# Patient Record
Sex: FEMALE | Race: WHITE | Employment: UNEMPLOYED | URBAN - METROPOLITAN AREA
[De-identification: names, ages, dates, MRNs, and addresses within clinical notes are randomized per-mention and may not be internally consistent; named-entity substitution may affect disease eponyms.]

---

## 2017-01-03 ENCOUNTER — OFFICE VISIT (OUTPATIENT)
Dept: OBGYN CLINIC | Age: 62
End: 2017-01-03

## 2017-01-03 ENCOUNTER — HOSPITAL ENCOUNTER (OUTPATIENT)
Dept: LAB | Age: 62
Discharge: HOME | End: 2017-01-03
Payer: MEDICARE

## 2017-01-03 VITALS
SYSTOLIC BLOOD PRESSURE: 133 MMHG | WEIGHT: 205 LBS | BODY MASS INDEX: 35 KG/M2 | HEIGHT: 64 IN | HEART RATE: 84 BPM | DIASTOLIC BLOOD PRESSURE: 74 MMHG | RESPIRATION RATE: 18 BRPM

## 2017-01-03 DIAGNOSIS — Z01.419 ENCOUNTER FOR GYNECOLOGICAL EXAMINATION WITHOUT ABNORMAL FINDING: Primary | ICD-10-CM

## 2017-01-03 DIAGNOSIS — Z87.448 HISTORY OF HEMATURIA: ICD-10-CM

## 2017-01-03 LAB
BILIRUB UR QL STRIP: NEGATIVE
GLUCOSE UR-MCNC: NEGATIVE MG/DL
KETONES P FAST UR STRIP-MCNC: NEGATIVE MG/DL
PH UR STRIP: 6 [PH] (ref 4.6–8)
PROT UR QL STRIP: NEGATIVE MG/DL
SP GR UR STRIP: 1.02 (ref 1–1.03)
UA UROBILINOGEN AMB POC: NORMAL (ref 0.2–1)
URINALYSIS CLARITY POC: CLEAR
URINALYSIS COLOR POC: YELLOW
URINE BLOOD POC: NORMAL
URINE LEUKOCYTES POC: NEGATIVE
URINE NITRITES POC: NEGATIVE

## 2017-01-03 PROCEDURE — 88175 CYTOPATH C/V AUTO FLUID REDO: CPT | Performed by: OBSTETRICS & GYNECOLOGY

## 2017-01-03 PROCEDURE — 87624 HPV HI-RISK TYP POOLED RSLT: CPT | Performed by: OBSTETRICS & GYNECOLOGY

## 2017-01-03 NOTE — PROGRESS NOTES
Subjective:   62 y.o. female for Well Woman Check. No LMP recorded. Patient is postmenopausal.    Social History: not sexually active. Pertinent past medical hstory: liver disease, former smoker, no history of HTN, DVT, CAD, DM, or migraines. Current Outpatient Prescriptions   Medication Sig Dispense Refill    omeprazole (PRILOSEC) 20 mg capsule Take 1 Cap by mouth daily. 30 Cap 2    levothyroxine (SYNTHROID) 125 mcg tablet Take 1 Tab by mouth Daily (before breakfast). 90 Tab 1    tiZANidine (ZANAFLEX) 4 mg tablet Take 1 Tab by mouth every six (6) hours. 120 Tab 0    meloxicam (MOBIC) 7.5 mg tablet Take 1 Tab by mouth daily. 30 Tab 2    traMADol (ULTRAM) 50 mg tablet Take 1 Tab by mouth three (3) times daily as needed for Pain. Max Daily Amount: 150 mg. Indications: PAIN 90 Tab 0    zolpidem (AMBIEN) 10 mg tablet   0    benztropine (COGENTIN) 1 mg tablet Take 1 mg by mouth three (3) times daily.  ARIPiprazole (ABILIFY) 5 mg tablet Take 5 mg by mouth daily.        Allergies   Allergen Reactions    Adhesive Tape-Silicones Rash     Past Medical History   Diagnosis Date    Abnormal Papanicolaou smear of cervix      1988 HPV    Asthma     Bipolar 1 disorder (HCC)     Bipolar 1 disorder, depressed (HCC)     Bursitis     Carpal tunnel syndrome     Cirrhosis, hepatitis C     Connective tissue disease ([address]     DDD (degenerative disc disease), lumbosacral     Gastric ulcer     Hashimoto's disease     Hashimoto's disease     Hepatitis C     Herniated intervertebral disc of lumbar spine     Hypermobility syndrome     Liver disease     Osteoarthritis     Plantar fasciitis, bilateral     RA (rheumatoid arthritis) ([address]      Past Surgical History   Procedure Laterality Date    Hx appendectomy      Hx heent      Hx gyn       BTL    Hx dilation and curettage  1988     cryso     Family History   Problem Relation Age of Onset    No Known Problems Mother     No Known Problems Father Social History   Substance Use Topics    Smoking status: Former Smoker     Quit date: 7/1/2005    Smokeless tobacco: Never Used    Alcohol use No        ROS:  Feeling well. No dyspnea or chest pain on exertion. No abdominal pain, change in bowel habits, black or bloody stools. No urinary tract symptoms. GYN ROS: no breast pain or new or enlarging lumps on self exam, no vaginal bleeding, no discharge or pelvic pain, no hot flashes. She complains of a history of hematuria which has so far not been worked up. No neurological complaints. Objective:     Visit Vitals    /74    Pulse 84    Resp 18    Ht 5' 4\" (1.626 m)    Wt 205 lb (93 kg)    BMI 35.19 kg/m2     The patient appears well, alert, oriented x 3, in no distress. ENT normal.  Neck supple. No adenopathy or thyromegaly. BUBBA. Lungs are clear, good air entry, no wheezes, rhonchi or rales. S1 and S2 normal, no murmurs, regular rate and rhythm. Abdomen soft without tenderness, guarding, mass or organomegaly. Extremities show no edema, normal peripheral pulses. Neurological is normal, no focal findings. BREAST EXAM: breasts appear normal, no suspicious masses, no skin or nipple changes or axillary nodes    PELVIC EXAM: normal external genitalia, vulva, vagina, cervix, uterus and adnexa, PAP: Pap smear done today, HPV test    UA: SG 1.025, 2+ blood    Assessment/Plan:   well woman  Hematuria, will contact the patient's PCP for follow up.   Mammogram done  pap smear  return annually or prn

## 2017-01-05 ENCOUNTER — HOSPITAL ENCOUNTER (OUTPATIENT)
Dept: PHYSICAL THERAPY | Age: 62
Discharge: HOME | End: 2017-01-05
Payer: MEDICARE

## 2017-01-05 PROCEDURE — 97530 THERAPEUTIC ACTIVITIES: CPT

## 2017-01-05 PROCEDURE — 97110 THERAPEUTIC EXERCISES: CPT

## 2017-01-05 PROCEDURE — 97112 NEUROMUSCULAR REEDUCATION: CPT

## 2017-01-05 NOTE — PROGRESS NOTES
PT DAILY TREATMENT NOTE - [facility]     Patient Name: Brant De La Garza  Date:2017  : 1955  [x]  Patient  Verified  [place]or: [place] MED[place] / Plan: [place] MEDICARE PART A & B / Product Type: Medicare /    In time: 10:27 am  Out time:11:26 am  Total Treatment Time (min): 59  Total Timed Codes (min): 49  1:1 Treatment Time ( [address] only): 49   Visit #: 14 of 24    Treatment Area: Myalgia [M79.1]    SUBJECTIVE  Pain Level (0-10 scale): 0  Any medication changes, allergies to medications, adverse drug reactions, diagnosis change, or new procedure performed?: [x] No    [] Yes (see summary sheet for update)  Subjective functional status/changes:   [] No changes reported  \"I feel great. My energy level is good and my shoulder is doing great. \"    OBJECTIVE    Modality rationale: decrease pain to improve the patients ability to tolerate daily activities    Min Type Additional Details    [] Estim:  []Unatt       []IFC  []Premod                        []Other:  []w/ice   []w/heat  Position:  Location:    [] Estim: []Att    []TENS instruct  []NMES                    []Other:  []w/US   []w/ice   []w/heat  Position:  Location:    []  Traction: [] Cervical       []Lumbar                       [] Prone          []Supine                       []Intermittent   []Continuous Lbs:  [] before manual  [] after manual    []  Ultrasound: []Continuous   [] Pulsed                           []1MHz   []3MHz W/cm2:  Location:    []  Iontophoresis with dexamethasone         Location: [] Take home patch   [] In clinic   10 [x]  Ice     []  heat  []  Ice massage  []  Laser   []  Anodyne Position: sitting   Location: right shoulder    []  Laser with stim  []  Other:  Position:  Location:    []  Vasopneumatic Device Pressure:       [] lo [] med [] hi   Temperature: [] lo [] med [] hi   [x] Skin assessment post-treatment:  [x]intact []redness- no adverse reaction    []redness - adverse reaction:     29 min Therapeutic Exercise: [x] See flow sheet : Rationale: increase ROM, increase strength, improve coordination and increase proprioception to improve the patients ability to perform ADLs and ambulation with decreased pain     10 min Therapeutic Activity: [x] See flow sheet :   Rationale: increase ROM, increase strength, improve coordination and increase proprioception to improve the patients ability to perform ADLs and ambulation with decreased pain      10 min Neuromuscular Re-education: [x] See flow sheet :dry needling procedure note    Rationale: increase ROM, increase strength, improve coordination, increase proprioception and  perform ADLs and ambulation with decrease pain to improve the patients ability to perform ADLs and ambulation with decreased pain    Dry Needling Procedure Note    Procedure: An intramuscular manual therapy (dry needling) and a neuro-muscular re-education treatment was done to deactivate myofascial trigger points with a 30 gauge filament needle under aseptic technique. Indications:  [x] Myofascial pain and dysfunction [] Muscled spasms  [] Myalgia/myositis   [] Muscle cramps  [] Muscle imbalances  [] Temporomandibular Dysfunction  [] Other:    Chart reviewed for the following:  Nicol LING PT, DPT, have reviewed the medical history, summary list and precautions/contraindications for Pete Day.   TIME OUT performed immediately prior to start of procedure:  Nicol LING PT, SHALINIT, have performed the following reviews on Pete Day prior to the start of the session:      [x] Verified patient identification by name and date of birth    [x] Agreement on all muscles being treated was verified   [x] Purpose of dry needling, side effects, possible complications, risks and benefits were explained to the patient   [x] Procedure site(s) verified  [x] Patient was positioned for comfort and draped for privacy  [x] Informed Consent was signed (initial visit) and verified verbally (subsequent visits)  [x] Patient was instructed on the need to report the use of blood thinners and/or immunosuppressant medications  [x] How to respond to possible adverse effects of treatment  [x] Self treatment of post needling soreness: ice, heat (moist heat, heat wraps) and stretching  [x] Opportunity was given to ask any questions, all questions were answered            Time: 10:54 am  Date of procedure: 1/5/2017  Treatment: The following muscles were treated today with intramuscular dry needling  [] Left [] Right Masster  [] Left [] Right Temporalis  [] Left [] Right Zygomaticus Major / Minor  [] Left [] Right Lateral Pterygoid  [] Left [] Right Medial Pterygoid  [] Left [] Right Digastric Post / Anterior Belly  [] Left [] Right Sternocleidomastoid  [] Left [] Right Scalene Anterior / Medial / Posterior  [] Left [] Right Extra Laryngeal Muscles  [] Left [x] Right Upper Trapezius  [] Left [x] Right Middle Trapezius  [] Left [] Right Lower Trapezius  [] Left [] Right Oblique Capitis Inferior  [] Left [] Right Splenius Capitis / Cervicis  [] Left [] Right Semispinalis: Capitis / Cervicis  [] Left [] Right Multifidi / Rotatores Cervicis / Thoracic  [] Left [] Right Longissimus Thoracis / Illiocostalis  [] Left [] Right Levator Scapulae  [] Left [x] Right Supraspinatus / Infraspinatus  [] Left [] Right Teres Major / Minor  [] Left [] Right Rhomboids / Serratus posterior superior  [] Left [] Right Pectoralis Major / Minor  [] Left [] Right Serratus Anterior  [] Left [] Right Latissimus Dorsi  [] Left [] Right Subscapularis  [] Left [] Right Coracobrachialis  [] Left [] Right Biceps Brachii  [] Left [] Right Deltoid: Anterior / Medial / Posterior  [] Left [] Right Brachialis  [] Left [] Right Triceps  [] Left [] Right Brachioradialis  [] Left [] Right Extensor Carpi Radialis Brevis / Extensor Carpi Radialis Longus    [] Left [] Right  Extensor digitorum  [] Left [] Right Supinator / Pronator Teres  [] Left [] Right Flexor Carpi Radialis/ Flexor Carpi Ulnaris   [] Left [] Right  Flexor Digitorum Superficialis/ Flexor Digitorum Profundus  [] Left [] Right Flexor Pollicis Longus / Flexor Pollicis Brevis / Palmaris Longus  [] Left [] Right Abductor Pollicis Longus / Abductor Pollicis Brevis  [] Left [] Right Opponens Pollicis / Adductor Pollicis  [] Left [] Right Dorsal / Palmar Interossei / Lumbricalis  [] Left [] Right Abductor Digiti Minimi / Opponens Digiti Minimi    Patient's response to today's treatment:  [x] Latent Twitch Response     [] Muscle relaxation [] Pain Relief  [x] Post needling soreness    [x] without complications  [] Increased Range of Motion   [] Decreased headaches    [] Decreased Tinnitus  [] Other:     Performed by: Cristy Pena, PT, DPT              With   [] TE   [x] TA   [] neuro   [] other: Patient Education: [x] Review HEP    [] Progressed/Changed HEP based on:   [] positioning   [] body mechanics   [] transfers   [] heat/ice application    [] other:      Other Objective/Functional Measures:   FOTO 71     Pain Level (0-10 scale) post treatment: 0    ASSESSMENT/Changes in Function:   Pt reports minimal to no pain with right shoulder or L-spine. Has not lifted anything heavier than 5 pounds. Has started back to raking yard in short increments. Discussed leaving chart open until after follow-up with  and if no return of sx, D/C at that time. Patient will continue to benefit from skilled PT services to modify and progress therapeutic interventions, address functional mobility deficits, address ROM deficits, address strength deficits, analyze and address soft tissue restrictions, analyze and cue movement patterns, analyze and modify body mechanics/ergonomics, assess and modify postural abnormalities and instruct in home and community integration to attain remaining goals. []  See Plan of Care  []  See progress note/recertification  []  See Discharge Summary         Progress towards goals / Updated goals:  Long Term Goals:  To be accomplished in 6 weeks:  1) increase right shoulder strength to 4/5 MMT in all directions to allow toreach overhead in kitchen cabinets.    Status at evaluation:  Status at Last Progress Note/Recert: MMT Right Shoulder:  Flex: 4/5  Abd:4-/5  Scaption: 4-/, p! IR: 4.5  Er 4/5        Current Status: PROGRESSING  Strength Right Left   Shoulder Flex p! Strength 4   Scaption  4- 4   Abd p! 4   IR 4+ 4   ER p! 4       2) increase trunk rotation to 16 in or less to indicate mobility needed for gait.    Status at evaluation: Right: 17 in Left: 18 in  Status at Last Progress Note/Recert: NA  Current Status: Trunk Rot: 14 in bilaterally MET      3) pt will be able to get in/out of car without back pain.    Status at evaluation: pain with getting in and out of the car  Status at Last Progress Note/Recert: continued intermittent pain    Current Status:Progressing: pt reports decreased level of pain and improved ability to transfer out of car but intermittent LBP       4) improve FOTO by >5 points to indicate increase in function.   Status at evaluation: 60  Status at Last Progress Note/Recert: 60  Current status: MET 71    5) Pt will be able to reach overhead with Right UE without pain to increase tolerance to daily activities  Status last PN/Recert: pain  Current Status: No pain at this time - MET      PLAN  [x]  Upgrade activities as tolerated     []  Continue plan of care  []  Update interventions per flow sheet       []  Discharge due to:_  []  Other:_      Greg Clarke, PT, DPT 1/5/2017  10:34 AM    Future Appointments  Date Time Provider Aniya Brady   1/9/2017 11:00 AM Dontrell López NP [facility] SCHED   1/12/2017 11:45 AM Aliza Hernandez MD [facility] SCHED   1/26/2017 10:30 AM Dutch Alarcon  [address]

## 2017-01-12 ENCOUNTER — OFFICE VISIT (OUTPATIENT)
Dept: FAMILY MEDICINE CLINIC | Age: 62
End: 2017-01-12

## 2017-01-12 ENCOUNTER — HOSPITAL ENCOUNTER (OUTPATIENT)
Dept: LAB | Age: 62
Discharge: HOME | End: 2017-01-12
Payer: MEDICARE

## 2017-01-12 VITALS
OXYGEN SATURATION: 98 % | TEMPERATURE: 98.8 F | HEART RATE: 62 BPM | BODY MASS INDEX: 34.49 KG/M2 | RESPIRATION RATE: 18 BRPM | DIASTOLIC BLOOD PRESSURE: 100 MMHG | HEIGHT: 64 IN | SYSTOLIC BLOOD PRESSURE: 130 MMHG | WEIGHT: 202 LBS

## 2017-01-12 DIAGNOSIS — Z23 ENCOUNTER FOR IMMUNIZATION: ICD-10-CM

## 2017-01-12 DIAGNOSIS — E03.9 ACQUIRED HYPOTHYROIDISM: ICD-10-CM

## 2017-01-12 DIAGNOSIS — Z86.39 HISTORY OF HYPOKALEMIA: ICD-10-CM

## 2017-01-12 DIAGNOSIS — R31.9 HEMATURIA: Primary | ICD-10-CM

## 2017-01-12 DIAGNOSIS — R31.9 HEMATURIA: ICD-10-CM

## 2017-01-12 DIAGNOSIS — K21.9 GASTROESOPHAGEAL REFLUX DISEASE WITHOUT ESOPHAGITIS: ICD-10-CM

## 2017-01-12 LAB
ALBUMIN SERPL BCP-MCNC: 4.5 G/DL (ref 3.4–5)
ALBUMIN/GLOB SERPL: 1.3 {RATIO} (ref 0.8–1.7)
ALP SERPL-CCNC: 82 U/L (ref 45–117)
ALT SERPL-CCNC: 33 U/L (ref 13–56)
ANION GAP BLD CALC-SCNC: 13 MMOL/L (ref 3–18)
AST SERPL W P-5'-P-CCNC: 26 U/L (ref 15–37)
BILIRUB SERPL-MCNC: 1.1 MG/DL (ref 0.2–1)
BILIRUB UR QL STRIP: NORMAL
BUN SERPL-MCNC: 12 MG/DL (ref 7–18)
BUN/CREAT SERPL: 14 (ref 12–20)
CALCIUM SERPL-MCNC: 9.7 MG/DL (ref 8.5–10.1)
CHLORIDE SERPL-SCNC: 103 MMOL/L (ref 100–108)
CO2 SERPL-SCNC: 24 MMOL/L (ref 21–32)
CREAT SERPL-MCNC: 0.86 MG/DL (ref 0.6–1.3)
GLOBULIN SER CALC-MCNC: 3.4 G/DL (ref 2–4)
GLUCOSE SERPL-MCNC: 108 MG/DL (ref 74–99)
GLUCOSE UR-MCNC: NEGATIVE MG/DL
KETONES P FAST UR STRIP-MCNC: NEGATIVE MG/DL
PH UR STRIP: 6 [PH] (ref 4.6–8)
POTASSIUM SERPL-SCNC: 4.4 MMOL/L (ref 3.5–5.5)
PROT SERPL-MCNC: 7.9 G/DL (ref 6.4–8.2)
PROT UR QL STRIP: NEGATIVE MG/DL
SODIUM SERPL-SCNC: 140 MMOL/L (ref 136–145)
SP GR UR STRIP: 1.01 (ref 1–1.03)
T4 FREE SERPL-MCNC: 1.5 NG/DL (ref 0.7–1.5)
TSH SERPL DL<=0.05 MIU/L-ACNC: 6.43 UIU/ML (ref 0.36–3.74)
UA UROBILINOGEN AMB POC: NORMAL (ref 0.2–1)
URINALYSIS CLARITY POC: CLEAR
URINALYSIS COLOR POC: YELLOW
URINE BLOOD POC: NORMAL
URINE LEUKOCYTES POC: NORMAL
URINE NITRITES POC: NEGATIVE

## 2017-01-12 PROCEDURE — 84443 ASSAY THYROID STIM HORMONE: CPT | Performed by: FAMILY MEDICINE

## 2017-01-12 PROCEDURE — 80053 COMPREHEN METABOLIC PANEL: CPT | Performed by: FAMILY MEDICINE

## 2017-01-12 PROCEDURE — 84439 ASSAY OF FREE THYROXINE: CPT | Performed by: FAMILY MEDICINE

## 2017-01-12 RX ORDER — POTASSIUM CHLORIDE 20 MEQ/1
TABLET, EXTENDED RELEASE ORAL 2 TIMES DAILY
COMMUNITY
End: 2017-03-10

## 2017-01-12 RX ORDER — POTASSIUM CHLORIDE 20 MEQ/1
20 TABLET, EXTENDED RELEASE ORAL DAILY
Status: CANCELLED | OUTPATIENT
Start: 2017-01-12

## 2017-01-12 RX ORDER — OMEPRAZOLE 20 MG/1
20 CAPSULE, DELAYED RELEASE ORAL DAILY
Qty: 30 CAP | Refills: 2 | Status: SHIPPED | OUTPATIENT
Start: 2017-01-12 | End: 2017-02-24 | Stop reason: SDUPTHER

## 2017-01-12 NOTE — PROGRESS NOTES
HISTORY OF PRESENT ILLNESS  Juno Flowers is a 62 y.o. female. HPI Comments: Ms. Rubens Avila is here for her scheduled 3 month follow up. She is doing well. Since her last visit, she has caught up most of her health maintenance. She's had her mammogram and pap (normal), she got her shingles vaccine and was supposed to have the colonoscopy but it got delayed and is coming up soon. She has seen rheumatology, has gone through PT, including dry needling, and her various pains are much better now. In fact, she wants to start walking again to lose the 40 lbs that she's put on. She had hematuria at Dr. Alexa Trejo' office and upon further questioning, she told Dr. Alexa Trejo that she had been told that in the past. It has never been evaluated and needs to be. Her BP is up today (she says she's been stressed lately) but looking back most of her BPs are fine. She will monitor this. She had an old pill bottle of potassium tablets that she was told to take for muscle cramps. She asked for a refill, but there is a HIGH warning against using potassium tablets with Prilosec (it can cause gastric erosions and ulcers, and she actually has a history of a perforated gastric ulcer) She doesn't recall if she was on this combo back then or not. She injured herself 4 weeks ago and ended up with a mallet finger, currently splinted. Her liver doctor called her and said she was cured of Hep C, and her cirrhosis is gone based of the testing. Thyroid Problem   Pertinent negatives include no chest pain, no abdominal pain, no headaches and no shortness of breath. Arthritis   Pertinent negatives include no chest pain, no abdominal pain, no headaches and no shortness of breath. Review of Systems   Constitutional: Negative for chills and fever. HENT: Negative for congestion, ear pain and sore throat. Eyes: Negative for discharge and redness. Respiratory: Negative for cough and shortness of breath.     Cardiovascular: Negative for chest pain, palpitations and orthopnea. Gastrointestinal: Negative for abdominal pain, nausea and vomiting. Genitourinary: Positive for hematuria. Negative for frequency and urgency. Musculoskeletal: Positive for joint pain and myalgias. Skin: Negative for rash. Neurological: Negative for weakness and headaches. Endo/Heme/Allergies: Does not bruise/bleed easily. Physical Exam   Constitutional: Vital signs are normal. She appears well-developed and well-nourished. HENT:   Right Ear: Tympanic membrane and ear canal normal.   Left Ear: Tympanic membrane and ear canal normal.   Nose: Nose normal.   Mouth/Throat: Uvula is midline, oropharynx is clear and moist and mucous membranes are normal.   Eyes: Pupils are equal, round, and reactive to light. Neck: No thyromegaly present. Cardiovascular: Normal rate, regular rhythm and normal heart sounds. Pulmonary/Chest: Effort normal and breath sounds normal. No respiratory distress. She has no wheezes. She has no rales. Abdominal: She exhibits no distension. Lymphadenopathy:     She has no cervical adenopathy. Skin: No rash noted. Psychiatric: She has a normal mood and affect. Nursing note and vitals reviewed. Results for orders placed or performed in visit on 01/12/17   AMB POC URINALYSIS DIP STICK AUTO W/O MICRO   Result Value Ref Range    Color (UA POC) Yellow     Clarity (UA POC) Clear     Glucose (UA POC) Negative Negative    Bilirubin (UA POC) 1+ Negative    Ketones (UA POC) Negative Negative    Specific gravity (UA POC) 1.015 1.001 - 1.035    Blood (UA POC) 2+ Negative    pH (UA POC) 6.0 4.6 - 8.0    Protein (UA POC) Negative Negative mg/dL    Urobilinogen (UA POC) 0.2 mg/dL 0.2 - 1    Nitrites (UA POC) Negative Negative    Leukocyte esterase (UA POC) Trace Negative       ASSESSMENT and PLAN    ICD-10-CM ICD-9-CM    1.  Hematuria R31.9 599.70 AMB POC URINALYSIS DIP STICK AUTO W/O MICRO      REFERRAL TO UROLOGY METABOLIC PANEL, COMPREHENSIVE   2. Gastroesophageal reflux disease without esophagitis K21.9 530.81 omeprazole (PRILOSEC) 20 mg capsule      METABOLIC PANEL, COMPREHENSIVE   3. Elevated BP T63 107.5 METABOLIC PANEL, COMPREHENSIVE   4. History of hypokalemia B29.41 W27.14 METABOLIC PANEL, COMPREHENSIVE   5. Acquired hypothyroidism E03.9 244.9 T4, FREE      TSH 3RD GENERATION   6. Encounter for immunization Z23 V03.89 PNEUMOCOCCAL CONJ VACCINE 13 VALENT IM      ADMIN PNEUMOCOCCAL VACCINE       Monitor BP.   Refer urology  prevnar today

## 2017-01-12 NOTE — PATIENT INSTRUCTIONS
I will send you an e-mail with any recommendations about the lab results. Do not take the potassium pills while you take Prilosec. Recheck in 3 months.     Someone will contact you about the urology referral.

## 2017-01-12 NOTE — MR AVS SNAPSHOT
Visit Information Date & Time Provider Department Dept. Phone Encounter #  
 1/12/2017 11:45 AM Kellen Contreras  [facility] [phone] [id number] Follow-up Instructions Return in about 3 months (around 4/12/2017). Your Appointments 1/16/2017 11:00 AM  
New Patient with Jairo Rocha NP [address] ([facility]) Appt Note: Colon Cancer Screening- Referred by Dr. Kassy Pabon; RE: R/S appt. ..JDG; $0 cp/ pwk/ photo ID/ ins card/ MSPQ. ....Manjeet Karri; R/S appointment from [address]  
  
    
 1/26/2017 10:30 AM  
Follow Up with Melissa Grubbs MD  
[facility], [address] [facility]) Appt Note: 3 MONTH FU/NO COPAY; PT R/S FROM 1/9/17  
 [address] [facility] [address]  
[phone]  
  
   
 [address] [facility] [address] Upcoming Health Maintenance Date Due COLONOSCOPY 10/21/1974 Pneumococcal 19-64 Medium Risk (1 of 1 - PPSV23) 10/21/1975 BREAST CANCER SCRN MAMMOGRAM 7/13/2018 PAP AKA CERVICAL CYTOLOGY 1/3/2022 DTaP/Tdap/Td series (2 - Td) 10/8/2024 Allergies as of 1/12/2017  Review Complete On: 1/12/2017 By: Kellen Contreras MD  
  
 Severity Noted Reaction Type Reactions Adhesive Tape-silicones  47/15/6810   Not Verified Rash Current Immunizations  Never Reviewed Name Date Hep A Vaccine 9/12/2012, 4/10/2012, 3/5/2012 Hep B Vaccine 9/12/2012, 4/10/2012, 3/5/2012 Influenza Vaccine 10/8/2014, 11/1/2013 Influenza Vaccine (Quad) PF 10/12/2016 Pneumococcal Conjugate (PCV-13)  Incomplete Tdap 10/8/2014 Not reviewed this visit You Were Diagnosed With   
  
 Codes Comments Hematuria    -  Primary ICD-10-CM: R31.9 ICD-9-CM: 599.70   
 Gastroesophageal reflux disease without esophagitis     ICD-10-CM: K21.9 ICD-9-CM: 530.81 Elevated BP     ICD-10-CM: I10 
ICD-9-CM: 401.9 History of hypokalemia     ICD-10-CM: Z86.39 
ICD-9-CM: V12.29 Acquired hypothyroidism     ICD-10-CM: E03.9 ICD-9-CM: 244.9 Encounter for immunization     ICD-10-CM: Q23 ICD-9-CM: V03.89 Vitals BP Pulse Temp Resp Height(growth percentile) Weight(growth percentile) (!) 130/100 (BP 1 Location: Left arm, BP Patient Position: Sitting) 62 98.8 °F (37.1 °C) (Oral) 18 5' 4\" (1.626 m) 202 lb (91.6 kg) SpO2 BMI OB Status Smoking Status 98% 34.67 kg/m2 Postmenopausal Former Smoker Vitals History BMI and BSA Data Body Mass Index Body Surface Area  
 34.67 kg/m 2 2.03 m 2 Preferred Pharmacy [facility] Phone [id number] [place] [address] [facility] [phone] Your Updated Medication List  
  
   
This list is accurate as of: 1/12/17 11:59 AM.  Always use your most recent med list.  
  
  
  
  
 ABILIFY 5 mg tablet Generic drug:  ARIPiprazole Take 5 mg by mouth daily. benztropine 1 mg tablet Commonly known as:  COGENTIN Take 1 mg by mouth three (3) times daily. levothyroxine 125 mcg tablet Commonly known as:  SYNTHROID Take 1 Tab by mouth Daily (before breakfast). meloxicam 7.5 mg tablet Commonly known as:  MOBIC Take 1 Tab by mouth daily. omeprazole 20 mg capsule Commonly known as:  PriLOSEC Take 1 Cap by mouth daily. potassium chloride 20 mEq tablet Commonly known as:  K-DUR, KLOR-CON Take  by mouth two (2) times a day. tiZANidine 4 mg tablet Commonly known as:  Selinda Lovings Take 1 Tab by mouth every six (6) hours. zolpidem 10 mg tablet Commonly known as:  AMBIEN Prescriptions Sent to Pharmacy Refills  
 omeprazole (PRILOSEC) 20 mg capsule 2 Sig: Take 1 Cap by mouth daily. Class: Normal  
 Pharmacy: [address] [place] [address] [address] [facility] #: [phone] Route: Oral  
  
We Performed the Following ADMIN PNEUMOCOCCAL VACCINE [ HCP] AMB POC URINALYSIS DIP STICK AUTO W/O MICRO [46935 CPT(R)] PNEUMOCOCCAL CONJ VACCINE 13 VALENT IM [id number] CPT(R)] REFERRAL TO UROLOGY [[id number] Custom] Comments:  
 Please evaluate patient for persistent hematuria Brigid Frederick Follow-up Instructions Return in about 3 months (around 4/12/2017). To-Do List   
 01/12/2017 Lab:  METABOLIC PANEL, COMPREHENSIVE   
  
 01/12/2017 Lab:  T4, FREE   
  
 01/12/2017 Lab:  TSH 3RD GENERATION Referral Information Referral ID Referred By Referred To  
  
 [id number] Von Valles MD   
   [address] [place] [address], [address] [place] [address] Phone: [phone] Fax: [phone] Visits Status Start Date End Date 1 New Request 1/12/17 1/12/18 If your referral has a status of pending review or denied, additional information will be sent to support the outcome of this decision. Patient Instructions I will send you an e-mail with any recommendations about the lab results. Do not take the potassium pills while you take Prilosec. Recheck in 3 months. Someone will contact you about the urology referral. 
 
  
Introducing [facility]! Dear Caroline Ferrari: Thank you for requesting a Greenbox account. Our records indicate that you already have an active Greenbox account. You can access your account anytime at [email or web address] Did you know that you can access your hospital and ER discharge instructions at any time in Greenbox? You can also review all of your test results from your hospital stay or ER visit. Additional Information If you have questions, please visit the Frequently Asked Questions section of the Trac[employer] website at [email or web address] Remember, Tracab is NOT to be used for urgent needs. For medical emergencies, dial 911. Now available from your iPhone and Android! Please provide this summary of care documentation to your next provider. Your primary care clinician is listed as Albaro Anedrs. If you have any questions after today's visit, please call [phone].

## 2017-01-12 NOTE — PROGRESS NOTES
Pt received PCV-13 vaccine 0.5ml in left deltoid. Tolerated well. No signs or symptoms of distress noted. Most current VIS given and consent signed.

## 2017-01-12 NOTE — PROGRESS NOTES
Rm 2  Pt presents to the clinic for a follow-up regarding her back pain and thyroid issues. Pt also requested med refills. Requested Prescriptions     Pending Prescriptions Disp Refills    omeprazole (PRILOSEC) 20 mg capsule 30 Cap 2     Sig: Take 1 Cap by mouth daily.  potassium chloride (K-DUR, KLOR-CON) 20 mEq tablet       Sig: Take 1 Tab by mouth daily. Flu shot requested: no    Depression Screening Completed: yes    Learning Assessment Completed: yes    Abuse Screening Completed: yes    Health Maintenance reviewed and discussed per provider: yes    Advance Directive:    1. Do you have an advance directive in place? Patient Reply: no    2. If not, would you like material regarding how to put one in place? Patient Reply: no     Coordination of Care:    1. Have you been to the ER, urgent care clinic since your last visit? Hospitalized since your last visit? no    2. Have you seen or consulted any other health care providers outside of the [address] [place] [address] since your last visit? Include any pap smears or colon screening.  no

## 2017-01-16 ENCOUNTER — OFFICE VISIT (OUTPATIENT)
Dept: SURGERY | Age: 62
End: 2017-01-16

## 2017-01-16 VITALS
BODY MASS INDEX: 34.49 KG/M2 | HEIGHT: 64 IN | TEMPERATURE: 97.9 F | RESPIRATION RATE: 20 BRPM | DIASTOLIC BLOOD PRESSURE: 88 MMHG | HEART RATE: 88 BPM | SYSTOLIC BLOOD PRESSURE: 134 MMHG | WEIGHT: 202 LBS

## 2017-01-16 DIAGNOSIS — R15.9 URINARY AND FECAL INCONTINENCE: ICD-10-CM

## 2017-01-16 DIAGNOSIS — Z80.0 FAMILY HISTORY OF COLON CANCER REQUIRING SCREENING COLONOSCOPY: Primary | ICD-10-CM

## 2017-01-16 DIAGNOSIS — R32 URINARY AND FECAL INCONTINENCE: ICD-10-CM

## 2017-01-16 RX ORDER — POLYETHYLENE GLYCOL 3350, SODIUM CHLORIDE, POTASSIUM CHLORIDE, SODIUM BICARBONATE, AND SODIUM SULFATE 240; 5.84; 2.98; 6.72; 22.72 G/4L; G/4L; G/4L; G/4L; G/4L
4 POWDER, FOR SOLUTION ORAL
Qty: 4 L | Refills: 0 | Status: SHIPPED | OUTPATIENT
Start: 2017-01-16 | End: 2017-01-16

## 2017-01-16 NOTE — PROGRESS NOTES
Jose Miguel Rgoers is a 62 y.o. female who presents today with   Chief Complaint   Patient presents with    Colon Cancer Screening     Consult       1. Have you been to the ER, urgent care clinic since your last visit? Hospitalized since your last visit? No    2. Have you seen or consulted any other health care providers outside of the [address] [place] [address] since your last visit? Include any pap smears or colon screening.  No

## 2017-01-16 NOTE — PROGRESS NOTES
[address]  [address] [address]  [facility]  [phone]    Colonoscopy History and Physical    Patient: Mariama Goldstein MRN: [id number]  SSN: [id number]    YOB: 1955  Age: 62 y.o. Sex: female      Subjective:      Mariama Goldstein is a 62 y.o. female who was referred by Dr. Juan R Schuster for colonoscopy for   Family hx of anal cancer. .  Her last colon screening was over 4 years ago and was negative per the patient. Her sister who is 77 was diagnosed with anal cancer last year. The patient denies any rectal bleeding, change in bowel habits, weight changes, nor any abdominal pain. She denies constipation, vomiting, diarrhea, bloody stools, mucousy stools, difficulty swallowing, loss of appetite, reflux and nausea. She does c/o fecal leakage which has been going on for several years. She states she has 2 BM/day and has leakage throughout the day. I did make an appointment for her to see Devan Mendez in Feb for urinary and anal leakage to see is she is a candidate for Interstem.         Past Medical History   Diagnosis Date    Abnormal Papanicolaou smear of cervix      1988 HPV    Asthma     Bipolar 1 disorder (HCC)     Bipolar 1 disorder, depressed (HCC)     Bursitis     Carpal tunnel syndrome     Cirrhosis, hepatitis C     Connective tissue disease ([address]     DDD (degenerative disc disease), lumbosacral     Gastric ulcer     Hashimoto's disease     Hashimoto's disease     Hepatitis C     Herniated intervertebral disc of lumbar spine     Hypermobility syndrome     Liver disease     Osteoarthritis     Plantar fasciitis, bilateral     RA [facility]rheumatoid arthritis) [facility] [address]      Past Surgical History   Procedure Laterality Date    Hx appendectomy      Hx heent      Hx gyn       BTL    Hx dilation and curettage  1988     cry      Family History   Problem Relation Age of Onset    No Known Problems Mother     No Known Problems Father Social History   Substance Use Topics    Smoking status: Former Smoker     Quit date: 7/1/2005    Smokeless tobacco: Never Used    Alcohol use No      Prior to Admission medications    Medication Sig Start Date End Date Taking? Authorizing Provider   potassium chloride (K-DUR, KLOR-CON) 20 mEq tablet Take  by mouth two (2) times a day. Yes Historical Provider   omeprazole (PRILOSEC) 20 mg capsule Take 1 Cap by mouth daily. 1/12/17  Yes Cecilia Morales MD   levothyroxine (SYNTHROID) 125 mcg tablet Take 1 Tab by mouth Daily (before breakfast). 11/28/16  Yes Cecilia Morales MD   tiZANidine (ZANAFLEX) 4 mg tablet Take 1 Tab by mouth every six (6) hours. 11/28/16  Yes Cecilia Morales MD   meloxicam (MOBIC) 7.5 mg tablet Take 1 Tab by mouth daily. 11/28/16  Yes Cecilia Morales MD   zolpidem (AMBIEN) 10 mg tablet  7/1/16  Yes Historical Provider   benztropine (COGENTIN) 1 mg tablet Take 1 mg by mouth three (3) times daily. Yes Historical Provider   ARIPiprazole (ABILIFY) 5 mg tablet Take 5 mg by mouth daily. Yes Historical Provider        Allergies   Allergen Reactions    Adhesive Tape-Silicones Rash       Review of Systems:  Review of systems performed with findings as noted. Objective:     Vitals:    01/16/17 1057   BP: 134/88   Pulse: 88   Resp: 20   Temp: 97.9 °F (36.6 °C)   TempSrc: Oral   Weight: 91.6 kg (202 lb)   Height: 5' 4\" (1.626 m)        Physical Exam:  GENERAL: alert, cooperative, no distress, appears stated age  LUNG: clear to auscultation bilaterally  HEART: regular rate and rhythm  ABDOMEN: soft, non-tender. Bowel sounds normal. No masses,  no organomegaly  NEUROLOGIC: negative  PSYCHIATRIC: non focal    Assessment:   Axel Dejesus is a 62 y.o. female who presents for colonoscopy for   Family hx of anal cancer. Plan:   1. I recommend proceeding with colonoscopy. The patient was in full agreement and was eager to proceed.     2. I discussed the details of the colonoscopy procedure. The risks of colonoscopy were discussed including colon injury/perforation, anesthesia issues, bleeding, and the possibility of incomplete examination. The patient was willing to accept these risks and proceed with the examination. All questions were answered to the patient's satisfaction. 3. The patient was provided with the instructions in preparation for the colonoscopy procedure including the bowel prep recommendations.                Signed By: Cadence Gruber NP     January 16, 2017

## 2017-01-18 ENCOUNTER — TELEPHONE (OUTPATIENT)
Dept: SURGERY | Age: 62
End: 2017-01-18

## 2017-01-18 ENCOUNTER — TELEPHONE (OUTPATIENT)
Dept: FAMILY MEDICINE CLINIC | Age: 62
End: 2017-01-18

## 2017-01-18 RX ORDER — LEVOTHYROXINE SODIUM 137 UG/1
137 TABLET ORAL
Qty: 90 TAB | Refills: 1 | Status: SHIPPED | OUTPATIENT
Start: 2017-01-18 | End: 2017-06-12 | Stop reason: SDUPTHER

## 2017-01-18 NOTE — TELEPHONE ENCOUNTER
CALLING PT BACK REGARDING SCHEDULING HER COLONOSCOPY. PT STATED HER DAUGHTER IN LAW MIGHT BE ABLE TO TAKE HER. SCHEDULED PT FOR 2/9/17 AT 1PM (ARRIVAL TIME 12PM.) PT STATES SHE DOES HAVE HER PREP INSTRUCTION SHEET.

## 2017-01-24 RX ORDER — EPINEPHRINE 0.1 MG/ML
1 INJECTION INTRACARDIAC; INTRAVENOUS
Status: CANCELLED | OUTPATIENT
Start: 2017-01-24 | End: 2017-01-24

## 2017-01-24 RX ORDER — FLUMAZENIL 0.1 MG/ML
0.2 INJECTION INTRAVENOUS
Status: CANCELLED | OUTPATIENT
Start: 2017-01-24 | End: 2017-01-24

## 2017-01-24 RX ORDER — ATROPINE SULFATE 0.1 MG/ML
0.5 INJECTION INTRAVENOUS
Status: CANCELLED | OUTPATIENT
Start: 2017-01-24 | End: 2017-01-24

## 2017-01-24 RX ORDER — DEXTROMETHORPHAN/PSEUDOEPHED 2.5-7.5/.8
1.2 DROPS ORAL
Status: CANCELLED | OUTPATIENT
Start: 2017-01-24

## 2017-01-24 RX ORDER — SODIUM CHLORIDE 0.9 % (FLUSH) 0.9 %
5-10 SYRINGE (ML) INJECTION AS NEEDED
Status: CANCELLED | OUTPATIENT
Start: 2017-01-24 | End: 2017-01-24

## 2017-01-24 RX ORDER — SODIUM CHLORIDE 9 MG/ML
25 INJECTION, SOLUTION INTRAVENOUS CONTINUOUS
Status: CANCELLED | OUTPATIENT
Start: 2017-01-24 | End: 2017-01-24

## 2017-01-24 RX ORDER — SODIUM CHLORIDE 0.9 % (FLUSH) 0.9 %
5-10 SYRINGE (ML) INJECTION EVERY 8 HOURS
Status: CANCELLED | OUTPATIENT
Start: 2017-01-24 | End: 2017-01-24

## 2017-01-24 RX ORDER — NALOXONE HYDROCHLORIDE 0.4 MG/ML
0.4 INJECTION, SOLUTION INTRAMUSCULAR; INTRAVENOUS; SUBCUTANEOUS
Status: CANCELLED | OUTPATIENT
Start: 2017-01-24 | End: 2017-01-24

## 2017-01-24 RX ORDER — MIDAZOLAM HYDROCHLORIDE 1 MG/ML
.25-5 INJECTION, SOLUTION INTRAMUSCULAR; INTRAVENOUS
Status: CANCELLED | OUTPATIENT
Start: 2017-01-24 | End: 2017-01-24

## 2017-01-26 ENCOUNTER — OFFICE VISIT (OUTPATIENT)
Dept: ORTHOPEDIC SURGERY | Age: 62
End: 2017-01-26

## 2017-01-26 VITALS
HEART RATE: 86 BPM | WEIGHT: 204 LBS | DIASTOLIC BLOOD PRESSURE: 67 MMHG | BODY MASS INDEX: 34.83 KG/M2 | HEIGHT: 64 IN | SYSTOLIC BLOOD PRESSURE: 133 MMHG

## 2017-01-26 DIAGNOSIS — M79.18 MYOFASCIAL PAIN: Primary | ICD-10-CM

## 2017-01-26 RX ORDER — DICLOFENAC SODIUM 10 MG/G
GEL TOPICAL
Refills: 0 | COMMUNITY
Start: 2016-12-16

## 2017-01-26 RX ORDER — ALBUTEROL SULFATE 90 UG/1
AEROSOL, METERED RESPIRATORY (INHALATION)
Refills: 0 | COMMUNITY
Start: 2016-10-21 | End: 2017-12-01 | Stop reason: SDUPTHER

## 2017-01-26 NOTE — MR AVS SNAPSHOT
Visit Information Date & Time Provider Department Dept. Phone Encounter #  
 1/26/2017 10:30 AM Faustino Alicia MD [facility] [phone] [id number] Follow-up Instructions Return if symptoms worsen or fail to improve. Your Appointments 4/13/2017 11:00 AM  
Office Visit with Meet Porter MD  
[employer] [facility]) Appt Note: 3 month follow up Tramaine Aponte [address] [place] [address]  
[phone]  
  
   
 [address] [id number] Upcoming Health Maintenance Date Due Pneumococcal 19-64 Medium Risk (1 of 1 - PPSV23) 10/21/1975 BREAST CANCER SCRN MAMMOGRAM 7/13/2018 PAP AKA CERVICAL CYTOLOGY 1/3/2022 DTaP/Tdap/Td series (2 - Td) 10/8/2024 COLONOSCOPY 1/18/2027 Allergies as of 1/26/2017  Review Complete On: 1/26/2017 By: Debbie Martines Severity Noted Reaction Type Reactions Adhesive Tape-silicones  94/32/2120   Not Verified Rash Current Immunizations  Never Reviewed Name Date Hep A Vaccine 9/12/2012, 4/10/2012, 3/5/2012 Hep B Vaccine 9/12/2012, 4/10/2012, 3/5/2012 Influenza Vaccine 10/8/2014, 11/1/2013 Influenza Vaccine (Quad) PF 10/12/2016 Pneumococcal Conjugate (PCV-13) 1/12/2017 Tdap 10/8/2014 Not reviewed this visit You Were Diagnosed With   
  
 Codes Comments Myofascial pain    -  Primary ICD-10-CM: M79.1 ICD-9-CM: 729.1 Vitals BP Pulse Height(growth percentile) Weight(growth percentile) BMI OB Status 133/67 86 5' 4\" (1.626 m) 204 lb (92.5 kg) 35.02 kg/m2 Postmenopausal  
 Smoking Status Former Smoker Vitals History BMI and BSA Data Body Mass Index Body Surface Area 35.02 kg/m 2 2.04 m 2 Preferred Pharmacy Pharmacy Name Phone [id number] [address] [place], [place] - [address] [facility] [phone] Your Updated Medication List  
  
 This list is accurate as of: 1/26/17 11:19 AM.  Always use your most recent med list.  
  
  
  
  
 ABILIFY 5 mg tablet Generic drug:  ARIPiprazole Take 5 mg by mouth daily. benztropine 1 mg tablet Commonly known as:  COGENTIN Take 1 mg by mouth three (3) times daily. diclofenac 1 % Gel Commonly known as:  VOLTAREN  
apply 2 grams to affected area four times a day  
  
 levothyroxine 137 mcg tablet Commonly known as:  SYNTHROID Take 137 mcg by mouth Daily (before breakfast). meloxicam 7.5 mg tablet Commonly known as:  MOBIC Take 1 Tab by mouth daily. omeprazole 20 mg capsule Commonly known as:  PriLOSEC Take 1 Cap by mouth daily. potassium chloride 20 mEq tablet Commonly known as:  K-DUR, KLOR-CON Take  by mouth two (2) times a day. PROAIR HFA 90 mcg/actuation inhaler Generic drug:  albuterol  
  
 tiZANidine 4 mg tablet Commonly known as:  Clearance Cornell Take 1 Tab by mouth every six (6) hours. zolpidem 10 mg tablet Commonly known as:  AMBIEN Follow-up Instructions Return if symptoms worsen or fail to improve. Patient Instructions What Is Myofascial Pain Syndrome? Myofascial pain syndrome is a chronic pain disorder that affects fascia (connective tissue that covers the muscles) and is characterized by muscle pain, tenderness, and spasm. The syndrome can involve a single muscle or a muscle group. Myofascial pain syndrome typically affects muscles in asymmetric areas of the body. The precise cause of the syndrome is unknown. As with most chronic pain conditions, associated symptoms may include poor sleep, fatigue, depression, and behavioral disturbances. In myofascial pain syndrome, there are focal tender points in muscles called trigger points. A trigger point is usually within a taut band of muscle that can be felt by the examiner.  They can ultimately be identified by the examiner applying pressure with one to three fingers and the thumb. Patients usually exhibit a jump sign when a trigger point is pressed. The patient with a positive jump sign may wince, cry out, and withdraw from the pressure being applied by an examiner. The pain may be referred, or felt at a distance away from the area being compressed. Trigger points can occur in muscles, ligaments, fascia, and periosteum (the membrane surrounding a bone). Pain in trigger points can be made worse with activity or stress. Currently, four types of trigger points can be distinguished: 
 
-An active trigger point is an area of extreme tenderness usually within a taut muscle or muscle group 
-A latent trigger point is an inactive area with the potential to act like a trigger point 
-A secondary trigger point is a highly irritable area in a muscle or muscle group that can be activated due to a trigger point or overload in another muscle or muscle group 
-A satellite trigger point is a highly irritable spot in a muscle or muscle group that becomes active because the muscle is in the region of another trigger point Causes Of Myofascial Pain Syndrome No one single factor can be identified as causing myofascial pain syndrome. The syndrome may develop from a muscle injury or excessive strain on a certain muscle or muscle group, ligament, or tendon. In addition, the following factors may be contributors: 
 
Trauma to the discs between the backbones, or vertebrae Inflammatory conditions Lack of blood flow to heart muscle Deconditioning from lack of exercise General fatigue Nutritional deficiencies Hormonal changes Obesity Intense cooling of parts of the body Use of tobacco 
Repetitive motions Overuse of a muscle Alcohol or drug abuse Long-term emotional stress Treatments For Myofascial Pain Syndrome Myofascial pain syndrome is best treated with a team of various medical professionals and various forms of therapies. The treatment team may consist of a primary care physician, a specialist in [address] [facility], nurses, physical and occupational therapists, massage therapists, and psychologists. The therapies may be carried out through drug and non-drug means. Current practice is combining non-drug therapies with short-term drug therapies for longer lasting and maximal benefits. Various drugs can be used in the treatment of myofascial pain syndrome. Non-steroidal anti-inflammatory drugs (NSAIDs) such as aspirin (Cierra), ibuprofen (Advil), and naproxen sodium (Aleve) can be used short term to reduce acute pain and inflammation. Acetaminophen (Tylenol) and oral narcotics such as oxycodone and hydrocodone may also be used in the short term for pain relief. Tricyclic antidepressants such as trazodone (Desyrel) and amitriptyline (Elavil) can be used at bedtime to improve sleep as well as relieve pain. Muscle relaxants such as cyclobenzaprine (Flexeril) and carisoprodol (Soma) can be used to relax muscle spasm and improve sleep, as they can have a sedating effect. Antidepressants such as fluoxetine (Prozac), sertraline (Zoloft), and duloxetine (Cymbalta) can be helpful with the chronic pain of myofascial pain syndrome. Anti-seizure medications such as gabapentin (Neurontin) and pregabalin (Lyrica) can also be helpful with the chronic pain of the syndrome. Capsaicin cream, a topical pain reliever derived from chili peppers, may also be helpful with the chronic pain of myofascial pain syndrome. Injections can also be utilized in the treatment of myofascial pain syndrome. Trigger point injections and botulinum toxin (Botox) injections are two areas of recent interest. Trigger point injections involve direct injection of a local anesthetic into the trigger point. This method is very effective when there are only a few precisely located trigger points. Botox can also be directly injected into trigger points. This method has produced inconsistent results. Various non-drug therapies can be utilized in the treatment of myofascial pain syndrome. Physical therapy is one of the best treatments for the syndrome. The stretch and spray method has also been used with some success for treatment of the syndrome. It involves spraying the muscle or muscle group containing the trigger point with a coolant, such as flourimethane, and then slowly stretching the muscle. Massage therapy is another non-drug treatment used in the treatment of the syndrome. Massage therapists exert ischemic compression, which is the application of progressively stronger pressure on a trigger point for the purpose of eliminating its tenderness. Conclusion Myofascial pain syndrome is a chronic pain disorder that affects muscle and the fascia covering the muscle. Key to the diagnosis and treatment of the syndrome is the identification of trigger points, which are areas of extreme tenderness in bands of taut muscle. No one knows the exact cause of the disorder. The treatment of myofascial pain syndrome is best done by a multidisciplinary team utilizing various drug and non-drug therapies. The combination of physical therapy, trigger point injections, and massage are routinely used with some success in the treatment of myofascial pain syndrome. [email or web address]  
 
  
Introducing [facility]! Dear Terry Best: Thank you for requesting a [employer] account. Our records indicate that you already have an active Vdancer account. You can access your account anytime at [email or web address] Did you know that you can access your hospital and ER discharge instructions at any time in Vdancer? You can also review all of your test results from your hospital stay or ER visit. Additional Information If you have questions, please visit the Frequently Asked Questions section of the [email or web address] website at [email or web address] Remember, [employer] is NOT to be used for urgent needs. For medical emergencies, dial 911. Now available from your iPhone and Android! Please provide this summary of care documentation to your next provider. Your primary care clinician is listed as Albaro Anders. If you have any questions after today's visit, please call [phone].

## 2017-01-26 NOTE — PROGRESS NOTES
[address]  [place] [address]  Phone: [phone]  Fax: [phone]        Asya Tompkins  : 1955  PCP: Phoebe Murray MD  2017    PROGRESS NOTE      ASSESSMENT AND PLAN    Leonid Maynard comes in to the office today for a cervical and lumbar PT continuation f/u. She has found significant relief with her visits and is experiencing little to no pain today. She only has complaints of an sporadic, low grade pain in her lumbar region. Pt will continue using her HEP to mitigate her remaining pain. Pt mentions she recently had a mechanical fall which injured her right shoulder. She was treated with a cortisone injection and PT which has provided relief. She was given counseling to avoid future falls. Pt will f/u prn. Jacinta Aschoff was seen today for follow-up. Diagnoses and all orders for this visit:    Myofascial pain         Follow-up Disposition:  Return if symptoms worsen or fail to improve. HISTORY OF PRESENT ILLNESS  Leonid Maynard is a 62 y.o. female. A&P / HPI from 2016:  Sukh Hartmann comes in to the office today c/o cervical and lumbar pain. She has brought her MRI with her today which does not reveal any obvious causes for her pain. She has a diagnosis of myofascial pain.     She was referred to PT as it has previously provided significant relief for her pain. Pt was advised to continue taking Mobic prn.     Pt will f/u in 6 weeks or prn. Updates from 17:  Pt presents for a cervical and lumbar PT continuation f/u. She has found significant relief with her visits and is experiencing little to no pain today. She only has complaints of an sporadic, low grade pain in her lumbar region. Pt mentions she recently had a mechanical fall which injured her right shoulder. She was treated with a cortisone injection and PT which has provided relief.       PAST MEDICAL HISTORY   Past Medical History Diagnosis Date    Abnormal Papanicolaou smear of cervix      1988 HPV    Asthma     Bipolar 1 disorder (HCC)     Bipolar 1 disorder, depressed (HCC)     Bursitis     Carpal tunnel syndrome     Cirrhosis, hepatitis C     Connective tissue disease ([address]     DDD (degenerative disc disease), lumbosacral     Gastric ulcer     Hashimoto's disease     Hashimoto's disease     Hepatitis C     Herniated intervertebral disc of lumbar spine     Hypermobility syndrome     Liver disease     Osteoarthritis     Plantar fasciitis, bilateral     RA (rheumatoid arthritis) ([address]        Past Surgical History   Procedure Laterality Date    Hx appendectomy      Hx heent      Hx gyn       BTL    Hx dilation and curettage  1988     cryso   . MEDICATIONS      Current Outpatient Prescriptions   Medication Sig Dispense Refill    diclofenac (VOLTAREN) 1 % gel apply 2 grams to affected area four times a day  0    PROAIR HFA 90 mcg/actuation inhaler   0    levothyroxine (SYNTHROID) 137 mcg tablet Take 137 mcg by mouth Daily (before breakfast). (Patient taking differently: Take 150 mcg by mouth Daily (before breakfast). ) 90 Tab 1    omeprazole (PRILOSEC) 20 mg capsule Take 1 Cap by mouth daily. 30 Cap 2    tiZANidine (ZANAFLEX) 4 mg tablet Take 1 Tab by mouth every six (6) hours. 120 Tab 0    zolpidem (AMBIEN) 10 mg tablet   0    benztropine (COGENTIN) 1 mg tablet Take 1 mg by mouth three (3) times daily.  ARIPiprazole (ABILIFY) 5 mg tablet Take 5 mg by mouth daily.  potassium chloride (K-DUR, KLOR-CON) 20 mEq tablet Take  by mouth two (2) times a day.  meloxicam (MOBIC) 7.5 mg tablet Take 1 Tab by mouth daily.  30 Tab 2        ALLERGIES    Allergies   Allergen Reactions    Adhesive Tape-Silicones Rash          SOCIAL HISTORY    Social History     Social History    Marital status: SINGLE     Spouse name: N/A    Number of children: N/A    Years of education: N/A     Social History Main Topics  Smoking status: Former Smoker     Quit date: 7/1/2005    Smokeless tobacco: Never Used    Alcohol use No    Drug use: Yes     Special: Marijuana    Sexual activity: No     Other Topics Concern    None     Social History Narrative     Social History Narrative      Problem Relation Age of Onset    No Known Problems Mother     No Known Problems Father          REVIEW OF SYSTEMS  Review of Systems   Constitutional: Negative for chills, diaphoresis, fever, malaise/fatigue and weight loss. Respiratory: Negative for shortness of breath. Cardiovascular: Negative for chest pain and leg swelling. Gastrointestinal: Negative for constipation, nausea and vomiting. Neurological: Negative for dizziness, tingling, seizures, loss of consciousness and headaches. Psychiatric/Behavioral: The patient does not have insomnia. PHYSICAL EXAMINATION  Visit Vitals    /67    Pulse 86    Ht 5' 4\" (1.626 m)    Wt 204 lb (92.5 kg)    BMI 35.02 kg/m2       Pain Assessment  1/26/2017   Location of Pain Back;Neck   Location Modifiers -   Severity of Pain 0   Quality of Pain Aching   Duration of Pain -   Frequency of Pain Intermittent   Aggravating Factors -   Aggravating Factors Comment -   Limiting Behavior Some   Relieving Factors Rest   Result of Injury No           Constitutional:  Well developed, well nourished, in no acute distress. Psychiatric: Affect and mood are appropriate. Integumentary: No rashes or abrasions noted on exposed areas. SPINE/MUSCULOSKELETAL EXAM    Cervical spine:  Neck is midline. Normal muscle tone. No focal atrophy is noted. ROM pain free. Shoulder ROM intact. Mild tenderness to palpation, R>L. Negative Spurling's sign. Negative Tinel's sign. Negative Dillon's sign.       Sensation in the bilateral arms grossly intact to light touch.      Lumbar spine:  No rash, ecchymosis, or gross obliquity. No fasciculations. No focal atrophy is noted.    No pain with hip ROM. Full range of motion. Diffuse tenderness to palpation, L>R. No tenderness to palpation at the sciatic notch. SI joints non-tender. Trochanters non tender.      Sensation in the bilateral legs grossly intact to light touch. MOTOR:      Biceps  Triceps Deltoids Wrist Ext Wrist Flex Hand Intrin   Right 5/5 5/5 5/5 5/5 5/5 5/5   Left 5/5 5/5 5/5 5/5 5/5 5/5             Hip Flex  Quads Hamstrings Ankle DF EHL Ankle PF   Right 5/5 5/5 5/5 5/5 5/5 5/5   Left 5/5 5/5 5/5 5/5 5/5 5/5     DTRs are 2+ biceps, triceps, brachioradialis, patella, and Achilles.     Negative Straight Leg raise. Squat not tested. No difficulty with tandem gait.      Ambulation without assistive device. FWB.       RADIOGRAPHS  Lumbar MRI images taken on June/July 2016 personally reviewed with patient:  1) No obvious stenosis      reviewed     Ms. Norberto Blanton has a reminder for a \"due or due soon\" health maintenance. I have asked that she contact her primary care provider for follow-up on this health maintenance.      This plan was reviewed with the patient and patient agrees. All questions were answered. More than half of this visit today was spent on counseling.     Written by Magdaleno Hays, as dictated by Dr. Grupo Sawyer, Dr. Charles Meléndez, confirm that all documentation is accurate.

## 2017-01-26 NOTE — PATIENT INSTRUCTIONS
What Is Myofascial Pain Syndrome? Myofascial pain syndrome is a chronic pain disorder that affects fascia (connective tissue that covers the muscles) and is characterized by muscle pain, tenderness, and spasm. The syndrome can involve a single muscle or a muscle group. Myofascial pain syndrome typically affects muscles in asymmetric areas of the body. The precise cause of the syndrome is unknown. As with most chronic pain conditions, associated symptoms may include poor sleep, fatigue, depression, and behavioral disturbances. In myofascial pain syndrome, there are focal tender points in muscles called trigger points. A trigger point is usually within a taut band of muscle that can be felt by the examiner. They can ultimately be identified by the examiner applying pressure with one to three fingers and the thumb. Patients usually exhibit a jump sign when a trigger point is pressed. The patient with a positive jump sign may wince, cry out, and withdraw from the pressure being applied by an examiner. The pain may be referred, or felt at a distance away from the area being compressed. Trigger points can occur in muscles, ligaments, fascia, and periosteum (the membrane surrounding a bone). Pain in trigger points can be made worse with activity or stress.  Currently, four types of trigger points can be distinguished:    -An active trigger point is an area of extreme tenderness usually within a taut muscle or muscle group  -A latent trigger point is an inactive area with the potential to act like a trigger point  -A secondary trigger point is a highly irritable area in a muscle or muscle group that can be activated due to a trigger point or overload in another muscle or muscle group  -A satellite trigger point is a highly irritable spot in a muscle or muscle group that becomes active because the muscle is in the region of another trigger point  Causes Of Myofascial Pain Syndrome    No one single factor can be identified as causing myofascial pain syndrome. The syndrome may develop from a muscle injury or excessive strain on a certain muscle or muscle group, ligament, or tendon. In addition, the following factors may be contributors:    Trauma to the discs between the backbones, or vertebrae  Inflammatory conditions  Lack of blood flow to heart muscle  Deconditioning from lack of exercise  General fatigue  Nutritional deficiencies  Hormonal changes  Obesity  Intense cooling of parts of the body  Use of tobacco  Repetitive motions  Overuse of a muscle  Alcohol or drug abuse  Long-term emotional stress  Treatments For Myofascial Pain Syndrome    Myofascial pain syndrome is best treated with a team of various medical professionals and various forms of therapies. The treatment team may consist of a primary care physician, a specialist in [address] [facility], nurses, physical and occupational therapists, massage therapists, and psychologists. The therapies may be carried out through drug and non-drug means. Current practice is combining non-drug therapies with short-term drug therapies for longer lasting and maximal benefits. Various drugs can be used in the treatment of myofascial pain syndrome. Non-steroidal anti-inflammatory drugs (NSAIDs) such as aspirin (Cierra), ibuprofen (Advil), and naproxen sodium (Aleve) can be used short term to reduce acute pain and inflammation. Acetaminophen (Tylenol) and oral narcotics such as oxycodone and hydrocodone may also be used in the short term for pain relief. Tricyclic antidepressants such as trazodone (Desyrel) and amitriptyline (Elavil) can be used at bedtime to improve sleep as well as relieve pain. Muscle relaxants such as cyclobenzaprine (Flexeril) and carisoprodol (Soma) can be used to relax muscle spasm and improve sleep, as they can have a sedating effect.     Antidepressants such as fluoxetine (Prozac), sertraline (Zoloft), and duloxetine (Cymbalta) can be helpful with the chronic pain of myofascial pain syndrome. Anti-seizure medications such as gabapentin (Neurontin) and pregabalin (Lyrica) can also be helpful with the chronic pain of the syndrome. Capsaicin cream, a topical pain reliever derived from chili peppers, may also be helpful with the chronic pain of myofascial pain syndrome. Injections can also be utilized in the treatment of myofascial pain syndrome. Trigger point injections and botulinum toxin (Botox) injections are two areas of recent interest. Trigger point injections involve direct injection of a local anesthetic into the trigger point. This method is very effective when there are only a few precisely located trigger points. Botox can also be directly injected into trigger points. This method has produced inconsistent results. Various non-drug therapies can be utilized in the treatment of myofascial pain syndrome. Physical therapy is one of the best treatments for the syndrome. The stretch and spray method has also been used with some success for treatment of the syndrome. It involves spraying the muscle or muscle group containing the trigger point with a coolant, such as flourimethane, and then slowly stretching the muscle. Massage therapy is another non-drug treatment used in the treatment of the syndrome. Massage therapists exert ischemic compression, which is the application of progressively stronger pressure on a trigger point for the purpose of eliminating its tenderness. Conclusion    Myofascial pain syndrome is a chronic pain disorder that affects muscle and the fascia covering the muscle. Key to the diagnosis and treatment of the syndrome is the identification of trigger points, which are areas of extreme tenderness in bands of taut muscle. No one knows the exact cause of the disorder. The treatment of myofascial pain syndrome is best done by a multidisciplinary team utilizing various drug and non-drug therapies.  The combination of physical therapy, trigger point injections, and massage are routinely used with some success in the treatment of myofascial pain syndrome.     [email or web address]

## 2017-02-09 ENCOUNTER — HOSPITAL ENCOUNTER (OUTPATIENT)
Age: 62
Setting detail: OUTPATIENT SURGERY
Discharge: HOME | End: 2017-02-09
Attending: COLON & RECTAL SURGERY | Admitting: COLON & RECTAL SURGERY
Payer: MEDICARE

## 2017-02-09 ENCOUNTER — SURGERY (OUTPATIENT)
Age: 62
End: 2017-02-09

## 2017-02-09 VITALS
OXYGEN SATURATION: 100 % | TEMPERATURE: 97.5 F | HEIGHT: 64 IN | WEIGHT: 205 LBS | SYSTOLIC BLOOD PRESSURE: 126 MMHG | DIASTOLIC BLOOD PRESSURE: 84 MMHG | RESPIRATION RATE: 20 BRPM | HEART RATE: 70 BPM | BODY MASS INDEX: 35 KG/M2

## 2017-02-09 PROCEDURE — 77030009426 HC FCPS BIOP ENDOSC BSC -B: Performed by: COLON & RECTAL SURGERY

## 2017-02-09 PROCEDURE — 77030031670 HC DEV INFL ENDOTEK BIG60 MRTM -B: Performed by: COLON & RECTAL SURGERY

## 2017-02-09 PROCEDURE — 88305 TISSUE EXAM BY PATHOLOGIST: CPT | Performed by: COLON & RECTAL SURGERY

## 2017-02-09 PROCEDURE — 74011250636 HC RX REV CODE- 250/636

## 2017-02-09 PROCEDURE — 76040000019: Performed by: COLON & RECTAL SURGERY

## 2017-02-09 PROCEDURE — 74011250636 HC RX REV CODE- 250/636: Performed by: COLON & RECTAL SURGERY

## 2017-02-09 RX ORDER — MIDAZOLAM HYDROCHLORIDE 5 MG/ML
INJECTION INTRAMUSCULAR; INTRAVENOUS AS NEEDED
Status: DISCONTINUED | OUTPATIENT
Start: 2017-02-09 | End: 2017-02-09 | Stop reason: HOSPADM

## 2017-02-09 RX ORDER — MIDAZOLAM HYDROCHLORIDE 1 MG/ML
INJECTION, SOLUTION INTRAMUSCULAR; INTRAVENOUS
Status: DISCONTINUED
Start: 2017-02-09 | End: 2017-02-09 | Stop reason: HOSPADM

## 2017-02-09 RX ADMIN — MEPERIDINE HYDROCHLORIDE 50 MG: 100 INJECTION, SOLUTION INTRAMUSCULAR; INTRAVENOUS; SUBCUTANEOUS at 14:27

## 2017-02-09 RX ADMIN — MEPERIDINE HYDROCHLORIDE 25 MG: 100 INJECTION, SOLUTION INTRAMUSCULAR; INTRAVENOUS; SUBCUTANEOUS at 14:32

## 2017-02-09 RX ADMIN — MIDAZOLAM HYDROCHLORIDE 1 MG: 5 INJECTION, SOLUTION INTRAMUSCULAR; INTRAVENOUS at 14:32

## 2017-02-09 RX ADMIN — MIDAZOLAM HYDROCHLORIDE 2 MG: 5 INJECTION, SOLUTION INTRAMUSCULAR; INTRAVENOUS at 14:27

## 2017-02-09 RX ADMIN — MIDAZOLAM HYDROCHLORIDE 1 MG: 5 INJECTION, SOLUTION INTRAMUSCULAR; INTRAVENOUS at 14:29

## 2017-02-09 NOTE — INTERVAL H&P NOTE
H&P Update:  Susie Pires was seen and examined. History and physical has been reviewed. The patient has been examined.  There have been no significant clinical changes since the completion of the originally dated History and Physical.    Signed By: Miladis Genao MD     February 9, 2017 12:03 PM

## 2017-02-09 NOTE — IP AVS SNAPSHOT
Codi Gray 
 
 
 [address] 
[phone] Patient: Sadia Walton MRN: [id number] :1955 You are allergic to the following Allergen Reactions Adhesive Tape-Silicones Rash Recent Documentation Height Weight Breastfeeding? BMI OB Status Smoking Status 1.626 m 93 kg No 35.19 kg/m2 Postmenopausal Former Smoker Emergency Contacts Name Discharge Info Relation Home Work Mobile Eloisa Ortiz (Dtr [employer]) DISCHARGE CAREGIVER [3] Other Relative [6] [phone] [facility], DISCHARGE CAREGIVER [3] Other Relative [6]   [phone] About your hospitalization You were admitted on:  2017 [facility] You [facility] on:  2017 Unit phone number:  [phone] Why you were hospitalized Your primary diagnosis was:  Not on File Providers Seen During Your Hospitalizations Provider Role Specialty Primary office phone Mary Bell MD Attending Provider Colon and Rectal Surgery [phone] Your Primary Care Physician (PCP) Primary Care Physician Office Phone Office Fax [facility] [phone] Follow-up Information Follow up With Details Comments Contact Info Estela Anderson MD   [place] [address] [address] [place] [address] 
[phone] MD Dr Cari Saab will contact you with results [id number] [address] [phone] Current Discharge Medication List  
  
CONTINUE these medications which have CHANGED Dose & Instructions Dispensing Information Comments Morning Noon Evening Bedtime  
 levothyroxine 137 mcg tablet Commonly known as:  SYNTHROID What changed:  how much to take Your next dose is: Today, Tomorrow Other:  _________ Dose:  137 mcg Take 137 mcg by mouth Daily (before breakfast). Quantity:  90 Tab Refills:  1 CONTINUE these medications which have NOT CHANGED Dose & Instructions Dispensing Information Comments Morning Noon Evening Bedtime ABILIFY 5 mg tablet Generic drug:  ARIPiprazole Your next dose is: Today, Tomorrow Other:  _________ Dose:  5 mg Take 5 mg by mouth daily. Refills:  0  
     
   
   
   
  
 benztropine 1 mg tablet Commonly known as:  COGENTIN Your next dose is: Today, Tomorrow Other:  _________ Dose:  1 mg Take 1 mg by mouth three (3) times daily. Refills:  0  
     
   
   
   
  
 diclofenac 1 % Gel Commonly known as:  VOLTAREN Your next dose is: Today, Tomorrow Other:  _________  
   
   
 apply 2 grams to affected area four times a day Refills:  0  
     
   
   
   
  
 meloxicam 7.5 mg tablet Commonly known as:  MOBIC Your next dose is: Today, Tomorrow Other:  _________ Dose:  7.5 mg Take 1 Tab by mouth daily. Quantity:  30 Tab Refills:  2  
     
   
   
   
  
 omeprazole 20 mg capsule Commonly known as:  PriLOSEC Your next dose is: Today, Tomorrow Other:  _________ Dose:  20 mg Take 1 Cap by mouth daily. Quantity:  30 Cap Refills:  2  
     
   
   
   
  
 potassium chloride 20 mEq tablet Commonly known as:  K-DUR, KLOR-CON Your next dose is: Today, Tomorrow Other:  _________ Take  by mouth two (2) times a day. Refills:  0 PROAIR HFA 90 mcg/actuation inhaler Generic drug:  albuterol Your next dose is: Today, Tomorrow Other:  _________ Refills:  0  
     
   
   
   
  
 tiZANidine 4 mg tablet Commonly known as:  Selinda Lovings Your next dose is: Today, Tomorrow Other:  _________ Dose:  4 mg Take 1 Tab by mouth every six (6) hours. Quantity:  120 Tab Refills:  0  
     
   
   
   
  
 zolpidem 10 mg tablet Commonly known as:  AMBIEN Your next dose is: Today, Tomorrow Other:  _________ Refills:  0 Discharge Instructions Colonoscopy Discharge Instructions Leighton Marmolejo [id number] 
1955 COLONOSCOPY FINDINGS: 
Your colonoscopy showed: 1. Three colon polyps which were completely removed. 2. Otherwise normal examination. FOLLOW UP RECOMMENDATIONS: 
 Dr. Víctor Kruse will contact you with your results. Dr. Víctor Kruse will recommend your next colonoscopy after the Pathology results are available. DISCOMFORT: 
If you have redness at your IV site- apply warm compress to area; if redness or soreness persist- contact your physician There may be a slight amount of blood passed from the rectum, more than a teaspoon of bright red blood is not expected - contact your physician Gaseous discomfort is common- walking, belching will help relieve any gas pains. If discomfort persist- contact your physician DIET: 
 Regular diet. ACTIVITY: 
You may resume your normal daily activities, however, it is recommended that you spend the remainder of the day resting - avoiding any strenuous activities. You may not operate a vehicle for 24 hours You may not engage in an occupation involving machinery or appliances for rest of today You may not drink alcoholic beverages for at least 24 hours Avoid making any critical decisions for at least 24 hour CALL M.D. ANY SIGN OF: Increasing pain, nausea, vomiting Abdominal distension (swelling) New increased bleeding Fever or chills Pain in chest area or shortness of breath Samira Townsend MD, FACS, FASCRS Colon and Rectal Surgery [facility] Office [phone] Fax     [phone] DISCHARGE SUMMARY from Nurse The following personal items are in your possession at time of discharge: 
 
Dental Appliances: None Visual Aid: Glasses PATIENT INSTRUCTIONS: 
 
 
F-face looks uneven A-arms unable to move or move unevenly S-speech slurred or non-existent T-time-call 911 as soon as signs and symptoms begin-DO NOT go Back to bed or wait to see if you get better-TIME IS BRAIN. Warning Signs of HEART ATTACK Call 911 if you have these symptoms: 
? Chest discomfort. Most heart attacks involve discomfort in the center of the chest that lasts more than a few minutes, or that goes away and comes back. It can feel like uncomfortable pressure, squeezing, fullness, or pain. ? Discomfort in other areas of the upper body. Symptoms can include pain or discomfort in one or both arms, the back, neck, jaw, or stomach. ? Shortness of breath with or without chest discomfort. ? Other signs may include breaking out in a cold sweat, nausea, or lightheadedness. Don't wait more than five minutes to call [address]! Fast action can save your life. Calling 911 is almost always the fastest way to get lifesaving treatment. Emergency Medical Services staff can begin treatment when they arrive  up to an hour sooner than if someone gets to the hospital by car. The discharge information has been reviewed with the patient and granddaughter. The patient and grandaughter verbalized understanding. Discharge medications reviewed with the patient and grandaughter and appropriate educational materials and side effects teaching were provided. Patient armband removed and given to patient to take home. Patient was informed of the privacy risks if armband lost or stolen Discharge Orders None Introducing [facility]! Dear Jacinta Aschoff: Thank you for requesting a [employer] account. Our records indicate that you already have an active [employer] account. You can access your account anytime at [email or web address] Did you know that you can access your hospital and ER discharge instructions at any time in [employer]? You can also review all of your test results from your hospital stay or ER visit. Additional Information If you have questions, please visit the Frequently Asked Questions section of the [employer] website at [email or web address] Remember, [employer] is NOT to be used for urgent needs. For medical emergencies, dial 911. Now available from your iPhone and Android! General Information Please provide this summary of care documentation to your next provider. Patient Signature:  ____________________________________________________________ Date:  ____________________________________________________________  
  
Lulu Crump Provider Signature:  ____________________________________________________________ Date:  ____________________________________________________________

## 2017-02-09 NOTE — PROCEDURES
Colonoscopy Procedure Note      Rocío Mccarty  1955  [id number]                Date of Procedure: 2/9/2017    Indications: fam hx of anal cancer    Preoperative diagnosis: fam hx of anal cancer      Postoperative diagnosis: descending colon polyps, rectal polyp    Title of Procedure:  Colonoscopy with polypectomies    :  Dionicio Dominguez MD    Assistant(s): Endoscopy Technician-1: Blanca Don  Endoscopy RN-1: Lord Yaz RN    Referring Source:  Abhinav Iqbal MD    Sedation:  Demerol 75 mg IV,  Versed 4 mg IV      ASA Class: ASA 3 - Severe systemic disease but compensated        Procedure Details:    Prior to the procedure, a history and physical were performed. The patients medications, allergies and sensitivities were reviewed and all questions were answered. After informed consent was obtained for the procedure, with all risks and benefits of procedure explained. The patient was taken to the endoscopy suite and placed in the left lateral decubitus position. Patient identification and proposed procedure were verified prior to the procedure by the nurse and I. Following the  satisfactory administration of sedation,  the anus was inspected and appeared within normal limits. Digital rectal examination revealed Normal sphincter tone and squeeze pressure. Palpation revealed No Masses. Next the Olympus video colonoscope was introduced through the anus and advanced to cecum, which was identified by the ileocecal valve and appendiceal orifice, terminal ileum. The quality of preparation was excellent. The terminal ileum was visualized. The colonoscope was then slowly withdrawn and the mucosa carefully examined for any abnormalities. Cecal withdrawl time was greater than six minutes. The patient tolerated the procedure well.       Findings:   Rectum: 1  Sessile polyp(s), the largest 3 mm in size;  Sigmoid: normal  Descending Colon: 2  Sessile polyp(s), the largest 3 mm in size;  Transverse Colon: normal  Ascending Colon: normal  Cecum: normal  Terminal Ileum: normal    Interventions:  3 complete polypectomy were performed using cold biopsy forceps and the polyps were  retrieved    Specimen Removed:   ID Type Source Tests Collected by Time Destination   1 : Polyp Bxs x2 (cold forcep) Preservative Colon, Descending  Viola Velasco MD 2/9/2017 1441 Pathology   2 : Polyp bx (cold forcep) Preservative Rectum  Viola Velasco MD 2/9/2017 1446 Pathology        Complications: None. EBL:  None. Impression:    descending colon polyps, rectal polyp     Recommendations: -Await pathology. Resume normal medication(s). Discharge Disposition:  Home in the company of a  when able to ambulate.         Viola Velasco MD, FACS, FASCRS  Colon and Rectal Surgery  [facility]  Office [phone]  Fax     [phone]  2/9/2017  2:51 PM       [facility]  [address] [place] [address]

## 2017-02-09 NOTE — H&P (VIEW-ONLY)
[address]  [facility] [address]  [address] [place] [address]  [phone]    Colonoscopy History and Physical    Patient: Jennifer Ramos MRN: [id number]  SSN: [id number]    YOB: 1955  Age: 62 y.o. Sex: female      Subjective:      Jennifer Ramos is a 62 y.o. female who was referred by Dr. Momo Green for colonoscopy for   Family hx of anal cancer. .  Her last colon screening was over 4 years ago and was negative per the patient. Her sister who is 77 was diagnosed with anal cancer last year. The patient denies any rectal bleeding, change in bowel habits, weight changes, nor any abdominal pain. She denies constipation, vomiting, diarrhea, bloody stools, mucousy stools, difficulty swallowing, loss of appetite, reflux and nausea. She does c/o fecal leakage which has been going on for several years. She states she has 2 BM/day and has leakage throughout the day. I did make an appointment for her to see Joshua Mackey in Feb for urinary and anal leakage to see is she is a candidate for Interstem.         Past Medical History   Diagnosis Date    Abnormal Papanicolaou smear of cervix      1988 HPV    Asthma     Bipolar 1 disorder (HCC)     Bipolar 1 disorder, depressed (HCC)     Bursitis     Carpal tunnel syndrome     Cirrhosis, hepatitis C     Connective tissue disease ([address]     DDD (degenerative disc disease), lumbosacral     Gastric ulcer     Hashimoto's disease     Hashimoto's disease     Hepatitis C     Herniated intervertebral disc of lumbar spine     Hypermobility syndrome     Liver disease     Osteoarthritis     Plantar fasciitis, bilateral     RA (rheumatoid arthritis) ([address]      Past Surgical History   Procedure Laterality Date    Hx appendectomy      Hx heent      Hx gyn       BTL    Hx dilation and curettage  1988     cryso      Family History   Problem Relation Age of Onset    No Known Problems Mother     No Known Problems Father Social History   Substance Use Topics    Smoking status: Former Smoker     Quit date: 7/1/2005    Smokeless tobacco: Never Used    Alcohol use No      Prior to Admission medications    Medication Sig Start Date End Date Taking? Authorizing Provider   potassium chloride (K-DUR, KLOR-CON) 20 mEq tablet Take  by mouth two (2) times a day. Yes Historical Provider   omeprazole (PRILOSEC) 20 mg capsule Take 1 Cap by mouth daily. 1/12/17  Yes Caryn Street MD   levothyroxine (SYNTHROID) 125 mcg tablet Take 1 Tab by mouth Daily (before breakfast). 11/28/16  Yes Caryn Street MD   tiZANidine (ZANAFLEX) 4 mg tablet Take 1 Tab by mouth every six (6) hours. 11/28/16  Yes Caryn Street MD   meloxicam (MOBIC) 7.5 mg tablet Take 1 Tab by mouth daily. 11/28/16  Yes Caryn Street MD   zolpidem (AMBIEN) 10 mg tablet  7/1/16  Yes Historical Provider   benztropine (COGENTIN) 1 mg tablet Take 1 mg by mouth three (3) times daily. Yes Historical Provider   ARIPiprazole (ABILIFY) 5 mg tablet Take 5 mg by mouth daily. Yes Historical Provider        Allergies   Allergen Reactions    Adhesive Tape-Silicones Rash       Review of Systems:  Review of systems performed with findings as noted. Objective:     Vitals:    01/16/17 1057   BP: 134/88   Pulse: 88   Resp: 20   Temp: 97.9 °F (36.6 °C)   TempSrc: Oral   Weight: 91.6 kg (202 lb)   Height: 5' 4\" (1.626 m)        Physical Exam:  GENERAL: alert, cooperative, no distress, appears stated age  LUNG: clear to auscultation bilaterally  HEART: regular rate and rhythm  ABDOMEN: soft, non-tender. Bowel sounds normal. No masses,  no organomegaly  NEUROLOGIC: negative  PSYCHIATRIC: non focal    Assessment:   Keya Jernigan is a 62 y.o. female who presents for colonoscopy for   Family hx of anal cancer. Plan:   1. I recommend proceeding with colonoscopy. The patient was in full agreement and was eager to proceed.     2. I discussed the details of the colonoscopy procedure. The risks of colonoscopy were discussed including colon injury/perforation, anesthesia issues, bleeding, and the possibility of incomplete examination. The patient was willing to accept these risks and proceed with the examination. All questions were answered to the patient's satisfaction. 3. The patient was provided with the instructions in preparation for the colonoscopy procedure including the bowel prep recommendations.                Signed By: Torres Peterson NP     January 16, 2017

## 2017-02-09 NOTE — DISCHARGE INSTRUCTIONS
Colonoscopy Discharge Instructions       Yvette Rios  [id number]  1955      COLONOSCOPY FINDINGS:  Your colonoscopy showed: 1. Three colon polyps which were completely removed. 2. Otherwise normal examination. FOLLOW UP RECOMMENDATIONS:   Dr. Beryle Levee will contact you with your results. Dr. Beryle Levee will recommend your next colonoscopy after the Pathology results are available. DISCOMFORT:  If you have redness at your IV site- apply warm compress to area; if redness or soreness persist- contact your physician  There may be a slight amount of blood passed from the rectum, more than a teaspoon of bright red blood is not expected - contact your physician  Gaseous discomfort is common- walking, belching will help relieve any gas pains. If discomfort persist- contact your physician    DIET:   Regular diet. ACTIVITY:  You may resume your normal daily activities, however, it is recommended that you spend the remainder of the day resting - avoiding any strenuous activities. You may not operate a vehicle for 24 hours  You may not engage in an occupation involving machinery or appliances for rest of today  You may not drink alcoholic beverages for at least 24 hours  Avoid making any critical decisions for at least 24 hour    CALL M.D.   ANY SIGN OF:   Increasing pain, nausea, vomiting  Abdominal distension (swelling)  New increased bleeding   Fever or chills  Pain in chest area or shortness of breath      Ade Anders MD, FACS, FASCRS  Colon and Rectal Surgery  [facility]  Office [phone]  Fax     [phone] SUMMARY from Nurse    The following personal items are in your possession at time of discharge:    Dental Appliances: None  Visual Aid: Glasses                            PATIENT INSTRUCTIONS:    After general anesthesia or intravenous sedation, for 24 hours or while taking prescription Narcotics:  · Limit your activities  · Do not drive and operate hazardous machinery  · Do not make important personal or business decisions  · Do  not drink alcoholic beverages  · If you have not urinated within 8 hours after discharge, please contact your surgeon on call. Report the following to your surgeon:  · Excessive pain, swelling, redness or odor of or around the surgical area  · Temperature over 100.5  · Nausea and vomiting lasting longer than 4 hours or if unable to take medications  · Any signs of decreased circulation or nerve impairment to extremity: change in color, persistent  numbness, tingling, coldness or increase pain  · Any questions        What to do at Home:  Recommended activity: Activity as tolerated and no driving for today. *  Please give a list of your current medications to your Primary Care Provider. *  Please update this list whenever your medications are discontinued, doses are      changed, or new medications (including over-the-counter products) are added. *  Please carry medication information at all times in case of emergency situations. These are general instructions for a healthy lifestyle:    No smoking/ No tobacco products/ Avoid exposure to second hand smoke    Surgeon General's Warning:  Quitting smoking now greatly reduces serious risk to your health. Obesity, smoking, and sedentary lifestyle greatly increases your risk for illness    A healthy diet, regular physical exercise & weight monitoring are important for maintaining a healthy lifestyle    You may be retaining fluid if you have a history of heart failure or if you experience any of the following symptoms:  Weight gain of 3 pounds or more overnight or 5 pounds in a week, increased swelling in our hands or feet or shortness of breath while lying flat in bed. Please call your doctor as soon as you notice any of these symptoms; do not wait until your next office visit.     Recognize signs and symptoms of STROKE:    F-face looks uneven    A-arms unable to move or move unevenly    S-speech slurred or non-existent    T-time-call 911 as soon as signs and symptoms begin-DO NOT go       Back to bed or wait to see if you get better-TIME IS BRAIN. Warning Signs of HEART ATTACK     Call 911 if you have these symptoms:   Chest discomfort. Most heart attacks involve discomfort in the center of the chest that lasts more than a few minutes, or that goes away and comes back. It can feel like uncomfortable pressure, squeezing, fullness, or pain.  Discomfort in other areas of the upper body. Symptoms can include pain or discomfort in one or both arms, the back, neck, jaw, or stomach.  Shortness of breath with or without chest discomfort.  Other signs may include breaking out in a cold sweat, nausea, or lightheadedness. Don't wait more than five minutes to call 911 - MINUTES MATTER! Fast action can save your life. Calling 911 is almost always the fastest way to get lifesaving treatment. Emergency Medical Services staff can begin treatment when they arrive -- up to an hour sooner than if someone gets to the hospital by car. The discharge information has been reviewed with the patient and granddaughter. The patient and grandaughter verbalized understanding. Discharge medications reviewed with the patient and grandaughter and appropriate educational materials and side effects teaching were provided. Patient armband removed and given to patient to take home.   Patient was informed of the privacy risks if armband lost or stolen

## 2017-02-24 DIAGNOSIS — K21.9 GASTROESOPHAGEAL REFLUX DISEASE WITHOUT ESOPHAGITIS: ICD-10-CM

## 2017-02-24 RX ORDER — OMEPRAZOLE 20 MG/1
20 CAPSULE, DELAYED RELEASE ORAL DAILY
Qty: 30 CAP | Refills: 2 | Status: CANCELLED | OUTPATIENT
Start: 2017-02-24

## 2017-02-24 RX ORDER — OMEPRAZOLE 20 MG/1
20 CAPSULE, DELAYED RELEASE ORAL DAILY
Qty: 30 CAP | Refills: 2 | Status: SHIPPED | OUTPATIENT
Start: 2017-02-24 | End: 2017-06-08 | Stop reason: SDUPTHER

## 2017-02-24 NOTE — TELEPHONE ENCOUNTER
From: Octavia Paul  To: Lorrie Santoyo MD  Sent: 2/24/2017 11:32 AM EST  Subject: Medication Renewal Request    Original authorizing provider: MD Octavia Bustillos would like a refill of the following medications:  omeprazole (PRILOSEC) 20 mg capsule Lorrie Santoyo MD]    Preferred pharmacy: [address] [place] [address] [address], [address]    Comment:

## 2017-03-10 ENCOUNTER — OFFICE VISIT (OUTPATIENT)
Dept: FAMILY MEDICINE CLINIC | Age: 62
End: 2017-03-10

## 2017-03-10 VITALS
BODY MASS INDEX: 34.83 KG/M2 | HEART RATE: 86 BPM | SYSTOLIC BLOOD PRESSURE: 120 MMHG | WEIGHT: 204 LBS | HEIGHT: 64 IN | RESPIRATION RATE: 16 BRPM | DIASTOLIC BLOOD PRESSURE: 91 MMHG | TEMPERATURE: 96.9 F | OXYGEN SATURATION: 96 %

## 2017-03-10 DIAGNOSIS — J04.0 LARYNGITIS: ICD-10-CM

## 2017-03-10 DIAGNOSIS — J06.9 UPPER RESPIRATORY TRACT INFECTION, UNSPECIFIED TYPE: Primary | ICD-10-CM

## 2017-03-10 DIAGNOSIS — R05.9 COUGH: ICD-10-CM

## 2017-03-10 RX ORDER — PREDNISONE 50 MG/1
50 TABLET ORAL
Qty: 7 TAB | Refills: 0 | Status: SHIPPED | OUTPATIENT
Start: 2017-03-10 | End: 2017-04-04

## 2017-03-10 RX ORDER — CHOLECALCIFEROL (VITAMIN D3) 125 MCG
CAPSULE ORAL
COMMUNITY
End: 2018-04-02

## 2017-03-10 RX ORDER — PREDNISONE 50 MG/1
50 TABLET ORAL
Qty: 7 TAB | Refills: 0 | Status: SHIPPED | OUTPATIENT
Start: 2017-03-10 | End: 2017-03-10 | Stop reason: SDUPTHER

## 2017-03-10 RX ORDER — AZITHROMYCIN 250 MG/1
TABLET, FILM COATED ORAL
Qty: 6 TAB | Refills: 0 | Status: SHIPPED | OUTPATIENT
Start: 2017-03-10 | End: 2017-03-10 | Stop reason: SDUPTHER

## 2017-03-10 RX ORDER — HYDROCODONE POLISTIREX AND CHLORPHENIRAMINE POLISTIREX 10; 8 MG/5ML; MG/5ML
1 SUSPENSION, EXTENDED RELEASE ORAL
Qty: 60 ML | Refills: 0 | Status: SHIPPED | OUTPATIENT
Start: 2017-03-10 | End: 2017-04-04

## 2017-03-10 RX ORDER — AZITHROMYCIN 250 MG/1
TABLET, FILM COATED ORAL
Qty: 6 TAB | Refills: 0 | Status: SHIPPED | OUTPATIENT
Start: 2017-03-10 | End: 2017-03-15

## 2017-03-10 NOTE — MR AVS SNAPSHOT
Visit Information Date & Time Provider Department Dept. Phone Encounter #  
 3/10/2017 11:00 AM Claudy Michael [facility] [phone] [id number] Follow-up Instructions Return if symptoms worsen or fail to improve. Your Appointments 3/10/2017 11:00 AM  
Office Visit with Baron Cristy MD  
[facility] Appt Note: cough/congestion/refill inhaler Tramaine Fadi [place] [facility] [address]  
[phone]  
  
   
 [address] [id number] 4/11/2017 11:30 AM  
Follow Up with Shannon Avila NP [facility] Appt Note: HCV/Cirrhosis; HCV/Cirrhosis, r/s  
 [address] [facility] [address] [facility] 4/13/2017 11:00 AM  
Office Visit with Baron Cristy MD  
[address] [facility] Appt Note: 3 month follow up Tramaine Flynns [phone]  
[phone] Upcoming Health Maintenance Date Due Pneumococcal 19-64 Medium Risk (1 of 1 - PPSV23) 10/21/1975 BREAST CANCER SCRN MAMMOGRAM 7/13/2018 PAP AKA CERVICAL CYTOLOGY 1/3/2022 COLONOSCOPY 2/9/2022 DTaP/Tdap/Td series (2 - Td) 10/8/2024 Allergies as of 3/10/2017  Review Complete On: 3/10/2017 By: Kiersten Mackenzie LPN Severity Noted Reaction Type Reactions Adhesive Tape-silicones  79/98/9404   Not Verified Rash Current Immunizations  Never Reviewed Name Date Hep A Vaccine 9/12/2012, 4/10/2012, 3/5/2012 Hep B Vaccine 9/12/2012, 4/10/2012, 3/5/2012 Influenza Vaccine 10/8/2014, 11/1/2013 Influenza Vaccine (Quad) PF 10/12/2016 Pneumococcal Conjugate (PCV-13) 1/12/2017 Tdap 10/8/2014 Not reviewed this visit You Were Diagnosed With   
  
 Codes Comments Upper respiratory tract infection, unspecified type    -  Primary ICD-10-CM: J06.9 ICD-9-CM: 465.9 Laryngitis     ICD-10-CM: J04.0 ICD-9-CM: 464.00 Cough     ICD-10-CM: R05 ICD-9-CM: 110. 2 Vitals BP Pulse Temp Resp Height(growth percentile) Weight(growth percentile) (!) 120/91 (BP 1 Location: Left arm, BP Patient Position: Sitting) 86 96.9 °F (36.1 °C) (Oral) 16 5' 4\" (1.626 m) 204 lb (92.5 kg) SpO2 BMI OB Status Smoking Status 96% 35.02 kg/m2 Postmenopausal Former Smoker Vitals History BMI and BSA Data Body Mass Index Body Surface Area 35.02 kg/m 2 2.04 m 2 Preferred Pharmacy Pharmacy Name Phone [id number] [address], [facility] [address] [facility] [phone] Your Updated Medication List  
  
   
This list is accurate as of: 3/10/17 10:54 AM.  Always use your most recent med list.  
  
  
  
  
 ABILIFY 5 mg tablet Generic drug:  ARIPiprazole Take 5 mg by mouth daily. azithromycin 250 mg tablet Commonly known as:  Iman Anisha Take 2 tablets today, then take 1 tablet daily  
  
 benztropine 1 mg tablet Commonly known as:  COGENTIN Take 1 mg by mouth three (3) times daily. chlorpheniramine-HYDROcodone 10-8 mg/5 mL suspension Commonly known as:  Leva Bigler Take 5 mL by mouth every twelve (12) hours as needed for Cough. Max Daily Amount: 10 mL. diclofenac 1 % Gel Commonly known as:  VOLTAREN  
apply 2 grams to affected area four times a day  
  
 levothyroxine 137 mcg tablet Commonly known as:  SYNTHROID Take 137 mcg by mouth Daily (before breakfast). omeprazole 20 mg capsule Commonly known as:  PriLOSEC Take 1 Cap by mouth daily. potassium chloride 20 mEq tablet Commonly known as:  K-DUR, KLOR-CON Take  by mouth two (2) times a day. predniSONE 50 mg tablet Commonly known as:  Jackie Garrido Take 1 Tab by mouth every morning. With food PROAIR HFA 90 mcg/actuation inhaler Generic drug:  albuterol  
  
 tiZANidine 4 mg tablet Commonly known as:  Verona Crape Take 1 Tab by mouth every six (6) hours. VITAMIN D3 2,000 unit Tab Generic drug:  cholecalciferol (vitamin D3) Take  by mouth.  
  
 zolpidem 10 mg tablet Commonly known as:  AMBIEN Prescriptions Printed Refills  
 chlorpheniramine-HYDROcodone (TUSSIONEX) 10-8 mg/5 mL suspension 0 Sig: Take 5 mL by mouth every twelve (12) hours as needed for Cough. Max Daily Amount: 10 mL. Class: Print Route: Oral  
 azithromycin (ZITHROMAX) 250 mg tablet 0 Sig: Take 2 tablets today, then take 1 tablet daily Class: Print  
 predniSONE (DELTASONE) 50 mg tablet 0 Sig: Take 1 Tab by mouth every morning. With food Class: Print Route: Oral  
  
Follow-up Instructions Return if symptoms worsen or fail to improve. Patient Instructions Take the antibiotic for 5 days, and the prednisone in the morning for 7 days. Take the cough medication at night, but if you are taking the cough medicine, don't take your dose of zanaflex at night Recheck as needed Introducing [facility] SERVICES! Dear Lauren Anderson: Thank you for requesting a [employer] account. Our records indicate that you already have an active PureHistory account. You can access your account anytime at [email or web address] Did you know that you can access your hospital and ER discharge instructions at any time in Pure[employer]? You can also review all of your test results from your hospital stay or ER visit. Additional Information If you have questions, please visit the Frequently Asked Questions section of the Pure[employer] website at [email or web address] Remember, PureHistory is NOT to be used for urgent needs. For medical emergencies, dial 911. Now available from your iPhone and Android! Please provide this summary of care documentation to your next provider. Your primary care clinician is listed as Albaro Anders. If you have any questions after today's visit, please call [phone].

## 2017-03-10 NOTE — PROGRESS NOTES
Rm 2  Pt presents to the clinic complaining of a productive cough with green mucous as well as congestion. Pt would like any prescriptions printed. Flu shot requested: no    Depression Screening Completed: yes    Learning Assessment Completed: yes    Abuse Screening Completed: n/a    Health Maintenance reviewed and discussed per provider: yes     Coordination of Care:    1. Have you been to the ER, urgent care clinic since your last visit? Hospitalized since your last visit? no    2. Have you seen or consulted any other health care providers outside of the [address] [place] [address] since your last visit? Include any pap smears or colon screening.  no

## 2017-03-10 NOTE — PROGRESS NOTES
HISTORY OF PRESENT ILLNESS  Alyssa Rinaldi is a 62 y.o. female. HPI Comments: Ms. Danny Franco has had chest congestion for a couple of months (I saw her in [address]) and now it is becoming more productive and she is losing her voice. She denies fever, chills, body aches. She doesn't smoke. Cough   Associated symptoms include headaches. Pertinent negatives include no chest pain. Nasal Congestion    Associated symptoms include congestion, sore throat, cough and headaches. Pertinent negatives include no chills, no ear pain and no chest pain. Review of Systems   Constitutional: Positive for malaise/fatigue. Negative for chills and fever. HENT: Positive for congestion and sore throat. Negative for ear pain, nosebleeds and tinnitus. Eyes: Negative for blurred vision and discharge. Respiratory: Positive for cough and sputum production. Negative for hemoptysis and wheezing. Cardiovascular: Negative for chest pain and palpitations. Gastrointestinal: Negative for heartburn. Genitourinary: Negative for dysuria and urgency. Neurological: Positive for headaches. Physical Exam   Constitutional: Vital signs are normal. She appears well-developed and well-nourished. HENT:   Right Ear: Tympanic membrane and ear canal normal.   Left Ear: Tympanic membrane and ear canal normal.   Nose: Nose normal.   Mouth/Throat: Uvula is midline, oropharynx is clear and moist and mucous membranes are normal.   Eyes: Pupils are equal, round, and reactive to light. Neck: No thyromegaly present. Cardiovascular: Normal rate, regular rhythm and normal heart sounds. Pulmonary/Chest: Effort normal and breath sounds normal. No respiratory distress. She has no wheezes. She has no rales. Abdominal: She exhibits no distension. There is no tenderness. There is no rebound. Lymphadenopathy:     She has no cervical adenopathy. Skin: No rash noted. Nursing note and vitals reviewed.       ASSESSMENT and PLAN ICD-10-CM ICD-9-CM    1. Upper respiratory tract infection, unspecified type J06.9 465.9 azithromycin (ZITHROMAX) 250 mg tablet      DISCONTINUED: azithromycin (ZITHROMAX) 250 mg tablet   2. Laryngitis J04.0 464.00 predniSONE (DELTASONE) 50 mg tablet      DISCONTINUED: predniSONE (DELTASONE) 50 mg tablet   3. Cough R05 786.2 chlorpheniramine-HYDROcodone (TUSSIONEX) 10-8 mg/5 mL suspension      predniSONE (DELTASONE) 50 mg tablet      DISCONTINUED: predniSONE (DELTASONE) 50 mg tablet       Will treat as secondary infection. I told her about zanaflex and Tussionex interacting, she will hold Zanaflex for now.     AVS instructions reviewed with patient, pt verbalized understanding

## 2017-03-10 NOTE — ACP (ADVANCE CARE PLANNING)
Do you have an [address] in place in the event that you have a healthcare crisis that could impact your decision making as it pertains to your health? no    Would you like information about Advance Care Planning? no    Information given.  no

## 2017-03-10 NOTE — PROGRESS NOTES
[facility] at the [address], [place] An[place]ms, [address] [facility]  Phone: [phone]      Fax:  [phone]    Discharge Summary    Patient name: Leighton Marmolejo     Start of Care: 16  Referral source: Delilah Reinoso MD    : 1955  Medical/Treatment Diagnosis: Myalgia [M79.1]  Onset Date:2016, 2016  Prior Hospitalization: see medical history   Provider#: [id number]  Comorbidities: Anxiety, Asthma, BMI >30,Depression  Prior Level of Function: shoulder: pain with reaching, pushing and pulling, lifting, household activities    PLOF LBP: increase in pain with bending, prolonged sitting (>45min), gardening, stooping, pain with getting in/out of car    decreased strength    Medications: Verified on Patient Summary List    Visits from Start of Care: 13    Missed Visits: 0  Reporting Period : 16 to 17      Long Term Goals: To be accomplished in 6 weeks:  1) increase right shoulder strength to 4/5 MMT in all directions to allow toreach overhead in kitchen cabinets.    Status at evaluation:  Status at Last Progress Note/Recert: MMT Right Shoulder:  Flex: 4/5  Abd:4-/5  Scaption: 4-/, p! IR: 4.5  Er 4/5        Current Status: PROGRESSING  Strength Right Left   Shoulder Flex p! Strength 4   Scaption  4- 4   Abd p! 4   IR 4+ 4   ER p! 4       2) increase trunk rotation to 16 in or less to indicate mobility needed for gait.    Status at evaluation: Right: 17 in Left: 18 in  Status at Last Progress Note/Recert: NA  Current Status: Trunk Rot: 14 in bilaterally MET      3) pt will be able to get in/out of car without back pain.    Status at evaluation: pain with getting in and out of the car  Status at Last Progress Note/Recert: continued intermittent pain    Current Status:Progressing: pt reports decreased level of pain and improved ability to transfer out of car but intermittent LBP       4) improve FOTO by >5 points to indicate increase in function. Status at evaluation: 60  Status at Last Progress Note/Recert: 60  Current status: MET 71     5) Pt will be able to reach overhead with Right UE without pain to increase tolerance to daily activities  Status last PN/Recert: pain  Current Status: No pain at this time - MET    G-Codes (GP)  Mobility    Goal  CJ= 20-39%  D/C  CK= 40-59%    The severity rating is based on clinical judgment and the FOTO score. Assessment/ Summary of Care:   Pt was out of town for 30 days over thanksgiving. Was on hold from PT for back and right shoulder until return from out of town. Upon return, pt had fall in front yard over tree root, fell onto face and right arm ended up behind back. Pt originally thought had fx right humerus or dislocated shoulder. Despite having fall, pt has demonstrated increase in function since starting PT.     RECOMMENDATIONS:  [x]Discontinue therapy: [x]Patient has reached or is progressing toward set goals      []Patient is non-compliant or has abdicated      []Due to lack of appreciable progress towards set goals    Cristy Pena, PT, DPT 3/10/2017 8:41 AM

## 2017-03-10 NOTE — PATIENT INSTRUCTIONS
Take the antibiotic for 5 days, and the prednisone in the morning for 7 days.     Take the cough medication at night, but if you are taking the cough medicine, don't take your dose of zanaflex at night    Recheck as needed

## 2017-03-26 PROBLEM — R31.29 MICROSCOPIC HEMATURIA: Status: ACTIVE | Noted: 2017-03-26

## 2017-04-04 PROBLEM — R39.15 URGENCY OF URINATION: Status: ACTIVE | Noted: 2017-04-04

## 2017-04-04 PROBLEM — N39.3 SUI (STRESS URINARY INCONTINENCE, FEMALE): Status: ACTIVE | Noted: 2017-04-04

## 2017-04-04 PROBLEM — R35.0 URINARY FREQUENCY: Status: ACTIVE | Noted: 2017-04-04

## 2017-04-04 PROBLEM — R35.1 NOCTURIA: Status: ACTIVE | Noted: 2017-04-04

## 2017-04-11 ENCOUNTER — HOSPITAL ENCOUNTER (OUTPATIENT)
Dept: LAB | Age: 62
Discharge: HOME | End: 2017-04-11
Payer: MEDICARE

## 2017-04-11 ENCOUNTER — OFFICE VISIT (OUTPATIENT)
Dept: HEMATOLOGY | Age: 62
End: 2017-04-11

## 2017-04-11 VITALS
SYSTOLIC BLOOD PRESSURE: 112 MMHG | HEIGHT: 64 IN | WEIGHT: 210.2 LBS | BODY MASS INDEX: 35.89 KG/M2 | OXYGEN SATURATION: 96 % | DIASTOLIC BLOOD PRESSURE: 75 MMHG | TEMPERATURE: 97.6 F | HEART RATE: 96 BPM | RESPIRATION RATE: 16 BRPM

## 2017-04-11 DIAGNOSIS — B18.2 CHRONIC HEPATITIS C WITHOUT HEPATIC COMA (HCC): ICD-10-CM

## 2017-04-11 DIAGNOSIS — B18.2 CHRONIC HEPATITIS C WITHOUT HEPATIC COMA (HCC): Primary | ICD-10-CM

## 2017-04-11 LAB
ALBUMIN SERPL BCP-MCNC: 4 G/DL (ref 3.4–5)
ALBUMIN/GLOB SERPL: 1.3 {RATIO} (ref 0.8–1.7)
ALP SERPL-CCNC: 72 U/L (ref 45–117)
ALT SERPL-CCNC: 32 U/L (ref 13–56)
ANION GAP BLD CALC-SCNC: 11 MMOL/L (ref 3–18)
AST SERPL W P-5'-P-CCNC: 22 U/L (ref 15–37)
BASOPHILS # BLD AUTO: 0 K/UL (ref 0–0.06)
BASOPHILS # BLD: 1 % (ref 0–2)
BILIRUB DIRECT SERPL-MCNC: 0.1 MG/DL (ref 0–0.2)
BILIRUB SERPL-MCNC: 0.6 MG/DL (ref 0.2–1)
BUN SERPL-MCNC: 11 MG/DL (ref 7–18)
BUN/CREAT SERPL: 14 (ref 12–20)
CALCIUM SERPL-MCNC: 9.2 MG/DL (ref 8.5–10.1)
CHLORIDE SERPL-SCNC: 104 MMOL/L (ref 100–108)
CO2 SERPL-SCNC: 27 MMOL/L (ref 21–32)
CREAT SERPL-MCNC: 0.76 MG/DL (ref 0.6–1.3)
DIFFERENTIAL METHOD BLD: ABNORMAL
EOSINOPHIL # BLD: 0.1 K/UL (ref 0–0.4)
EOSINOPHIL NFR BLD: 2 % (ref 0–5)
ERYTHROCYTE [DISTWIDTH] IN BLOOD BY AUTOMATED COUNT: 12.8 % (ref 11.6–14.5)
GLOBULIN SER CALC-MCNC: 3.2 G/DL (ref 2–4)
GLUCOSE SERPL-MCNC: 106 MG/DL (ref 74–99)
HCT VFR BLD AUTO: 42.2 % (ref 35–45)
HGB BLD-MCNC: 14.6 G/DL (ref 12–16)
LYMPHOCYTES # BLD AUTO: 25 % (ref 21–52)
LYMPHOCYTES # BLD: 1.3 K/UL (ref 0.9–3.6)
MCH RBC QN AUTO: 29.2 PG (ref 24–34)
MCHC RBC AUTO-ENTMCNC: 34.6 G/DL (ref 31–37)
MCV RBC AUTO: 84.4 FL (ref 74–97)
MONOCYTES # BLD: 0.5 K/UL (ref 0.05–1.2)
MONOCYTES NFR BLD AUTO: 9 % (ref 3–10)
NEUTS SEG # BLD: 3.3 K/UL (ref 1.8–8)
NEUTS SEG NFR BLD AUTO: 63 % (ref 40–73)
PLATELET # BLD AUTO: 111 K/UL (ref 135–420)
PMV BLD AUTO: 9.6 FL (ref 9.2–11.8)
POTASSIUM SERPL-SCNC: 3.7 MMOL/L (ref 3.5–5.5)
PROT SERPL-MCNC: 7.2 G/DL (ref 6.4–8.2)
RBC # BLD AUTO: 5 M/UL (ref 4.2–5.3)
SODIUM SERPL-SCNC: 142 MMOL/L (ref 136–145)
WBC # BLD AUTO: 5.2 K/UL (ref 4.6–13.2)

## 2017-04-11 PROCEDURE — 85025 COMPLETE CBC W/AUTO DIFF WBC: CPT | Performed by: NURSE PRACTITIONER

## 2017-04-11 PROCEDURE — 80048 BASIC METABOLIC PNL TOTAL CA: CPT | Performed by: NURSE PRACTITIONER

## 2017-04-11 PROCEDURE — 36415 COLL VENOUS BLD VENIPUNCTURE: CPT | Performed by: NURSE PRACTITIONER

## 2017-04-11 PROCEDURE — 80076 HEPATIC FUNCTION PANEL: CPT | Performed by: NURSE PRACTITIONER

## 2017-04-11 NOTE — MR AVS SNAPSHOT
Visit Information Date & Time Provider Department Dept. Phone Encounter #  
 4/11/2017 11:30 AM Ana Maria Beach NP [facility] [id number] Follow-up Instructions Return in about 6 months (around 10/11/2017). Your Appointments 4/13/2017 11:00 AM  
Office Visit with Peggy Jacobsen MD  
[address]) Appt Note: 3 month follow up Tramaine Aponte [address] [facility] [address]  
[phone]  
  
   
 [address] [place] [address]  
  
    
 5/3/2017  1:15 PM  
PROCEDURE with Mac Tyson MD  
[facility] ([address] [place] [address]) Appt Note: ua CYSTO microhematuria RAMONA check on estrace cream  
 [address] [facility] [address] [facility] [address] [place] [address]  
[phone]  
  
   
 Brandon Posrclas 15 Upcoming Health Maintenance Date Due Pneumococcal 19-64 Medium Risk (1 of 1 - PPSV23) 10/21/1975 BREAST CANCER SCRN MAMMOGRAM 7/13/2018 PAP AKA CERVICAL CYTOLOGY 1/3/2022 COLONOSCOPY 2/9/2022 DTaP/Tdap/Td series (2 - Td) 10/8/2024 Allergies as of 4/11/2017  Review Complete On: 4/11/2017 By: Ana Maria Beach NP Severity Noted Reaction Type Reactions Adhesive Tape-silicones  55/25/9844   Not Verified Rash Current Immunizations  Never Reviewed Name Date Hep A Vaccine 9/12/2012, 4/10/2012, 3/5/2012 Hep B Vaccine 9/12/2012, 4/10/2012, 3/5/2012 Influenza Vaccine 10/8/2014, 11/1/2013 Influenza Vaccine (Quad) PF 10/12/2016 Pneumococcal Conjugate (PCV-13) 1/12/2017 Tdap 10/8/2014 Not reviewed this visit You Were Diagnosed With   
  
 Codes Comments Chronic hepatitis C without hepatic coma (HCC)    -  Primary ICD-10-CM: B18.2 ICD-9-CM: 070.54 Vitals BP Pulse Temp Resp Height(growth percentile)  112/75 (BP 1 Location: Left arm, BP Patient Position: Sitting) 96 97.6 °F (36.4 °C) (Tympanic) 16 5' 4\" (1.626 m) Weight(growth percentile) SpO2 BMI OB Status Smoking Status 210 lb 3.2 oz (95.3 kg) 96% 36.08 kg/m2 Postmenopausal Former Smoker BMI and BSA Data Body Mass Index Body Surface Area 36.08 kg/m 2 2.07 m 2 Preferred Pharmacy Pharmacy Name Phone [id number] [address] [place], [place] [facility] [address] [facility] [phone] Your Updated Medication List  
  
   
This list is accurate as of: 4/11/17 11:48 AM.  Always use your most recent med list.  
  
  
  
  
 ABILIFY 5 mg tablet Generic drug:  ARIPiprazole Take 5 mg by mouth daily. benztropine 1 mg tablet Commonly known as:  COGENTIN Take 1 mg by mouth three (3) times daily. diclofenac 1 % Gel Commonly known as:  VOLTAREN  
apply 2 grams to affected area four times a day  
  
 estradiol 0.01 % (0.1 mg/gram) vaginal cream  
Commonly known as:  ESTRACE  
1/16\" of cream applied to periurethral area daily  
  
 levothyroxine 137 mcg tablet Commonly known as:  SYNTHROID Take 137 mcg by mouth Daily (before breakfast). omeprazole 20 mg capsule Commonly known as:  PriLOSEC Take 1 Cap by mouth daily. PROAIR HFA 90 mcg/actuation inhaler Generic drug:  albuterol  
  
 tiZANidine 4 mg tablet Commonly known as:  Sloane Satya Take 1 Tab by mouth every six (6) hours. VITAMIN D3 2,000 unit Tab Generic drug:  cholecalciferol (vitamin D3) Take  by mouth.  
  
 zolpidem 10 mg tablet Commonly known as:  AMBIEN Follow-up Instructions Return in about 6 months (around 10/11/2017). To-Do List   
 04/11/2017 Lab:  CBC WITH AUTOMATED DIFF   
  
 04/11/2017 Lab:  HEPATIC FUNCTION PANEL   
  
 04/11/2017 Lab:  METABOLIC PANEL, BASIC Introducing [facility] & HEALTH SERVICES! Dear Michaela Kearns: Thank you for requesting a [employer] account. Our records indicate that you already have an active [employer] account.   You can access your account anytime at [email or web address] Did you know that you can access your hospital and ER discharge instructions at any time in [employer]? You can also review all of your test results from your hospital stay or ER visit. Additional Information If you have questions, please visit the Frequently Asked Questions section of the [employer] website at [email or web address] Remember, [employer] is NOT to be used for urgent needs. For medical emergencies, dial 911. Now available from your iPhone and Android! Please provide this summary of care documentation to your next provider. Your primary care clinician is listed as Albaro Anders. If you have any questions after today's visit, please call [phone].

## 2017-04-11 NOTE — PROGRESS NOTES
2 Francisca Tomlinson MD, MELANIE Rand PA-C Phillips Saas, MD, FACP, MD Catie Matos NP Zoila Footman, NP        at [facility]     [address] [facility], [address], [address]     [phone]     FAX: [phone]    at [address], [address] [place] [address], [address]     [phone]     FAX: [phone]       Patient Care Team:  Loulou Ruiz MD as PCP - General (Family Practice)  Angelo Vasquez NP (Nurse Practitioner)      Problem List  Date Reviewed: 4/11/2017          Codes Class Noted    Urinary frequency ICD-10-CM: R35.0  ICD-9-CM: 788.41  4/4/2017        Urgency of urination ICD-10-CM: R39.15  ICD-9-CM: 998.96  4/4/2017        Nocturia ICD-10-CM: R35.1  ICD-9-CM: 788.43  4/4/2017        RAMONA (stress urinary incontinence, female) ICD-10-CM: N39.3  ICD-9-CM: 625.6  4/4/2017        Microscopic hematuria ICD-10-CM: R31.29  ICD-9-CM: 599.72  3/26/2017        Chronic back pain ICD-10-CM: M54.9, G89.29  ICD-9-CM: 724.5, 338.29  9/23/2015    Overview Signed 9/23/2015 12:00 PM by Carol Guerrero NP     Nerve ablation 09/16/2015. Hypothyroidism ICD-10-CM: E03.9  ICD-9-CM: 244.9  2/6/2013        Chronic hepatitis C ([facility] ICD-10-CM: B18.2  ICD-9-CM: 070.54  2/6/2013    Overview Addendum 3/19/2015 10:22 AM by Carol Guerrero NP     SVR achieved with SOF/SIM (2014). Standard interferon TIW x 6 months 1997. Non-response. Standard interferon/ribavirin x 6months 1999. Non-response             Rheumatoid arthritis ([facility] ICD-10-CM: M06.9  ICD-9-CM: 714.0  2/6/2013        Carpal tunnel syndrome ICD-10-CM: G56.00  ICD-9-CM: 354.0  2/6/2013              Darvin Chandler returns to the [facility] today for education and management of cirrhosis. She was treated for HCV and was SVR two years post treatment.   She began treatment on 05/15/2014 with dual therapy of SOF/SIM and completed her 12 weeks of therapy on 08/05/2014. The patient is a 62 y.o.  female who was noted to have abnormalities in liver chemistries and subsequently tested positive for chronic HCV in 1990s. Risk factors for acquiring HCV are IV drug use in 1980s. There was no history of acute incteric hepatitis at the time of these risk factors. Ultrasound of the liver was performed 06/2016. The liver again demonstrates a coarsened echotexture with lobulated contour compatible with the history of cirrhosis. No mention of suspicious masses in the report. Repeat ultrasound with shear wave elastography was performed in 12/2016. The ultrasound demonstrates a coarse heterogeneous echotexture. No focal mass. The shear wave elastography suggests that the cirrhosis is resolving. A liver biopsy has not been performed. The patient was treated with standard interferon in 1997 and with standard interferon and ribavirin in 1999. Both treatments lasted for 24 weeks. The patient tolerated treatment reasonably well. HCV RNA levels obtained during treatment are not available at this time. The patient is unaware of the virologic response pattern. The most recent laboratory studies indicate that the liver transaminases are normal, alkaline phosphatase is normal, tests of hepatic synthetic and metabolic function are normal, and the platelet count is depressed. The patient has no complaints which can be attributed to liver disease. The patient completes all daily activities without any functional limitations.  The patient has not experienced fatigue, fevers, chills, shortness of breath, chest pain, pain in the right side over the liver, diffuse abdominal pain, nausea, vomiting, constipation, diarrhrea, dry eyes, dry mouth, arthralgias, myalgias, yellowing of the eyes or skin, itching, dark urine, problems concentrating, swelling of the abdomen, swelling of the lower extremities, hematemesis, or hematochezia. ALLERGIES  Allergies   Allergen Reactions    Adhesive Tape-Silicones Rash       MEDICATIONS:  Current Outpatient Prescriptions   Medication Sig Dispense Refill    estradiol (ESTRACE) 0.01 % (0.1 mg/gram) vaginal cream 1/16\" of cream applied to periurethral area daily 42.5 g 5    cholecalciferol, vitamin D3, (VITAMIN D3) 2,000 unit tab Take  by mouth.  omeprazole (PRILOSEC) 20 mg capsule Take 1 Cap by mouth daily. 30 Cap 2    diclofenac (VOLTAREN) 1 % gel apply 2 grams to affected area four times a day  0    PROAIR HFA 90 mcg/actuation inhaler   0    levothyroxine (SYNTHROID) 137 mcg tablet Take 137 mcg by mouth Daily (before breakfast). (Patient taking differently: Take 150 mcg by mouth Daily (before breakfast). ) 90 Tab 1    tiZANidine (ZANAFLEX) 4 mg tablet Take 1 Tab by mouth every six (6) hours. 120 Tab 0    zolpidem (AMBIEN) 10 mg tablet   0    benztropine (COGENTIN) 1 mg tablet Take 1 mg by mouth three (3) times daily.  ARIPiprazole (ABILIFY) 5 mg tablet Take 5 mg by mouth daily. REVIEW OF SYSTEMS:  Systems related to all organ systems were reviewed. All were negative except as noted above. FAMILY/SOCIAL HISTORY:  The patient is . The patient has 2 children, and 4 grandchildren. The patient used to smoke a little but stopped in 2005. She used to consumed alcohol in excess. The patient has been abstinent from alcohol since 5/2012. 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 The patient is currently receiving disability. PHYSICAL EXAMINATION:  Visit Vitals    /75 (BP 1 Location: Left arm, BP Patient Position: Sitting)    Pulse 96    Temp 97.6 °F (36.4 °C) (Tympanic)    Resp 16    Ht 5' 4\" (1.626 m)    Wt 210 lb 3.2 oz (95.3 kg)    SpO2 96%    BMI 36.08 kg/m2     General: No acute distress. Eyes: Sclera anicteric. ENT: No oral lesions.   Thyroid normal.  Nodes: No adenopathy. Skin: No spider angiomata. No jaundice. No palmar erythema. Respiratory: Lungs clear to auscultation. Cardiovascular: Regular heart rate. No murmurs. No JVD. Abdomen: Soft non-tender. Liver size normal to percussion/palpation. Spleen not palpable. No obvious ascites. Extremities: No lower extremity edema. No muscle wasting. No gross arthritic changes. Neurologic: Alert and oriented. Cranial nerves grossly intact. No asterixsis. LABORATORY STUDIES:   Liver [place] [address] & Units 1/12/2017 10/3/2016 7/18/2016   WBC 4.6 - 13.2 K/uL  3.9 (L)    ANC 1.8 - 8.0 K/UL  2.4    HGB 12.0 - 16.0 g/dL  13.7     - 420 K/uL  81 (L)    INR 0.8 - 1.2    1.1    AST 15 - 37 U/L 26 26 24   ALT 13 - 56 U/L 33 34 31   Alk Phos 45 - 117 U/L 82 87 77   Bili, Total 0.2 - 1.0 MG/DL 1.1 (H) 0.4 0.8   Bili, Direct 0.0 - 0.2 MG/DL  0.1    Albumin 3.4 - 5.0 g/dL 4.5 3.8 4.1   BUN 7.0 - 18 MG/DL 12 16 9   Creat 0.6 - 1.3 MG/DL 0.86 0.81 0.78   Na 136 - 145 mmol/L 140 144 138   K 3.5 - 5.5 mmol/L 4.4 4.1 3.9   Cl 100 - 108 mmol/L 103 106 104   CO2 21 - 32 mmol/L 24 29 25   Glucose 74 - 99 mg/dL 108 (H) 97 119 (H)   Ammonia 11 - 32 UMOL/L        Cancer Screening Latest Ref Rng & Units 10/3/2016 5/10/2016   AFP, Serum 0.0 - 8.0 ng/mL 3.4 3.2   AFP-L3% 0.0 - 9.9 % Comment Comment     Cancer Screening Latest Ref Rng & Units 12/23/2015   AFP, Serum 0.0 - 8.0 ng/mL 4.9   AFP-L3% 0.0 - 9.9 % Comment     SEROLOGIES:  Serologies Latest Ref Rng 2/6/2013   Hep A Ab, Total Negative Positive (A)   Hep B Surface Ag Negative Negative   Hep B Core Ab, Total Negative Negative   Hep B Surface Ab 0.00 - 0.99 Index Value 0.52   Hep C Genotype See Note 1a   IL28B Genotype  CT genotype   Ferritin 13 - 150 ng/mL 464 (H)   Iron % Saturation 15 - 55 % 67 (H)     2/2013. HCV RNA Log 6.36 IU/ml. Eradicated. LIVER HISTOLOGY:  12/2016. Shear wave elastography.   E Median is 11.66, suggestive of F#, bridging fibrosis. Wide E Std of 4.41 is suggestive of fatty liver disease. ENDOSCOPIC PROCEDURES:  09/2014. EGD by Dr. Amy Alejandre. Normal esophagus. H.Pylori is negative. RADIOLOGY:  04/2014. Ultrasound of liver. Consistent with cirrhosis. No masses. 10/2014. Ultrasound of liver. Consistent with cirrhosis. No masses. 04/2015. Ultrasound of the liver. Consistent with cirrhosis. No mention of masses in the report. 10/2015. Ultrasound of the liver. Consistent with cirrhosis. No mention of masses in the report.  06/2016. Ultrasound of the liver. Heterogeneous appearing hepatic echotexture without mass. 12/2016. Ultrasound of the liver. Coarse heterogeneous echotexture. No focal mass. OTHER TESTING:  Not available or performed    ASSESSMENT AND PLAN:  Chronic HCV of unclear severity. The most recent laboratory studies indicate that the liver transaminases are normal, alkaline phosphatase is normal,  tests of hepatic synthetic and metabolic function are depressed, and the platelet count is depressed. Based upon laboratory studies the patient clearly has cirrhosis. Will perform laboratory testing to monitor liver function and degree of liver injury. This will include hepatic panel, a CBC w/ diff, a BMP, a PT/INR, a HCV RNA, and an AFP-L3%. Complications of cirrhosis were discussed in detail. We discussed thrombocytopenia, portal hypertension, varices, GI bleeding, peripheral edema, ascites, hepatic encephalopathy, and hepatocellular carcinoma. She is very stable and the cirrhosis may have even resolved. We will move her appointments to every 6 months. We discussed the need for every 6 month liver imaging studies. Hepatic encephalopathy has not developed. Edema. Resolved. Diuretics have been discontinued. [facility] screening. Ultrasound was recently performed and does not demonstrate any sign of developing HCC. Repeat imaging annually. HCV.  The patient was previously treated for HCV with standard interferon and ribavirin in the 1990s. There was a non-response that was not clearly defined. She was retreated with SOF/SIM last year and is SVR more than one year post treatment. The patient was directed to continue all current medications at the current dosages except for the NSAIDs, which she should stop. There are no contraindications for the patient to take any medications that are necessary for treatment of other medical issues. The patient was counseled regarding alcohol consumption. Vaccination for viral hepatitis A is not needed. The patient has serologic evidence of prior exposure or vaccination with immunity. [facility] in 6 months.      Jose L Naik NP   [facility] of [address] [place] [address], [address]   [phone]

## 2017-04-13 ENCOUNTER — OFFICE VISIT (OUTPATIENT)
Dept: FAMILY MEDICINE CLINIC | Age: 62
End: 2017-04-13

## 2017-04-13 ENCOUNTER — HOSPITAL ENCOUNTER (OUTPATIENT)
Dept: LAB | Age: 62
Discharge: HOME | End: 2017-04-13
Payer: MEDICARE

## 2017-04-13 VITALS
HEART RATE: 74 BPM | SYSTOLIC BLOOD PRESSURE: 126 MMHG | OXYGEN SATURATION: 98 % | WEIGHT: 212 LBS | DIASTOLIC BLOOD PRESSURE: 58 MMHG | RESPIRATION RATE: 16 BRPM | HEIGHT: 64 IN | BODY MASS INDEX: 36.19 KG/M2 | TEMPERATURE: 96.6 F

## 2017-04-13 DIAGNOSIS — E03.9 ACQUIRED HYPOTHYROIDISM: Primary | ICD-10-CM

## 2017-04-13 DIAGNOSIS — Z13.31 SCREENING FOR DEPRESSION: ICD-10-CM

## 2017-04-13 DIAGNOSIS — E03.9 ACQUIRED HYPOTHYROIDISM: ICD-10-CM

## 2017-04-13 DIAGNOSIS — R31.9 HEMATURIA: ICD-10-CM

## 2017-04-13 DIAGNOSIS — E66.9 OBESITY (BMI 35.0-39.9 WITHOUT COMORBIDITY): ICD-10-CM

## 2017-04-13 PROBLEM — R39.15 URGENCY OF URINATION: Status: RESOLVED | Noted: 2017-04-04 | Resolved: 2017-04-13

## 2017-04-13 PROBLEM — R35.0 URINARY FREQUENCY: Status: RESOLVED | Noted: 2017-04-04 | Resolved: 2017-04-13

## 2017-04-13 LAB
BILIRUB UR QL STRIP: NEGATIVE
GLUCOSE UR-MCNC: NEGATIVE MG/DL
KETONES P FAST UR STRIP-MCNC: NEGATIVE MG/DL
PH UR STRIP: 7 [PH] (ref 4.6–8)
PROT UR QL STRIP: NEGATIVE MG/DL
SP GR UR STRIP: 1.01 (ref 1–1.03)
T4 FREE SERPL-MCNC: 1.7 NG/DL (ref 0.7–1.5)
TSH SERPL DL<=0.05 MIU/L-ACNC: 0.46 UIU/ML (ref 0.36–3.74)
UA UROBILINOGEN AMB POC: NORMAL (ref 0.2–1)
URINALYSIS CLARITY POC: CLEAR
URINALYSIS COLOR POC: YELLOW
URINE BLOOD POC: NORMAL
URINE LEUKOCYTES POC: NEGATIVE
URINE NITRITES POC: NEGATIVE

## 2017-04-13 PROCEDURE — 84443 ASSAY THYROID STIM HORMONE: CPT | Performed by: FAMILY MEDICINE

## 2017-04-13 PROCEDURE — 84439 ASSAY OF FREE THYROXINE: CPT | Performed by: FAMILY MEDICINE

## 2017-04-13 NOTE — PROGRESS NOTES
HISTORY OF PRESENT ILLNESS  Jovan Price is a 62 y.o. female. HPI Comments: Ms. Elaine Hickey is here for her scheduled follow up. She is doing well, hasn't had any back pain since dry needling was done. She saw the hepatologist and was told that her cirrhosis does appear to be resolving, but she has fatty liver. Because of this she is really determined to lose weight. At one point in the past she lost 40 lbs, mostly by extensive walking. Three months ago her TSH was elevated and I increased her dose of Synthroid. She is due for a follow up lab test.   NO other complaints or issues. She is in the process of being worked up for hematuria and has a cystoscopy and CT scheduled. So far everything is benign. Hypertension    Pertinent negatives include no chest pain, no palpitations, no blurred vision, no headaches, no shortness of breath, no nausea and no vomiting. Thyroid Problem   Pertinent negatives include no chest pain, no abdominal pain, no headaches and no shortness of breath. Review of Systems   Constitutional: Negative for chills and fever. Eyes: Negative for blurred vision and double vision. Respiratory: Negative for cough and shortness of breath. Cardiovascular: Negative for chest pain and palpitations. Gastrointestinal: Negative for abdominal pain, heartburn, nausea and vomiting. Neurological: Negative for headaches. Physical Exam   Constitutional: Vital signs are normal. She appears well-developed and well-nourished. HENT:   Right Ear: Tympanic membrane and ear canal normal.   Left Ear: Tympanic membrane and ear canal normal.   Nose: Nose normal.   Mouth/Throat: Uvula is midline, oropharynx is clear and moist and mucous membranes are normal.   Eyes: Pupils are equal, round, and reactive to light. Neck: No thyromegaly present. Cardiovascular: Normal rate, regular rhythm and normal heart sounds.     Pulmonary/Chest: Effort normal and breath sounds normal. No respiratory distress. She has no wheezes. She has no rales. Abdominal: She exhibits no distension. Skin: No rash noted. Psychiatric: She has a normal mood and affect. Her behavior is normal.   Nursing note and vitals reviewed. ASSESSMENT and PLAN    ICD-10-CM ICD-9-CM    1. Acquired hypothyroidism E03.9 244.9 TSH 3RD GENERATION      T4, FREE   2. Hematuria R31.9 599.70 AMB POC URINALYSIS DIP STICK AUTO W/O MICRO   3. Obesity (BMI 35.0-39.9 without comorbidity) ([facility]) E66.9 278.00    4.  Screening for depression Z13.89 V79.0 BEHAV ASSMT W/SCORE & DOCD/STAND INSTRUMENT         AVS instructions reviewed with patient, pt verbalized understanding

## 2017-04-13 NOTE — MR AVS SNAPSHOT
Visit Information Date & Time Provider Department Dept. Phone Encounter #  
 4/13/2017 11:00 AM Duke Herrera, [address] [facility] [phone] [id number] Follow-up Instructions Return in about 6 months (around 10/13/2017), or if symptoms worsen or fail to improve. Your Appointments 5/3/2017  1:15 PM  
PROCEDURE with Cj Aguero MD  
[facility] ([address]) Appt Note: ua CYSTO microhematuria RAMONA check on estrace cream  
 [address] [address]  
[phone]  
  
   
 [address] [facility] [place]  
  
    
 10/11/2017 11:30 AM  
Follow Up with Nic Penny NP [facility] ([address]) Appt Note: HCV  
 [address] [facility] [address] [facility] [address] [place] [address] Upcoming Health Maintenance Date Due Pneumococcal 19-64 Medium Risk (1 of 1 - PPSV23) 10/21/1975 BREAST CANCER SCRN MAMMOGRAM 7/13/2018 PAP AKA CERVICAL CYTOLOGY 1/3/2022 COLONOSCOPY 2/9/2022 DTaP/Tdap/Td series (2 - Td) 10/8/2024 Allergies as of 4/13/2017  Review Complete On: 4/13/2017 By: Duke Herrera MD  
  
 Severity Noted Reaction Type Reactions Adhesive Tape-silicones  41/97/6707   Not Verified Rash Current Immunizations  Never Reviewed Name Date Hep A Vaccine 9/12/2012, 4/10/2012, 3/5/2012 Hep B Vaccine 9/12/2012, 4/10/2012, 3/5/2012 Influenza Vaccine 10/8/2014, 11/1/2013 Influenza Vaccine (Quad) PF 10/12/2016 Pneumococcal Conjugate (PCV-13) 1/12/2017 Tdap 10/8/2014 Not reviewed this visit You Were Diagnosed With   
  
 Codes Comments Acquired hypothyroidism    -  Primary ICD-10-CM: E03.9 ICD-9-CM: 244.9 Hematuria     ICD-10-CM: R31.9 ICD-9-CM: 599.70 Vitals BP Pulse Temp Resp Height(growth percentile) Weight(growth percentile) 126/58 (BP 1 Location: Left arm, BP Patient Position: Sitting) 74 96.6 °F (35.9 °C) (Oral) 16 5' 4\" (1.626 m) 212 lb (96.2 kg) SpO2 BMI OB Status Smoking Status 98% 36.39 kg/m2 Postmenopausal Former Smoker Vitals History BMI and BSA Data Body Mass Index Body Surface Area  
 36.39 kg/m 2 2.08 m 2 Preferred Pharmacy Pharmacy Name Phone [id number] [address], [facility] [address] [facility] [phone] Your Updated Medication List  
  
   
This list is accurate as of: 4/13/17 11:31 AM.  Always use your most recent med list.  
  
  
  
  
 ABILIFY 5 mg tablet Generic drug:  ARIPiprazole Take 5 mg by mouth daily. benztropine 1 mg tablet Commonly known as:  COGENTIN Take 1 mg by mouth three (3) times daily. diclofenac 1 % Gel Commonly known as:  VOLTAREN  
apply 2 grams to affected area four times a day  
  
 estradiol 0.01 % (0.1 mg/gram) vaginal cream  
Commonly known as:  ESTRACE  
1/16\" of cream applied to periurethral area daily  
  
 levothyroxine 137 mcg tablet Commonly known as:  SYNTHROID Take 137 mcg by mouth Daily (before breakfast). omeprazole 20 mg capsule Commonly known as:  PriLOSEC Take 1 Cap by mouth daily. PROAIR HFA 90 mcg/actuation inhaler Generic drug:  albuterol  
  
 tiZANidine 4 mg tablet Commonly known as:  Todd Heading Take 1 Tab by mouth every six (6) hours. VITAMIN D3 2,000 unit Tab Generic drug:  cholecalciferol (vitamin D3) Take  by mouth.  
  
 zolpidem 10 mg tablet Commonly known as:  AMBIEN We Performed the Following AMB POC URINALYSIS DIP STICK AUTO W/O MICRO [66243 CPT(R)] Follow-up Instructions Return in about 6 months (around 10/13/2017), or if symptoms worsen or fail to improve. To-Do List   
 04/13/2017 Lab:  T4, FREE   
  
 04/13/2017 Lab:  TSH 3RD GENERATION Patient Instructions I will send you an e-mail with any recommendations about the lab results. Continue everything the same for now. Recheck 6 months, goal should be 18 lbs less (3 lbs/mo) Introducing [facility]! Dear Toshia Beatty: Thank you for requesting a [employer] account. Our records indicate that you already have an active [employer] account. You can access your account anytime at [email or web address] Did you know that you can access your hospital and ER discharge instructions at any time in [employer]? You can also review all of your test results from your hospital stay or ER visit. Additional Information If you have questions, please visit the Frequently Asked Questions section of the [employer] website at [email or web address] Remember, [employer] is NOT to be used for urgent needs. For medical emergencies, dial 911. Now available from your iPhone and Android! Please provide this summary of care documentation to your next provider. Your primary care clinician is listed as Albaro Anders. If you have any questions after today's visit, please call [phone].

## 2017-04-13 NOTE — PATIENT INSTRUCTIONS
I will send you an e-mail with any recommendations about the lab results. Continue everything the same for now.     Recheck 6 months, goal should be 18 lbs less (3 lbs/mo)

## 2017-04-13 NOTE — PROGRESS NOTES
Rm 2  Pt presents to the clinic for a 3 month follow-up regarding her HTN and thyroid. Flu shot requested: no    Depression Screening Completed: yes    Learning Assessment Completed: yes    Abuse Screening Completed: n/a    Health Maintenance reviewed and discussed per provider: yes     Coordination of Care:    1. Have you been to the ER, urgent care clinic since your last visit? Hospitalized since your last visit? no    2. Have you seen or consulted any other health care providers outside of the [address] [place] [address] since your last visit? Include any pap smears or colon screening.  no

## 2017-05-03 PROBLEM — N36.42 INTRINSIC (URETHRAL) SPHINCTER DEFICIENCY (ISD): Status: ACTIVE | Noted: 2017-05-03

## 2017-05-10 ENCOUNTER — OFFICE VISIT (OUTPATIENT)
Dept: ORTHOPEDIC SURGERY | Age: 62
End: 2017-05-10

## 2017-05-10 VITALS
TEMPERATURE: 98 F | BODY MASS INDEX: 36.12 KG/M2 | OXYGEN SATURATION: 97 % | HEIGHT: 64 IN | DIASTOLIC BLOOD PRESSURE: 74 MMHG | HEART RATE: 85 BPM | RESPIRATION RATE: 20 BRPM | WEIGHT: 211.6 LBS | SYSTOLIC BLOOD PRESSURE: 140 MMHG

## 2017-05-10 DIAGNOSIS — M79.18 MYOFASCIAL PAIN: Primary | ICD-10-CM

## 2017-05-10 RX ORDER — DICLOFENAC SODIUM 75 MG/1
75 TABLET, DELAYED RELEASE ORAL
Qty: 60 TAB | Refills: 5 | Status: SHIPPED | OUTPATIENT
Start: 2017-05-10 | End: 2018-04-18 | Stop reason: SDUPTHER

## 2017-05-10 RX ORDER — LIDOCAINE HYDROCHLORIDE 20 MG/ML
4 INJECTION, SOLUTION EPIDURAL; INFILTRATION; INTRACAUDAL; PERINEURAL ONCE
Qty: 4 ML | Refills: 0
Start: 2017-05-10 | End: 2017-05-10

## 2017-05-10 RX ORDER — TIZANIDINE 2 MG/1
2 TABLET ORAL 3 TIMES DAILY
Qty: 90 TAB | Refills: 2 | Status: SHIPPED | OUTPATIENT
Start: 2017-05-10 | End: 2018-12-17 | Stop reason: SDUPTHER

## 2017-05-10 RX ORDER — BUPIVACAINE HYDROCHLORIDE 2.5 MG/ML
4 INJECTION, SOLUTION INFILTRATION; PERINEURAL ONCE
Qty: 4 ML | Refills: 0
Start: 2017-05-10 | End: 2017-05-10

## 2017-05-10 RX ORDER — BETAMETHASONE SODIUM PHOSPHATE AND BETAMETHASONE ACETATE 3; 3 MG/ML; MG/ML
12 INJECTION, SUSPENSION INTRA-ARTICULAR; INTRALESIONAL; INTRAMUSCULAR; SOFT TISSUE ONCE
Qty: 2 ML | Refills: 0
Start: 2017-05-10 | End: 2017-05-10

## 2017-05-10 NOTE — MR AVS SNAPSHOT
Visit Information Date & Time Provider Department Dept. Phone Encounter #  
 5/10/2017  8:15 AM Xavier Candelaria MD [address] [facility] [phone] Follow-up Instructions Return in about 6 weeks (around 6/21/2017), or if symptoms worsen or fail to improve. Your Appointments 10/5/2017 11:00 AM  
Office Visit with La Christian MD  
[address] [facility]: 6 month follow up Tramaine Aponte [address] [facility] [id number]  
[phone]  
  
   
 [address]  
  
    
 10/11/2017 11:30 AM  
Follow Up with Soledad Whyte NP [facility] ([facility]) Appt Note: HCV  
 [id number] [address] [employer] [address] [facility] [address] [facility] Upcoming Health Maintenance Date Due Pneumococcal 19-64 Medium Risk (1 of 1 - PPSV23) 10/21/1975 INFLUENZA AGE 9 TO ADULT 8/1/2017 BREAST CANCER SCRN MAMMOGRAM 7/13/2018 PAP AKA CERVICAL CYTOLOGY 1/3/2022 COLONOSCOPY 2/9/2022 DTaP/Tdap/Td series (2 - Td) 10/8/2024 Allergies as of 5/10/2017  Review Complete On: 5/10/2017 By: Savana Ornelas LPN Severity Noted Reaction Type Reactions Adhesive Tape-silicones  94/49/3240   Not Verified Rash Current Immunizations  Never Reviewed Name Date Hep A Vaccine 9/12/2012, 4/10/2012, 3/5/2012 Hep B Vaccine 9/12/2012, 4/10/2012, 3/5/2012 Influenza Vaccine 10/8/2014, 11/1/2013 Influenza Vaccine (Quad) PF 10/12/2016 Pneumococcal Conjugate (PCV-13) 1/12/2017 Tdap 10/8/2014 Not reviewed this visit You Were Diagnosed With   
  
 Codes Comments Myofascial pain    -  Primary ICD-10-CM: M79.1 ICD-9-CM: 729.1 Vitals BP Pulse Temp Resp Height(growth percentile) Weight(growth percentile) 140/74 85 98 °F (36.7 °C) (Oral) 20 5' 4\" (1.626 m) 211 lb 9.6 oz (96 kg) SpO2 BMI OB Status Smoking Status 97% 36.32 kg/m2 Postmenopausal Former Smoker BMI and BSA Data Body Mass Index Body Surface Area  
 36.32 kg/m 2 2.08 m 2 Preferred Pharmacy Pharmacy Name Phone [id number], [place] - [address] [facility] [phone] Your Updated Medication List  
  
   
This list is accurate as of: 5/10/17  9:31 AM.  Always use your most recent med list.  
  
  
  
  
 ABILIFY 5 mg tablet Generic drug:  ARIPiprazole Take 5 mg by mouth daily. benztropine 1 mg tablet Commonly known as:  COGENTIN Take 1 mg by mouth three (3) times daily. betamethasone 6 mg/mL injection Commonly known as:  CELESTONE SOLUSPAN  
2 mL by IntraMUSCular route once for 1 dose. bupivacaine 0.25 % (2.5 mg/mL) Soln injection Commonly known as:  MARCAINE  
4 mL by IntraMUSCular route once for 1 dose. conjugated estrogens 0.625 mg/gram vaginal cream  
Commonly known as:  PREMARIN  
1/2 gm twice weekly on sunday and wednesday * diclofenac 1 % Gel Commonly known as:  VOLTAREN  
apply 2 grams to affected area four times a day * diclofenac EC 75 mg EC tablet Commonly known as:  VOLTAREN Take 1 Tab by mouth two (2) times daily as needed. levothyroxine 137 mcg tablet Commonly known as:  SYNTHROID Take 137 mcg by mouth Daily (before breakfast). lidocaine (PF) 20 mg/mL (2 %) injection Commonly known as:  XYLOCAINE  
4 mL by Other route once for 1 dose. omeprazole 20 mg capsule Commonly known as:  PriLOSEC Take 1 Cap by mouth daily. PROAIR HFA 90 mcg/actuation inhaler Generic drug:  albuterol * tiZANidine 4 mg tablet Commonly known as:  Kamran Bliss Take 1 Tab by mouth every six (6) hours. * tiZANidine 2 mg tablet Commonly known as:  Kamran Bliss Take 1 Tab by mouth three (3) times daily. VITAMIN D3 2,000 unit Tab Generic drug:  cholecalciferol (vitamin D3) Take  by mouth.  
  
 zolpidem 10 mg tablet Commonly known as:  AMBIEN  
  
 * Notice: This list has 4 medication(s) that are the same as other medications prescribed for you. Read the directions carefully, and ask your doctor or other care provider to review them with you. Prescriptions Sent to Pharmacy Refills  
 diclofenac EC (VOLTAREN) 75 mg EC tablet 5 Sig: Take 1 Tab by mouth two (2) times daily as needed. Class: Normal  
 Pharmacy: [address] [place] [address] Ph #: [phone] Route: Oral  
 tiZANidine (ZANAFLEX) 2 mg tablet 2 Sig: Take 1 Tab by mouth three (3) times daily. Class: Normal  
 Pharmacy: [address] [place] [address] Ph #: [phone] Route: Oral  
  
We Performed the Following BETAMETHASONE ACETATE & SODIUM PHOSPHATE INJECTION 3 MG EA. [ [facility]] INJECTION, BUPIVICAINE HYDRO [ [facility]] LIDOCAINE INJECTION [ [facility]] GA INJECT TRIGGER POINTS, > 3 [id number] CPT(R)] REFERRAL TO PHYSICAL THERAPY [SBJ89 Custom] Comments:  
 eval and treat, dry needling if indicated, Pilates equipment based exercises; Patient has lumbar myofascial pain. Follow-up Instructions Return in about 6 weeks (around 6/21/2017), or if symptoms worsen or fail to improve. Referral Information Referral ID Referred By Referred To  
  
 [id number] NADEEM Rhodes Not Available Visits Status Start Date End Date 1 New Request 5/10/17 5/10/18 If your referral has a status of pending review or denied, additional information will be sent to support the outcome of this decision. Patient Instructions Herniated Disc: Exercises Your Care Instructions Here are some examples of typical rehabilitation exercises for your condition. Start each exercise slowly. Ease off the exercise if you start to have pain. Your doctor or physical therapist will tell you when you can start these exercises and which ones will work best for you. How to do the exercises Note: These exercises can help you move easier and feel better. But when you first start doing them, you may have more pain in your back. This is normal. But it is important to pay close attention to your pain during and after each exercise. · Keep doing these exercises if your pain stays the same or moves from your leg and buttock more toward the middle of your spine. Pain moving out of your leg and buttock is a good sign. · Stop doing these exercises if your pain gets worse in your leg and buttock. Stop if you start to have pain in your leg and buttock that you didn't have before. Be sure to do these exercises in the order they appear. Note how your pain changes before you move to the next one. If your pain is much worse right after exercise and stays worse the next day, do not do any of these exercises. 1. Rest on belly 1. Lie on your stomach, face down, with your head turned to the side. Place your arms beside your body. If this bothers your neck, place your hands, one on top of the other, underneath your forehead. This will help support your head and neck. 2. Try to relax your lower back muscles as much as you can. 3. Continue to lie on your stomach for 2 minutes. 4. If your pain spreads down your leg or increases down your leg, stop this exercise and do not do the next exercises. 2. Press-up 1. Lie on your stomach, face down. Keep your elbows tucked into your sides and under your shoulders. 2. Press your elbows down into the floor to raise your upper back. As you do this, relax your stomach muscles. Allow your back to arch without using your back muscles. Let your low back relax completely as you arch up. 3. Hold this position for 2 minutes. 4. Repeat 2 to 4 times. 5. If your pain spreads down your leg or increases down your leg, stop this exercise and do not do the next exercises. 3. Full press-up 1. Lie on your stomach, face down. Keep your elbows tucked into your sides and under your shoulders. 2. Straighten your elbows, and push your upper body up as far as you can. Allow your lower back to sag. Keep your hips, pelvis, and legs relaxed. 3. Hold this position for 5 seconds, and then relax. 4. Repeat 10 times. Each time, try to raise your upper body a little higher and hold your arms a bit straighter. 5. If your pain spreads down your leg or gets worse down your leg, stop this exercise and do not move to the next exercise. 6. If you can't do this exercise, you may instead try the backward bend exercise that follows. 4. Backward bend · Stand with your feet hip-width apart. Your toes should point forward. Do not lock your knees. · Place your hands in the small of your back. · Bend backward as far as you can, keeping your knees straight. Hold this position for 2 to 3 seconds. Then return to your starting position. · Repeat 2 to 4 times. Each time, try to bend backward a little farther, until you bend backward as far as you can. · If your pain spreads down your leg or increases down your leg, stop this exercise. Follow-up care is a key part of your treatment and safety. Be sure to make and go to all appointments, and call your doctor if you are having problems. It's also a good idea to know your test results and keep a list of the medicines you take. Where can you learn more? Go to http://dena-denton.info/. Enter 69275 [phone] in the search box to learn more about \"Herniated Disc: Exercises. \" Current as of: May 23, 2016 Content Version: 11.2 © 6745-0748 [employer].  Care instructions adapted under license by [employer] (which disclaims liability or warranty for this information). If you have questions about a medical condition or this instruction, always ask your healthcare professional. [address] any warranty or liability for your use of this information. Exercises discussed today: Sohail therapy Introducing [facility]! Dear Tanna Desir: Thank you for requesting a [employer] account. Our records indicate that you already have an active [employer] account. You can access your account anytime at [email or web address] Did you know that you can access your hospital and ER discharge instructions at any time in [employer]? You can also review all of your test results from your hospital stay or ER visit. Additional Information If you have questions, please visit the Frequently Asked Questions section of the [employer] website at [email or web address] Remember, [employer] is NOT to be used for urgent needs. For medical emergencies, dial 911. Now available from your iPhone and Android! Please provide this summary of care documentation to your next provider. Your primary care clinician is listed as Albaro Anders. If you have any questions after today's visit, please call [phone].

## 2017-05-10 NOTE — PATIENT INSTRUCTIONS
Herniated Disc: Exercises  Your Care Instructions  Here are some examples of typical rehabilitation exercises for your condition. Start each exercise slowly. Ease off the exercise if you start to have pain. Your doctor or physical therapist will tell you when you can start these exercises and which ones will work best for you. How to do the exercises  Note: These exercises can help you move easier and feel better. But when you first start doing them, you may have more pain in your back. This is normal. But it is important to pay close attention to your pain during and after each exercise. · Keep doing these exercises if your pain stays the same or moves from your leg and buttock more toward the middle of your spine. Pain moving out of your leg and buttock is a good sign. · Stop doing these exercises if your pain gets worse in your leg and buttock. Stop if you start to have pain in your leg and buttock that you didn't have before. Be sure to do these exercises in the order they appear. Note how your pain changes before you move to the next one. If your pain is much worse right after exercise and stays worse the next day, do not do any of these exercises. 1. Rest on belly    1. Lie on your stomach, face down, with your head turned to the side. Place your arms beside your body. If this bothers your neck, place your hands, one on top of the other, underneath your forehead. This will help support your head and neck. 2. Try to relax your lower back muscles as much as you can. 3. Continue to lie on your stomach for 2 minutes. 4. If your pain spreads down your leg or increases down your leg, stop this exercise and do not do the next exercises. 2. Press-up    1. Lie on your stomach, face down. Keep your elbows tucked into your sides and under your shoulders. 2. Press your elbows down into the floor to raise your upper back. As you do this, relax your stomach muscles.  Allow your back to arch without using your back muscles. Let your low back relax completely as you arch up. 3. Hold this position for 2 minutes. 4. Repeat 2 to 4 times. 5. If your pain spreads down your leg or increases down your leg, stop this exercise and do not do the next exercises. 3. Full press-up    1. Lie on your stomach, face down. Keep your elbows tucked into your sides and under your shoulders. 2. Straighten your elbows, and push your upper body up as far as you can. Allow your lower back to sag. Keep your hips, pelvis, and legs relaxed. 3. Hold this position for 5 seconds, and then relax. 4. Repeat 10 times. Each time, try to raise your upper body a little higher and hold your arms a bit straighter. 5. If your pain spreads down your leg or gets worse down your leg, stop this exercise and do not move to the next exercise. 6. If you can't do this exercise, you may instead try the backward bend exercise that follows. 4. Backward bend    · Stand with your feet hip-width apart. Your toes should point forward. Do not lock your knees. · Place your hands in the small of your back. · Bend backward as far as you can, keeping your knees straight. Hold this position for 2 to 3 seconds. Then return to your starting position. · Repeat 2 to 4 times. Each time, try to bend backward a little farther, until you bend backward as far as you can. · If your pain spreads down your leg or increases down your leg, stop this exercise. Follow-up care is a key part of your treatment and safety. Be sure to make and go to all appointments, and call your doctor if you are having problems. It's also a good idea to know your test results and keep a list of the medicines you take. Where can you learn more? Go to http://dena-denton.info/. Enter [id number] [phone] in the search box to learn more about \"Herniated Disc: Exercises. \"  Current as of: May 23, 2016  Content Version: 11.2  © 3171-1911 [employer].  Care instructions adapted under license by [address] (which disclaims liability or warranty for this information). If you have questions about a medical condition or this instruction, always ask your healthcare professional. [place] [address] any warranty or liability for your use of this information.           Exercises discussed today:  Sohail therapy

## 2017-05-10 NOTE — PROGRESS NOTES
[address]  [address], [address]  [place], [address] [place] [address] Street  Phone: [phone]  Fax: [phone]        Peter Orellana  : 1955  PCP: Duke Herrera MD  5/10/2017    PROGRESS NOTE      ASSESSMENT AND PLAN    Ivory Vega comes in to the office today for a prn f/u. Pt previously found relief with cervical and lumbar PT. She returns today after straining her lumbar region about 2 weeks ago while moving heavy tables. Her pain still appears to be myofascial in nature although there is potential for it to be due to sacroiliitis or a lumbar radiculopathy. She was given trigger point injections to her lumbar region which provided good, immediate relief for her pain free ROM. Pt was referred to PT. Pt was prescribed diclofenac 75mg BID prn. The risks, benefits, and potential side effects of this medication were discussed. She was prescribed Zanaflex 2 mg TID as she has found relief with it in the past.     Pt will f/u in 6 weeks or prn. Tanna Desir was seen today for back pain. Diagnoses and all orders for this visit:    Myofascial pain  -     bupivacaine (MARCAINE) 0.25 % (2.5 mg/mL) soln injection; 4 mL by IntraMUSCular route once for 1 dose. -     INJECTION, BUPIVICAINE HYDRO  -     LIDOCAINE INJECTION  -     lidocaine, PF, (XYLOCAINE) 20 mg/mL (2 %) injection; 4 mL by Other route once for 1 dose. -     betamethasone (CELESTONE SOLUSPAN) 6 mg/mL injection; 2 mL by IntraMUSCular route once for 1 dose.  -     BETAMETHASONE ACETATE & SODIUM PHOSPHATE INJECTION 6 MG   - INJECT TRIGGER POINTS, > 3       Follow-up Disposition: Not on File      HISTORY OF PRESENT ILLNESS  Ivory Vega is a 62 y.o. female. A&P / HPI from 2016:  Omari Thrasher comes in to the office today c/o cervical and lumbar pain. She has brought her MRI with her today which does not reveal any obvious causes for her pain.  She has a diagnosis of myofascial pain.      She was referred to PT as it has previously provided significant relief for her pain. Pt was advised to continue taking Mobic prn.      Pt will f/u in 6 weeks or prn.     Updates from 01/26/17:  Pt presents for a cervical and lumbar PT continuation f/u. She has found significant relief with her visits and is experiencing little to no pain today. She only has complaints of an sporadic, low grade pain in her lumbar region.     Pt mentions she recently had a mechanical fall which injured her right shoulder. She was treated with a cortisone injection and PT which has provided relief. Updates from 05/10/17:  Pt presents for a prn f/u. Pt previously found relief with cervical and lumbar PT. She returns today after straining her lumbar region about 2 weeks ago while moving heavy tables. Her pain still appears to be myofascial in nature although there is potential for it to be due to sacroiliitis or a lumbar radiculopathy. PAST MEDICAL HISTORY   Past Medical History:   Diagnosis Date    Abnormal Papanicolaou smear of cervix     1988 HPV    Asthma     Bipolar 1 disorder ([address]     Bipolar 1 disorder, depressed (HCC)     Bursitis     Carpal tunnel syndrome     Cirrhosis, hepatitis C     Connective tissue disease ([address]     DDD (degenerative disc disease), lumbosacral     Gastric ulcer     Hashimoto's disease     Hashimoto's disease     Hepatitis C     Herniated intervertebral disc of lumbar spine     Hypermobility syndrome     Liver disease     Osteoarthritis     Plantar fasciitis, bilateral     RA (rheumatoid arthritis) ([address]        Past Surgical History:   Procedure Laterality Date    COLONOSCOPY N/A 2/9/2017     Colonoscopy w/polyp bxs x3 [facility] Griffin [facility] ENDOSCOPY    HX APPENDECTOMY      HX DILATION AND CURETTAGE  1988    cryso    HX GYN      BTL    HX HEENT      malofacial surgery   .       MEDICATIONS      Current Outpatient Prescriptions   Medication Sig Dispense Refill    conjugated estrogens (PREMARIN) 0.625 mg/gram vaginal cream 1/2 gm twice weekly on sunday and wednesday 45 g 2    cholecalciferol, vitamin D3, (VITAMIN D3) 2,000 unit tab Take  by mouth.  omeprazole (PRILOSEC) 20 mg capsule Take 1 Cap by mouth daily. 30 Cap 2    diclofenac (VOLTAREN) 1 % gel apply 2 grams to affected area four times a day  0    PROAIR HFA 90 mcg/actuation inhaler   0    levothyroxine (SYNTHROID) 137 mcg tablet Take 137 mcg by mouth Daily (before breakfast). (Patient taking differently: Take 150 mcg by mouth Daily (before breakfast). ) 90 Tab 1    tiZANidine (ZANAFLEX) 4 mg tablet Take 1 Tab by mouth every six (6) hours. 120 Tab 0    zolpidem (AMBIEN) 10 mg tablet   0    benztropine (COGENTIN) 1 mg tablet Take 1 mg by mouth three (3) times daily.  ARIPiprazole (ABILIFY) 5 mg tablet Take 5 mg by mouth daily. ALLERGIES    Allergies   Allergen Reactions    Adhesive Tape-Silicones Rash          SOCIAL HISTORY    Social History     Social History    Marital status: SINGLE     Spouse name: N/A    Number of children: N/A    Years of education: N/A     Social History Main Topics    Smoking status: Former Smoker     Quit date: 7/1/2005    Smokeless tobacco: Never Used    Alcohol use No    Drug use: Yes     Special: Marijuana    Sexual activity: No     Other Topics Concern    Not on file     Social History Narrative       FAMILY HISTORY    Family History   Problem Relation Age of Onset    No Known Problems Mother     No Known Problems Father          REVIEW OF SYSTEMS  Review of Systems   Constitutional: Negative for chills, diaphoresis, fever, malaise/fatigue and weight loss. Respiratory: Negative for shortness of breath. Cardiovascular: Negative for chest pain and leg swelling. Gastrointestinal: Negative for constipation, nausea and vomiting. Neurological: Negative for dizziness, tingling, seizures, loss of consciousness and headaches. Psychiatric/Behavioral: The patient does not have insomnia. PHYSICAL EXAMINATION  Visit Vitals    /74    Pulse 85    Temp 98 °F (36.7 °C) (Oral)    Resp 20    Ht 5' 4\" (1.626 m)    Wt 211 lb 9.6 oz (96 kg)    SpO2 97%    BMI 36.32 kg/m2       Pain Assessment  5/10/2017   Location of Pain Back   Location Modifiers -   Severity of Pain 7   Quality of Pain Aching; Throbbing;Burning   Duration of Pain -   Frequency of Pain Constant   Aggravating Factors -   Aggravating Factors Comment -   Limiting Behavior -   Relieving Factors -   Result of Injury -           Constitutional:  Well developed, well nourished, in no acute distress. Psychiatric: Affect and mood are appropriate. Integumentary: No rashes or abrasions noted on exposed areas. SPINE/MUSCULOSKELETAL EXAM    Cervical spine:  Neck is midline. Normal muscle tone. No focal atrophy is noted. ROM pain free. Shoulder ROM intact. Mild tenderness to palpation, R>L. Negative Spurling's sign. Negative Tinel's sign. Negative Dillon's sign.       Sensation in the bilateral arms grossly intact to light touch.       Lumbar spine:  No rash, ecchymosis, or gross obliquity. No fasciculations. No focal atrophy is noted. No pain with hip ROM. Full range of motion. Diffuse tenderness to palpation, L>R. No tenderness to palpation at the sciatic notch. SI joints non-tender. Trochanters non tender.      Sensation in the bilateral legs grossly intact to light touch. Updates from 05/10/17:  Bilateral SI joints tender. Negative right PORTILLO test.  Negative right p4 test.  Negative bilateral straight leg raise. Tenderness to palpation in the lumbar region.     MOTOR:      Biceps  Triceps Deltoids Wrist Ext Wrist Flex Hand Intrin   Right 5/5 5/5 5/5 5/5 5/5 5/5   Left 5/5 5/5 5/5 5/5 5/5 5/5             Hip Flex  Quads Hamstrings Ankle DF EHL Ankle PF   Right 5/5 5/5 5/5 5/5 5/5 5/5   Left 5/5 5/5 5/5 5/5 5/5 5/5     DTRs are 2+ biceps, triceps, brachioradialis, patella, and Achilles.      Negative Straight Leg raise. Squat not tested. No difficulty with tandem gait.       Ambulation without assistive device. FWB.       RADIOGRAPHS  Lumbar MRI images taken on June/July 2016 personally reviewed with patient:  1) No obvious stenosis       reviewed      Ms. Yancy Najera has a reminder for a \"due or due soon\" health maintenance. I have asked that she contact her primary care provider for follow-up on this health maintenance.       This plan was reviewed with the patient and patient agrees. All questions were answered. More than half of this visit today was spent on counseling.      Written by Glenn Sousa, as dictated by Dr. Hilda Bobby, Dr. Lxex Victoria, confirm that all documentation is accurate.

## 2017-05-16 ENCOUNTER — HOSPITAL ENCOUNTER (OUTPATIENT)
Dept: PHYSICAL THERAPY | Age: 62
End: 2017-05-16
Payer: MEDICARE

## 2017-05-22 ENCOUNTER — HOSPITAL ENCOUNTER (OUTPATIENT)
Dept: PHYSICAL THERAPY | Age: 62
Discharge: HOME | End: 2017-05-22
Payer: MEDICARE

## 2017-05-22 PROCEDURE — 97530 THERAPEUTIC ACTIVITIES: CPT

## 2017-05-22 PROCEDURE — G8978 MOBILITY CURRENT STATUS: HCPCS

## 2017-05-22 PROCEDURE — 97162 PT EVAL MOD COMPLEX 30 MIN: CPT

## 2017-05-22 PROCEDURE — G8979 MOBILITY GOAL STATUS: HCPCS

## 2017-05-22 NOTE — PROGRESS NOTES
PT DAILY TREATMENT NOTE - [facility]     Patient Name: Damaris Oconnor  Date:2017  : 1955  [x]  Patient  Verified  Payor: Pia Whelan / Plan: [place] / Product Type: Medicare /    In time:10:25 am  Out time:11:00 am  Total Treatment Time (min): 35  Total Timed Codes (min): 10  1:1 Treatment Time ( [address] only): 35   Visit #: 1 of 12    Treatment Area: Myofascial pain [M79.1]    SUBJECTIVE  Pain Level (0-10 scale): 3  Any medication changes, allergies to medications, adverse drug reactions, diagnosis change, or new procedure performed?: [x] No    [] Yes (see summary sheet for update)  Subjective functional status/changes:   [] No changes reported    Hx Present Illness: \"I am back for a little tune up\"  Pt reports that \"I hurt the right side, pulling heavy tables. I was twisting my back. \"  ~3-4 weeks ago, preparing for a yard sale  Pt reports that at present the primary problem is on the right side, previously it was on the left side  Has also has some right shoulder and neck stiffness, end of last year injured right shoulder with a fall  Denies numbness and tingling at present  Prior to injection reports that had pain to proximal right hamstring and buttock  Increase in pain with standing, bending, stooping, getting down and getting back up, rolling in bed, going from sitting to standing    Pt has hx of myofascial pain    Pain:  _7-8__/10 max       __3_/10 min     _3___/10 now    Location: indicates right QL, right side of L-spine, right buttock, proximal hamstring, right UT and periscapular region       [] Sharp    [] Dull      [] Burning     []  Aching     [] Throbbing      [] Tingling     [] Other:       [x]  Constant                   [] Intermittent        Previous treatment:   PT with dry needling last year  Trigger point injections 2 weeks ago with positive results, reduction in pain      PMHX: Significant for: please see past medical hx form   Right shoulder injury in Fall/Winter 2016 - Right shoulder dislocation    Social/Recreation/Work: lives with granddaughter  Tali Velásquez, housework    Patient Goal(s): \"Pain relief so I can function. \"      OBJECTIVE    Modality rationale:    Min Type Additional Details    [] Estim:  []Unatt       []IFC  []Premod                        []Other:  []w/ice   []w/heat  Position:  Location:    [] Estim: []Att    []TENS instruct  []NMES                    []Other:  []w/US   []w/ice   []w/heat  Position:  Location:    []  Traction: [] Cervical       []Lumbar                       [] Prone          []Supine                       []Intermittent   []Continuous Lbs:  [] before manual  [] after manual    []  Ultrasound: []Continuous   [] Pulsed                           []1MHz   []3MHz W/cm2:  Location:    []  Iontophoresis with dexamethasone         Location: [] Take home patch   [] In clinic    []  Ice     []  heat  []  Ice massage  []  Laser   []  Anodyne Position:  Location:    []  Laser with stim  []  Other:  Position:  Location:    []  Vasopneumatic Device Pressure:       [] lo [] med [] hi   Temperature: [] lo [] med [] hi   [] Skin assessment post-treatment:  []intact []redness- no adverse reaction    []redness - adverse reaction:     25 min [x]Eval                  []Re-Eval             With   [] TE   [x] TA -10   [] neuro   [] other: Patient Education: [] Review HEP    [] Progressed/Changed HEP based on:   [] positioning   [] body mechanics   [] transfers   [] heat/ice application    [x] other: reviewed exam findings, anatomy involved and POC     Other Objective/Functional Measures: Movement/gait: decreased arm swing      Visual Inspection: Thoracic: flattened  Lumbar: appears decreased  Shoulder/Scapula: left shoulder lower, scap abducted and tilted at vertebral border    Palpation: increased tone and trigger points right UT and infraspinatus  TTP along bilateral gluts, posterior hip musculature, lumbar paraspinals. AROM/PROM Right Left   Standing Forward Flexion: 5 in from floor     Extension: dcr 25%     Trunk Rot 16 in 16.5 in                    Strength Right Left   Hip Flex 4+ 4+   Knee Ext 5 5   Hamstrings 4- 4-   Hip Ext 4 4   Hip Abd 4- 4-            Special Tests Right Left   Slump - -   SLR - -   Passive SLR >90 >90   PORTILLO - -   Melanie - -   Hip Add Drop  + +     Other Right Left                                       Other Comments: Standing Forward Flexion 5 in from floor  Gillet: hypomobility bilaterally, lateralization bilaterally   Scapular Rhythm: dyskinesia bilaterally, superior translation of humeral head with abduction     Pain Level (0-10 scale) post treatment: 3    ASSESSMENT/Changes in Function:   Pt is a 62 y.o., Right hand dominant female with C/C of right shoulder pain and LBP. Pt reports long standing hx of LBP with exacerbation while preparing for a yard sale 3-4 weeks ago. Denies radicular sx at this time and none provoked by exam or special test. Objective findings: decreased right shoulder and scapular strength, decreased scapular stabilization evidenced by dyskinesia, multiple active trigger points in UT, RC musculature and periscapular musculature. With Lumbar spine noted decreased ROM, decreased trunk rotation, glute and hamstring inhibition and decreased strength, decreased pelvic mobility, gait abnormality and multiple trigger points and increased turgor in lumbar paraspinals and posterior hip musculature. Pt could benefit from skilled PT to address ROM, mobility and strength deficits.     Patient will continue to benefit from skilled PT services to modify and progress therapeutic interventions, address functional mobility deficits, address ROM deficits, address strength deficits, analyze and address soft tissue restrictions, analyze and cue movement patterns, analyze and modify body mechanics/ergonomics, assess and modify postural abnormalities and instruct in home and community integration to attain remaining goals. [x]  See Plan of Care  []  See progress note/recertification  []  See Discharge Summary         Progress towards goals / Updated goals:  Short Term Goals: STG- To be accomplished in 3 week(s):  1. Pt will be independent with HEP to encourage prophylaxis. Eval: held due to time  Current: NA    Long Term Goals: LTG- To be accomplished in 6 week(s):  1. Pt will increase glut and hamstring strength to 4/5 MMT or greater to improve tolerance to daily activities. Eval:4-/5 MMT  Current: NA    2. Pt will improve trunk rotation to 15 in or less to indicate mobility needed for gait. .  Eval:Right 16 in Left 16.5 in  Current: NA    3. Pt will be be able to lift gallon of milk and move using Right UE without pain. Eval:unable  Current: NA    4. Pt FOTO score will increase by 10 points to show improvement in function.   Eval:54  Current: will address at visit 5      PLAN  [x]  Upgrade activities as tolerated     []  Continue plan of care  []  Update interventions per flow sheet       []  Discharge due to:_  []  Other:_      Lamine Hughes, PT, DPT 5/22/2017  10:28 AM    Future Appointments  Date Time Provider Aniya Caitlyn   6/21/2017 9:15 AM Jaret Zhong  [address]   10/5/2017 11:00 AM Raven Guillen MD [facility] SCHED   10/11/2017 11:30 AM Milady Wood NP [address] [place] [address]

## 2017-05-22 NOTE — PROGRESS NOTES
[facility] at the [address], [place] Aniya simms, [address] [facility]  Phone: [phone]      Fax:  [phone]    Plan of Care/ Statement of Necessity for Physical Therapy Services    Patient name: Leeroy Linder Start of Care: 2017   Referral source: Shay Lee MD : 1955    Medical Diagnosis: Myofascial pain [M79.1]   Onset Date: 2017    Treatment Diagnosis: Myofascial Pain    Prior Hospitalization: see medical history Provider#: [id number]   Medications: Verified on Patient summary List    Comorbidities:  BMI >30, Depression, Anxiety, Thyroid Dysfunction, Hx of back pain , Asthma    Prior Level of Function: Increase in pain with standing, bending, stooping, getting down and getting back up, rolling in bed, going from sitting to standing       The Plan of Care and following information is based on the information from the initial evaluation. Assessment/ key information:   Pt is a 62 y.o., Right hand dominant female with C/C of right shoulder pain and LBP. Pt reports long standing hx of LBP with exacerbation while preparing for a yard sale 3-4 weeks ago. Denies radicular sx at this time and none provoked by exam or special test. Objective findings: decreased right shoulder and scapular strength, decreased scapular stabilization evidenced by dyskinesia, multiple active trigger points in UT, RC musculature and periscapular musculature. With Lumbar spine noted decreased ROM, decreased trunk rotation, glute and hamstring inhibition and decreased strength, decreased pelvic mobility, gait abnormality and multiple trigger points and increased turgor in lumbar paraspinals and posterior hip musculature. Pt could benefit from skilled PT to address ROM, mobility and strength deficits.     Evaluation Complexity History HIGH Complexity :3+ comorbidities / personal factors will impact the outcome/ POC ; Examination HIGH Complexity : 4+ Standardized tests and measures addressing body structure, function, activity limitation and / or participation in recreation  ;Presentation MEDIUM Complexity : Evolving with changing characteristics  ; Clinical Decision Making MEDIUM Complexity : FOTO score of 26-74  Overall Complexity Rating: MEDIUM    Problem List: pain affecting function, decrease ROM, decrease strength, edema affecting function, impaired gait/ balance, decrease ADL/ functional abilitiies, decrease activity tolerance and decrease flexibility/ joint mobility   Treatment Plan may include any combination of the following: Therapeutic exercise, Therapeutic activities, Neuromuscular re-education, Physical agent/modality, Gait/balance training, Manual therapy and Patient education  Patient / Family readiness to learn indicated by: asking questions, trying to perform skills and interest  Persons(s) to be included in education: patient (P)  Barriers to Learning/Limitations: None  Patient Goal (s): Pain relief so I can function  Patient Self Reported Health Status: good  Rehabilitation Potential: good    Short Term Goals: STG- To be accomplished in 3 week(s):  1. Pt will be independent with HEP to encourage prophylaxis. Eval: held due to time  Current: NA     Long Term Goals: LTG- To be accomplished in 6 week(s):  1. Pt will increase glut and hamstring strength to 4/5 MMT or greater to improve tolerance to daily activities. Eval:4-/5 MMT  Current: NA     2. Pt will improve trunk rotation to 15 in or less to indicate mobility needed for gait. .  Eval:Right 16 in Left 16.5 in  Current: NA     3. Pt will be be able to lift gallon of milk and move using Right UE without pain. Eval:unable  Current: NA     4. Pt FOTO score will increase by 10 points to show improvement in function. Eval:54  Current: will address at visit 5    5.  Pt will be able to start a walking program for exercise to start healthy lifestyle changes  Eval: pain with walking  Current: NT    Frequency / Duration: Patient to be seen 2 times per week for 6 weeks. Patient/ Caregiver education and instruction: Diagnosis, prognosis, self care and activity modification   [x]  Plan of care has been reviewed with PTA    G-Codes (GP)  Mobility   Current  CK= 40-59%   Goal  CJ= 20-39%    The severity rating is based on clinical judgment and the FOTO score. Certification Period: 5/22/17 - 7/20/17   Fozia Yung PT, DPT 5/22/2017 3:43 PM  _____________________________________________________________________  I certify that the above Therapy Services are being furnished while the patient is under my care. I agree with the treatment plan and certify that this therapy is necessary.     Physician's Signature:____________________  Date:__________Time:______    Please sign and return to   [facility] at the [address], [address] [facility]  Phone: [phone]      Fax:  [phone]

## 2017-05-24 ENCOUNTER — HOSPITAL ENCOUNTER (OUTPATIENT)
Dept: PHYSICAL THERAPY | Age: 62
Discharge: HOME | End: 2017-05-24
Payer: MEDICARE

## 2017-05-24 PROCEDURE — 97112 NEUROMUSCULAR REEDUCATION: CPT

## 2017-05-24 PROCEDURE — 97140 MANUAL THERAPY 1/> REGIONS: CPT

## 2017-05-24 PROCEDURE — 97530 THERAPEUTIC ACTIVITIES: CPT

## 2017-05-24 NOTE — PROGRESS NOTES
PT DAILY TREATMENT NOTE - [facility]     Patient Name: Tiffany Schwab  Date:2017  : 1955  [x]  Patient  Verified  Payor: Tangela Laureano / Plan: [place] MEDICARE PART A & B / Product Type: Medicare /    In time:10:54  Out time:11:40  Total Treatment Time (min): 46  Total Timed Codes (min): 46  1:1 Treatment Time ( [address] only): 55   Visit #: 2 of 12    Treatment Area: Myofascial pain [M79.1]    SUBJECTIVE  Pain Level (0-10 scale): 310  Any medication changes, allergies to medications, adverse drug reactions, diagnosis change, or new procedure performed?: [x] No    [] Yes (see summary sheet for update)  Subjective functional status/changes:   [] No changes reported  \"My back already feels better. \"    OBJECTIVE    Modality rationale:    Min Type Additional Details    [] Estim:  []Unatt       []IFC  []Premod                        []Other:  []w/ice   []w/heat  Position:  Location:    [] Estim: []Att    []TENS instruct  []NMES                    []Other:  []w/US   []w/ice   []w/heat  Position:  Location:    []  Traction: [] Cervical       []Lumbar                       [] Prone          []Supine                       []Intermittent   []Continuous Lbs:  [] before manual  [] after manual    []  Ultrasound: []Continuous   [] Pulsed                           []1MHz   []3MHz W/cm2:  Location:    []  Iontophoresis with dexamethasone         Location: [] Take home patch   [] In clinic    []  Ice     []  heat  []  Ice massage  []  Laser   []  Anodyne Position:  Location:    []  Laser with stim  []  Other:  Position:  Location:    []  Vasopneumatic Device Pressure:       [] lo [] med [] hi   Temperature: [] lo [] med [] hi   [] Skin assessment post-treatment:  []intact []redness- no adverse reaction    []redness - adverse reaction:      min []Eval                  []Re-Eval        min Therapeutic Exercise:  [] See flow sheet :       20 min Therapeutic Activity:  []  See flow sheet :  Mechanical spine assessment   Rationale: decrease pain  to improve the patients ability to tolerate positions and ADLs. 16 min Neuromuscular Re-education:  []  See flow sheet :  Added glutes sets, prone heel presses, bridges with adduction, transverse abdominus (TA) bracing with Swissball #1, #2, #3, added posterior pelvic tilts (PPT), added REIL (to be performed every 2-3 hours while awake)   Rationale: increase strength, improve coordination and increase proprioception  to improve the patients ability to tolerate positions and ADLs. 10 min Manual Therapy:    METs to correct left innominate upslip   Rationale: decrease pain and correct joint alignment and joint mechanics to tolerate positions and ADLs. min Gait Training:  ___ feet with ___ device on level surfaces with ___ level of assist   Rationale: With   [] TE   [] TA   [] neuro   [] other: Patient Education: [x] Review HEP    [] Progressed/Changed HEP based on:   [] positioning   [] body mechanics   [] transfers   [] heat/ice application    [] other:      Other Objective/Functional Measures:   left LE shorter in supine and sitting- left innominate upslip  Sit: 2/10 right LBP  Standing: NE (2/10)  FRED x10 reps: increased midline LBP (3/10)  RFISitting x10 reps: increased midline LBP (3-4/10)  Prone x1 minutes: NE (3-4/10)  JAYDA x1 minute: NE (3-4/10)   REIL x10 reps: decreased LBP (2/10)     Pain Level (0-10 scale) post treatment: 2/10    ASSESSMENT/Changes in Function:    Pt displayed a left innominate upslip that was corrected today resulting in reduction of back pain. Mechanical spine assessement showed a tentative extension bias directional preference.     Patient will continue to benefit from skilled PT services to modify and progress therapeutic interventions, address functional mobility deficits, address ROM deficits, address strength deficits, analyze and address soft tissue restrictions, analyze and cue movement patterns, analyze and modify body mechanics/ergonomics, assess and modify postural abnormalities and instruct in home and community integration to attain remaining goals. []  See Plan of Care  []  See progress note/recertification  []  See Discharge Summary         Progress towards goals / Updated goals:  Short Term Goals: STG- To be accomplished in 3 week(s):  1. Pt will be independent with HEP to encourage prophylaxis. Eval: held due to time  Current: progressing (pt is over 25% independent with exercises) 5/24/17      Long Term Goals: LTG- To be accomplished in 6 week(s):  1. Pt will increase glut and hamstring strength to 4/5 MMT or greater to improve tolerance to daily activities. Eval:4-/5 MMT  Current: NA      2. Pt will improve trunk rotation to 15 in or less to indicate mobility needed for gait. .  Eval:Right 16 in Left 16.5 in  Current: NA      3. Pt will be be able to lift gallon of milk and move using Right UE without pain. Eval:unable  Current: NA      4. Pt FOTO score will increase by 10 points to show improvement in function. Eval:54  Current: will address at visit 5     5.  Pt will be able to start a walking program for exercise to start healthy lifestyle changes  Eval: pain with walking  Current: NT    PLAN  []  Upgrade activities as tolerated     [x]  Continue plan of care  []  Update interventions per flow sheet       []  Discharge due to:_  []  Other:_      [place] Thibodeaux PT 5/24/2017  10:57 AM    Future Appointments  Date Time Provider Aniya Brady   5/24/2017 11:00 AM DANIEL Crouch [facility]   5/31/2017 1:30 PM Georgia Marx PT, DPT MICARLOS [facility]   6/1/2017 11:00 AM Georgia Gurrolaughter, PT, DPT MIHPNIKKY [facility]   6/7/2017 11:00 AM Georgia Gurrolaughter, PT, DPT MIHPNIKOW [facility]   6/9/2017 11:00 AM Fili SANTIAGO [facility]   6/14/2017 11:00 AM Georgia Marx PT, DPT PAMELA [facility]   6/16/2017 11:00 AM [facility]   6/21/2017 9:15 AM Josh Bower  [address] 6/21/2017 1:00 PM Georgia Marx, PT, DPT PAMELA [facility]   6/23/2017 11:00 AM Mita Medrano [facility]   6/28/2017 11:00 AM DANIEL FungTBMAGGIE [facility]   6/30/2017 11:00 AM Sonya Camargo [facility]   10/5/2017 11:00 AM Ajit Cross MD [facility]   10/11/2017 11:30 AM Kamari Bhardwaj NP [address] [place] [address]

## 2017-05-31 ENCOUNTER — HOSPITAL ENCOUNTER (OUTPATIENT)
Dept: PHYSICAL THERAPY | Age: 62
Discharge: HOME | End: 2017-05-31
Payer: MEDICARE

## 2017-05-31 PROCEDURE — 97112 NEUROMUSCULAR REEDUCATION: CPT

## 2017-05-31 PROCEDURE — 97110 THERAPEUTIC EXERCISES: CPT

## 2017-05-31 NOTE — PROGRESS NOTES
PT DAILY TREATMENT NOTE 12-    Patient Name: Rosalba Marti  Date:2017  : 1955  [x]  Patient  Verified  Payor: Ashok  / Plan: [place] MEDICARE PART A & B / Product Type: Medicare /    In time: 1:21 pm  Out time:2:40 pm   Total Treatment Time (min): 71 (waiting on PT)  Total Timed Codes (min): 61  1:1 Treatment Time ( [address] only): 45   Visit #: 3 of 12    Treatment Area: Myofascial pain [M79.1]    SUBJECTIVE  Pain Level (0-10 scale): 2  Any medication changes, allergies to medications, adverse drug reactions, diagnosis change, or new procedure performed?: [x] No    [] Yes (see summary sheet for update)  Subjective functional status/changes:   [] No changes reported  \"Not too bad. I have walked 7 miles since last time I was here. I walked on the track and on the golf course. \"   Indicates pain at right side of SIJ.      OBJECTIVE    Modality rationale: decrease pain to improve the patients ability to tolerate daily activities    Min Type Additional Details    [] Estim:  []Unatt       []IFC  []Premod                        []Other:  []w/ice   []w/heat  Position:  Location:    [] Estim: []Att    []TENS instruct  []NMES                    []Other:  []w/US   []w/ice   []w/heat  Position:  Location:    []  Traction: [] Cervical       []Lumbar                       [] Prone          []Supine                       []Intermittent   []Continuous Lbs:  [] before manual  [] after manual    []  Ultrasound: []Continuous   [] Pulsed                           []1MHz   []3MHz W/cm2:  Location:    []  Iontophoresis with dexamethasone         Location: [] Take home patch   [] In clinic   10 [x]  Ice     []  heat  []  Ice massage  []  Laser   []  Anodyne Position:prone with pillow under abdomen  Location: right glut    []  Laser with stim  []  Other:  Position:  Location:    []  Vasopneumatic Device Pressure:       [] lo [] med [] hi   Temperature: [] lo [] med [] hi   [x] Skin assessment post-treatment:  [x]intact []redness- no adverse reaction    []redness - adverse reaction:     51 min Therapeutic Exercise:  [x] See flow sheet :   Rationale: increase ROM, increase strength, improve coordination and increase proprioception to improve the patients ability to perform daily activities with decreased pain and symptom levels     10 min Neuromuscular Re-education:  [x]  See flow sheet / see dry needling procedure note   Rationale: increase ROM, increase proprioception and decrease trigger points and pain   to improve the patients ability to perform daily activities with decreased pain and symptom levels    Dry Needling Procedure Note    Procedure: An intramuscular manual therapy (dry needling) and a neuro-muscular re-education treatment was done to deactivate myofascial trigger points with a 30 gauge filament needle under aseptic technique. Indications:  [x] Myofascial pain and dysfunction [] Muscled spasms  [x] Myalgia/myositis   [] Muscle cramps  [] Muscle imbalances  [] Other:    Chart reviewed for the following:  IShira, PT, DPT, have reviewed the medical history, summary list and precautions/contraindications for Damaris Oconnor.   TIME OUT performed immediately prior to start of procedure:  Shira LING, PT, DPT, have performed the following reviews on Damaris Oconnor prior to the start of the session:      [x] Verified patient identification by name and date of birth    [x] Agreement on all muscles being treated was verified   [x] Purpose of dry needling, side effects, possible complications, risks and benefits were explained to the patient   [x] Procedure site(s) verified  [x] Patient was positioned for comfort and draped for privacy  [x] Informed Consent was signed (initial visit) and verified verbally (subsequent visits)  [x] Patient was instructed on the need to report the use of blood thinners and/or immunosuppressant medications  [x] How to respond to possible adverse effects of treatment  [x] Self treatment of post needling soreness: ice, heat (moist heat, heat wraps) and stretching  [x] Opportunity was given to ask any questions, all questions were answered            Time: 2:28 pm  Date of procedure: 5/31/2017    Treatment: The following muscles were treated today with intramuscular dry needling  [] Left [] Right Abdominals: Ant Rectus Abdominis  [] Left [] Right External Oblique / Internal Oblique / Transverse Abdominis  [] Left [] Right Thoracic Multifidi / Freeda Alexandre  [] Left [] Right Iliocostalis Erick Sites / Julissa ontario  [] Left [] Right Longissimus Thoracis / Julissa ontario  [] Left [] Right Lumbar Multifidi  [] Left [] Right Serratus Posterior Inferior  [] Left [] Right Quadratus Lumborum  [] Left [] Right Psoas  [] Left [] Right Iliacus  [] Left [] Right Iliopsoas (inguinal)  [] Left [] Right Piriformis  [] Left [] Right Quadratus Femoris  [] Left [x] Right Manpreet Se / Romayne Plane / Frida Yaritza  [] Left [] Right Obturator Internus  [] Left [] Right Obturator Externus / Chantal Benedicto / Inferior Gemellus    [] Left [] Right Tensor Fasciae Camila  [] Left [] Right Iliotibial Band  [] Left [] Right Pectineus  [] Left [] Right Sartorius  [] Left [] Right Gracilis  [] Left [] Right Adductor Brevis / Adductor Longus / Adductor [employer] Garcia  [] Left [] Right Rectus Femoris / Vastus Lateralis / Vastus Intermedius / Vastus Medialis Obliquus  [] Left [] Right Tibialis Anterior / Posterior  [] Left [] Right Extensor Digitorum Longus / Brevis  [] Left [] Right Extensor Hallucis Longus / Brevis  [] Left [] Right Hamstrings: Biceps Femoris / [place] / Semimembranosis  [] Left [] Right Popliteus / Planteris  [] Left [] Right Gastrocnemius Medial / Lateral  [] Left [] Right Soleus  [] Left [] Right Peronei: Longus / Madalynn Umanzor / Tertius  [] Left [] Right Flexor Digitorum Longus / Brevis  [] Left [] Right Flexor Hallucis Longus / Brevis  [] Left [] Right Quadratus Plantae  [] Left [] Right Abductor Digiti Minimi  [] Left [] Right Foot Interossei  [] Left [] Right Other:    Patient's response to today's treatment:  [x] Latent Twitch Response  [] Muscle relaxation [] Pain Relief  [x] Post needling soreness [x] without complications  [] Increased Range of Motion  [] Other:     Performed by: Art Pérez, PT, DPT          With   [] TE   [] TA   [] neuro   [] other: Patient Education: [x] Review HEP    [] Progressed/Changed HEP based on:   [] positioning   [] body mechanics   [] transfers   [] heat/ice application    [] other:      Other Objective/Functional Measures:   Twitch response in right glut max and med     Pain Level (0-10 scale) post treatment: 3-4 \"sore\"    ASSESSMENT/Changes in Function:   Initiated trigger point dry needling this session with no adverse reactions. Noted bruises from trigger point injections on right glut. Pt fatigued with clamshells. Reported is trying to walk on regular basis. Patient will continue to benefit from skilled PT services to modify and progress therapeutic interventions, address functional mobility deficits, address ROM deficits, address strength deficits, analyze and address soft tissue restrictions, analyze and cue movement patterns, analyze and modify body mechanics/ergonomics, assess and modify postural abnormalities and instruct in home and community integration to attain remaining goals. []  See Plan of Care  []  See progress note/recertification  []  See Discharge Summary         Progress towards goals / Updated goals:  Short Term Goals: STG- To be accomplished in 3 week(s):  1. Pt will be independent with HEP to encourage prophylaxis. Eval: held due to time  Current: progressing reports compliance      Long Term Goals: LTG- To be accomplished in 6 week(s):  1. Pt will increase glut and hamstring strength to 4/5 MMT or greater to improve tolerance to daily activities. Eval:4-/5 MMT  Current: NA      2.  Pt will improve trunk rotation to 15 in or less to indicate mobility needed for gait..  Eval:Right 16 in Left 16.5 in  Current: NA      3. Pt will be be able to lift gallon of milk and move using Right UE without pain. Eval:unable  Current: NA      4. Pt FOTO score will increase by 10 points to show improvement in function. Eval:54  Current: will address at visit 5      5.  Pt will be able to start a walking program for exercise to start healthy lifestyle changes  Eval: pain with walking  Current: NT    PLAN  [x]  Upgrade activities as tolerated     []  Continue plan of care  []  Update interventions per flow sheet       []  Discharge due to:_  []  Other:_      Guzman Koo PT, DPT 5/31/2017  1:28 PM    Future Appointments  Date Time Provider Aniya Delgadoi   5/31/2017 1:30 PM Guzman Koo PT, DPT [facility]   6/1/2017 11:00 AM Guzman Koo PT, DPT [facility]   6/7/2017 11:00 AM Guzman Koo PT, DPT [facility]   6/9/2017 11:00 AM Rexine Critchley [facility]   6/14/2017 11:00 AM Guzman Koo PT, DPT [facility]   6/16/2017 11:00 AM Rexine Critchley [facility]   6/21/2017 9:15 AM [facility]izz[facility]avedra  [facility]   6/21/2017 1:00 PM Guzman Koo PT, DPT [facility]   6/23/2017 11:00 AM Rexine Critchley [facility]   6/28/2017 11:00 AM Santo Mina PT [facility]   6/30/2017 11:00 AM Rexine Critchley [facility]   10/5/2017 11:00 AM Duke Herrera MD [facility]   10/11/2017 11:30 AM Nic Penny NP [address] [place] [address]

## 2017-06-01 ENCOUNTER — HOSPITAL ENCOUNTER (OUTPATIENT)
Dept: PHYSICAL THERAPY | Age: 62
Discharge: HOME | End: 2017-06-01
Payer: MEDICARE

## 2017-06-01 PROCEDURE — 97112 NEUROMUSCULAR REEDUCATION: CPT

## 2017-06-01 PROCEDURE — 97110 THERAPEUTIC EXERCISES: CPT

## 2017-06-01 NOTE — PROGRESS NOTES
PT DAILY TREATMENT NOTE - [facility]     Patient Name: Tyrell Barajas  Date:2017  : 1955  [x]  Patient  Verified  Payor: Keith Pan / Plan: [place] MEDICARE PART A & B / Product Type: Medicare /    In time: 11:02 am  Out time:12:04 pm   Total Treatment Time (min): 62  Total Timed Codes (min): 62  1:1 Treatment Time ( [address] only): 40   Visit #: 4 of 12    Treatment Area: Myofascial pain [M79.1]    SUBJECTIVE  Pain Level (0-10 scale): 2  Any medication changes, allergies to medications, adverse drug reactions, diagnosis change, or new procedure performed?: [x] No    [] Yes (see summary sheet for update)  Subjective functional status/changes:   [] No changes reported  \"I was really sore. I decided not to walk yesterday. But I felt I could go farther with my stretches. I am good today. \"    OBJECTIVE    Modality rationale:    Min Type Additional Details    [] Estim:  []Unatt       []IFC  []Premod                        []Other:  []w/ice   []w/heat  Position:  Location:    [] Estim: []Att    []TENS instruct  []NMES                    []Other:  []w/US   []w/ice   []w/heat  Position:  Location:    []  Traction: [] Cervical       []Lumbar                       [] Prone          []Supine                       []Intermittent   []Continuous Lbs:  [] before manual  [] after manual    []  Ultrasound: []Continuous   [] Pulsed                           []1MHz   []3MHz W/cm2:  Location:    []  Iontophoresis with dexamethasone         Location: [] Take home patch   [] In clinic    []  Ice     []  heat  []  Ice massage  []  Laser   []  Anodyne Position:  Location:    []  Laser with stim  []  Other:  Position:  Location:    []  Vasopneumatic Device Pressure:       [] lo [] med [] hi   Temperature: [] lo [] med [] hi   [] Skin assessment post-treatment:  []intact []redness- no adverse reaction    []redness - adverse reaction:       50 min Therapeutic Exercise:  [x] See flow sheet :   Rationale: increase ROM, increase strength, improve coordination and increase proprioception to improve the patients ability to perform daily activities with decreased pain and symptom levels    12 min Neuromuscular Re-education:  [x]  See flow sheet : TA ball series, 90-90 hip lift with tactile cues   Rationale: increase ROM, increase strength, improve coordination and increase proprioception  to improve the patients ability to perform daily activities with decreased pain and symptom levels            With   [] TE   [] TA   [] neuro   [] other: Patient Education: [x] Review HEP    [] Progressed/Changed HEP based on:   [] positioning   [] body mechanics   [] transfers   [] heat/ice application    [] other:      Other Objective/Functional Measures:   No increase in pain with exercises     Pain Level (0-10 scale) post treatment: 2    ASSESSMENT/Changes in Function:   No adverse reactions to dry needling, pt reported increased soreness but increased mobility. Gluts fatigued with bridges and clamshells. Patient will continue to benefit from skilled PT services to modify and progress therapeutic interventions, address functional mobility deficits, address ROM deficits, address strength deficits, analyze and address soft tissue restrictions, analyze and cue movement patterns, analyze and modify body mechanics/ergonomics, assess and modify postural abnormalities and instruct in home and community integration to attain remaining goals. []  See Plan of Care  []  See progress note/recertification  []  See Discharge Summary         Progress towards goals / Updated goals:  Short Term Goals: STG- To be accomplished in 3 week(s):  1. Pt will be independent with HEP to encourage prophylaxis. Eval: held due to time  Current: progressing reports compliance      Long Term Goals: LTG- To be accomplished in 6 week(s):  1. Pt will increase glut and hamstring strength to 4/5 MMT or greater to improve tolerance to daily activities.   Eval:4-/5 MMT  Current: progressing - appropriately challenged with clamshells and bridges.       2. Pt will improve trunk rotation to 15 in or less to indicate mobility needed for gait. .  Eval:Right 16 in Left 16.5 in  Current: NA      3. Pt will be be able to lift gallon of milk and move using Right UE without pain. Eval:unable  Current: NA      4. Pt FOTO score will increase by 10 points to show improvement in function. Eval:54  Current: will address at visit 5      5.  Pt will be able to start a walking program for exercise to start healthy lifestyle changes  Eval: pain with walking  Current: NT       PLAN  [x]  Upgrade activities as tolerated     []  Continue plan of care  []  Update interventions per flow sheet       []  Discharge due to:_  []  Other:_      Cleo Tracy PT, DPT 6/1/2017  11:35 AM    Future Appointments  Date Time Provider Aniya Brady   6/7/2017 11:00 AM Cleo Tracy PT, DPT [facility]   6/9/2017 11:00 AM Janna Sierra [facility]   6/14/2017 11:00 AM Cleo Tracy PT, DPT [facility]   6/16/2017 11:00 AM Janna Sierra [facility]   6/21/2017 9:15 AM Alia Huston  [address]   6/21/2017 1:00 PM Cleo Tracy PT, DPT [facility]   6/23/2017 11:00 AM Janna YOUSSEFHPNIKOW THE [facility]   6/28/2017 11:00 AM Patrica Kong PT [facility]   6/30/2017 11:00 AM Janna NOVAKW THE [facility]   10/5/2017 11:00 AM Edward[facility] Joe [facility]   10/11/2017 11:30 AM Gabriela Leavitt NP [facility] [address]

## 2017-06-07 ENCOUNTER — HOSPITAL ENCOUNTER (OUTPATIENT)
Dept: PHYSICAL THERAPY | Age: 62
Discharge: HOME | End: 2017-06-07
Payer: MEDICARE

## 2017-06-07 PROCEDURE — 97140 MANUAL THERAPY 1/> REGIONS: CPT

## 2017-06-07 PROCEDURE — 97112 NEUROMUSCULAR REEDUCATION: CPT

## 2017-06-07 PROCEDURE — 97110 THERAPEUTIC EXERCISES: CPT

## 2017-06-07 NOTE — PROGRESS NOTES
PT DAILY TREATMENT NOTE - [facility]     Patient Name: Damaris Oconnor  Date:2017  : 1955  [x]  Patient  Verified  Payor: [place] MEDICARE / Plan: VA MEDICARE PART A & B / Product Type: Medicare /    In time:11:06  Out time:12:20  Total Treatment Time (min): 74  Total Timed Codes (min): 74  1:1 Treatment Time ( [address] only): 40   Visit #: 5 of 12    Treatment Area: Myofascial pain [M79.1]    SUBJECTIVE  Pain Level (0-10 scale): 210  Any medication changes, allergies to medications, adverse drug reactions, diagnosis change, or new procedure performed?: [x] No    [] Yes (see summary sheet for update)  Subjective functional status/changes:   [] No changes reported  \"I'm doing all right. \"    OBJECTIVE    Modality rationale:    Min Type Additional Details    [] Estim:  []Unatt       []IFC  []Premod                        []Other:  []w/ice   []w/heat  Position:  Location:    [] Estim: []Att    []TENS instruct  []NMES                    []Other:  []w/US   []w/ice   []w/heat  Position:  Location:    []  Traction: [] Cervical       []Lumbar                       [] Prone          []Supine                       []Intermittent   []Continuous Lbs:  [] before manual  [] after manual    []  Ultrasound: []Continuous   [] Pulsed                           []1MHz   []3MHz W/cm2:  Location:    []  Iontophoresis with dexamethasone         Location: [] Take home patch   [] In clinic    []  Ice     []  heat  []  Ice massage  []  Laser   []  Anodyne Position:  Location:    []  Laser with stim  []  Other:  Position:  Location:    []  Vasopneumatic Device Pressure:       [] lo [] med [] hi   Temperature: [] lo [] med [] hi   [] Skin assessment post-treatment:  []intact []redness- no adverse reaction    []redness - adverse reaction:      min []Eval                  []Re-Eval       33 total  15 1:1 min Therapeutic Exercise:  [x] See flow sheet :   Rationale: increase ROM, increase strength, improve coordination and increase proprioception to improve the patients ability to perform daily activities with decreased pain and symptom levels     min Therapeutic Activity:  []  See flow sheet :        31 total  15 1:1 min Neuromuscular Re-education:  [x]  See flow sheet :   Rationale: increase ROM, increase strength, improve coordination and increase proprioception to improve the patients ability to perform daily activities with decreased pain and symptom levels    10 1:1 min Manual Therapy:    METs to correct posteriorly rotated right innominate (successful)   Rationale: decrease pain and correct joint alignment and joint mechanics to tolerate positions and ADLs. min Gait Training:  ___ feet with ___ device on level surfaces with ___ level of assist   Rationale: With   [] TE   [] TA   [] neuro   [] other: Patient Education: [x] Review HEP    [] Progressed/Changed HEP based on:   [] positioning   [] body mechanics   [] transfers   [] heat/ice application    [] other:      Other Objective/Functional Measures:   left LE even in supine and shortens in sitting- posteriorly rotated right innominate  right standing flexion test    Pain Level (0-10 scale) post treatment: 3-4/10    ASSESSMENT/Changes in Function:    Pt displayed a posteriorly rotated right innominate that was successfully corrected today using METs. Correction did not change the pain the pt was experiencing after she performed sidelying clams.   Pt reported increased pain in the ipsilateral proximal lateral thighs when she was performing clams with lingering pain reported afterwards.         Patient will continue to benefit from skilled PT services to modify and progress therapeutic interventions, address functional mobility deficits, address ROM deficits, address strength deficits, analyze and address soft tissue restrictions, analyze and cue movement patterns, analyze and modify body mechanics/ergonomics, assess and modify postural abnormalities and instruct in home and community integration to attain remaining goals. []  See Plan of Care  []  See progress note/recertification  []  See Discharge Summary         Progress towards goals / Updated goals:  Short Term Goals: STG- To be accomplished in 3 week(s):  1. Pt will be independent with HEP to encourage prophylaxis. Eval: held due to time  Current: progressing reports compliance      Long Term Goals: LTG- To be accomplished in 6 week(s):  1. Pt will increase glut and hamstring strength to 4/5 MMT or greater to improve tolerance to daily activities. Eval:4-/5 MMT  Current: progressing - appropriately challenged with clamshells and bridges.       2. Pt will improve trunk rotation to 15 in or less to indicate mobility needed for gait. .  Eval:Right 16 in Left 16.5 in  Current: NA      3. Pt will be be able to lift gallon of milk and move using Right UE without pain. Eval:unable  Current: NA      4. Pt FOTO score will increase by 10 points to show improvement in function. Eval:54  Current: will address at visit 5      5.  Pt will be able to start a walking program for exercise to start healthy lifestyle changes  Eval: pain with walking  Current: NT    PLAN  []  Upgrade activities as tolerated     []  Continue plan of care  []  Update interventions per flow sheet       []  Discharge due to:_  []  Other:_      Hieu Casillas PT 6/7/2017  12:05 PM    Future Appointments  Date Time Provider Aniya Caitlyn   6/8/2017 10:00 AM [facility]   6/9/2017 11:00 AM Earline Sicard [facility]   6/14/2017 11:00 AM Shira Pat PT, DPT [facility]   6/16/2017 11:00 AM Earline Sicard [facility]   6/21/2017 9:15 AM Palomo Reno  [address]   6/21/2017 1:00 PM Shira Pat PT, DPT [facility]   6/23/2017 11:00 AM Earline Sicard [facility]   6/28/2017 11:00 AM DANIEL Quinteros THE [facility]   6/30/2017 11:00 AM Earline Sicard [facility]PT[facility]   10/5/2017 11:00 AM Qing Galvan [place] [id number] [facility] 10/11/2017 11:30 AM Nic Penny NP [address] [place] [address]

## 2017-06-08 ENCOUNTER — OFFICE VISIT (OUTPATIENT)
Dept: FAMILY MEDICINE CLINIC | Age: 62
End: 2017-06-08

## 2017-06-08 VITALS
OXYGEN SATURATION: 92 % | WEIGHT: 208 LBS | DIASTOLIC BLOOD PRESSURE: 98 MMHG | RESPIRATION RATE: 16 BRPM | SYSTOLIC BLOOD PRESSURE: 134 MMHG | TEMPERATURE: 97 F | HEIGHT: 64 IN | BODY MASS INDEX: 35.51 KG/M2 | HEART RATE: 59 BPM

## 2017-06-08 DIAGNOSIS — R05.9 COUGH: ICD-10-CM

## 2017-06-08 DIAGNOSIS — Z00.00 ROUTINE GENERAL MEDICAL EXAMINATION AT A HEALTH CARE FACILITY: ICD-10-CM

## 2017-06-08 DIAGNOSIS — K21.9 GASTROESOPHAGEAL REFLUX DISEASE WITHOUT ESOPHAGITIS: ICD-10-CM

## 2017-06-08 DIAGNOSIS — J01.00 ACUTE NON-RECURRENT MAXILLARY SINUSITIS: Primary | ICD-10-CM

## 2017-06-08 DIAGNOSIS — Z71.89 ADVANCED CARE PLANNING/COUNSELING DISCUSSION: ICD-10-CM

## 2017-06-08 RX ORDER — OMEPRAZOLE 20 MG/1
20 CAPSULE, DELAYED RELEASE ORAL DAILY
Qty: 30 CAP | Refills: 2 | Status: SHIPPED | OUTPATIENT
Start: 2017-06-08 | End: 2017-06-30 | Stop reason: SDUPTHER

## 2017-06-08 RX ORDER — AMOXICILLIN AND CLAVULANATE POTASSIUM 875; 125 MG/1; MG/1
1 TABLET, FILM COATED ORAL EVERY 12 HOURS
Qty: 20 TAB | Refills: 0 | Status: SHIPPED | OUTPATIENT
Start: 2017-06-08 | End: 2017-06-18

## 2017-06-08 NOTE — PATIENT INSTRUCTIONS
Take the antibiotic for the full 10 days, Robitussin DM for cough. Recheck next week if you're not feeling better. Read the info about Advanced Care planning and get one done and filed electronically.     Recheck as needed

## 2017-06-08 NOTE — MR AVS SNAPSHOT
Visit Information Date & Time Provider Department Dept. Phone Encounter #  
 6/8/2017 10:00 AM Mary Gallardo, [address] [phone] [id number] Your Appointments 6/21/2017  9:15 AM  
Follow Up with Nick Berman MD  
[address] [facility], [address] and [facility]) Appt Note: 6 wk f/u low back [address] [facility] [address]  
[phone]  
  
   
 [address] [facility],[address] [facility]  
  
    
 10/5/2017 11:00 AM  
Office Visit with Mary Gallardo MD  
[address] [facility] ([facility]) Appt Note: 6 month follow up Tramaine Aponte [address] [facility] [address]  
[phone]  
  
   
 [address]  
  
    
 10/11/2017 11:30 AM  
Follow Up with Bennett Mirza NP [facility] ([facility]) Appt Note: HCV  
 [address] [address] [facility] [address]  
  
   
 [address] [facility] Upcoming Health Maintenance Date Due Pneumococcal 19-64 Medium Risk (1 of 1 - PPSV23) 10/21/1975 INFLUENZA AGE 9 TO ADULT 8/1/2017 BREAST CANCER SCRN MAMMOGRAM 7/13/2018 PAP AKA CERVICAL CYTOLOGY 1/3/2022 COLONOSCOPY 2/9/2022 DTaP/Tdap/Td series (2 - Td) 10/8/2024 Allergies as of 6/8/2017  Review Complete On: 6/8/2017 By: Mary Gallardo MD  
  
 Severity Noted Reaction Type Reactions Adhesive Tape-silicones  63/54/9824   Not Verified Rash Current Immunizations  Never Reviewed Name Date Hep A Vaccine 9/12/2012, 4/10/2012, 3/5/2012 Hep B Vaccine 9/12/2012, 4/10/2012, 3/5/2012 Influenza Vaccine 10/8/2014, 11/1/2013 Influenza Vaccine (Quad) PF 10/12/2016 Pneumococcal Conjugate (PCV-13) 1/12/2017 Tdap 10/8/2014 Not reviewed this visit You Were Diagnosed With   
  
 Codes Comments Acute non-recurrent maxillary sinusitis    -  Primary ICD-10-CM: J01.00 ICD-9-CM: 461.0 Cough     ICD-10-CM: R05 ICD-9-CM: 786.2 Routine general medical examination at a health care facility     ICD-10-CM: Z00.00 ICD-9-CM: V70.0 Advanced care planning/counseling discussion     ICD-10-CM: Z71.89 ICD-9-CM: V65.49 Vitals BP Pulse Temp Resp Height(growth percentile) Weight(growth percentile) (!) 134/98 (BP 1 Location: Right arm, BP Patient Position: Sitting) (!) 59 97 °F (36.1 °C) (Oral) 16 5' 4\" (1.626 m) 208 lb (94.3 kg) SpO2 BMI OB Status Smoking Status 92% 35.7 kg/m2 Postmenopausal Former Smoker BMI and BSA Data Body Mass Index Body Surface Area 35.7 kg/m 2 2.06 m 2 Preferred Pharmacy Pharmacy Name Phone [facility] [address] [place], [place] - [address] [facility] [phone] Your Updated Medication List  
  
   
This list is accurate as of: 6/8/17 10:27 AM.  Always use your most recent med list.  
  
  
  
  
 ABILIFY 5 mg tablet Generic drug:  ARIPiprazole Take 5 mg by mouth daily. amoxicillin-clavulanate 875-125 mg per tablet Commonly known as:  AUGMENTIN Take 1 Tab by mouth every twelve (12) hours for 10 days. benztropine 1 mg tablet Commonly known as:  COGENTIN Take 1 mg by mouth three (3) times daily. conjugated estrogens 0.625 mg/gram vaginal cream  
Commonly known as:  PREMARIN  
1/2 gm twice weekly on sunday and wednesday * diclofenac 1 % Gel Commonly known as:  VOLTAREN  
apply 2 grams to affected area four times a day * diclofenac EC 75 mg EC tablet Commonly known as:  VOLTAREN Take 1 Tab by mouth two (2) times daily as needed. levothyroxine 137 mcg tablet Commonly known as:  SYNTHROID Take 137 mcg by mouth Daily (before breakfast). omeprazole 20 mg capsule Commonly known as:  PriLOSEC Take 1 Cap by mouth daily. PROAIR HFA 90 mcg/actuation inhaler Generic drug:  albuterol  
  
 tiZANidine 2 mg tablet Commonly known as:  Cirilo Prakash Take 1 Tab by mouth three (3) times daily. VITAMIN D3 2,000 unit Tab Generic drug:  cholecalciferol (vitamin D3) Take  by mouth.  
  
 zolpidem 10 mg tablet Commonly known as:  AMBIEN  
  
 * Notice: This list has 2 medication(s) that are the same as other medications prescribed for you. Read the directions carefully, and ask your doctor or other care provider to review them with you. Prescriptions Sent to Pharmacy Refills  
 amoxicillin-clavulanate (AUGMENTIN) 875-125 mg per tablet 0 Sig: Take 1 Tab by mouth every twelve (12) hours for 10 days. Class: Normal  
 Pharmacy: [address] [place] [address] #: [phone] Route: Oral  
  
To-Do List   
 06/09/2017 11:00 AM  
  Appointment with Vishal Bo at THE [address] ([phone])  
  
 06/14/2017 11:00 AM  
  Appointment with Vishal Bo at THE [address] ([phone])  
  
 06/16/2017 11:00 AM  
  Appointment with Vishal Bo at THE [address] ([phone])  
  
 06/21/2017 1:00 PM  
  Appointment with Lamine Hughes PT, DPT at THE [address] ([phone])  
  
 06/23/2017 11:00 AM  
  Appointment with Vishal Bo at THE [address] ([phone])  
  
 06/28/2017 11:00 AM  
  Appointment with Antonio Yang PT at THE [address] ([phone])  
  
 06/30/2017 11:00 AM  
  Appointment with iVshal Bo at THE [address] ([phone]) Patient Instructions Take the antibiotic for the full 10 days, Robitussin DM for cough. Recheck next week if you're not feeling better. Read the info about Advanced Care planning and get one done and filed electronically. Recheck as needed Introducing hospitals & HEALTH SERVICES! Dear Aisha Broderick: Thank you for requesting a [employer] account.   Our records indicate that you already have an active [employer] account. You can access your account anytime at [email or web address] Did you know that you can access your hospital and ER discharge instructions at any time in [employer]? You can also review all of your test results from your hospital stay or ER visit. Additional Information If you have questions, please visit the Frequently Asked Questions section of the [employer] website at [email or web address] Remember, [employer] is NOT to be used for urgent needs. For medical emergencies, dial 911. Now available from your iPhone and Android! Please provide this summary of care documentation to your next provider. Your primary care clinician is listed as Albaro Anders. If you have any questions after today's visit, please call [phone].

## 2017-06-08 NOTE — PROGRESS NOTES
HISTORY OF PRESENT ILLNESS  Savanah Turpin is a 62 y.o. female. HPI Comments: Ms. Jose Armando Soria is here because of a cough and congestion for a couple of weeks. No fever, chills, nausea, sore throat. Congestion is getting worse, also starting to lose voice. She asks about an incidental lung finding on CT urogram. It looks like it said atelectasis vs scar tissue, but no mass or such. She needs a refill of Prilosec. Review of Systems   Constitutional: Positive for malaise/fatigue. Negative for chills and fever. HENT: Positive for congestion. Negative for ear pain, nosebleeds, sore throat and tinnitus. Eyes: Negative for blurred vision and discharge. Respiratory: Positive for cough. Negative for hemoptysis, sputum production and wheezing. Cardiovascular: Negative for chest pain and palpitations. Gastrointestinal: Negative for heartburn. Genitourinary: Negative for dysuria and urgency. Neurological: Positive for headaches. Physical Exam   Neck: No thyromegaly present. Cardiovascular: Normal rate. Pulmonary/Chest: No respiratory distress. She has no wheezes. She has no rales. Abdominal: She exhibits no distension. Lymphadenopathy:     She has no cervical adenopathy. ASSESSMENT and PLAN    ICD-10-CM ICD-9-CM    1. Acute non-recurrent maxillary sinusitis J01.00 461.0 amoxicillin-clavulanate (AUGMENTIN) 875-125 mg per tablet   2. Cough R05 786.2    3. Gastroesophageal reflux disease without esophagitis K21.9 530.81 omeprazole (PRILOSEC) 20 mg capsule   4. Routine general medical examination at a health care facility Z00.00 V70.0    5. Advanced care planning/counseling discussion Z71.89 V65.49        Will treat sinusitis and follow closely.     AVS instructions reviewed with patient, pt verbalized understanding

## 2017-06-08 NOTE — PROGRESS NOTES
Laura Prado is a 62 y.o. female and presents for annual Medicare Wellness Visit. Problem List: Reviewed with patient and discussed risk factors. 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       Current medical providers:  Patient Care Team:  Leonardo Garcia MD as PCP - General (Family Practice)  Parisa Hanna NP (Nurse Practitioner)    PSH: Reviewed with patient  Past Surgical History:   Procedure Laterality Date    COLONOSCOPY N/A 2/9/2017     Colonoscopy w/polyp bxs x3 performed by Mary Ghotra [facility] at [facility] ENDOSCOPY    HX APPENDECTOMY      HX DILATION AND CURETTAGE  1988    cryso    HX GYN      BTL    HX HEENT      malofacial surgery        SH: Reviewed with patient  Social History   Substance Use Topics    Smoking status: Former Smoker     Quit date: 7/1/2005    Smokeless tobacco: Never Used    Alcohol use No       FH: Reviewed with patient  Family History   Problem Relation Age of Onset    No Known Problems Mother     No Known Problems Father        Medications/Allergies: Reviewed with patient  Current Outpatient Prescriptions on File Prior to Visit   Medication Sig Dispense Refill    diclofenac EC (VOLTAREN) 75 mg EC tablet Take 1 Tab by mouth two (2) times daily as needed. 60 Tab 5    tiZANidine (ZANAFLEX) 2 mg tablet Take 1 Tab by mouth three (3) times daily. 90 Tab 2    conjugated estrogens (PREMARIN) 0.625 mg/gram vaginal cream 1/2 gm twice weekly on sunday and wednesday 45 g 2    cholecalciferol, vitamin D3, (VITAMIN D3) 2,000 unit tab Take  by mouth.       omeprazole (PRILOSEC) 20 mg capsule Take 1 Cap by mouth daily. 30 Cap 2    diclofenac (VOLTAREN) 1 % gel apply 2 grams to affected area four times a day  0    PROAIR HFA 90 mcg/actuation inhaler   0    levothyroxine (SYNTHROID) 137 mcg tablet Take 137 mcg by mouth Daily (before breakfast). (Patient taking differently: Take 150 mcg by mouth Daily (before breakfast). ) 90 Tab 1    zolpidem (AMBIEN) 10 mg tablet   0    benztropine (COGENTIN) 1 mg tablet Take 1 mg by mouth three (3) times daily.  ARIPiprazole (ABILIFY) 5 mg tablet Take 5 mg by mouth daily.  tiZANidine (ZANAFLEX) 4 mg tablet Take 1 Tab by mouth every six (6) hours. 120 Tab 0     No current facility-administered medications on file prior to visit. Allergies   Allergen Reactions    Adhesive Tape-Silicones Rash       Objective:  Visit Vitals    BP (!) 134/98 (BP 1 Location: Right arm, BP Patient Position: Sitting)    Pulse (!) 59    Temp 97 °F (36.1 °C) (Oral)    Resp 16    Ht 5' 4\" (1.626 m)    Wt 208 lb (94.3 kg)    SpO2 92%    BMI 35.7 kg/m2    Body mass index is 35.7 kg/(m^2). Assessment of cognitive impairment: Alert and oriented x 3    Depression Screen:   PHQ over the last two weeks 6/8/2017   Little interest or pleasure in doing things Not at all   Feeling down, depressed or hopeless Not at all   Total Score PHQ 2 0       Fall Risk Assessment:  No flowsheet data found. Functional Ability:   Does the patient exhibit a steady gait? yes   How long did it take the patient to get up and walk from a sitting position? 3 secs   Is the patient self reliant?  (ie can do own laundry, meals, household chores)  yes     Does the patient handle his/her own medications? yes     Does the patient handle his/her own money? yes     Is the patients home safe (ie good lighting, handrails on stairs and bath, etc.)? yes     Did you notice or did patient express any hearing difficulties? no     Did you notice or did patient express any vision difficulties?    Yes (wears glasses) Were distance and reading eye charts used? no       Advance Care Planning:   Patient was offered the opportunity to discuss advance care planning:  yes     Does patient have an Advance Directive:  no   If no, did you provide information on Caring Connections? yes       Plan:      No orders of the defined types were placed in this encounter. Health Maintenance   Topic Date Due    Pneumococcal 19-64 Medium Risk (1 of 1 - PPSV23) 10/21/1975    INFLUENZA AGE 9 TO ADULT  08/01/2017    BREAST CANCER SCRN MAMMOGRAM  07/13/2018    PAP AKA CERVICAL CYTOLOGY  01/03/2022    COLONOSCOPY  02/09/2022    DTaP/Tdap/Td series (2 - Td) 10/08/2024    ZOSTER VACCINE AGE 60>  Completed       *Patient verbalized understanding and agreement with the plan. A copy of the After Visit Summary with personalized health plan was given to the patient today.         UTD on HM, only needs an AMD

## 2017-06-08 NOTE — PROGRESS NOTES
Patient presents to the clinic for cough that began a few months ago. Patient complains of chest congestion, productive cough, and shortness of breath.

## 2017-06-09 ENCOUNTER — HOSPITAL ENCOUNTER (OUTPATIENT)
Dept: PHYSICAL THERAPY | Age: 62
Discharge: HOME | End: 2017-06-09
Payer: MEDICARE

## 2017-06-09 PROCEDURE — 97110 THERAPEUTIC EXERCISES: CPT

## 2017-06-09 PROCEDURE — 97530 THERAPEUTIC ACTIVITIES: CPT

## 2017-06-09 PROCEDURE — 97112 NEUROMUSCULAR REEDUCATION: CPT

## 2017-06-09 NOTE — PROGRESS NOTES
PT DAILY TREATMENT NOTE - [facility] 3-16    Patient Name: Raul Carlton  Date:2017  : 1955  [x]  Patient  Verified  Payor: Adela Ryan / Plan: [place] MEDICARE PART A & B / Product Type: Medicare /    In time:11:00  Out time:12:10  Total Treatment Time (min): 70  Total Timed Codes (min): 70  1:1 Treatment Time (Sara Hamm only): 38  Visit #: 6 of 12    Treatment Area: Myofascial pain [M79.1]    SUBJECTIVE  Pain Level (0-10 scale): 2/10  Any medication changes, allergies to medications, adverse drug reactions, diagnosis change, or new procedure performed?: [x] No    [] Yes (see summary sheet for update)  Subjective functional status/changes:   [] No changes reported  \"I just have some pain today but nothing really severe. \"    OBJECTIVE  30 min Therapeutic Exercise:  [x] See flow sheet :   Rationale: increase ROM, increase strength and improve coordination to improve the patients ability to perform ADLs with less pain. 25 min Therapeutic Activity:  [x]  See flow sheet :   Rationale: increase ROM, increase strength and improve coordination  to improve the patients ability to perform ADLs with less pain. 15 min Neuromuscular Re-education:  [x]  See flow sheet :   Rationale: increase ROM, increase strength and improve coordination  to improve the patients ability to seasonal allergic rhinitis     With   [] TE   [] TA   [] neuro   [] other: Patient Education: [x] Review HEP    [] Progressed/Changed HEP based on:   [] positioning   [] body mechanics   [] transfers   [] heat/ice application    [] other:      Other Objective/Functional Measures: FOTO: 56     Pain Level (0-10 scale) post treatment: 2/10    ASSESSMENT/Changes in Function: Pt tolerated ex well. Pt required intial VC to properly facilitate hamstrings. Pt continues to be moderately challenged by ex. Pt denied modalities post tx.      Patient will continue to benefit from skilled PT services to modify and progress therapeutic interventions, address functional mobility deficits, address ROM deficits, address strength deficits, analyze and cue movement patterns and analyze and modify body mechanics/ergonomics to attain remaining goals. []  See Plan of Care  []  See progress note/recertification  []  See Discharge Summary         Progress towards goals / Updated goals:  Short Term Goals: STG- To be accomplished in 3 week(s):  1. Pt will be independent with HEP to encourage prophylaxis. Eval: held due to time  Current: progressing reports compliance      Long Term Goals: LTG- To be accomplished in 6 week(s):  1. Pt will increase glut and hamstring strength to 4/5 MMT or greater to improve tolerance to daily activities. Eval:4-/5 MMT  Current: progressing - appropriately challenged with clamshells and bridges.       2. Pt will improve trunk rotation to 15 in or less to indicate mobility needed for gait. .  Eval:Right 16 in Left 16.5 in  Current: NA      3. Pt will be be able to lift gallon of milk and move using Right UE without pain. Eval:unable  Current: able with 3/10      4. Pt FOTO score will increase by 10 points to show improvement in function. Eval:54  Current: will address at visit 5      5.  Pt will be able to start a walking program for exercise to start healthy lifestyle changes  Eval: pain with walking  Current: NT    PLAN  []  Upgrade activities as tolerated     [x]  Continue plan of care  []  Update interventions per flow sheet       []  Discharge due to:_  []  Other:_      Bebeto Kidney 6/9/2017  12:45 PM

## 2017-06-12 RX ORDER — LEVOTHYROXINE SODIUM 137 UG/1
137 TABLET ORAL
Qty: 90 TAB | Refills: 1 | Status: SHIPPED | OUTPATIENT
Start: 2017-06-12 | End: 2018-01-01 | Stop reason: SDUPTHER

## 2017-06-13 ENCOUNTER — TELEPHONE (OUTPATIENT)
Dept: FAMILY MEDICINE CLINIC | Age: 62
End: 2017-06-13

## 2017-06-14 ENCOUNTER — DOCUMENTATION ONLY (OUTPATIENT)
Dept: FAMILY MEDICINE CLINIC | Age: 62
End: 2017-06-14

## 2017-06-14 ENCOUNTER — HOSPITAL ENCOUNTER (OUTPATIENT)
Dept: PHYSICAL THERAPY | Age: 62
Discharge: HOME | End: 2017-06-14
Payer: MEDICARE

## 2017-06-14 PROCEDURE — 97110 THERAPEUTIC EXERCISES: CPT

## 2017-06-14 PROCEDURE — 97530 THERAPEUTIC ACTIVITIES: CPT

## 2017-06-14 NOTE — PROGRESS NOTES
PT DAILY TREATMENT NOTE - [facility] 3-16    Patient Name: Polly Olivares  Date:2017  : 1955  [x]  Patient  Verified  Payor: Cosme Wade / Plan: [place] MEDICARE PART A & B / Product Type: Medicare /    In time:11:30  Out time:12:30  Total Treatment Time (min): 60  Total Timed Codes (min): 60  1:1 Treatment Time ( [address] only): 35   Visit #: 24 of 28    Treatment Area: Myofascial pain [M79.1]    SUBJECTIVE  Pain Level (0-10 scale): 4/10  Any medication changes, allergies to medications, adverse drug reactions, diagnosis change, or new procedure performed?: [x] No    [] Yes (see summary sheet for update)  Subjective functional status/changes:   [] No changes reported  \"I have a good amount of pain today. \"    OBJECTIVE    30 min Therapeutic Exercise:  [x] See flow sheet :   Rationale: increase ROM, increase strength and improve coordination to improve the patients ability to perform ADLs with less pain. 30 min Therapeutic Activity:  [x]  See flow sheet :   Rationale: increase ROM, increase strength and improve coordination  to improve the patients ability to perform ADLs with less pain. With   [] TE   [] TA   [] neuro   [] other: Patient Education: [x] Review HEP    [] Progressed/Changed HEP based on:   [] positioning   [] body mechanics   [] transfers   [] heat/ice application    [] other:      Other Objective/Functional Measures:      Pain Level (0-10 scale) post treatment: 4/10    ASSESSMENT/Changes in Function: Pt continues to be moderately challenged by ex. Pt denied modalities post tx. Patient will continue to benefit from skilled PT services to modify and progress therapeutic interventions, address functional mobility deficits, address ROM deficits, address strength deficits, analyze and address soft tissue restrictions, analyze and cue movement patterns and assess and modify postural abnormalities to attain remaining goals.      []  See Plan of Care  []  See progress note/recertification  [] See Discharge Summary         Progress towards goals / Updated goals:  Short Term Goals: STG- To be accomplished in 3 week(s):  1. Pt will be independent with HEP to encourage prophylaxis. Eval: held due to time  Current: progressing reports compliance      Long Term Goals: LTG- To be accomplished in 6 week(s):  1. Pt will increase glut and hamstring strength to 4/5 MMT or greater to improve tolerance to daily activities. Eval:4-/5 MMT  Current: progressing - appropriately challenged with clamshells and bridges.       2. Pt will improve trunk rotation to 15 in or less to indicate mobility needed for gait. .  Eval:Right 16 in Left 16.5 in  Current: NA      3. Pt will be be able to lift gallon of milk and move using Right UE without pain. Eval:unable  Current: able with 3/10      4. Pt FOTO score will increase by 10 points to show improvement in function. Eval:54  Current: will address at visit 5      5.  Pt will be able to start a walking program for exercise to start healthy lifestyle changes  Eval: pain with walking  Current: NT    PLAN  []  Upgrade activities as tolerated     [x]  Continue plan of care  []  Update interventions per flow sheet       []  Discharge due to:_  []  Other:_      Sean Amaya 6/14/2017  2:30 PM

## 2017-06-15 ENCOUNTER — TELEPHONE (OUTPATIENT)
Dept: FAMILY MEDICINE CLINIC | Age: 62
End: 2017-06-15

## 2017-06-15 NOTE — TELEPHONE ENCOUNTER
Pt called and stated she is feeling better and would like to cancel her appt. She informed she will finish off the medication and call on Monday 6/17/17 if symptoms continue.

## 2017-06-16 ENCOUNTER — APPOINTMENT (OUTPATIENT)
Dept: PHYSICAL THERAPY | Age: 62
End: 2017-06-16
Payer: MEDICARE

## 2017-06-21 ENCOUNTER — HOSPITAL ENCOUNTER (OUTPATIENT)
Dept: PHYSICAL THERAPY | Age: 62
Discharge: HOME | End: 2017-06-21
Payer: MEDICARE

## 2017-06-21 PROCEDURE — 97110 THERAPEUTIC EXERCISES: CPT

## 2017-06-21 PROCEDURE — 97112 NEUROMUSCULAR REEDUCATION: CPT

## 2017-06-21 NOTE — PROGRESS NOTES
PT DAILY TREATMENT NOTE - [facility]     Patient Name: Latosha Perdomo  Date:2017  : 1955  [x]  Patient  Verified  Payor: Zainab Rodriguez / Plan: [place] MEDICARE PART A & B / Product Type: Medicare /    In time:12:35 pm  Out time:1:37 pm  Total Treatment Time (min): 62  Total Timed Codes (min): 52  1:1 Treatment Time ( [address] only): 46   Visit #: 8 of 12    Treatment Area: Myofascial pain [M79.1]    SUBJECTIVE  Pain Level (0-10 scale): 4-5  Any medication changes, allergies to medications, adverse drug reactions, diagnosis change, or new procedure performed?: [x] No    [] Yes (see summary sheet for update)  Subjective functional status/changes:   [] No changes reported  \"I have finally finished my antibiotics. I am having some spasms today.  Just a little achy    OBJECTIVE    Modality rationale: decrease pain to improve the patients ability to tolerate daily activities    Min Type Additional Details    [] Estim:  []Unatt       []IFC  []Premod                        []Other:  []w/ice   []w/heat  Position:  Location:    [] Estim: []Att    []TENS instruct  []NMES                    []Other:  []w/US   []w/ice   []w/heat  Position:  Location:    []  Traction: [] Cervical       []Lumbar                       [] Prone          []Supine                       []Intermittent   []Continuous Lbs:  [] before manual  [] after manual    []  Ultrasound: []Continuous   [] Pulsed                           []1MHz   []3MHz W/cm2:  Location:    []  Iontophoresis with dexamethasone         Location: [] Take home patch   [] In clinic   10 [x]  Ice     []  heat  []  Ice massage  []  Laser   []  Anodyne Position: prone wtn pillows under abdomen  Location: L-spine and gluts    []  Laser with stim  []  Other:  Position:  Location:    []  Vasopneumatic Device Pressure:       [] lo [] med [] hi   Temperature: [] lo [] med [] hi   [x] Skin assessment post-treatment:  [x]intact []redness- no adverse reaction    []redness  adverse reaction: 34 min Therapeutic Exercise:  [x] See flow sheet :   Rationale: increase ROM, increase strength, improve coordination and increase proprioception to improve the patients ability to perform daily activities with decreased pain and symptom levels     18 min Neuromuscular Re-education:  [x]  See flow sheet :   Rationale: increase ROM, improve coordination, increase proprioception and decrease pain and trigger points  to improve the patients ability to perform daily activities with decreased pain and symptom levels    Dry Needling Procedure Note    Procedure: An intramuscular manual therapy (dry needling) and a neuro-muscular re-education treatment was done to deactivate myofascial trigger points with a 30 gauge filament needle under aseptic technique. Indications:  [x] Myofascial pain and dysfunction [] Muscled spasms  [x] Myalgia/myositis   [] Muscle cramps  [] Muscle imbalances  [] Other:    Chart reviewed for the following:  William LING PT, DPT, have reviewed the medical history, summary list and precautions/contraindications for Rosita Ing.   TIME OUT performed immediately prior to start of procedure:  William LING PT, DPT, have performed the following reviews on Rosita Ing prior to the start of the session:      [x] Verified patient identification by name and date of birth    [x] Agreement on all muscles being treated was verified   [x] Purpose of dry needling, side effects, possible complications, risks and benefits were explained to the patient   [x] Procedure site(s) verified  [x] Patient was positioned for comfort and draped for privacy  [x] Informed Consent was signed (initial visit) and verified verbally (subsequent visits)  [x] Patient was instructed on the need to report the use of blood thinners and/or immunosuppressant medications  [x] How to respond to possible adverse effects of treatment  [x] Self treatment of post needling soreness: ice, heat (moist heat, heat wraps) and stretching  [x] Opportunity was given to ask any questions, all questions were answered            Time: 1:17 pm  Date of procedure: 6/21/2017    Treatment: The following muscles were treated today with intramuscular dry needling  [] Left [] Right Abdominals: Ant Rectus Abdominis  [] Left [] Right External Oblique / Internal Oblique / Transverse Abdominis  [] Left [] Right Thoracic Multifidi / Sudha Prerna  [] Left [x] Right Iliocostalis Thoracis / Flor Junior  [] Left [x] Right Longissimus Thoracis / Flor Junior  [] Left [] Right Lumbar Multifidi  [] Left [] Right Serratus Posterior Inferior  [] Left [] Right Quadratus Lumborum  [] Left [] Right Psoas  [] Left [] Right Iliacus  [] Left [] Right Iliopsoas (inguinal)  [] Left [x] Right Piriformis  [] Left [] Right Quadratus Femoris  [] Left [x] Right Sharlet Parisian / Eloisa Lovett / Bakari  [] Left [] Right Obturator Internus  [] Left [] Right Obturator Externus / [place] Amen / Inferior Gemellus    [] Left [] Right Tensor Fasciae Camila  [] Left [] Right Iliotibial Band  [] Left [] Right Pectineus  [] Left [] Right Sartorius  [] Left [] Right Gracilis  [] Left [] Right Adductor Brevis / Adductor Longus / Adductor Barber  [] Left [] Right Rectus Femoris / Vastus Lateralis / Vastus Intermedius / Vastus Medialis Obliquus  [] Left [] Right Tibialis Anterior / Posterior  [] Left [] Right Extensor Digitorum Longus / Brevis  [] Left [] Right Extensor Hallucis Longus / Brevis  [] Left [] Right Hamstrings: Biceps Femoris / Noretta Chapa / Semimembranosis  [] Left [] Right Popliteus / Planteris  [] Left [] Right Gastrocnemius Medial / Lateral  [] Left [] Right Soleus  [] Left [] Right Peronei: Longus / Meghana Bookman / Tertius  [] Left [] Right Flexor Digitorum Longus / Brevis  [] Left [] Right Flexor Hallucis Longus / Brevis  [] Left [] Right Quadratus Plantae  [] Left [] Right Abductor Digiti Minimi  [] Left [] Right Foot Interossei  [] Left [] Right Other:    Patient's response to today's treatment:  [x] Latent Twitch Response  [] Muscle relaxation [] Pain Relief  [x] Post needling soreness [] without complications  [] Increased Range of Motion  [] Other:     Performed by: Dipti Hui, PT, DPT            With   [] TE   [] TA   [] neuro   [] other: Patient Education: [x] Review HEP    [] Progressed/Changed HEP based on:   [] positioning   [] body mechanics   [] transfers   [] heat/ice application    [] other:      Other Objective/Functional Measures: ***     Pain Level (0-10 scale) post treatment: ***    ASSESSMENT/Changes in Function: ***    Patient will continue to benefit from skilled PT services to modify and progress therapeutic interventions, address functional mobility deficits, address ROM deficits, address strength deficits, analyze and address soft tissue restrictions, analyze and cue movement patterns, analyze and modify body mechanics/ergonomics, assess and modify postural abnormalities and instruct in home and community integration to attain remaining goals. []  See Plan of Care  []  See progress note/recertification  []  See Discharge Summary         Progress towards goals / Updated goals:  Short Term Goals: STG- To be accomplished in 3 week(s):  1. Pt will be independent with HEP to encourage prophylaxis. Eval: held due to time  Current: progressing reports compliance      Long Term Goals: LTG- To be accomplished in 6 week(s):  1. Pt will increase glut and hamstring strength to 4/5 MMT or greater to improve tolerance to daily activities. Eval:4-/5 MMT  Current: progressing - appropriately challenged with clamshells and bridges.       2. Pt will improve trunk rotation to 15 in or less to indicate mobility needed for gait. .  Eval:Right 16 in Left 16.5 in  Current: NA      3. Pt will be be able to lift gallon of milk and move using Right UE without pain. Eval:unable  Current: able with 3/10      4.  Pt FOTO score will increase by 10 points to show improvement in function. Eval:54  Current: will address at visit 5      5.  Pt will be able to start a walking program for exercise to start healthy lifestyle changes  Eval: pain with walking  Current: NT    PLAN  [x]  Upgrade activities as tolerated     []  Continue plan of care  []  Update interventions per flow sheet       []  Discharge due to:_  []  Other:_      Arianna Meyers PT, DPT 6/21/2017  12:46 PM    Future Appointments  Date Time Provider Aniya Brady   6/23/2017 11:00 AM Dougie SANTIAGO THE [facility]   6/28/2017 11:00 AM DANIEL Woods THE [facility]   6/29/2017 10:45 AM Xin Hardy  [facility]   6/30/2017 11:00 AM Dougie SANTIAGO THE [facility]   10/5/2017 11:00 AM Leonides Campa MD [facility]   10/11/2017 11:30 AM Crow More NP [address] [place] [address]

## 2017-06-23 ENCOUNTER — APPOINTMENT (OUTPATIENT)
Dept: PHYSICAL THERAPY | Age: 62
End: 2017-06-23
Payer: MEDICARE

## 2017-06-27 ENCOUNTER — HOSPITAL ENCOUNTER (OUTPATIENT)
Dept: PHYSICAL THERAPY | Age: 62
Discharge: HOME | End: 2017-06-27
Payer: MEDICARE

## 2017-06-27 PROCEDURE — 97110 THERAPEUTIC EXERCISES: CPT

## 2017-06-27 PROCEDURE — 97530 THERAPEUTIC ACTIVITIES: CPT

## 2017-06-27 NOTE — PROGRESS NOTES
PT DAILY TREATMENT NOTE - [facility]    Patient Name: Richa Greenberg  Date:2017  : 1955  [x]  Patient  Verified  Payor: [place] MEDICARE / Plan: VA MEDICARE PART A & B / Product Type: Medicare /    In time:9:55  Out time:10:56  Total Treatment Time (min): 61  Total Timed Codes (min): 61  1:1 Treatment Time ( [address] only): 34   Visit #: 9 of 12    Treatment Area: Myofascial pain [M79.1]    SUBJECTIVE  Pain Level (0-10 scale): 3/10  Any medication changes, allergies to medications, adverse drug reactions, diagnosis change, or new procedure performed?: [x] No    [] Yes (see summary sheet for update)  Subjective functional status/changes:   [] No changes reported  \"I was having some spasms yesterday or something. \"    OBJECTIVE    30 min Therapeutic Exercise:  [x] See flow sheet :   Rationale: increase ROM, increase strength and improve coordination to improve the patients ability to perform ADLs with less pain. 20 min Therapeutic Activity:  [x]  See flow sheet :   Rationale: increase ROM, increase strength and improve coordination  to improve the patients ability to perform ADLs with less pain. 11 min Neuromuscular Re-education:  [x]  See flow sheet :   Rationale: increase ROM, increase strength and improve coordination  to improve the patients ability to perform ADLs with less pain. With   [] TE   [] TA   [] neuro   [] other: Patient Education: [x] Review HEP    [] Progressed/Changed HEP based on:   [] positioning   [] body mechanics   [] transfers   [] heat/ice application    [] other:      Other Objective/Functional Measures:      Pain Level (0-10 scale) post treatment: 2/10    ASSESSMENT/Changes in Function: Pt continues to be challenged by ex and reports increased fatigue. Pt had mild decreased pain post tx.      Patient will continue to benefit from skilled PT services to modify and progress therapeutic interventions, address functional mobility deficits, address ROM deficits, address strength deficits, analyze and cue movement patterns, analyze and modify body mechanics/ergonomics and assess and modify postural abnormalities to attain remaining goals. []  See Plan of Care  []  See progress note/recertification  []  See Discharge Summary         Progress towards goals / Updated goals:  Short Term Goals: STG- To be accomplished in 3 week(s):  1. Pt will be independent with HEP to encourage prophylaxis. Eval: held due to time  Current: progressing reports compliance      Long Term Goals: LTG- To be accomplished in 6 week(s):  1. Pt will increase glut and hamstring strength to 4/5 MMT or greater to improve tolerance to daily activities. Eval:4-/5 MMT  Current: progressing - appropriately challenged with clamshells and bridges.       2. Pt will improve trunk rotation to 15 in or less to indicate mobility needed for gait. .  Eval:Right 16 in Left 16.5 in  Current: NA      3. Pt will be be able to lift gallon of milk and move using Right UE without pain. Eval:unable  Current: able with 3/10      4. Pt FOTO score will increase by 10 points to show improvement in function. Eval:54  Current: will address at visit 5      5.  Pt will be able to start a walking program for exercise to start healthy lifestyle changes  Eval: pain with walking  Current: NT    PLAN  []  Upgrade activities as tolerated     [x]  Continue plan of care  []  Update interventions per flow sheet       []  Discharge due to:_  []  Other:_      Ger Sousa 6/27/2017  10:08 AM

## 2017-06-28 ENCOUNTER — HOSPITAL ENCOUNTER (OUTPATIENT)
Dept: PHYSICAL THERAPY | Age: 62
Discharge: HOME | End: 2017-06-28
Payer: MEDICARE

## 2017-06-28 PROCEDURE — G8979 MOBILITY GOAL STATUS: HCPCS

## 2017-06-28 PROCEDURE — 97164 PT RE-EVAL EST PLAN CARE: CPT

## 2017-06-28 PROCEDURE — G8978 MOBILITY CURRENT STATUS: HCPCS

## 2017-06-28 PROCEDURE — 97110 THERAPEUTIC EXERCISES: CPT

## 2017-06-28 NOTE — PROGRESS NOTES
[facility] at the [address], [place], [address] [facility]  Phone: [phone]      Fax:  [phone]    Continued Plan of Care/ Re-certification for Physical Therapy Services    Patient name: Kennedy Moreno Start of Care: 17   Referral source: Arden Pina MD : 1955   Medical/Treatment Diagnosis: Myofascial pain [M79.1] Onset Date: 2017   Prior Hospitalization: see medical history Provider#: [id number]   Medications: Verified on Patient Summary List    Comorbidities:  BMI >30, Depression, Anxiety, Thyroid Dysfunction, Hx of back pain , Asthma    Prior Level of Function: Increase in pain with standing, bending, stooping, getting down and getting back up, rolling in bed, going from sitting to standing  Visits from Start of Care: 10    Missed Visits: 2    The Plan of Care and following information is based on the patient's current status:  Short Term Goals: STG- To be accomplished in 3 week(s):  1. Pt will be independent with HEP to encourage prophylaxis. Eval: held due to time  Current: progressing reports compliance      Long Term Goals: LTG- To be accomplished in 6 week(s):  1. Pt will increase glut and hamstring strength to 4/5 MMT or greater to improve tolerance to daily activities. Eval:4-/5 MMT  Current: met (strength glutes: 4+/5 right, 4/5 left, knee flexors: 5/5 bilaterally)      2. Pt will improve trunk rotation to 15 in or less to indicate mobility needed for gait. .  Eval:Right 16 in Left 16.5 in  Current: met (lower trunk rotation: 9 inches right, 10.5 inches left)      3. Pt will be be able to lift gallon of milk and move using Right UE without pain. Eval:unable   Current: met (pt lifted up to 9 lbs and walked through the clinic without increased pain)      4. Pt FOTO score will increase by 10 points to show improvement in function. Eval: FOTO: 54/100  Current: progressing (FOTO: 56/100)      5.  Pt will be able to start a walking program for exercise to start healthy lifestyle changes  Eval: pain with walking  Current: progressing (pt reports walking up to 3-4 miles per week depending on weather)      Key functional changes:    Pt has shown improvements in LEs' strength, ROM of trunk in rotation and flexion, and demonstrated ability to carry loads equivalent to bags of groceries without increased pain. Innominate alignment remaining symmetric for at least 3 weeks. Pt's LEs' strength still not WNL and pt would benefit from additional skilled PT to emphasize LE strengthening and core/back strengthening/stabilization, but pt is to leave the local area for a period of 7 weeks to care for a elderly friend starting on 7/05/17. Pt has 2 more PT treatments scheduled before her scheduled leave and pt would benefit to receive dry needling and continue with strengthening/stabilization. Pt would likely benefit from additional PT after she returns to the local area in August.      Problems/ barriers to goal attainment: none     Problem List: pain affecting function, decrease ROM, decrease strength, edema affecting function, impaired gait/ balance, decrease ADL/ functional abilitiies, decrease activity tolerance and decrease flexibility/ joint mobility                         Treatment Plan may include any combination of the following: Therapeutic exercise, Therapeutic activities, Neuromuscular re-education, Physical agent/modality, Gait/balance training, Manual therapy and Patient education    Patient Goal (s) has been updated and includes: Pain relief so I can function     Goals for this certification period to be accomplished in 2 weeks:   1) Continue with unmet goals above. Frequency / Duration: Patient to be seen for 2 more treatments:    Assessment / Recommendations:   Continue with 2 more visits then D/C pt due to pt leaving the area for 7 weeks.     G-Codes (GP)  Mobility  [id number] Current  CK= 40-59%  [id number] Goal  CJ= 20-39%    The severity rating is based on clinical judgment and the FOTO score. Certification Period: 5/22/17 to 7/20/17    Sandeep Perez, PT 6/28/2017 7:38 PM    ________________________________________________________________________  I certify that the above Therapy Services are being furnished while the patient is under my care. I agree with the treatment plan and certify that this therapy is necessary. [] I have read the above and request that my patient continue as recommended.   [] I have read the above report and request that my patient continue therapy with the following changes/special instructions: ______________________________________  [] I have read the above report and request that my patient be discharged from therapy    Physician's Signature:_______________________________Date:___________Time:__________    Please sign and return to [facility] at the [address], [place], [address] [facility]  Phone: [phone]      Fax:  [phone]

## 2017-06-28 NOTE — PROGRESS NOTES
PT DAILY TREATMENT NOTE - [facility]     Patient Name: Sarath Black  Date:2017  : 1955  [x]  Patient  Verified  Payor: Payam Cortez / Plan: VA MEDICARE PART A & B / Product Type: Medicare /    In time:11:00  Out time:11:45  Total Treatment Time (min): 45  Total Timed Codes (min): 25  1:1 Treatment Time ( [address] only): 45   Visit #: 10 of 12    Treatment Area: Myofascial pain [M79.1]    SUBJECTIVE  Pain Level (0-10 scale): 2/10  Any medication changes, allergies to medications, adverse drug reactions, diagnosis change, or new procedure performed?: [x] No    [] Yes (see summary sheet for update)  Subjective functional status/changes:   [] No changes reported  \"I'm doing good today. I have good days and bad days. I was sore after last dry needling treatment. \"    OBJECTIVE    Modality rationale:    Min Type Additional Details    [] Estim:  []Unatt       []IFC  []Premod                        []Other:  []w/ice   []w/heat  Position:  Location:    [] Estim: []Att    []TENS instruct  []NMES                    []Other:  []w/US   []w/ice   []w/heat  Position:  Location:    []  Traction: [] Cervical       []Lumbar                       [] Prone          []Supine                       []Intermittent   []Continuous Lbs:  [] before manual  [] after manual    []  Ultrasound: []Continuous   [] Pulsed                           []1MHz   []3MHz W/cm2:  Location:    []  Iontophoresis with dexamethasone         Location: [] Take home patch   [] In clinic    []  Ice     []  heat  []  Ice massage  []  Laser   []  Anodyne Position:  Location:    []  Laser with stim  []  Other:  Position:  Location:    []  Vasopneumatic Device Pressure:       [] lo [] med [] hi   Temperature: [] lo [] med [] hi   [] Skin assessment post-treatment:  []intact []redness- no adverse reaction    []redness - adverse reaction:     20 min []Eval                  [x]Re-Eval       25 min Therapeutic Exercise:  [x] See flow sheet :  Added Swissball squats, added bridges on Swissball   Rationale: increase ROM, increase strength and improve coordination to improve the patients ability to perform ADLs with less pain. min Therapeutic Activity:  []  See flow sheet :         min Neuromuscular Re-education:  []  See flow sheet :        min Manual Therapy:          min Gait Training:  ___ feet with ___ device on level surfaces with ___ level of assist   Rationale: With   [] TE   [] TA   [] neuro   [] other: Patient Education: [x] Review HEP    [] Progressed/Changed HEP based on:   [] positioning   [] body mechanics   [] transfers   [] heat/ice application    [] other:      Other Objective/Functional Measures:   strength glutes: 4+/5 right, 4/5 left, knee flexors: 5/5 bilaterally  Innominates aligned. Pain Level (0-10 scale) post treatment: 1/10    ASSESSMENT/Changes in Function:    Pt has shown improvements in LEs' strength, ROM of trunk in rotation and flexion, and demonstrated ability to carry loads equivalent to bags of groceries without increased pain. Innominate alignment remaining symmetric for at least 3 weeks. Pt's LEs' strength still not WNL and pt would benefit from additional skilled PT to emphasize LE strengthening and core/back strengthening/stabilization, but pt is to leave the local area for a period of 7 weeks to care for a elderly friend starting on 7/05/17. Pt has 2 more PT treatments scheduled before her scheduled leave and pt would benefit to receive dry needling and continue with strengthening/stabilization.   Pt would likely benefit from additional PT after she returns to the local area in August.    Patient will continue to benefit from skilled PT services to modify and progress therapeutic interventions, address functional mobility deficits, address ROM deficits, address strength deficits, analyze and cue movement patterns, analyze and modify body mechanics/ergonomics and assess and modify postural abnormalities to attain remaining goals. []  See Plan of Care  []  See progress note/recertification  []  See Discharge Summary         Progress towards goals / Updated goals:  Short Term Goals: STG- To be accomplished in 3 week(s):  1. Pt will be independent with HEP to encourage prophylaxis. Eval: held due to time  Current: progressing reports compliance      Long Term Goals: LTG- To be accomplished in 6 week(s):  1. Pt will increase glut and hamstring strength to 4/5 MMT or greater to improve tolerance to daily activities. Eval:4-/5 MMT  Current: met (strength glutes: 4+/5 right, 4/5 left, knee flexors: 5/5 bilaterally) 6/28/17      2. Pt will improve trunk rotation to 15 in or less to indicate mobility needed for gait. .  Eval:Right 16 in Left 16.5 in  Current: met (lower trunk rotation: 9 inches right, 10.5 inches left) 6/28/17       3. Pt will be be able to lift gallon of milk and move using Right UE without pain. Eval:unable   Current: met (pt lifted up to 9 lbs and walked through the clinic without increased pain) 6/28/17      4. Pt FOTO score will increase by 10 points to show improvement in function. Eval: FOTO: 54/100  Current: progressing (FOTO: 56/100) 6/09/17      5.  Pt will be able to start a walking program for exercise to start healthy lifestyle changes  Eval: pain with walking  Current: progressing (pt reports walking up to 3-4 miles per week depending on weather) 6/28/17     PLAN  []  Upgrade activities as tolerated     [x]  Continue plan of care  []  Update interventions per flow sheet       []  Discharge due to:_  []  Other:_      Devan Vogel, PT 6/28/2017  11:04 AM    Future Appointments  Date Time Provider Aniya Caitlyn   6/29/2017 10:45 AM Garo Linda  [address]   6/30/2017 11:00 AM Latonia YOUSSEFNIKKY THE [facility]   7/3/2017 7:30 AM Santos Lawrence, PT, DPT MINIKK[facility]   10/5/2017 11:00 AM Herb Ortega MD [facility] WISAM KAREN   10/11/2017 11:30 AM Ced Cameron NP [address] [place] [address]

## 2017-06-29 ENCOUNTER — OFFICE VISIT (OUTPATIENT)
Dept: ORTHOPEDIC SURGERY | Age: 62
End: 2017-06-29

## 2017-06-29 VITALS
WEIGHT: 208 LBS | HEIGHT: 64 IN | HEART RATE: 76 BPM | OXYGEN SATURATION: 93 % | DIASTOLIC BLOOD PRESSURE: 82 MMHG | RESPIRATION RATE: 16 BRPM | BODY MASS INDEX: 35.51 KG/M2 | SYSTOLIC BLOOD PRESSURE: 118 MMHG

## 2017-06-29 DIAGNOSIS — M79.18 MYOFASCIAL PAIN: Primary | ICD-10-CM

## 2017-06-29 RX ORDER — ACETAMINOPHEN 325 MG
TABLET ORAL
Refills: 0 | COMMUNITY
Start: 2017-06-06 | End: 2017-10-11

## 2017-06-29 RX ORDER — IBUPROFEN 600 MG/1
TABLET ORAL
Refills: 0 | COMMUNITY
Start: 2017-06-06 | End: 2018-07-03 | Stop reason: ALTCHOICE

## 2017-06-29 RX ORDER — OXYCODONE AND ACETAMINOPHEN 10; 325 MG/1; MG/1
TABLET ORAL
Refills: 0 | COMMUNITY
Start: 2017-06-06 | End: 2017-10-11

## 2017-06-29 NOTE — PROGRESS NOTES
[address]  [address]  [place]  Phone: [phone]  Fax: [phone]        Curtis Patel  : 1955  PCP: Gaurang Pavon MD  2017    PROGRESS NOTE      ASSESSMENT AND PLAN    Rin Akbar comes in to the office today for PT and trigger point injection f/u. She is doing better and hopefully she eventually has full resolution of her myofascial pain. She will be going on a trip to [facility] soon to help take care of a friend. Pt will f/u in 6 months or sooner as needed. Ralph Hinojosa was seen today for neck pain, back pain and follow-up. Diagnoses and all orders for this visit:    Myofascial pain       Follow-up Disposition:  Return in about 6 months (around 2017), or if symptoms worsen or fail to improve. HISTORY OF PRESENT ILLNESS  Rin Akbar is a 62 y.o. female. A&P / HPI from 2016:  Madi Delgadillo comes in to the office today c/o cervical and lumbar pain. She has brought her MRI with her today which does not reveal any obvious causes for her pain. She has a diagnosis of myofascial pain.      She was referred to PT as it has previously provided significant relief for her pain. Pt was advised to continue taking Mobic prn.      Pt will f/u in 6 weeks or prn.      Updates from 17:  Pt presents for a cervical and lumbar PT continuation f/u. She has found significant relief with her visits and is experiencing little to no pain today. She only has complaints of an sporadic, low grade pain in her lumbar region.      Pt mentions she recently had a mechanical fall which injured her right shoulder. She was treated with a cortisone injection and PT which has provided relief.     Updates from 05/10/17:  Pt presents for a prn f/u. Pt previously found relief with cervical and lumbar PT. She returns today after straining her lumbar region about 2 weeks ago while moving heavy tables.  Her pain still appears to be myofascial in nature although there is potential for it to be due to sacroiliitis or a lumbar radiculopathy.          Updates from 06/29/17:  Pt presents for pain in the lumbar region. At the last visit I referred her to get PT and a trigger point injection which provided her great relief. She reports to an increase in exercise which has also provided her relief. PAST MEDICAL HISTORY   Past Medical History:   Diagnosis Date    Abnormal Papanicolaou smear of cervix     1988 HPV    Asthma     [facility] [address]     Bipolar 1 disorder, depressed (HCC)     Bursitis     Carpal tunnel syndrome     Cirrhosis, hepatitis C     Connective tissue disease ([facility] [address] [facility]     DDD (degenerative disc disease), lumbosacral     Gastric ulcer     Hashimoto's disease     Hashimoto's disease     Hepatitis C     Herniated intervertebral disc of lumbar spine     Hypermobility syndrome     Liver disease     Osteoarthritis     Plantar fasciitis, bilateral     RA (rheumatoid arthritis) ([facility] [address]        Past Surgical History:   Procedure Laterality Date    COLONOSCOPY N/A 2/9/2017     Colonoscopy w/polyp bxs x3 performed by Nakita Ferrell MD at [facility] ENDOSCOPY    HX APPENDECTOMY      HX DILATION AND CURETTAGE  1988    cryso    HX GYN      BTL    HX HEENT      malofacial surgery   . MEDICATIONS      Current Outpatient Prescriptions   Medication Sig Dispense Refill    levothyroxine (SYNTHROID) 137 mcg tablet Take 137 mcg by mouth Daily (before breakfast). 90 Tab 1    omeprazole (PRILOSEC) 20 mg capsule Take 1 Cap by mouth daily. 30 Cap 2    diclofenac EC (VOLTAREN) 75 mg EC tablet Take 1 Tab by mouth two (2) times daily as needed. 60 Tab 5    tiZANidine (ZANAFLEX) 2 mg tablet Take 1 Tab by mouth three (3) times daily.  90 Tab 2    conjugated estrogens (PREMARIN) 0.625 mg/gram vaginal cream 1/2 gm twice weekly on sunday and wednesday 45 g 2    cholecalciferol, vitamin D3, (VITAMIN D3) 2,000 unit tab Take  by mouth.      diclofenac (VOLTAREN) 1 % gel apply 2 grams to affected area four times a day  0    PROAIR HFA 90 mcg/actuation inhaler   0    zolpidem (AMBIEN) 10 mg tablet   0    benztropine (COGENTIN) 1 mg tablet Take 1 mg by mouth three (3) times daily.  ARIPiprazole (ABILIFY) 5 mg tablet Take 5 mg by mouth daily.  oxyCODONE-acetaminophen (PERCOCET 10)  mg per tablet take 1 tablet by mouth every 4 to 6 hours if needed for pain  0    ibuprofen (MOTRIN) 600 mg tablet take 1 tablet by mouth every 6 hours if needed for pain  0    ATHENOL 325 mg tablet take 2 tablets by mouth every 6 hours if needed for pain  0        ALLERGIES    Allergies   Allergen Reactions    Adhesive Tape-Silicones Rash          SOCIAL HISTORY    Social History     Social History    Marital status: SINGLE     Spouse name: N/A    Number of children: N/A    Years of education: N/A     Social History Main Topics    Smoking status: Former Smoker     Quit date: 7/1/2005    Smokeless tobacco: Never Used    Alcohol use No    Drug use: Yes     Special: Marijuana    Sexual activity: No     Other Topics Concern    None     Social History Narrative       FAMILY HISTORY    Family History   Problem Relation Age of Onset    No Known Problems Mother     No Known Problems Father          REVIEW OF SYSTEMS  Review of Systems   Constitutional: Negative for chills, diaphoresis, fever, malaise/fatigue and weight loss. Respiratory: Negative for shortness of breath. Cardiovascular: Negative for chest pain and leg swelling. Gastrointestinal: Negative for constipation, nausea and vomiting. Neurological: Negative for dizziness, tingling, seizures, loss of consciousness, weakness and headaches. Psychiatric/Behavioral: The patient does not have insomnia.            PHYSICAL EXAMINATION  Visit Vitals    /82    Pulse 76    Resp 16    Ht 5' 4\" (1.626 m)    Wt 208 lb (94.3 kg)    SpO2 93%    BMI 35.7 kg/m2       Pain Assessment  6/29/2017   Location of Pain Neck;Back   Location Modifiers -   Severity of Pain 2   Quality of Pain Aching;Dull   Duration of Pain Persistent   Frequency of Pain Constant   Aggravating Factors -   Aggravating Factors Comment -   Limiting Behavior -   Relieving Factors -   Result of Injury No           Constitutional:  Well developed, well nourished, in no acute distress. Psychiatric: Affect and mood are appropriate. Integumentary: No rashes or abrasions noted on exposed areas. SPINE/MUSCULOSKELETAL EXAM    Cervical spine:  Neck is midline. Normal muscle tone. No focal atrophy is noted. ROM pain free. Shoulder ROM intact. Mild tenderness to palpation, R>L. Negative Spurling's sign. Negative Tinel's sign. Negative Dillon's sign.       Sensation in the bilateral arms grossly intact to light touch.       Lumbar spine:  No rash, ecchymosis, or gross obliquity. No fasciculations. No focal atrophy is noted. No pain with hip ROM. Full range of motion. Diffuse tenderness to palpation, L>R. No tenderness to palpation at the sciatic notch. SI joints non-tender. Trochanters non tender.      Sensation in the bilateral legs grossly intact to light touch.     Updates from 05/10/17:  Bilateral SI joints tender. Negative right PORTILLO test.  Negative right p4 test.  Negative bilateral straight leg raise. Tenderness to palpation in the lumbar region. MOTOR:      Biceps  Triceps Deltoids Wrist Ext Wrist Flex Hand Intrin   Right 5/5 5/5 5/5 5/5 5/5 5/5   Left 5/5 5/5 5/5 5/5 5/5 5/5             Hip Flex  Quads Hamstrings Ankle DF EHL Ankle PF   Right 5/5 5/5 5/5 5/5 5/5 5/5   Left 5/5 5/5 5/5 5/5 5/5 5/5     DTRs are 2+ biceps, triceps, brachioradialis, patella, and Achilles.      Negative Straight Leg raise. Squat not tested. No difficulty with tandem gait.       Ambulation without assistive device. FWB.         RADIOGRAPHS  Lumbar MRI images taken on June/July 2016 personally reviewed with patient:  1) No obvious stenosis       reviewed      Ms. Dat Soliz has a reminder for a \"due or due soon\" health maintenance. I have asked that she contact her primary care provider for follow-up on this health maintenance.       This plan was reviewed with the patient and patient agrees. All questions were answered. More than half of this visit today was spent on counseling.      Written by Marta Douglas, as dictated by Dr. Nallely Mireles. I, Dr. Nallely Mireles, confirm that all documentation is accurate.

## 2017-06-29 NOTE — MR AVS SNAPSHOT
Visit Information Date & Time Provider Department Dept. Phone Encounter #  
 6/29/2017 10:45 AM Lizzeth Saavedra MD [place] [facility] [phone] [id number] Follow-up Instructions Return in about 6 months (around 12/29/2017), or if symptoms worsen or fail to improve. Your Appointments 6/30/2017  1:30 PM  
Office Visit with Duke Herrera MD  
[facility]: cough/congestion [address] [facility] [address]  
[phone]  
  
   
 [address] [facility] [address]  
  
    
 10/5/2017 11:00 AM  
Office Visit with Duke Herrera MD  
[facility] Appt Note: 6 month follow up [address] [id number]  
[phone]  
  
    
 10/11/2017 11:30 AM  
Follow Up with Nic Penny NP [facility] [place] ([facility]) Appt Note: HCV  
 [address] [address] [facility] [address] [place] [address] Upcoming Health Maintenance Date Due Pneumococcal 19-64 Medium Risk (1 of 1 - PPSV23) 10/21/1975 INFLUENZA AGE 9 TO ADULT 8/1/2017 BREAST CANCER SCRN MAMMOGRAM 7/13/2018 PAP AKA CERVICAL CYTOLOGY 1/3/2022 COLONOSCOPY 2/9/2022 DTaP/Tdap/Td series (2 - Td) 10/8/2024 Allergies as of 6/29/2017  Review Complete On: 6/29/2017 By: Aleisha Cole LPN Severity Noted Reaction Type Reactions Adhesive Tape-silicones  87/73/0977   Not Verified Rash Current Immunizations  Never Reviewed Name Date Hep A Vaccine 9/12/2012, 4/10/2012, 3/5/2012 Hep B Vaccine 9/12/2012, 4/10/2012, 3/5/2012 Influenza Vaccine 10/8/2014, 11/1/2013 Influenza Vaccine (Quad) PF 10/12/2016 Pneumococcal Conjugate (PCV-13) 1/12/2017 Tdap 10/8/2014 Not reviewed this visit You Were Diagnosed With   
  
 Codes Comments Myofascial pain    -  Primary ICD-10-CM: M79.1 ICD-9-CM: 729.1 Vitals BP Pulse Resp Height(growth percentile) Weight(growth percentile) SpO2  
 118/82 76 16 5' 4\" (1.626 m) 208 lb (94.3 kg) 93% BMI OB Status Smoking Status 35.7 kg/m2 Postmenopausal Former Smoker BMI and BSA Data Body Mass Index Body Surface Area 35.7 kg/m 2 2.06 m 2 Preferred Pharmacy Pharmacy Name Phone [facility] [place], [place] - [address] [facility] [phone] Your Updated Medication List  
  
   
This list is accurate as of: 6/29/17 11:30 AM.  Always use your most recent med list.  
  
  
  
  
 ABILIFY 5 mg tablet Generic drug:  ARIPiprazole Take 5 mg by mouth daily. ATHENOL 325 mg tablet Generic drug:  acetaminophen  
take 2 tablets by mouth every 6 hours if needed for pain  
  
 benztropine 1 mg tablet Commonly known as:  COGENTIN Take 1 mg by mouth three (3) times daily. conjugated estrogens 0.625 mg/gram vaginal cream  
Commonly known as:  PREMARIN  
1/2 gm twice weekly on sunday and wednesday * diclofenac 1 % Gel Commonly known as:  VOLTAREN  
apply 2 grams to affected area four times a day * diclofenac EC 75 mg EC tablet Commonly known as:  VOLTAREN Take 1 Tab by mouth two (2) times daily as needed. ibuprofen 600 mg tablet Commonly known as:  MOTRIN  
take 1 tablet by mouth every 6 hours if needed for pain  
  
 levothyroxine 137 mcg tablet Commonly known as:  SYNTHROID Take 137 mcg by mouth Daily (before breakfast). omeprazole 20 mg capsule Commonly known as:  PriLOSEC Take 1 Cap by mouth daily. oxyCODONE-acetaminophen  mg per tablet Commonly known as:  PERCOCET 10  
take 1 tablet by mouth every 4 to 6 hours if needed for pain PROAIR HFA 90 mcg/actuation inhaler Generic drug:  albuterol  
  
 tiZANidine 2 mg tablet Commonly known as:  Taz Lynn Take 1 Tab by mouth three (3) times daily. VITAMIN D3 2,000 unit Tab Generic drug:  cholecalciferol (vitamin D3) Take  by mouth.  
  
 zolpidem 10 mg tablet Commonly known as:  AMBIEN  
  
 * Notice: This list has 2 medication(s) that are the same as other medications prescribed for you. Read the directions carefully, and ask your doctor or other care provider to review them with you. Follow-up Instructions Return in about 6 months (around 12/29/2017), or if symptoms worsen or fail to improve. To-Do List   
 06/30/2017 11:00 AM  
  Appointment with Bebeto Parsons at THE [facility] PT [address] [facility] ([phone])  
  
 07/03/2017 7:30 AM  
  Appointment with Nereida Aceves, PT, DPT at THE [facility] PT [facility] ([phone]) Introducing [facility] & HEALTH SERVICES! Dear Zakia Sullivan: Thank you for requesting a Chobani account. Our records indicate that you already have an active Chobani account. You can access your account anytime at [email or web address] Did you know that you can access your hospital and ER discharge instructions at any time in Chobani? You can also review all of your test results from your hospital stay or ER visit. Additional Information If you have questions, please visit the Frequently Asked Questions section of the Chobani website at [email or web address] Remember, Chobani is NOT to be used for urgent needs. For medical emergencies, dial 911. Now available from your iPhone and Android! Please provide this summary of care documentation to your next provider. Your primary care clinician is listed as Albaro Anders. If you have any questions after today's visit, please call [phone].

## 2017-06-30 ENCOUNTER — APPOINTMENT (OUTPATIENT)
Dept: PHYSICAL THERAPY | Age: 62
End: 2017-06-30
Payer: MEDICARE

## 2017-06-30 ENCOUNTER — OFFICE VISIT (OUTPATIENT)
Dept: FAMILY MEDICINE CLINIC | Age: 62
End: 2017-06-30

## 2017-06-30 VITALS
SYSTOLIC BLOOD PRESSURE: 141 MMHG | HEIGHT: 64 IN | DIASTOLIC BLOOD PRESSURE: 85 MMHG | OXYGEN SATURATION: 97 % | TEMPERATURE: 98.5 F | WEIGHT: 205 LBS | BODY MASS INDEX: 35 KG/M2 | HEART RATE: 77 BPM | RESPIRATION RATE: 16 BRPM

## 2017-06-30 DIAGNOSIS — K21.9 GASTROESOPHAGEAL REFLUX DISEASE WITHOUT ESOPHAGITIS: ICD-10-CM

## 2017-06-30 DIAGNOSIS — J06.9 UPPER RESPIRATORY TRACT INFECTION, UNSPECIFIED TYPE: Primary | ICD-10-CM

## 2017-06-30 DIAGNOSIS — R05.9 COUGH: ICD-10-CM

## 2017-06-30 RX ORDER — HYDROCODONE POLISTIREX AND CHLORPHENIRAMINE POLISTIREX 10; 8 MG/5ML; MG/5ML
1 SUSPENSION, EXTENDED RELEASE ORAL
Qty: 90 ML | Refills: 0 | Status: SHIPPED | OUTPATIENT
Start: 2017-06-30 | End: 2017-09-26

## 2017-06-30 RX ORDER — AZITHROMYCIN 250 MG/1
TABLET, FILM COATED ORAL
Qty: 6 TAB | Refills: 0 | Status: SHIPPED | OUTPATIENT
Start: 2017-06-30 | End: 2017-07-05

## 2017-06-30 RX ORDER — OMEPRAZOLE 20 MG/1
20 CAPSULE, DELAYED RELEASE ORAL DAILY
Qty: 30 CAP | Refills: 2 | OUTPATIENT
Start: 2017-06-30

## 2017-06-30 RX ORDER — OMEPRAZOLE 20 MG/1
20 CAPSULE, DELAYED RELEASE ORAL DAILY
Qty: 30 CAP | Refills: 2 | Status: SHIPPED | OUTPATIENT
Start: 2017-06-30 | End: 2018-02-12 | Stop reason: SDUPTHER

## 2017-06-30 RX ORDER — PREDNISONE 20 MG/1
40 TABLET ORAL DAILY
Qty: 14 TAB | Refills: 0 | Status: SHIPPED | OUTPATIENT
Start: 2017-06-30 | End: 2017-07-07

## 2017-06-30 RX ORDER — OMEPRAZOLE 20 MG/1
20 CAPSULE, DELAYED RELEASE ORAL DAILY
Qty: 30 CAP | Refills: 2 | Status: CANCELLED | OUTPATIENT
Start: 2017-06-30

## 2017-06-30 NOTE — PROGRESS NOTES
Patient presents to the clinic to follow up on a cough that has not subsided. Patient complains of a dry cough.

## 2017-06-30 NOTE — PATIENT INSTRUCTIONS
Take the prednisone for 7 days, and the cough medication only if needed.     If the cough becomes productive, sinuses act up again while in [facility], take the Zithromax for 5 days

## 2017-06-30 NOTE — MR AVS SNAPSHOT
Visit Information Date & Time Provider Department Dept. Phone Encounter #  
 6/30/2017  1:30 PM Angelia Diop, [employer] [phone] [id number] Your Appointments 10/5/2017 11:00 AM  
Office Visit with Angelia Diop MD  
[employer] [facility]) Appt Note: 6 month follow up [address] [facility] [address]  
[phone]  
  
   
 [address]  
  
    
 10/11/2017 11:30 AM  
Follow Up with Taniya Abrams NP [facility] [place] [facility] ([facility]) Appt Note: HCV  
 [address] [address] [facility] [address]  
[phone]  
  
   
 [address] [address] [facility]  
  
    
 12/29/2017 10:30 AM  
Follow Up with Efren Holm MD  
[facility]) Appt Note: 6 mo f/u low back and shoulder [address] [facility] [id number] [phone]  
  
   
 [address] [facility] [address] Upcoming Health Maintenance Date Due Pneumococcal 19-64 Medium Risk (1 of 1 - PPSV23) 10/21/1975 INFLUENZA AGE 9 TO ADULT 8/1/2017 BREAST CANCER SCRN MAMMOGRAM 7/13/2018 PAP AKA CERVICAL CYTOLOGY 1/3/2022 COLONOSCOPY 2/9/2022 DTaP/Tdap/Td series (2 - Td) 10/8/2024 Allergies as of 6/30/2017  Review Complete On: 6/30/2017 By: Richelle Gallardo LPN Severity Noted Reaction Type Reactions Adhesive Tape-silicones  78/38/0616   Not Verified Rash Current Immunizations  Never Reviewed Name Date Hep A Vaccine 9/12/2012, 4/10/2012, 3/5/2012 Hep B Vaccine 9/12/2012, 4/10/2012, 3/5/2012 Influenza Vaccine 10/8/2014, 11/1/2013 Influenza Vaccine (Quad) PF 10/12/2016 Pneumococcal Conjugate (PCV-13) 1/12/2017 Tdap 10/8/2014 Not reviewed this visit You Were Diagnosed With   
  
 Codes Comments Upper respiratory tract infection, unspecified type    -  Primary ICD-10-CM: J06.9 ICD-9-CM: 465.9 Cough     ICD-10-CM: R05 ICD-9-CM: 476. 2 Vitals BP Pulse Temp Resp Height(growth percentile) Weight(growth percentile) 141/85 (BP 1 Location: Right arm, BP Patient Position: Sitting) 77 98.5 °F (36.9 °C) (Oral) 16 5' 4\" (1.626 m) 205 lb (93 kg) SpO2 BMI OB Status Smoking Status 97% 35.19 kg/m2 Postmenopausal Former Smoker BMI and BSA Data Body Mass Index Body Surface Area  
 35.19 kg/m 2 2.05 m 2 Preferred Pharmacy Pharmacy Name Phone [facility] [address] [place], [place] - [address] [facility] [phone] Your Updated Medication List  
  
   
This list is accurate as of: 6/30/17  1:46 PM.  Always use your most recent med list.  
  
  
  
  
 ABILIFY 5 mg tablet Generic drug:  ARIPiprazole Take 5 mg by mouth daily. ATHENOL 325 mg tablet Generic drug:  acetaminophen  
take 2 tablets by mouth every 6 hours if needed for pain  
  
 azithromycin 250 mg tablet Commonly known as:  Alcus Aspen Take 2 tablets today, then take 1 tablet daily  
  
 benztropine 1 mg tablet Commonly known as:  COGENTIN Take 1 mg by mouth three (3) times daily. chlorpheniramine-HYDROcodone 10-8 mg/5 mL suspension Commonly known as:  Jem Maillard Take 5 mL by mouth every twelve (12) hours as needed for Cough. Max Daily Amount: 10 mL. conjugated estrogens 0.625 mg/gram vaginal cream  
Commonly known as:  PREMARIN  
1/2 gm twice weekly on sunday and wednesday * diclofenac 1 % Gel Commonly known as:  VOLTAREN  
apply 2 grams to affected area four times a day * diclofenac EC 75 mg EC tablet Commonly known as:  VOLTAREN Take 1 Tab by mouth two (2) times daily as needed. ibuprofen 600 mg tablet Commonly known as:  MOTRIN  
take 1 tablet by mouth every 6 hours if needed for pain  
  
 levothyroxine 137 mcg tablet Commonly known as:  SYNTHROID Take 137 mcg by mouth Daily (before breakfast). omeprazole 20 mg capsule Commonly known as:  PriLOSEC Take 1 Cap by mouth daily. oxyCODONE-acetaminophen  mg per tablet Commonly known as:  PERCOCET 10  
take 1 tablet by mouth every 4 to 6 hours if needed for pain  
  
 predniSONE 20 mg tablet Commonly known as:  Rayne Nora Take 2 Tabs by mouth daily for 7 days. PROAIR HFA 90 mcg/actuation inhaler Generic drug:  albuterol  
  
 tiZANidine 2 mg tablet Commonly known as:  Lenell Muss Take 1 Tab by mouth three (3) times daily. VITAMIN D3 2,000 unit Tab Generic drug:  cholecalciferol (vitamin D3) Take  by mouth.  
  
 zolpidem 10 mg tablet Commonly known as:  AMBIEN  
  
 * Notice: This list has 2 medication(s) that are the same as other medications prescribed for you. Read the directions carefully, and ask your doctor or other care provider to review them with you. Prescriptions Printed Refills  
 chlorpheniramine-HYDROcodone (TUSSIONEX) 10-8 mg/5 mL suspension 0 Sig: Take 5 mL by mouth every twelve (12) hours as needed for Cough. Max Daily Amount: 10 mL. Class: Print Route: Oral  
  
Prescriptions Sent to Pharmacy Refills  
 predniSONE (DELTASONE) 20 mg tablet 0 Sig: Take 2 Tabs by mouth daily for 7 days. Class: Normal  
 Pharmacy: [address] [place] [address] Ph #: [phone] Route: Oral  
 azithromycin (ZITHROMAX) 250 mg tablet 0 Sig: Take 2 tablets today, then take 1 tablet daily Class: Normal  
 Pharmacy: [address] [place] [address] Ph #: [phone] To-Do List   
 07/03/2017 7:30 AM  
  Appointment with Esme Noonan, PT, DPT at [address] [place] [address] ([phone]) Patient Instructions Take the prednisone for 7 days, and the cough medication only if needed. If the cough becomes productive, sinuses act up again while in [facility], take the Zithromax for 5 days Introducing [facility] & HEALTH SERVICES! Dear Gutierrez Benson: Thank you for requesting a [employer] account. Our records indicate that you already have an active New Vision account. You can access your account anytime at [email or web address] Did you know that you can access your hospital and ER discharge instructions at any time in New Vision? You can also review all of your test results from your hospital stay or ER visit. Additional Information If you have questions, please visit the Frequently Asked Questions section of the New Vision website at [email or web address] Remember, [employer] is NOT to be used for urgent needs. For medical emergencies, dial 911. Now available from your iPhone and Android! Please provide this summary of care documentation to your next provider. Your primary care clinician is listed as Albaro Anders. If you have any questions after today's visit, please call [phone].

## 2017-06-30 NOTE — PROGRESS NOTES
HISTORY OF PRESENT ILLNESS  Linwood Pacheco is a 62 y.o. female. HPI Comments: Ms. Fatoumata Cox is here because of a dry cough for a week or longer . She can be doing fine one minute, then have a spell of about 15 coughs, then back to normal. She is concerned because she is going to [facility] in 5 days and doesn't want to ruin the trip. She denies fever, chills, sinus pressure, headache. Cough   Pertinent negatives include no chest pain, no headaches and no shortness of breath. Review of Systems   Constitutional: Negative for chills, fever and malaise/fatigue. HENT: Negative for congestion, ear pain, nosebleeds, sore throat and tinnitus. Eyes: Negative for blurred vision and discharge. Respiratory: Positive for cough. Negative for hemoptysis, sputum production, shortness of breath and wheezing. Cardiovascular: Negative for chest pain and palpitations. Gastrointestinal: Negative for heartburn. Genitourinary: Negative for dysuria and urgency. Neurological: Negative for headaches. Physical Exam   Constitutional: Vital signs are normal. She appears well-developed and well-nourished. HENT:   Right Ear: Tympanic membrane and ear canal normal.   Left Ear: Tympanic membrane and ear canal normal.   Nose: Nose normal.   Mouth/Throat: Uvula is midline, oropharynx is clear and moist and mucous membranes are normal.   Eyes: Pupils are equal, round, and reactive to light. Neck: No thyromegaly present. Cardiovascular: Normal rate, regular rhythm and normal heart sounds. Pulmonary/Chest: Effort normal and breath sounds normal. No respiratory distress. She has no wheezes. She has no rales. Abdominal: She exhibits no distension. There is no tenderness. Lymphadenopathy:     She has no cervical adenopathy. Skin: No rash noted. Nursing note and vitals reviewed. ASSESSMENT and PLAN    ICD-10-CM ICD-9-CM    1.  Upper respiratory tract infection, unspecified type J06.9 465.9 predniSONE (DELTASONE) 20 mg tablet      chlorpheniramine-HYDROcodone (TUSSIONEX) 10-8 mg/5 mL suspension      azithromycin (ZITHROMAX) 250 mg tablet   2. Cough R05 786.2 predniSONE (DELTASONE) 20 mg tablet      chlorpheniramine-HYDROcodone (TUSSIONEX) 10-8 mg/5 mL suspension      azithromycin (ZITHROMAX) 250 mg tablet       See orders.  Prednisone and cough med, and I'll send her to [facility] with a Z-pack in case it gets more productive, sinuses flare back up, etc

## 2017-06-30 NOTE — TELEPHONE ENCOUNTER
From: Jovan Price  To: Edwardo Joe MD  Sent: 6/30/2017 3:15 PM EDT  Subject: Medication Renewal Request    Original authorizing provider: MD Krish Whalen. Elaine Hickey would like a refill of the following medications:  omeprazole (PRILOSEC) 20 mg capsule Edwardo Joe MD]    Preferred pharmacy: [address] [place] [address] [address]    Comment:

## 2017-07-03 ENCOUNTER — HOSPITAL ENCOUNTER (OUTPATIENT)
Dept: PHYSICAL THERAPY | Age: 62
Discharge: HOME | End: 2017-07-03
Payer: MEDICARE

## 2017-07-03 PROCEDURE — 97112 NEUROMUSCULAR REEDUCATION: CPT

## 2017-07-03 PROCEDURE — G8979 MOBILITY GOAL STATUS: HCPCS

## 2017-07-03 PROCEDURE — G8980 MOBILITY D/C STATUS: HCPCS

## 2017-07-03 PROCEDURE — 97110 THERAPEUTIC EXERCISES: CPT

## 2017-07-03 NOTE — PROGRESS NOTES
PT DISCHARGE DAILY NOTE AND [id number]    Date:7/3/2017  Patient name: Michelle Lizarraga Start of Care: 17   Referral source: Juanita Romberg, MD : [id number]   Medical/Treatment Diagnosis: Myofascial pain [M79.1] Onset Date:2017     Prior Hospitalization: see medical history Provider#: [id number]   Medications: Verified on Patient Summary List    Comorbidities:  BMI >30, Depression, Anxiety, Thyroid Dysfunction, Hx of back pain , Asthma    Prior Level of Function: Increase in pain with standing, bending, stooping, getting down and getting back up, rolling in bed, going from sitting to standing    Visits from Start of Care: 11    Missed Visits: 3    Reporting Period : 17 to 7/3/17    [x]  Patient  Verified  Payor: [place] MEDICARE / Plan: [place] MEDICARE PART A & B / Product Type: Medicare /    In time:7:33 am  Out time:8:44 am  Total Treatment Time (min): 71  Total Timed Codes (min): 61  1:1 Treatment Time ( [address] only): 47   Visit #: 11 of 13    SUBJECTIVE  Pain Level (0-10 scale): 2  Any medication changes, allergies to medications, adverse drug reactions, diagnosis change, or new procedure performed?: [x] No    [] Yes (see summary sheet for update)  Subjective functional status/changes:   [] No changes reported  \"Just some spasms. \"  Pt reported that saw MD and MD told pt can D/C from PT at this time. Is leaving for out of town for 7 weeks this week.     OBJECTIVE    Modality rationale: decrease pain to improve the patients ability to tolerate daily activities    Min Type Additional Details    [] Estim:  []Unatt       []IFC  []Premod                        []Other:  []w/ice   []w/heat  Position:  Location:    [] Estim: []Att    []TENS instruct  []NMES                    []Other:  []w/US   []w/ice   []w/heat  Position:  Location:    []  Traction: [] Cervical       []Lumbar                       [] Prone          []Supine                       []Intermittent   []Continuous Lbs:  [] before manual  [] after manual    []  Ultrasound: []Continuous   [] Pulsed                           []1MHz   []3MHz W/cm2:  Location:    []  Iontophoresis with dexamethasone         Location: [] Take home patch   [] In clinic   10 [x]  Ice     []  heat  []  Ice massage  []  Laser   []  Anodyne Position: prone with pillow under abdomen  Location: L-spine and buttocks    []  Laser with stim  []  Other:  Position:  Location:    []  Vasopneumatic Device Pressure:       [] lo [] med [] hi   Temperature: [] lo [] med [] hi   [x] Skin assessment post-treatment:  [x]intact []redness- no adverse reaction    []redness - adverse reaction:       51 min Therapeutic Exercise:  [x] See flow sheet :   Rationale: increase ROM, increase strength, improve coordination and increase proprioception to improve the patients ability to perform daily activities with decreased pain and symptom levels     10 min Neuromuscular Re-education:  [x]  See flow sheet :   Rationale: increase ROM, increase strength, improve coordination, increase proprioception and decrease trigger points and pain   to improve the patients ability to perform daily activities with decreased pain and symptom levels      Dry Needling Procedure Note    Procedure: An intramuscular manual therapy (dry needling) and a neuro-muscular re-education treatment was done to deactivate myofascial trigger points with a 30 gauge filament needle under aseptic technique. Indications:  [x] Myofascial pain and dysfunction [] Muscled spasms  [x] Myalgia/myositis   [] Muscle cramps  [] Muscle imbalances  [] Other:    Chart reviewed for the following:  iVelka LING, PT, DPT, have reviewed the medical history, summary list and precautions/contraindications for Cephus Harm.   TIME OUT performed immediately prior to start of procedure:  Vielka LING, PT, DPT, have performed the following reviews on Cephus Harm prior to the start of the session:      [x] Verified patient identification by name and date of birth    [x] Agreement on all muscles being treated was verified   [x] Purpose of dry needling, side effects, possible complications, risks and benefits were explained to the patient   [x] Procedure site(s) verified  [x] Patient was positioned for comfort and draped for privacy  [x] Informed Consent was signed (initial visit) and verified verbally (subsequent visits)  [x] Patient was instructed on the need to report the use of blood thinners and/or immunosuppressant medications  [x] How to respond to possible adverse effects of treatment  [x] Self treatment of post needling soreness: ice, heat (moist heat, heat wraps) and stretching  [x] Opportunity was given to ask any questions, all questions were answered            Time: 8:24 am  Date of procedure: 7/3/2017    Treatment: The following muscles were treated today with intramuscular dry needling  [] Left [x] Right Iliocostalis Thoracis / Lumborum  [] Left [x] Right Longissimus Thoracis / Ashley Yunior  [] Left [x] Right Lumbar Multifidi  [] Left [x] Right Quadratus Lumborum    Patient's response to today's treatment:  [x] Latent Twitch Response  [] Muscle relaxation [] Pain Relief  [x] Post needling soreness [x] without complications  [] Increased Range of Motion  [] Other:     Performed by: Kristin Sevilla, PT, DPT            With   [x] TE   [] TA   [] neuro   [] other: Patient Education: [x] Review HEP    [] Progressed/Changed HEP based on:   [] positioning   [] body mechanics   [] transfers   [] heat/ice application    [] other:      Other Objective/Functional Measures:   No change since last progress note (last session)     Pain Level (0-10 scale) post treatment: 0    Summary of Care:  1. Pt will increase glut and hamstring strength to 4/5 MMT or greater to improve tolerance to daily activities. Eval:4-/5 MMT  Last PN: met (strength glutes: 4+/5 right, 4/5 left, knee flexors: 5/5 bilaterally)      2.  Pt will improve trunk rotation to 15 in or less to indicate mobility needed for gait. .  Eval:Right 16 in Left 16.5 in  Last PN: met (lower trunk rotation: 9 inches right, 10.5 inches left)      3. Pt will be be able to lift gallon of milk and move using Right UE without pain. Eval:unable   Last PN: met (pt lifted up to 9 lbs and walked through the clinic without increased pain)      4. Pt FOTO score will increase by 10 points to show improvement in function. Eval: FOTO: 54/100  Last PN: progressing (FOTO: 56/100)  Current:       5. Pt will be able to start a walking program for exercise to start healthy lifestyle changes  Eval: pain with walking  Last PN: progressing (pt reports walking up to 3-4 miles per week depending on weather)   Current:       ASSESSMENT/Changes in Function: At present pt's C/C is of intermittent spasms in Lumbar spine. Occurs mostly in morning with waking and with lifting dog. Pt is traveling out of state for 7 weeks. Have reviewed HEP to work on paraspinal inhibition and glut facilitation. Overall pt responded very well with treatment including trigger point dry needling. G-Codes (GP)  Mobility   [id number] Goal  CJ= 20-39%  D/C  CK= 40-59%    The severity rating is based on clinical judgment and the FOTO score. Thank you for this referral!     PLAN  [x]Discontinue therapy: [x]Patient has reached or is progressing toward set goals and pt is going out of town for 4-5 weeks      []Patient is non-compliant or has abdicated      []Due to lack of appreciable progress towards set goals    Ashley Allen, PT, DPT 7/3/2017 7:46 AM    NOTE TO PHYSICIAN:  Please complete the following and fax to: [facility] at [phone]  Retain this original for your records. If you are unable to process this request in   24 hours, please contact our office.      [] I have read the above report and request that my patient continue therapy with the following changes/special instructions:  [] I have read the above report and request that my patient be discharged from therapy    Physician's Signature:_____________________ Date:___________Time:__________

## 2017-09-26 ENCOUNTER — OFFICE VISIT (OUTPATIENT)
Dept: FAMILY MEDICINE CLINIC | Age: 62
End: 2017-09-26

## 2017-09-26 ENCOUNTER — HOSPITAL ENCOUNTER (OUTPATIENT)
Dept: LAB | Age: 62
Discharge: HOME | End: 2017-09-26
Payer: MEDICARE

## 2017-09-26 VITALS
BODY MASS INDEX: 35.34 KG/M2 | HEIGHT: 64 IN | OXYGEN SATURATION: 98 % | SYSTOLIC BLOOD PRESSURE: 132 MMHG | TEMPERATURE: 98 F | HEART RATE: 76 BPM | RESPIRATION RATE: 16 BRPM | DIASTOLIC BLOOD PRESSURE: 68 MMHG | WEIGHT: 207 LBS

## 2017-09-26 DIAGNOSIS — R53.83 FATIGUE, UNSPECIFIED TYPE: ICD-10-CM

## 2017-09-26 DIAGNOSIS — R23.2 HOT FLASHES: ICD-10-CM

## 2017-09-26 DIAGNOSIS — R73.9 ELEVATED BLOOD SUGAR: ICD-10-CM

## 2017-09-26 DIAGNOSIS — E03.4 HYPOTHYROIDISM DUE TO ACQUIRED ATROPHY OF THYROID: Primary | ICD-10-CM

## 2017-09-26 DIAGNOSIS — E03.4 HYPOTHYROIDISM DUE TO ACQUIRED ATROPHY OF THYROID: ICD-10-CM

## 2017-09-26 LAB
BASOPHILS # BLD: 0 K/UL (ref 0–0.06)
BASOPHILS NFR BLD: 0 % (ref 0–2)
DIFFERENTIAL METHOD BLD: ABNORMAL
EOSINOPHIL # BLD: 0.1 K/UL (ref 0–0.4)
EOSINOPHIL NFR BLD: 2 % (ref 0–5)
ERYTHROCYTE [DISTWIDTH] IN BLOOD BY AUTOMATED COUNT: 12.9 % (ref 11.6–14.5)
HBA1C MFR BLD: 5.4 % (ref 4.2–5.6)
HCT VFR BLD AUTO: 42.3 % (ref 35–45)
HGB BLD-MCNC: 14.9 G/DL (ref 12–16)
LYMPHOCYTES # BLD: 1.3 K/UL (ref 0.9–3.6)
LYMPHOCYTES NFR BLD: 23 % (ref 21–52)
MCH RBC QN AUTO: 29.9 PG (ref 24–34)
MCHC RBC AUTO-ENTMCNC: 35.2 G/DL (ref 31–37)
MCV RBC AUTO: 84.9 FL (ref 74–97)
MONOCYTES # BLD: 0.4 K/UL (ref 0.05–1.2)
MONOCYTES NFR BLD: 7 % (ref 3–10)
NEUTS SEG # BLD: 3.9 K/UL (ref 1.8–8)
NEUTS SEG NFR BLD: 68 % (ref 40–73)
PLATELET # BLD AUTO: 95 K/UL (ref 135–420)
PMV BLD AUTO: 10.1 FL (ref 9.2–11.8)
RBC # BLD AUTO: 4.98 M/UL (ref 4.2–5.3)
T4 FREE SERPL-MCNC: 1.5 NG/DL (ref 0.7–1.5)
TSH SERPL DL<=0.05 MIU/L-ACNC: 1.41 UIU/ML (ref 0.36–3.74)
WBC # BLD AUTO: 5.8 K/UL (ref 4.6–13.2)

## 2017-09-26 PROCEDURE — 84439 ASSAY OF FREE THYROXINE: CPT | Performed by: FAMILY MEDICINE

## 2017-09-26 PROCEDURE — 83036 HEMOGLOBIN GLYCOSYLATED A1C: CPT | Performed by: FAMILY MEDICINE

## 2017-09-26 PROCEDURE — 84443 ASSAY THYROID STIM HORMONE: CPT | Performed by: FAMILY MEDICINE

## 2017-09-26 PROCEDURE — 85025 COMPLETE CBC W/AUTO DIFF WBC: CPT | Performed by: FAMILY MEDICINE

## 2017-09-26 NOTE — PATIENT INSTRUCTIONS
I will send you an e-mail with any recommendations about the lab results. For now, continue everything the same.

## 2017-09-26 NOTE — PROGRESS NOTES
HISTORY OF PRESENT ILLNESS  Elder Martinez is a 62 y.o. female. HPI Comments: Ms. Jud Eduardo is here to follow up on her thyroid because she has very tired lately, and has been having both heat and cold intolerance. Her levels have been fluctuating and we've had to switch strengths a couple of times. She takes generic synthroid because the brand name caused burning in her throat 20-30 years ago, although she is willing to try it if we can't get the levels straight with generic. Thyroid Problem   Pertinent negatives include no chest pain, no abdominal pain, no headaches and no shortness of breath. Review of Systems   Constitutional: Negative for chills and fever. HENT: Negative for sore throat. Eyes: Negative for blurred vision and double vision. Respiratory: Negative for cough and shortness of breath. Cardiovascular: Negative for chest pain and palpitations. Gastrointestinal: Negative for abdominal pain, nausea and vomiting. Musculoskeletal: Negative for myalgias and neck pain. Neurological: Negative for headaches. Physical Exam   Constitutional: Vital signs are normal. She appears well-developed and well-nourished. HENT:   Right Ear: Tympanic membrane and ear canal normal.   Left Ear: Tympanic membrane and ear canal normal.   Nose: Nose normal.   Mouth/Throat: Uvula is midline, oropharynx is clear and moist and mucous membranes are normal.   Eyes: Pupils are equal, round, and reactive to light. Neck: No thyromegaly present. Cardiovascular: Normal rate, regular rhythm and normal heart sounds. Pulmonary/Chest: Effort normal and breath sounds normal. No respiratory distress. She has no wheezes. She has no rales. Abdominal: She exhibits no distension. There is no tenderness. Lymphadenopathy:     She has no cervical adenopathy. Skin: No rash noted. Nursing note and vitals reviewed. ASSESSMENT and PLAN    ICD-10-CM ICD-9-CM    1.  Hypothyroidism due to acquired atrophy of thyroid E03.4 244.8 TSH 3RD GENERATION     246.8 T4, FREE   2. Fatigue, unspecified type R53.83 780.79 CBC WITH AUTOMATED DIFF      TSH 3RD GENERATION      T4, FREE   3. Hot flashes R23.2 782.62 TSH 3RD GENERATION      T4, FREE   4. Elevated blood sugar R73.9 790.29 HEMOGLOBIN A1C W/O EAG       Will check labs.

## 2017-09-26 NOTE — MR AVS SNAPSHOT
Visit Information Date & Time Provider Department Dept. Phone Encounter #  
 9/26/2017  1:30 PM Jamal Kunz, [address] [phone] [id number] Follow-up Instructions Return in about 3 months (around 12/26/2017). Your Appointments 10/11/2017 11:30 AM  
[place] [id number] [facility] ([facility]) Appt Note: 04/11/2017 [id number]: HCV  
 [address], [address] [place] [facility] [address]  
[phone]  
  
   
 [address] [facility] [address] [place] [id number]  
  
    
 12/29/2017 10:30 AM  
Follow Up with Edna Benjamin MD  
[address] [facility] [facility]) Appt Note: 6 mo f/u low back and shoulder [address] [facility] [address]  
[phone]  
  
   
 [address] [facility] [address] Upcoming Health Maintenance Date Due Pneumococcal 19-64 Medium Risk (1 of 1 - PPSV23) 10/21/1975 BREAST CANCER SCRN MAMMOGRAM 7/13/2018 PAP AKA CERVICAL CYTOLOGY 1/3/2022 COLONOSCOPY 2/9/2022 DTaP/Tdap/Td series (2 - Td) 10/8/2024 Allergies as of 9/26/2017  Review Complete On: 9/26/2017 By: Jamal Kunz MD  
  
 Severity Noted Reaction Type Reactions Adhesive Tape-silicones  38/58/2989   Not Verified Rash Current Immunizations  Never Reviewed Name Date Hep A Vaccine 9/12/2012, 4/10/2012, 3/5/2012 Hep B Vaccine 9/12/2012, 4/10/2012, 3/5/2012 Influenza Vaccine 9/15/2017, 10/28/2014 12:00 AM, 10/8/2014, 11/8/2013 12:00 AM, 11/1/2013, 1/8/2013 12:00 AM  
 Influenza Vaccine (Quad) PF 10/12/2016, 10/15/2015 12:00 AM  
 Pneumococcal Conjugate (PCV-13) 1/12/2017 Pneumococcal Polysaccharide (PPSV-23) 1/8/2013 12:00 AM  
 Tdap 10/28/2014 12:00 AM  
  
 Not reviewed this visit You Were Diagnosed With   
  
 Codes Comments Hypothyroidism due to acquired atrophy of thyroid    -  Primary ICD-10-CM: E03.4 ICD-9-CM: 244.8, 246.8 Fatigue, unspecified type     ICD-10-CM: R53.83 ICD-9-CM: 780.79 Hot flashes     ICD-10-CM: R23.2 ICD-9-CM: 782.62 Elevated blood sugar     ICD-10-CM: R73.9 ICD-9-CM: 790.29 Vitals BP Pulse Temp Resp Height(growth percentile) Weight(growth percentile) 132/68 (BP 1 Location: Left arm, BP Patient Position: Sitting) 76 98 °F (36.7 °C) (Oral) 16 5' 4\" (1.626 m) 207 lb (93.9 kg) SpO2 BMI OB Status Smoking Status 98% 35.53 kg/m2 Postmenopausal Former Smoker BMI and BSA Data Body Mass Index Body Surface Area 35.53 kg/m 2 2.06 m 2 Preferred Pharmacy Pharmacy Name Phone [id number] [place] [address] [facility] [phone] Your Updated Medication List  
  
   
This list is accurate as of: 9/26/17  1:51 PM.  Always use your most recent med list.  
  
  
  
  
 ABILIFY 5 mg tablet Generic drug:  ARIPiprazole Take 5 mg by mouth daily. ATHENOL 325 mg tablet Generic drug:  acetaminophen  
take 2 tablets by mouth every 6 hours if needed for pain  
  
 benztropine 1 mg tablet Commonly known as:  COGENTIN Take 1 mg by mouth three (3) times daily. conjugated estrogens 0.625 mg/gram vaginal cream  
Commonly known as:  PREMARIN  
1/2 gm twice weekly on sunday and wednesday * diclofenac 1 % Gel Commonly known as:  VOLTAREN  
apply 2 grams to affected area four times a day * diclofenac EC 75 mg EC tablet Commonly known as:  VOLTAREN Take 1 Tab by mouth two (2) times daily as needed. ibuprofen 600 mg tablet Commonly known as:  MOTRIN  
take 1 tablet by mouth every 6 hours if needed for pain  
  
 levothyroxine 137 mcg tablet Commonly known as:  SYNTHROID Take 137 mcg by mouth Daily (before breakfast). omeprazole 20 mg capsule Commonly known as:  PriLOSEC  
 Take 1 Cap by mouth daily. oxyCODONE-acetaminophen  mg per tablet Commonly known as:  PERCOCET 10  
take 1 tablet by mouth every 4 to 6 hours if needed for pain PROAIR HFA 90 mcg/actuation inhaler Generic drug:  albuterol  
  
 tiZANidine 2 mg tablet Commonly known as:  Mansura Bouche Take 1 Tab by mouth three (3) times daily. VITAMIN D3 2,000 unit Tab Generic drug:  cholecalciferol (vitamin D3) Take  by mouth.  
  
 zolpidem 10 mg tablet Commonly known as:  AMBIEN  
  
 * Notice: This list has 2 medication(s) that are the same as other medications prescribed for you. Read the directions carefully, and ask your doctor or other care provider to review them with you. Follow-up Instructions Return in about 3 months (around 12/26/2017). Patient Instructions I will send you an e-mail with any recommendations about the lab results. For now, continue everything the same. Introducing [facility] SERVICES! Dear Jelly Sheets: Thank you for requesting a [employer] account. Our records indicate that you already have an active [employer] account. You can access your account anytime at [email or web address] Did you know that you can access your hospital and ER discharge instructions at any time in [employer]? You can also review all of your test results from your hospital stay or ER visit. Additional Information If you have questions, please visit the Frequently Asked Questions section of the [employer] website at [email or web address] Remember, [employer] is NOT to be used for urgent needs. For medical emergencies, dial 911. Now available from your iPhone and Android! Please provide this summary of care documentation to your next provider. Your primary care clinician is listed as Albaro Anders. If you have any questions after today's visit, please call [phone].

## 2017-10-11 ENCOUNTER — OFFICE VISIT (OUTPATIENT)
Dept: HEMATOLOGY | Age: 62
End: 2017-10-11

## 2017-10-11 ENCOUNTER — HOSPITAL ENCOUNTER (OUTPATIENT)
Dept: LAB | Age: 62
Discharge: HOME | End: 2017-10-11
Payer: MEDICARE

## 2017-10-11 VITALS
RESPIRATION RATE: 18 BRPM | DIASTOLIC BLOOD PRESSURE: 92 MMHG | OXYGEN SATURATION: 97 % | BODY MASS INDEX: 34.97 KG/M2 | HEART RATE: 73 BPM | HEIGHT: 64 IN | SYSTOLIC BLOOD PRESSURE: 154 MMHG | WEIGHT: 204.8 LBS | TEMPERATURE: 97.8 F

## 2017-10-11 DIAGNOSIS — K74.60 CIRRHOSIS OF LIVER WITHOUT ASCITES, UNSPECIFIED HEPATIC CIRRHOSIS TYPE (HCC): Primary | ICD-10-CM

## 2017-10-11 DIAGNOSIS — K74.60 CIRRHOSIS OF LIVER WITHOUT ASCITES, UNSPECIFIED HEPATIC CIRRHOSIS TYPE (HCC): ICD-10-CM

## 2017-10-11 LAB
ALBUMIN SERPL-MCNC: 4.1 G/DL (ref 3.4–5)
ALBUMIN/GLOB SERPL: 1.3 {RATIO} (ref 0.8–1.7)
ALP SERPL-CCNC: 68 U/L (ref 45–117)
ALT SERPL-CCNC: 40 U/L (ref 13–56)
ANION GAP SERPL CALC-SCNC: 13 MMOL/L (ref 3–18)
AST SERPL-CCNC: 27 U/L (ref 15–37)
BASOPHILS # BLD: 0 K/UL (ref 0–0.06)
BASOPHILS NFR BLD: 1 % (ref 0–2)
BILIRUB DIRECT SERPL-MCNC: 0.2 MG/DL (ref 0–0.2)
BILIRUB SERPL-MCNC: 0.7 MG/DL (ref 0.2–1)
BUN SERPL-MCNC: 12 MG/DL (ref 7–18)
BUN/CREAT SERPL: 17 (ref 12–20)
CALCIUM SERPL-MCNC: 10 MG/DL (ref 8.5–10.1)
CHLORIDE SERPL-SCNC: 104 MMOL/L (ref 100–108)
CO2 SERPL-SCNC: 26 MMOL/L (ref 21–32)
CREAT SERPL-MCNC: 0.69 MG/DL (ref 0.6–1.3)
DIFFERENTIAL METHOD BLD: ABNORMAL
EOSINOPHIL # BLD: 0.2 K/UL (ref 0–0.4)
EOSINOPHIL NFR BLD: 3 % (ref 0–5)
ERYTHROCYTE [DISTWIDTH] IN BLOOD BY AUTOMATED COUNT: 12.7 % (ref 11.6–14.5)
GLOBULIN SER CALC-MCNC: 3.1 G/DL (ref 2–4)
GLUCOSE SERPL-MCNC: 92 MG/DL (ref 74–99)
HCT VFR BLD AUTO: 41 % (ref 35–45)
HGB BLD-MCNC: 14.5 G/DL (ref 12–16)
INR PPP: 1.1 (ref 0.8–1.2)
LYMPHOCYTES # BLD: 1.3 K/UL (ref 0.9–3.6)
LYMPHOCYTES NFR BLD: 25 % (ref 21–52)
MCH RBC QN AUTO: 29.4 PG (ref 24–34)
MCHC RBC AUTO-ENTMCNC: 35.4 G/DL (ref 31–37)
MCV RBC AUTO: 83.2 FL (ref 74–97)
MONOCYTES # BLD: 0.4 K/UL (ref 0.05–1.2)
MONOCYTES NFR BLD: 7 % (ref 3–10)
NEUTS SEG # BLD: 3.5 K/UL (ref 1.8–8)
NEUTS SEG NFR BLD: 64 % (ref 40–73)
PLATELET # BLD AUTO: 100 K/UL (ref 135–420)
PMV BLD AUTO: 9.7 FL (ref 9.2–11.8)
POTASSIUM SERPL-SCNC: 3.6 MMOL/L (ref 3.5–5.5)
PROT SERPL-MCNC: 7.2 G/DL (ref 6.4–8.2)
PROTHROMBIN TIME: 13.7 SEC (ref 11.5–15.2)
RBC # BLD AUTO: 4.93 M/UL (ref 4.2–5.3)
SODIUM SERPL-SCNC: 143 MMOL/L (ref 136–145)
WBC # BLD AUTO: 5.4 K/UL (ref 4.6–13.2)

## 2017-10-11 PROCEDURE — 85025 COMPLETE CBC W/AUTO DIFF WBC: CPT | Performed by: NURSE PRACTITIONER

## 2017-10-11 PROCEDURE — 36415 COLL VENOUS BLD VENIPUNCTURE: CPT | Performed by: NURSE PRACTITIONER

## 2017-10-11 PROCEDURE — 80048 BASIC METABOLIC PNL TOTAL CA: CPT | Performed by: NURSE PRACTITIONER

## 2017-10-11 PROCEDURE — 82107 ALPHA-FETOPROTEIN L3: CPT | Performed by: NURSE PRACTITIONER

## 2017-10-11 PROCEDURE — 87521 HEPATITIS C PROBE&RVRS TRNSC: CPT | Performed by: NURSE PRACTITIONER

## 2017-10-11 PROCEDURE — 80076 HEPATIC FUNCTION PANEL: CPT | Performed by: NURSE PRACTITIONER

## 2017-10-11 PROCEDURE — 85610 PROTHROMBIN TIME: CPT | Performed by: NURSE PRACTITIONER

## 2017-10-11 RX ORDER — BISMUTH SUBSALICYLATE 262 MG
1 TABLET,CHEWABLE ORAL DAILY
COMMUNITY

## 2017-10-11 NOTE — PROGRESS NOTES
Alyssa Rinaldi is a 62 y.o. female    No chief complaint on file. 1. Have you been to the ER, urgent care clinic or hospitalized since your last visit? NO.     2. Have you seen or consulted any other health care providers outside of the [address] [place] [address] since your last visit (Include any pap smears or colon screening)?  NO  Learning Assessment 6/8/2017   PRIMARY LEARNER Patient   HIGHEST LEVEL OF EDUCATION - PRIMARY LEARNER  GRADUATED HIGH SCHOOL OR GED   BARRIERS PRIMARY LEARNER NONE   CO-LEARNER CAREGIVER No   PRIMARY LANGUAGE ENGLISH   LEARNER PREFERENCE PRIMARY DEMONSTRATION   ANSWERED BY patient   RELATIONSHIP SELF

## 2017-10-11 NOTE — PROGRESS NOTES
[address] Vivek, [address], [facility], [place]       MELANIE Sims PA-C Zeno Motts, [facility]   MELANIE Banks NP        at [address]     [address] [place] [address], [address] [address]     [phone]     FAX: [phone]    at [facility], [address] [place] [address], [address] [facility] - [address]     [phone]     FAX: [phone]         Patient Care Team:  Velia Mccurdy MD as PCP - General (Family Practice)      Problem List  Date Reviewed: 10/11/2017          Codes Class Noted    Advanced care planning/counseling discussion ICD-10-CM: Z71.89  ICD-9-CM: V65.49  6/8/2017        Intrinsic (urethral) sphincter deficiency (ISD) ICD-10-CM: N36.42  ICD-9-CM: 599.82  5/3/2017        Screening for depression ICD-10-CM: Z13.89  ICD-9-CM: V79.0  4/13/2017        Nocturia ICD-10-CM: R35.1  ICD-9-CM: 788.43  4/4/2017        RAMONA (stress urinary incontinence, female) ICD-10-CM: N39.3  ICD-9-CM: 625.6  4/4/2017        Microscopic hematuria ICD-10-CM: R31.29  ICD-9-CM: 599.72  3/26/2017        Chronic back pain ICD-10-CM: M54.9, G89.29  ICD-9-CM: 724.5, 338.29  9/23/2015    Overview Signed 9/23/2015 12:00 PM by Parul Huang NP     Nerve ablation 09/16/2015. Hypothyroidism ICD-10-CM: E03.9  ICD-9-CM: 244.9  2/6/2013        Chronic hepatitis C ([facility] ICD-10-CM: B18.2  ICD-9-CM: 070.54  2/6/2013    Overview Addendum 3/19/2015 10:22 AM by Constancia Gravely, NP     SVR achieved with SOF/SIM (2014). Standard interferon TIW x 6 months 1997. Non-response. Standard interferon/ribavirin x 6months 1999.   Non-response             Rheumatoid arthritis(714.0) ICD-10-CM: M06.9  ICD-9-CM: 714.0  2/6/2013        Carpal tunnel syndrome ICD-10-CM: G56.00  ICD-9-CM: 354.0  2/6/2013              Tracy Pritchard returns to the [place] [address] today for education and management of cirrhosis. She was treated for HCV and was SVR two years post treatment. She began treatment on 05/15/2014 with dual therapy of SOF/SIM and completed her 12 weeks of therapy on 08/05/2014. The patient is a 62 y.o.  female who was noted to have abnormalities in liver chemistries and subsequently tested positive for chronic HCV in 1990s. Risk factors for acquiring HCV are IV drug use in 1980s. There was no history of acute incteric hepatitis at the time of these risk factors. Ultrasound of the liver was performed 12/2016. Coarse heterogeneous echotexture. No focal mass. Repeat ultrasound with shear wave elastography was performed in 12/2016. The ultrasound demonstrates a coarse heterogeneous echotexture. No focal mass. The shear wave elastography suggests that the cirrhosis is resolving. A liver biopsy has not been performed. The patient was treated with standard interferon in 1997 and with standard interferon and ribavirin in 1999. Both treatments lasted for 24 weeks. The patient tolerated treatment reasonably well. HCV RNA levels obtained during treatment are not available at this time. The patient is unaware of the virologic response pattern. The most recent laboratory studies indicate that the liver transaminases are normal, alkaline phosphatase is normal, tests of hepatic synthetic and metabolic function are normal, and the platelet count is depressed. The patient has no complaints which can be attributed to liver disease. The patient completes all daily activities without any functional limitations.  The patient has not experienced fatigue, fevers, chills, shortness of breath, chest pain, pain in the right side over the liver, diffuse abdominal pain, nausea, vomiting, constipation, diarrhrea, dry eyes, dry mouth, arthralgias, myalgias, yellowing of the eyes or skin, itching, dark urine, problems concentrating, swelling of the abdomen, swelling of the lower extremities, hematemesis, or hematochezia. ALLERGIES  Allergies   Allergen Reactions    Adhesive Tape-Silicones Rash       MEDICATIONS:  Current Outpatient Prescriptions   Medication Sig Dispense Refill    multivitamin (ONE A DAY) tablet Take 1 Tab by mouth daily.  omeprazole (PRILOSEC) 20 mg capsule Take 1 Cap by mouth daily. 30 Cap 2    levothyroxine (SYNTHROID) 137 mcg tablet Take 137 mcg by mouth Daily (before breakfast). 90 Tab 1    diclofenac EC (VOLTAREN) 75 mg EC tablet Take 1 Tab by mouth two (2) times daily as needed. 60 Tab 5    tiZANidine (ZANAFLEX) 2 mg tablet Take 1 Tab by mouth three (3) times daily. 90 Tab 2    conjugated estrogens (PREMARIN) 0.625 mg/gram vaginal cream 1/2 gm twice weekly on sunday and wednesday 45 g 2    cholecalciferol, vitamin D3, (VITAMIN D3) 2,000 unit tab Take  by mouth.  diclofenac (VOLTAREN) 1 % gel apply 2 grams to affected area four times a day  0    PROAIR HFA 90 mcg/actuation inhaler   0    zolpidem (AMBIEN) 10 mg tablet   0    benztropine (COGENTIN) 1 mg tablet Take 1 mg by mouth three (3) times daily.  ARIPiprazole (ABILIFY) 5 mg tablet Take 5 mg by mouth daily.  ibuprofen (MOTRIN) 600 mg tablet take 1 tablet by mouth every 6 hours if needed for pain  0     REVIEW OF SYSTEMS:  Systems related to all organ systems were reviewed. All were negative except as noted above. FAMILY/SOCIAL HISTORY:  The patient is . The patient has 2 children, and 4 grandchildren. The patient used to smoke a little but stopped in 2005. She used to consumed alcohol in excess. The patient has been abstinent from alcohol since 5/2012. 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 The patient is currently receiving disability.       PHYSICAL EXAMINATION:  Visit Vitals    BP (!) 164/111 (BP 1 Location: Right arm, BP Patient Position: Sitting)    Pulse 73    Temp 97.8 °F (36.6 °C) (Tympanic)    Resp 18    Ht 5' 4\" (1.626 m)    Wt 204 lb 12.8 oz (92.9 kg)    SpO2 97%    BMI 35.15 kg/m2     General: No acute distress. Eyes: Sclera anicteric. ENT: No oral lesions. Thyroid normal.  Nodes: No adenopathy. Skin: No spider angiomata. No jaundice. No palmar erythema. Respiratory: Lungs clear to auscultation. Cardiovascular: Regular heart rate. No murmurs. No JVD. Abdomen: Soft non-tender. Liver size normal to percussion/palpation. Spleen not palpable. No obvious ascites. Extremities: No lower extremity edema. No muscle wasting. No gross arthritic changes. Neurologic: Alert and oriented. Cranial nerves grossly intact. No asterixsis.     LABORATORY STUDIES:   [facility] [address] & Units 10/11/2017 9/26/2017   WBC 4.6 - 13.2 K/uL 5.4 5.8   ANC 1.8 - 8.0 K/UL 3.5 3.9   HGB 12.0 - 16.0 g/dL 14.5 14.9    - 420 K/uL 100 (L) 95 (L)   INR 0.8 - 1.2   1.1    AST 15 - 37 U/L 27    ALT 13 - 56 U/L 40    Alk Phos 45 - 117 U/L 68    Bili, Total 0.2 - 1.0 MG/DL 0.7    Bili, Direct 0.0 - 0.2 MG/DL 0.2    Albumin 3.4 - 5.0 g/dL 4.1    BUN 7.0 - 18 MG/DL 12    Creat 0.6 - 1.3 MG/DL 0.69    Na 136 - 145 mmol/L 143    K 3.5 - 5.5 mmol/L 3.6    Cl 100 - 108 mmol/L 104    CO2 21 - 32 mmol/L 26    Glucose 74 - 99 mg/dL 92      Liver [facility] Latest Ref Rng & Units 4/11/2017   WBC 4.6 - 13.2 K/uL 5.2   ANC 1.8 - 8.0 K/UL 3.3   HGB 12.0 - 16.0 g/dL 14.6    - 420 K/uL 111 (L)   INR 0.8 - 1.2      AST 15 - 37 U/L 22   ALT 13 - 56 U/L 32   Alk Phos 45 - 117 U/L 72   Bili, Total 0.2 - 1.0 MG/DL 0.6   Bili, Direct 0.0 - 0.2 MG/DL 0.1   Albumin 3.4 - 5.0 g/dL 4.0   BUN 7.0 - 18 MG/DL 11   Creat 0.6 - 1.3 MG/DL 0.76   Na 136 - 145 mmol/L 142   K 3.5 - 5.5 mmol/L 3.7   Cl 100 - 108 mmol/L 104   CO2 21 - 32 mmol/L 27   Glucose 74 - 99 mg/dL 106 (H)     Cancer Screening Latest Ref Rng & Units 10/3/2016 5/10/2016   AFP, Serum 0.0 - 8.0 ng/mL 3.4 3.2   AFP-L3% 0.0 - 9.9 % Comment Comment     Cancer Screening Latest Ref Rng & Units 12/23/2015   AFP, Serum 0.0 - 8.0 ng/mL 4.9   AFP-L3% 0.0 - 9.9 % Comment     SEROLOGIES:  Serologies Latest Ref Rng 2/6/2013   Hep A Ab, Total Negative Positive (A)   Hep B Surface Ag Negative Negative   Hep B Core Ab, Total Negative Negative   Hep B Surface Ab 0.00 - 0.99 Index Value 0.52   Hep C Genotype See Note 1a   IL28B Genotype  CT genotype   Ferritin 13 - 150 ng/mL 464 (H)   Iron % Saturation 15 - 55 % 67 (H)     2/2013. HCV RNA Log 6.36 IU/ml. Eradicated. LIVER HISTOLOGY:  12/2016. Shear wave elastography. E Median is 11.66, suggestive of F3, bridging fibrosis. Wide E Std of 4.41 is suggestive of fatty liver disease. ENDOSCOPIC PROCEDURES:  09/2014. EGD by Dr. sIma Avery. Normal esophagus. H.Pylori is negative. RADIOLOGY:  04/2014. Ultrasound of liver. Consistent with cirrhosis. No masses. 10/2014. Ultrasound of liver. Consistent with cirrhosis. No masses. 04/2015. Ultrasound of the liver. Consistent with cirrhosis. No mention of masses in the report. 10/2015. Ultrasound of the liver. Consistent with cirrhosis. No mention of masses in the report.  06/2016. Ultrasound of the liver. Heterogeneous appearing hepatic echotexture without mass. 12/2016. Ultrasound of the liver. Coarse heterogeneous echotexture. No focal mass. OTHER TESTING:  Not available or performed    ASSESSMENT AND PLAN:  Chronic HCV of unclear severity. The most recent laboratory studies indicate that the liver transaminases are normal, alkaline phosphatase is normal,  tests of hepatic synthetic and metabolic function are depressed, and the platelet count is depressed. Complications of cirrhosis were discussed in detail. We discussed thrombocytopenia, portal hypertension, varices, GI bleeding, peripheral edema, ascites, hepatic encephalopathy, and hepatocellular carcinoma. She is very stable and the cirrhosis may have even resolved.   We will move her appointments to every 6 months. We discussed the need for every 6 month liver imaging studies. It appears that the cirrhosis is resolving. Hepatic encephalopathy has not developed. Edema. Resolved. Diuretics have been discontinued. [address] screening. Ultrasound was recently performed and does not demonstrate any sign of developing HCC. Repeat imaging annually. Ultrasound with shear wave elastography was ordered today. Elastography  can assess liver fibrosis and determine if a patient has advanced fibrosis or cirrhosis without the need for liver biopsy. HCV. The patient was previously treated for HCV with standard interferon and ribavirin in the 1990s. There was a non-response that was not clearly defined. She was retreated with SOF/SIM last year and is SVR more than 2 years post treatment. The patient was directed to continue all current medications at the current dosages except for the NSAIDs, which she should stop. There are no contraindications for the patient to take any medications that are necessary for treatment of other medical issues. The patient was counseled regarding alcohol consumption. Vaccination for viral hepatitis A is not needed. The patient has serologic evidence of prior exposure or vaccination with immunity. [facility] 87 in 6 months.      Liss Ramos NP   Liver [place] of [address] [place] [address] [place]ST, [address] [place] [address] Francisca Mcmillan, [address] [facility]   [phone]

## 2017-10-12 LAB
AFP L3 MFR SERPL: NORMAL % (ref 0–9.9)
AFP SERPL-MCNC: 2.8 NG/ML (ref 0–8)
HCV RNA SERPL QL NAA+PROBE: NEGATIVE

## 2017-10-30 ENCOUNTER — HOSPITAL ENCOUNTER (OUTPATIENT)
Dept: ULTRASOUND IMAGING | Age: 62
Discharge: HOME | End: 2017-10-30
Payer: MEDICARE

## 2017-10-30 DIAGNOSIS — K74.60 CIRRHOSIS OF LIVER WITHOUT ASCITES, UNSPECIFIED HEPATIC CIRRHOSIS TYPE (HCC): ICD-10-CM

## 2017-10-30 PROCEDURE — 0346T US ABD LTD W ELASTOGRAPHY: CPT

## 2017-12-01 RX ORDER — ALBUTEROL SULFATE 90 UG/1
2 AEROSOL, METERED RESPIRATORY (INHALATION)
Qty: 1 INHALER | Refills: 3 | Status: SHIPPED | OUTPATIENT
Start: 2017-12-01 | End: 2018-11-23 | Stop reason: SDUPTHER

## 2018-01-01 RX ORDER — LEVOTHYROXINE SODIUM 137 UG/1
TABLET ORAL
Qty: 90 TAB | Refills: 0 | Status: SHIPPED | COMMUNITY
Start: 2018-01-01 | End: 2018-04-10 | Stop reason: SDUPTHER

## 2018-02-12 DIAGNOSIS — K21.9 GASTROESOPHAGEAL REFLUX DISEASE WITHOUT ESOPHAGITIS: ICD-10-CM

## 2018-02-12 RX ORDER — OMEPRAZOLE 20 MG/1
CAPSULE, DELAYED RELEASE ORAL
Qty: 30 CAP | Refills: 2 | Status: SHIPPED | OUTPATIENT
Start: 2018-02-12 | End: 2018-05-26 | Stop reason: SDUPTHER

## 2018-03-23 ENCOUNTER — HOSPITAL ENCOUNTER (OUTPATIENT)
Dept: LAB | Age: 63
Discharge: HOME | End: 2018-03-23
Payer: MEDICARE

## 2018-03-23 ENCOUNTER — OFFICE VISIT (OUTPATIENT)
Dept: FAMILY MEDICINE CLINIC | Age: 63
End: 2018-03-23

## 2018-03-23 VITALS
DIASTOLIC BLOOD PRESSURE: 86 MMHG | HEART RATE: 80 BPM | WEIGHT: 200.6 LBS | RESPIRATION RATE: 18 BRPM | OXYGEN SATURATION: 96 % | TEMPERATURE: 96.8 F | SYSTOLIC BLOOD PRESSURE: 136 MMHG | BODY MASS INDEX: 34.25 KG/M2 | HEIGHT: 64 IN

## 2018-03-23 DIAGNOSIS — E78.1 HYPERTRIGLYCERIDEMIA: ICD-10-CM

## 2018-03-23 DIAGNOSIS — E55.9 VITAMIN D DEFICIENCY: ICD-10-CM

## 2018-03-23 DIAGNOSIS — Z12.39 SCREENING FOR BREAST CANCER: ICD-10-CM

## 2018-03-23 DIAGNOSIS — J06.9 VIRAL URI WITH COUGH: Primary | ICD-10-CM

## 2018-03-23 LAB
CHOLEST SERPL-MCNC: 186 MG/DL
HDLC SERPL-MCNC: 55 MG/DL (ref 40–60)
HDLC SERPL: 3.4 {RATIO} (ref 0–5)
LDLC SERPL CALC-MCNC: 103.2 MG/DL (ref 0–100)
LIPID PROFILE,FLP: ABNORMAL
TRIGL SERPL-MCNC: 139 MG/DL (ref ?–150)
VLDLC SERPL CALC-MCNC: 27.8 MG/DL

## 2018-03-23 PROCEDURE — 80061 LIPID PANEL: CPT | Performed by: FAMILY MEDICINE

## 2018-03-23 PROCEDURE — 82306 VITAMIN D 25 HYDROXY: CPT | Performed by: FAMILY MEDICINE

## 2018-03-23 RX ORDER — CODEINE PHOSPHATE AND GUAIFENESIN 10; 100 MG/5ML; MG/5ML
10 SOLUTION ORAL
Qty: 120 ML | Refills: 1 | Status: SHIPPED | OUTPATIENT
Start: 2018-03-23 | End: 2018-07-03 | Stop reason: ALTCHOICE

## 2018-03-23 NOTE — PROGRESS NOTES
Room 4   Pt presents to clinic with cough x 1 week with nasal congestion. Pt has taken Mucinex. Flu Shot Requested: no    Depression Screening:  PHQ over the last two weeks 3/23/2018 10/11/2017 9/26/2017 6/30/2017 6/8/2017 4/13/2017 3/10/2017   Little interest or pleasure in doing things Not at all Not at all Not at all Not at all Not at all Not at all Not at all   Feeling down, depressed or hopeless Not at all More than half the days Not at all Not at all Not at all Not at all Not at all   Total Score PHQ 2 0 2 0 0 0 0 0       Learning Assessment:  Learning Assessment 6/8/2017 7/18/2016 3/19/2015 11/18/2014   PRIMARY LEARNER Patient Patient Patient Patient   HIGHEST LEVEL OF EDUCATION - PRIMARY LEARNER  GRADUATED HIGH SCHOOL OR GED GRADUATED HIGH SCHOOL OR GED - GRADUATED HIGH SCHOOL OR GED   BARRIERS PRIMARY LEARNER NONE NONE - [address] CAREGIVER No No - No   PRIMARY LANGUAGE ENGLISH ENGLISH ENGLISH ENGLISH   LEARNER PREFERENCE PRIMARY DEMONSTRATION DEMONSTRATION LISTENING DEMONSTRATION   ANSWERED BY patient patient patient Patient   RELATIONSHIP SELF SELF SELF SELF       Abuse Screening:  Abuse Screening Questionnaire 6/8/2017   Do you ever feel afraid of your partner? N   Are you in a relationship with someone who physically or mentally threatens you? N   Is it safe for you to go home? Y       Health Maintenance reviewed and discussed per provider: yes     Coordination of Care:    1. Have you been to the ER, urgent care clinic since your last visit? Hospitalized since your last visit? no    2. Have you seen or consulted any other health care providers outside of the [address] [place] [address] since your last visit? Include any pap smears or colon screening.  yes

## 2018-03-23 NOTE — MR AVS SNAPSHOT
[address] [place] [address] 55 [facility] 83 [id number] [phone] Patient: Heather Her MRN: [id number] Visit Information Date & Time Provider Department Dept. Phone Encounter #  
 3/23/2018 11:00 AM Win Cameron MD [email or web address] [phone] [id number] Your Appointments 2018  3:00 PM  
Follow [place] [address] [facility] ([facility]) Appt Note: 6mnth f/up; r/s from 18 6 month f/up sp18  
 [facility], [address]  
  
   
 [address] [place] [address] Upcoming Health Maintenance Date Due  
 BREAST CANCER SCRN MAMMOGRAM 2018 PAP AKA CERVICAL CYTOLOGY 1/3/2022 COLONOSCOPY 2022 DTaP/Tdap/Td series (2 - Td) 10/28/2024 Allergies as of 3/23/2018  Review Complete On: 3/23/2018 By: Win Cameron MD  
  
 Severity Noted Reaction Type Reactions Adhesive Tape-silicones     Not Verified Rash Current Immunizations  Never Reviewed Name Date Hep A Vaccine 2012, 4/10/2012, 3/5/2012 Hep B Vaccine 2012, 4/10/2012, 3/5/2012 Influenza Vaccine 9/15/2017, 10/28/2014 12:00 AM, 10/8/2014, 2013 12:00 AM, 2013, 2013 12:00 AM  
 Influenza Vaccine (Quad) PF 10/12/2016, 10/15/2015 12:00 AM  
 Pneumococcal Conjugate (PCV-13) 2017 Pneumococcal Polysaccharide (PPSV-23) 2013 12:00 AM  
 Tdap 10/28/2014 12:00 AM  
  
 Not reviewed this visit You Were Diagnosed With   
  
 Codes Comments Viral URI with cough    -  Primary ICD-10-CM: J06.9, B97.89 ICD-9-CM: 465.9 Vitamin D deficiency     ICD-10-CM: E55.9 ICD-9-CM: 268.9 Hypertriglyceridemia     ICD-10-CM: E78.1 ICD-9-CM: 272.1 Vitals BP Pulse Temp Resp Height(growth percentile) Weight(growth percentile) 136/86 (BP 1 Location: Left arm, BP Patient Position: Sitting) 80 96.8 °F (36 °C) (Oral) 18 5' 4\" (1.626 m) 200 lb 9.6 oz (91 kg) SpO2 BMI OB Status Smoking Status 96% 34.43 kg/m2 Postmenopausal Former Smoker Vitals History BMI and BSA Data Body Mass Index Body Surface Area 34.43 kg/m 2 2.03 m 2 Preferred Pharmacy Pharmacy Name Phone [id number] [address] [facility] [address] [facility] [phone] Your Updated Medication List  
  
   
This list is accurate as of 3/23/18 11:13 AM.  Always use your most recent med list.  
  
  
  
  
 ABILIFY 5 mg tablet Generic drug:  ARIPiprazole Take 10 mg by mouth daily. benztropine 1 mg tablet Commonly known as:  COGENTIN Take 1 mg by mouth three (3) times daily. conjugated estrogens 0.625 mg/gram vaginal cream  
Commonly known as:  PREMARIN  
1/2 gm twice weekly on sunday and wednesday * diclofenac 1 % Gel Commonly known as:  VOLTAREN  
apply 2 grams to affected area four times a day * diclofenac EC 75 mg EC tablet Commonly known as:  VOLTAREN Take 1 Tab by mouth two (2) times daily as needed. guaiFENesin-codeine 100-10 mg/5 mL solution Commonly known as:  ROBITUSSIN AC Take 10 mL by mouth three (3) times daily as needed for Cough. Max Daily Amount: 30 mL. ibuprofen 600 mg tablet Commonly known as:  MOTRIN  
take 1 tablet by mouth every 6 hours if needed for pain  
  
 levothyroxine 137 mcg tablet Commonly known as:  SYNTHROID  
take 1 tablet by mouth once daily (BEFORE BREAKFAST)  
  
 multivitamin tablet Commonly known as:  ONE A DAY Take 1 Tab by mouth daily. omeprazole 20 mg capsule Commonly known as:  PRILOSEC  
take 1 capsule by mouth once daily PROAIR HFA 90 mcg/actuation inhaler Generic drug:  albuterol Take 2 Puffs by inhalation every six (6) hours as needed for Wheezing. tiZANidine 2 mg tablet Commonly known as:  Hannahbelia Felipe Take 1 Tab by mouth three (3) times daily. VITAMIN D3 2,000 unit Tab Generic drug:  cholecalciferol (vitamin D3) Take  by mouth.  
  
 zolpidem 10 mg tablet Commonly known as:  AMBIEN  
  
 * Notice: This list has 2 medication(s) that are the same as other medications prescribed for you. Read the directions carefully, and ask your doctor or other care provider to review them with you. Prescriptions Printed Refills  
 guaiFENesin-codeine (ROBITUSSIN AC) 100-10 mg/5 mL solution 1 Sig: Take 10 mL by mouth three (3) times daily as needed for Cough. Max Daily Amount: 30 mL. Class: Print Route: Oral  
  
To-Do List   
 03/23/2018 Lab:  LIPID PANEL   
  
 03/23/2018 Lab:  VITAMIN D, 25 HYDROXY Patient Instructions I have prescribed a cough medication that has codeine-if you take it don't take your Ambien or Zanaflex. Recheck 1 week if cough isn't gone. I will send you an e-mail with any recommendations about the lab results. You will likely see the results before me! Introducing [facility]! Dear Anirudh Trinidad: Thank you for requesting a [employer] account. Our records indicate that you already have an active [employer] account. You can access your account anytime at [email or web address] Did you know that you can access your hospital and ER discharge instructions at any time in [employer]? You can also review all of your test results from your hospital stay or ER visit. Additional Information If you have questions, please visit the Frequently Asked Questions section of the [employer] website at [email or web address] Remember, [employer] is NOT to be used for urgent needs. For medical emergencies, dial 911. Now available from your iPhone and Android! Please provide this summary of care documentation to your next provider. Your primary care clinician is listed as Albaro Anders. If you have any questions after today's visit, please call [phone].

## 2018-03-23 NOTE — PATIENT INSTRUCTIONS
I have prescribed a cough medication that has codeine-if you take it don't take your Ambien or Zanaflex. Recheck 1 week if cough isn't gone. I will send you an e-mail with any recommendations about the lab results. You will likely see the results before me!

## 2018-03-23 NOTE — PROGRESS NOTES
HISTORY OF PRESENT ILLNESS  Miguel A Chavira is a 65 y.o. female. HPI Comments: Ms. Dolores Segura is here because of a cough for a week. Cough is dry, no fever, chills, sore throat, allergy sx. She also has a history of Vitamin D deficiency and has been taking OTC Vitamin D. She asks if she needs a prescription. When she lived in [place] she was told she had high triglycerides. She was treated and they came down but she hasn't been on anything for years. I don't see any lipids in our system going back to 2012. She will be due for a mammogram in July, otherwise is UTD on . I will put in a future order for July. Cough   Pertinent negatives include no chest pain, no abdominal pain, no headaches and no shortness of breath. Review of Systems   Constitutional: Negative for chills and fever. HENT: Negative for congestion, ear pain and sore throat. Eyes: Negative for blurred vision and double vision. Respiratory: Positive for cough. Negative for hemoptysis, sputum production and shortness of breath. Cardiovascular: Negative for chest pain and palpitations. Gastrointestinal: Negative for abdominal pain, nausea and vomiting. Neurological: Negative for headaches. Physical Exam   Constitutional: She is oriented to person, place, and time. Vital signs are normal. She appears well-developed and well-nourished. Non-toxic appearance. No distress. HENT:   Head: Normocephalic. Eyes: Conjunctivae are normal. Pupils are equal, round, and reactive to light. Neck: Full passive range of motion without pain. Neck supple. No Brudzinski's sign and no Kernig's sign noted. No thyromegaly present. Cardiovascular: Normal rate, regular rhythm and normal heart sounds. No murmur heard. Pulmonary/Chest: Effort normal and breath sounds normal. No respiratory distress. She has no wheezes. She has no rhonchi. She has no rales. Abdominal: She exhibits no distension.    Lymphadenopathy:     She has no cervical adenopathy. Neurological: She is alert and oriented to person, place, and time. Skin: No rash noted. She is not diaphoretic. Psychiatric: She has a normal mood and affect. Nursing note and vitals reviewed. ASSESSMENT and PLAN    ICD-10-CM ICD-9-CM    1. Viral URI with cough J06.9 465.9 guaiFENesin-codeine (ROBITUSSIN AC) 100-10 mg/5 mL solution    B97.89     2. Vitamin D deficiency E55.9 268.9 VITAMIN D, 25 HYDROXY   3.  Hypertriglyceridemia E78.1 272.1 LIPID PANEL   4. Screening for breast cancer Z12.31 V76.10 CELESTINO MAMMO BI SCREENING INCL CAD       See orders

## 2018-03-24 LAB — 25(OH)D3 SERPL-MCNC: 39.3 NG/ML (ref 30–100)

## 2018-04-02 ENCOUNTER — HOSPITAL ENCOUNTER (OUTPATIENT)
Dept: LAB | Age: 63
Discharge: HOME | End: 2018-04-02
Payer: MEDICARE

## 2018-04-02 ENCOUNTER — OFFICE VISIT (OUTPATIENT)
Dept: HEMATOLOGY | Age: 63
End: 2018-04-02

## 2018-04-02 VITALS
RESPIRATION RATE: 23 BRPM | WEIGHT: 204 LBS | DIASTOLIC BLOOD PRESSURE: 84 MMHG | HEART RATE: 96 BPM | OXYGEN SATURATION: 97 % | SYSTOLIC BLOOD PRESSURE: 121 MMHG | TEMPERATURE: 99.2 F | BODY MASS INDEX: 34.83 KG/M2 | HEIGHT: 64 IN

## 2018-04-02 DIAGNOSIS — K74.60 CIRRHOSIS OF LIVER WITHOUT ASCITES, UNSPECIFIED HEPATIC CIRRHOSIS TYPE (HCC): ICD-10-CM

## 2018-04-02 DIAGNOSIS — K74.60 CIRRHOSIS OF LIVER WITHOUT ASCITES, UNSPECIFIED HEPATIC CIRRHOSIS TYPE (HCC): Primary | ICD-10-CM

## 2018-04-02 LAB
ALBUMIN SERPL-MCNC: 4.1 G/DL (ref 3.4–5)
ALBUMIN/GLOB SERPL: 1.2 {RATIO} (ref 0.8–1.7)
ALP SERPL-CCNC: 65 U/L (ref 45–117)
ALT SERPL-CCNC: 46 U/L (ref 13–56)
ANION GAP SERPL CALC-SCNC: 11 MMOL/L (ref 3–18)
AST SERPL-CCNC: 29 U/L (ref 15–37)
BASOPHILS # BLD: 0 K/UL (ref 0–0.06)
BASOPHILS NFR BLD: 0 % (ref 0–2)
BILIRUB DIRECT SERPL-MCNC: 0.2 MG/DL (ref 0–0.2)
BILIRUB SERPL-MCNC: 0.8 MG/DL (ref 0.2–1)
BUN SERPL-MCNC: 13 MG/DL (ref 7–18)
BUN/CREAT SERPL: 15 (ref 12–20)
CALCIUM SERPL-MCNC: 9.6 MG/DL (ref 8.5–10.1)
CHLORIDE SERPL-SCNC: 107 MMOL/L (ref 100–108)
CO2 SERPL-SCNC: 28 MMOL/L (ref 21–32)
CREAT SERPL-MCNC: 0.88 MG/DL (ref 0.6–1.3)
DIFFERENTIAL METHOD BLD: ABNORMAL
EOSINOPHIL # BLD: 0.1 K/UL (ref 0–0.4)
EOSINOPHIL NFR BLD: 2 % (ref 0–5)
ERYTHROCYTE [DISTWIDTH] IN BLOOD BY AUTOMATED COUNT: 12.8 % (ref 11.6–14.5)
GLOBULIN SER CALC-MCNC: 3.3 G/DL (ref 2–4)
GLUCOSE SERPL-MCNC: 99 MG/DL (ref 74–99)
HCT VFR BLD AUTO: 39.7 % (ref 35–45)
HGB BLD-MCNC: 13.8 G/DL (ref 12–16)
INR PPP: 1.1 (ref 0.8–1.2)
LYMPHOCYTES # BLD: 1.2 K/UL (ref 0.9–3.6)
LYMPHOCYTES NFR BLD: 22 % (ref 21–52)
MCH RBC QN AUTO: 29.4 PG (ref 24–34)
MCHC RBC AUTO-ENTMCNC: 34.8 G/DL (ref 31–37)
MCV RBC AUTO: 84.6 FL (ref 74–97)
MONOCYTES # BLD: 0.3 K/UL (ref 0.05–1.2)
MONOCYTES NFR BLD: 6 % (ref 3–10)
NEUTS SEG # BLD: 3.9 K/UL (ref 1.8–8)
NEUTS SEG NFR BLD: 70 % (ref 40–73)
PLATELET # BLD AUTO: 97 K/UL (ref 135–420)
PMV BLD AUTO: 9.9 FL (ref 9.2–11.8)
POTASSIUM SERPL-SCNC: 4 MMOL/L (ref 3.5–5.5)
PROT SERPL-MCNC: 7.4 G/DL (ref 6.4–8.2)
PROTHROMBIN TIME: 13.6 SEC (ref 11.5–15.2)
RBC # BLD AUTO: 4.69 M/UL (ref 4.2–5.3)
SODIUM SERPL-SCNC: 146 MMOL/L (ref 136–145)
WBC # BLD AUTO: 5.5 K/UL (ref 4.6–13.2)

## 2018-04-02 PROCEDURE — 36415 COLL VENOUS BLD VENIPUNCTURE: CPT | Performed by: NURSE PRACTITIONER

## 2018-04-02 PROCEDURE — 80048 BASIC METABOLIC PNL TOTAL CA: CPT | Performed by: NURSE PRACTITIONER

## 2018-04-02 PROCEDURE — 82107 ALPHA-FETOPROTEIN L3: CPT | Performed by: NURSE PRACTITIONER

## 2018-04-02 PROCEDURE — 87521 HEPATITIS C PROBE&RVRS TRNSC: CPT | Performed by: NURSE PRACTITIONER

## 2018-04-02 PROCEDURE — 85025 COMPLETE CBC W/AUTO DIFF WBC: CPT | Performed by: NURSE PRACTITIONER

## 2018-04-02 PROCEDURE — 80076 HEPATIC FUNCTION PANEL: CPT | Performed by: NURSE PRACTITIONER

## 2018-04-02 PROCEDURE — 85610 PROTHROMBIN TIME: CPT | Performed by: NURSE PRACTITIONER

## 2018-04-02 NOTE — PROGRESS NOTES
[address] Vivek, [place], [place], [place]       Simran Estimable, NP    Radha Trejo, JESICA Mortensen, HENRYP-BC   MELANIE Litteljohn NP        at [address], [address] [facility], [address]     [phone]     FAX: [phone]    at [facility], [address] [place] [address], [address]     [phone]     FAX: [phone]         Patient Care Team:  María Vela MD as PCP - General (Family Practice)  Bin Oneal MD (Rheumatology)      Problem List  Date Reviewed: 4/2/2018          Codes Class Noted    Advanced care planning/counseling discussion ICD-10-CM: Z71.89  ICD-9-CM: V65.49  6/8/2017        Intrinsic (urethral) sphincter deficiency (ISD) ICD-10-CM: N36.42  ICD-9-CM: 599.82  5/3/2017        Screening for depression ICD-10-CM: Z13.89  ICD-9-CM: V79.0  4/13/2017        Nocturia ICD-10-CM: R35.1  ICD-9-CM: 788.43  4/4/2017        RAMONA (stress urinary incontinence, female) ICD-10-CM: N39.3  ICD-9-CM: 625.6  4/4/2017        Microscopic hematuria ICD-10-CM: R31.29  ICD-9-CM: 599.72  3/26/2017        Chronic back pain ICD-10-CM: M54.9, G89.29  ICD-9-CM: 724.5, 338.29  9/23/2015    Overview Signed 9/23/2015 12:00 PM by Floyd Delgadillo NP     Nerve ablation 09/16/2015. Hypothyroidism ICD-10-CM: E03.9  ICD-9-CM: 244.9  2/6/2013        Chronic hepatitis C ([facility] ICD-10-CM: B18.2  ICD-9-CM: 070.54  2/6/2013    Overview Addendum 3/19/2015 10:22 AM by Floyd Delgadillo NP     SVR achieved with SOF/SIM (2014). Standard interferon TIW x 6 months 1997. Non-response. Standard interferon/ribavirin x 6months 1999.   Non-response             Rheumatoid arthritis(714.0) ICD-10-CM: M06.9  ICD-9-CM: 714.0  2/6/2013        Carpal tunnel syndrome ICD-10-CM: G56.00  ICD-9-CM: 354.0  2/6/2013              Swati Gravely returns to the [facility] today for education and management of cirrhosis. She was treated for HCV and was SVR two years post treatment. She began treatment on 05/15/2014 with dual therapy of SOF/SIM and completed her 12 weeks of therapy on 08/05/2014. The patient is a 65 y.o.  female who was noted to have abnormalities in liver chemistries and subsequently tested positive for chronic HCV in 1990s. Risk factors for acquiring HCV are IV drug use in 1980s. There was no history of acute incteric hepatitis at the time of these risk factors. Ultrasound of the liver was performed 12/2016. Coarse heterogeneous echotexture. No focal mass. Repeat ultrasound with shear wave elastography was performed in 10/2017. The ultrasound demonstrates a coarse heterogeneous echotexture. No focal mass. The shear wave elastography suggests that the cirrhosis has resolved. A liver biopsy has not been performed. The patient was treated with standard interferon in 1997 and with standard interferon and ribavirin in 1999. Both treatments lasted for 24 weeks. The patient tolerated treatment reasonably well. HCV RNA levels obtained during treatment are not available at this time. The patient is unaware of the virologic response pattern. The most recent laboratory studies indicate that the liver transaminases are normal, alkaline phosphatase is normal, tests of hepatic synthetic and metabolic function are normal, and the platelet count is depressed. The patient has no complaints which can be attributed to liver disease. The patient completes all daily activities without any functional limitations.  The patient has not experienced fatigue, fevers, chills, shortness of breath, chest pain, pain in the right side over the liver, diffuse abdominal pain, nausea, vomiting, constipation, diarrhrea, dry eyes, dry mouth, arthralgias, myalgias, yellowing of the eyes or skin, itching, dark urine, problems concentrating, swelling of the abdomen, swelling of the lower extremities, hematemesis, or hematochezia. ALLERGIES  Allergies   Allergen Reactions    Adhesive Tape-Silicones Rash       MEDICATIONS:  Current Outpatient Prescriptions   Medication Sig Dispense Refill    guaiFENesin-codeine (ROBITUSSIN AC) 100-10 mg/5 mL solution Take 10 mL by mouth three (3) times daily as needed for Cough. Max Daily Amount: 30 mL. 120 mL 1    omeprazole (PRILOSEC) 20 mg capsule take 1 capsule by mouth once daily 30 Cap 2    levothyroxine (SYNTHROID) 137 mcg tablet take 1 tablet by mouth once daily (BEFORE BREAKFAST) 90 Tab 0    PROAIR HFA 90 mcg/actuation inhaler Take 2 Puffs by inhalation every six (6) hours as needed for Wheezing. 1 Inhaler 3    multivitamin (ONE A DAY) tablet Take 1 Tab by mouth daily.  ibuprofen (MOTRIN) 600 mg tablet take 1 tablet by mouth every 6 hours if needed for pain  0    diclofenac EC (VOLTAREN) 75 mg EC tablet Take 1 Tab by mouth two (2) times daily as needed. 60 Tab 5    tiZANidine (ZANAFLEX) 2 mg tablet Take 1 Tab by mouth three (3) times daily. 90 Tab 2    conjugated estrogens (PREMARIN) 0.625 mg/gram vaginal cream 1/2 gm twice weekly on sunday and wednesday 45 g 2    diclofenac (VOLTAREN) 1 % gel apply 2 grams to affected area four times a day  0    zolpidem (AMBIEN) 10 mg tablet   0    benztropine (COGENTIN) 1 mg tablet Take 1 mg by mouth three (3) times daily.  ARIPiprazole (ABILIFY) 5 mg tablet Take 10 mg by mouth daily. REVIEW OF SYSTEMS:  Systems related to all organ systems were reviewed. All were negative except as noted above. FAMILY/SOCIAL HISTORY:  The patient is . The patient has 2 children, and 4 grandchildren. The patient used to smoke a little but stopped in 2005. She used to consumed alcohol in excess. The patient has been abstinent from alcohol since 5/2012. 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   The patient is currently receiving disability. PHYSICAL EXAMINATION:  Visit Vitals    /84 (BP 1 Location: Left arm, BP Patient Position: Sitting)    Pulse 96    Temp 99.2 °F (37.3 °C)    Resp 23    Ht 5' 4\" (1.626 m)    Wt 204 lb (92.5 kg)    SpO2 97%    BMI 35.02 kg/m2     General: No acute distress. Eyes: Sclera anicteric. ENT: No oral lesions. Thyroid normal.  Nodes: No adenopathy. Skin: No spider angiomata. No jaundice. No palmar erythema. Respiratory: Lungs clear to auscultation. Cardiovascular: Regular heart rate. No murmurs. No JVD. Abdomen: Soft non-tender. Liver size normal to percussion/palpation. Spleen not palpable. No obvious ascites. Extremities: No lower extremity edema. No muscle wasting. No gross arthritic changes. Neurologic: Alert and oriented. Cranial nerves grossly intact. No asterixsis.     LABORATORY STUDIES:   [address] & Units 10/11/2017 9/26/2017   WBC 4.6 - 13.2 K/uL 5.4 5.8   ANC 1.8 - 8.0 K/UL 3.5 3.9   HGB 12.0 - 16.0 g/dL 14.5 14.9    - 420 K/uL 100 (L) 95 (L)   INR 0.8 - 1.2   1.1    AST 15 - 37 U/L 27    ALT 13 - 56 U/L 40    Alk Phos 45 - 117 U/L 68    Bili, Total 0.2 - 1.0 MG/DL 0.7    Bili, Direct 0.0 - 0.2 MG/DL 0.2    Albumin 3.4 - 5.0 g/dL 4.1    BUN 7.0 - 18 MG/DL 12    Creat 0.6 - 1.3 MG/DL 0.69    Na 136 - 145 mmol/L 143    K 3.5 - 5.5 mmol/L 3.6    Cl 100 - 108 mmol/L 104    CO2 21 - 32 mmol/L 26    Glucose 74 - 99 mg/dL 92      [facility] Latest Ref Rng & Units 4/11/2017   WBC 4.6 - 13.2 K/uL 5.2   ANC 1.8 - 8.0 K/UL 3.3   HGB 12.0 - 16.0 g/dL 14.6    - 420 K/uL 111 (L)   INR 0.8 - 1.2      AST 15 - 37 U/L 22   ALT 13 - 56 U/L 32   Alk Phos 45 - 117 U/L 72   Bili, Total 0.2 - 1.0 MG/DL 0.6   Bili, Direct 0.0 - 0.2 MG/DL 0.1   Albumin 3.4 - 5.0 g/dL 4.0   BUN 7.0 - 18 MG/DL 11   Creat 0.6 - 1.3 MG/DL 0.76   Na 136 - 145 mmol/L 142   K 3.5 - 5.5 mmol/L 3.7   Cl 100 - 108 mmol/L 104   CO2 21 - 32 mmol/L 27   Glucose 74 - 99 mg/dL 106 (H)     Cancer Screening Latest Ref Rng & Units 10/11/2017   AFP, Serum 0.0 - 8.0 ng/mL 2.8   AFP-L3% 0.0 - 9.9 % Comment     Cancer Screening Latest Ref Rng & Units 10/3/2016 5/10/2016   AFP, Serum 0.0 - 8.0 ng/mL 3.4 3.2   AFP-L3% 0.0 - 9.9 % Comment Comment     SEROLOGIES:  Serologies Latest Ref Rng 2/6/2013   Hep A Ab, Total Negative Positive (A)   Hep B Surface Ag Negative Negative   Hep B Core Ab, Total Negative Negative   Hep B Surface Ab 0.00 - 0.99 Index Value 0.52   Hep C Genotype See Note 1a   IL28B Genotype  CT genotype   Ferritin 13 - 150 ng/mL 464 (H)   Iron % Saturation 15 - 55 % 67 (H)     2/2013. HCV RNA Log 6.36 IU/ml. Eradicated. LIVER HISTOLOGY:  12/2016. Shear wave elastography. E Median is 11.66, suggestive of F3, bridging fibrosis. Wide E Std of 4.41 is suggestive of fatty liver disease. 10/2017. TRANSIENT HEPATIC ELASTOGRAPHY:  E Range: 7-12.0 kPa (previously 9.1-23.0), E Mean: 9.3 kPa (previously 13.05),   E Median: 8.6 kPa (recently 11.7), E Std: 2.0 kPa (previously 4.4)     ENDOSCOPIC PROCEDURES:  09/2014. EGD by Dr. Cintia Santoyo. Normal esophagus. H.Pylori is negative. RADIOLOGY:  04/2014. Ultrasound of liver. Consistent with cirrhosis. No masses. 10/2014. Ultrasound of liver. Consistent with cirrhosis. No masses. 04/2015. Ultrasound of the liver. Consistent with cirrhosis. No mention of masses in the report. 10/2015. Ultrasound of the liver. Consistent with cirrhosis. No mention of masses in the report.  06/2016. Ultrasound of the liver. Heterogeneous appearing hepatic echotexture without mass. 12/2016. Ultrasound of the liver. Coarse heterogeneous echotexture. No focal mass. 10/2018. Ultrasound of the liver, performed with elastography. Persistent sonographic findings of hepatic cirrhosis. No focal hepatic mass.     OTHER TESTING:  Not available or performed    ASSESSMENT AND PLAN:  Chronic HCV of unclear severity. The most recent laboratory studies indicate that the liver transaminases are normal, alkaline phosphatase is normal,  tests of hepatic synthetic and metabolic function are depressed, and the platelet count is depressed. Complications of cirrhosis were discussed in detail. We discussed thrombocytopenia, portal hypertension, varices, GI bleeding, peripheral edema, ascites, hepatic encephalopathy, and hepatocellular carcinoma. She is very stable and the cirrhosis may have even resolved. We will move her appointments to every 6 months. We discussed the need for every 6 month liver imaging studies. It appears that the cirrhosis is resolving. Hepatic encephalopathy has not developed. Edema. Resolved. Diuretics have been discontinued. [address] screening. Ultrasound was recently performed and does not demonstrate any sign of developing HCC. Repeat imaging ordered today. Will alternate ultrasounds with ultrasounds with shear wave elastography. Elastography  can assess liver fibrosis and determine if a patient has advanced fibrosis or cirrhosis without the need for liver biopsy. HCV. The patient was previously treated for HCV with standard interferon and ribavirin in the 1990s. There was a non-response that was not clearly defined. She was retreated with SOF/SIM last year and is SVR more than 3 years post treatment. The patient was directed to continue all current medications at the current dosages except for the NSAIDs, which she should stop. There are no contraindications for the patient to take any medications that are necessary for treatment of other medical issues. The patient was counseled regarding alcohol consumption. Vaccination for viral hepatitis A is not needed. The patient has serologic evidence of prior exposure or vaccination with immunity. [facility] 87 in 6 months.      Sarah Fontaine NP   [facility] [place] of [address], [address] [place] [address] Francisca Mcmillan, [address] [facility]   [phone]

## 2018-04-02 NOTE — MR AVS SNAPSHOT
[address] [address] 
[phone] Patient: Michaela Orozco MRN: [id number] :1955 Visit Information Date & Time Provider Department Dept. Phone Encounter #  
 2018  3:00 PM MELANIE [address] of [facility] [phone] Follow-up Instructions Return in about 6 months (around 10/2/2018). Your Appointments 10/2/2018  4:00 PM  
Follow [place] with [place]je[place] [id number] [facility] ([facility]) Appt Note: 6 month f/u  
 [facility], [facility] [address]  
  
   
 [facility], [address] [place] [address] Upcoming Health Maintenance Date Due  
 BREAST CANCER SCRN MAMMOGRAM 2018 MEDICARE YEARLY EXAM 2018 PAP AKA CERVICAL CYTOLOGY 1/3/2022 COLONOSCOPY 2022 DTaP/Tdap/Td series (2 - Td) 10/28/2024 Allergies as of 2018  Review Complete On: 3/23/2018 By: Warden Sosa MD  
  
 Severity Noted Reaction Type Reactions Adhesive Tape-silicones     Not Verified Rash Current Immunizations  Never Reviewed Name Date Hep A Vaccine 2012, 4/10/2012, 3/5/2012 Hep B Vaccine 2012, 4/10/2012, 3/5/2012 Influenza Vaccine 9/15/2017, 10/28/2014 12:00 AM, 10/8/2014, 2013 12:00 AM, 2013, 2013 12:00 AM  
 Influenza Vaccine (Quad) PF 10/12/2016, 10/15/2015 12:00 AM  
 Pneumococcal Conjugate (PCV-13) 2017 Pneumococcal Polysaccharide (PPSV-23) 2013 12:00 AM  
 Tdap 10/28/2014 12:00 AM  
  
 Not reviewed this visit You Were Diagnosed With   
  
 Codes Comments Cirrhosis of liver without ascites, unspecified hepatic cirrhosis type ([facility] [address]    -  Primary ICD-10-CM: K74.60 ICD-9-CM: 571.5 Vitals BP Pulse Temp Resp Height(growth percentile) Weight(growth percentile) 121/84 (BP 1 Location: Left arm, BP Patient Position: Sitting) 96 99.2 °F (37.3 °C) 23 5' 4\" (1.626 m) 204 lb (92.5 kg) SpO2 BMI OB Status Smoking Status 97% 35.02 kg/m2 Postmenopausal Former Smoker Vitals History BMI and BSA Data Body Mass Index Body Surface Area 35.02 kg/m 2 2.04 m 2 Preferred Pharmacy Pharmacy Name Phone [id number] [address] [place], [place] - [address] [facility] [phone] Your Updated Medication List  
  
   
This list is accurate as of 4/2/18  3:18 PM.  Always use your most recent med list.  
  
  
  
  
 ABILIFY 5 mg tablet Generic drug:  ARIPiprazole Take 10 mg by mouth daily. benztropine 1 mg tablet Commonly known as:  COGENTIN Take 1 mg by mouth three (3) times daily. conjugated estrogens 0.625 mg/gram vaginal cream  
Commonly known as:  PREMARIN  
1/2 gm twice weekly on sunday and wednesday * diclofenac 1 % Gel Commonly known as:  VOLTAREN  
apply 2 grams to affected area four times a day * diclofenac EC 75 mg EC tablet Commonly known as:  VOLTAREN Take 1 Tab by mouth two (2) times daily as needed. guaiFENesin-codeine 100-10 mg/5 mL solution Commonly known as:  ROBITUSSIN AC Take 10 mL by mouth three (3) times daily as needed for Cough. Max Daily Amount: 30 mL. ibuprofen 600 mg tablet Commonly known as:  MOTRIN  
take 1 tablet by mouth every 6 hours if needed for pain  
  
 levothyroxine 137 mcg tablet Commonly known as:  SYNTHROID  
take 1 tablet by mouth once daily (BEFORE BREAKFAST)  
  
 multivitamin tablet Commonly known as:  ONE A DAY Take 1 Tab by mouth daily. omeprazole 20 mg capsule Commonly known as:  PRILOSEC  
take 1 capsule by mouth once daily PROAIR HFA 90 mcg/actuation inhaler Generic drug:  albuterol Take 2 Puffs by inhalation every six (6) hours as needed for Wheezing. tiZANidine 2 mg tablet Commonly known as:  Masterson Husk Take 1 Tab by mouth three (3) times daily. zolpidem 10 mg tablet Commonly known as:  AMBIEN  
  
 * Notice: This list has 2 medication(s) that are the same as other medications prescribed for you. Read the directions carefully, and ask your doctor or other care provider to review them with you. Follow-up Instructions Return in about 6 months (around 10/2/2018). To-Do List   
 04/02/2018 Lab:  AFP WITH AFP-L3% 04/02/2018 Lab:  CBC WITH AUTOMATED DIFF   
  
 04/02/2018 Lab:  HCV RNA BY MADDY QL,RFLX TO QT   
  
 04/02/2018 Lab:  HEPATIC FUNCTION PANEL   
  
 04/02/2018 Lab:  METABOLIC PANEL, BASIC   
  
 04/02/2018 Lab:  PROTHROMBIN TIME + INR   
  
 04/02/2018 Imaging:  US ABD LTD   
  
 07/13/2018 11:30 AM  
  Appointment with [facility] CELESTINO MAYBERRY RM 1 at [facility] ([phone]) PAYMENT  For Non-Medicare patients - $15.00 will be collected from you at the time of your exam.  You will be billed $35.00 from the reading [facility]. OUTSIDE FILMS  - Any outside films related to the study being scheduled should be brought with you on the day of the exam.  If this cannot be done there may be a delay in the reading of the study. MEDICATIONS  - Patient must bring a complete list of all medications currently taking to include prescriptions, over-the-counter meds, herbals, vitamins & any dietary supplements  GENERAL INSTRUCTIONS  - On the day of your exam do not use any bath powder, deodorant or lotions on the armpit area. -Tenderness of breasts may cause an increase of discomfort during procedure. If you are experiencing breast tenderness on the day of your appointment and would like to reschedule, please call [phone]. Introducing [facility] HE[facility]! Dear Sae Junior: Thank you for requesting a Pharmacy [facility] account. Our records indicate that you already have an active Pharmacy Development account.   You can access your account anytime at [email or web address] Did you know that you can access your hospital and ER discharge instructions at any time in Present? You can also review all of your test results from your hospital stay or ER visit. Additional Information If you have questions, please visit the Frequently Asked Questions section of the Present website at [email or web address] Remember, Present is NOT to be used for urgent needs. For medical emergencies, dial 911. Now available from your iPhone and Android! Please provide this summary of care documentation to your next provider. Your primary care clinician is listed as Albaro Anders. If you have any questions after today's visit, please call [phone].

## 2018-04-03 LAB
AFP L3 MFR SERPL: NORMAL % (ref 0–9.9)
AFP SERPL-MCNC: 2.8 NG/ML (ref 0–8)

## 2018-04-06 LAB
HCV RNA SERPL NAA+PROBE-LOG IU: NOT DETECTED HCV LOG 10IU/ML
HCV RNA SERPL PROBE AMP-ACNC: NOT DETECTED HCVIU/ML
HCV RNA SERPL QL NAA+PROBE: NOT DETECTED

## 2018-04-10 RX ORDER — LEVOTHYROXINE SODIUM 137 UG/1
TABLET ORAL
Qty: 90 TAB | Refills: 0 | Status: SHIPPED | OUTPATIENT
Start: 2018-04-10 | End: 2018-07-05 | Stop reason: SDUPTHER

## 2018-04-16 ENCOUNTER — TELEPHONE (OUTPATIENT)
Dept: FAMILY MEDICINE CLINIC | Age: 63
End: 2018-04-16

## 2018-04-18 ENCOUNTER — DOCUMENTATION ONLY (OUTPATIENT)
Dept: FAMILY MEDICINE CLINIC | Age: 63
End: 2018-04-18

## 2018-04-18 NOTE — PROGRESS NOTES
Prior Authorization for [employer] HFA completed and approved by patient's insurance from 4/18/2018 through 12/31/2018.

## 2018-04-28 ENCOUNTER — HOSPITAL ENCOUNTER (OUTPATIENT)
Dept: ULTRASOUND IMAGING | Age: 63
Discharge: HOME | End: 2018-04-28
Payer: MEDICARE

## 2018-04-28 DIAGNOSIS — K74.60 CIRRHOSIS OF LIVER WITHOUT ASCITES, UNSPECIFIED HEPATIC CIRRHOSIS TYPE (HCC): ICD-10-CM

## 2018-04-28 PROCEDURE — 76705 ECHO EXAM OF ABDOMEN: CPT

## 2018-05-26 DIAGNOSIS — K21.9 GASTROESOPHAGEAL REFLUX DISEASE WITHOUT ESOPHAGITIS: ICD-10-CM

## 2018-05-29 RX ORDER — OMEPRAZOLE 20 MG/1
CAPSULE, DELAYED RELEASE ORAL
Qty: 30 CAP | Refills: 2 | Status: SHIPPED | OUTPATIENT
Start: 2018-05-29 | End: 2018-08-28 | Stop reason: SDUPTHER

## 2018-07-03 ENCOUNTER — OFFICE VISIT (OUTPATIENT)
Dept: FAMILY MEDICINE CLINIC | Age: 63
End: 2018-07-03

## 2018-07-03 ENCOUNTER — HOSPITAL ENCOUNTER (OUTPATIENT)
Dept: LAB | Age: 63
Discharge: HOME | End: 2018-07-03
Payer: MEDICARE

## 2018-07-03 VITALS
RESPIRATION RATE: 18 BRPM | TEMPERATURE: 98.3 F | SYSTOLIC BLOOD PRESSURE: 153 MMHG | HEIGHT: 64 IN | OXYGEN SATURATION: 93 % | BODY MASS INDEX: 33.8 KG/M2 | HEART RATE: 85 BPM | DIASTOLIC BLOOD PRESSURE: 107 MMHG | WEIGHT: 198 LBS

## 2018-07-03 DIAGNOSIS — Z71.89 ADVANCED CARE PLANNING/COUNSELING DISCUSSION: ICD-10-CM

## 2018-07-03 DIAGNOSIS — Z00.00 MEDICARE ANNUAL WELLNESS VISIT, SUBSEQUENT: ICD-10-CM

## 2018-07-03 DIAGNOSIS — M25.561 CHRONIC PAIN OF BOTH KNEES: ICD-10-CM

## 2018-07-03 DIAGNOSIS — G89.29 CHRONIC PAIN OF BOTH KNEES: ICD-10-CM

## 2018-07-03 DIAGNOSIS — M25.562 CHRONIC PAIN OF BOTH KNEES: ICD-10-CM

## 2018-07-03 DIAGNOSIS — E03.4 HYPOTHYROIDISM DUE TO ACQUIRED ATROPHY OF THYROID: Primary | ICD-10-CM

## 2018-07-03 DIAGNOSIS — Z13.31 SCREENING FOR DEPRESSION: ICD-10-CM

## 2018-07-03 DIAGNOSIS — K21.9 GASTROESOPHAGEAL REFLUX DISEASE WITHOUT ESOPHAGITIS: ICD-10-CM

## 2018-07-03 DIAGNOSIS — E03.4 HYPOTHYROIDISM DUE TO ACQUIRED ATROPHY OF THYROID: ICD-10-CM

## 2018-07-03 LAB
T4 FREE SERPL-MCNC: 2.4 NG/DL (ref 0.7–1.5)
TSH SERPL DL<=0.05 MIU/L-ACNC: 0.36 UIU/ML (ref 0.36–3.74)

## 2018-07-03 PROCEDURE — 84443 ASSAY THYROID STIM HORMONE: CPT | Performed by: FAMILY MEDICINE

## 2018-07-03 PROCEDURE — 84439 ASSAY OF FREE THYROXINE: CPT | Performed by: FAMILY MEDICINE

## 2018-07-03 RX ORDER — MELOXICAM 15 MG/1
15 TABLET ORAL
Qty: 90 TAB | Refills: 1 | Status: SHIPPED | OUTPATIENT
Start: 2018-07-03 | End: 2019-01-15 | Stop reason: SDUPTHER

## 2018-07-03 NOTE — PROGRESS NOTES
Rm:1    Chief Complaint   Patient presents with    Physical     Depression Screening:  PHQ over the last two weeks 7/3/2018 7/3/2018 3/23/2018 10/11/2017 9/26/2017 6/30/2017 6/8/2017   Little interest or pleasure in doing things Not at all Not at all Not at all Not at all Not at all Not at all Not at all   Feeling down, depressed or hopeless Not at all Not at all Not at all More than half the days Not at all Not at all Not at all   Total Score PHQ 2 0 0 0 2 0 0 0       Learning Assessment:  Learning Assessment 4/2/2018 6/8/2017 7/18/2016 3/19/2015 11/18/2014   PRIMARY LEARNER Patient Patient Patient Patient Patient   HIGHEST LEVEL OF EDUCATION - PRIMARY LEARNER  GRADUATED HIGH SCHOOL OR GED GRADUATED HIGH SCHOOL OR GED GRADUATED HIGH SCHOOL OR GED - GRADUATED HIGH SCHOOL OR GED   BARRIERS PRIMARY LEARNER VISUAL NONE NONE - [address] CAREGIVER - No No - No   PRIMARY LANGUAGE ENGLISH ENGLISH ENGLISH ENGLISH ENGLISH   LEARNER PREFERENCE PRIMARY DEMONSTRATION DEMONSTRATION DEMONSTRATION LISTENING DEMONSTRATION   ANSWERED BY patient patient patient patient Patient   RELATIONSHIP SELF SELF SELF SELF SELF       Abuse Screening:  Abuse Screening Questionnaire 7/3/2018 6/8/2017   Do you ever feel afraid of your partner? N N   Are you in a relationship with someone who physically or mentally threatens you? N N   Is it safe for you to go home? Y Y       Health Maintenance reviewed and discussed per provider: yes     Coordination of Care:    1. Have you been to the ER, urgent care clinic since your last visit? Hospitalized since your last visit? no    2. Have you seen or consulted any other health care providers outside of the [address] [place] [address] since your last visit? Include any pap smears or colon screening.  no

## 2018-07-03 NOTE — PATIENT INSTRUCTIONS
Medicare Wellness Visit, Female    The best way to live healthy is to have a lifestyle where you eat a well-balanced diet, exercise regularly, limit alcohol use, and quit all forms of tobacco/nicotine, if applicable. Regular preventive services are another way to keep healthy. Preventive services (vaccines, screening tests, monitoring & exams) can help personalize your care plan, which helps you manage your own care. Screening tests can find health problems at the earliest stages, when they are easiest to treat. [address] follows the current, evidence-based guidelines published by the [facility] Philippe Moose ([facility]) when recommending preventive services for our patients. Because we follow these guidelines, sometimes recommendations change over time as research supports it. (For example, mammograms used to be recommended annually. Even though Medicare will still pay for an annual mammogram, the newer guidelines recommend a mammogram every two years for women of average risk.)    Of course, you and your provider may decide to screen more often for some diseases, based on your risk and co-morbidities (chronic disease you are already diagnosed with). Preventive services for you include:    - Medicare offers their members a free annual wellness visit, which is time for you and your primary care provider to discuss and plan for your preventive service needs. Take advantage of this benefit every year!    -All people over age 72 should receive the recommended pneumonia vaccines. Current USPSTF guidelines recommend a series of two vaccines for the best pneumonia protection.     -All adults should have a yearly flu vaccine and a tetanus vaccine every 10 years. All adults age 61 years should receive a shingles vaccine once in their lifetime.      -A bone mass density test is recommended when a woman turns 65 to screen for osteoporosis.  This test is only recommended once as a screening. Some providers will use this same test as a disease monitoring tool if you already have osteoporosis. -All adults age 38-68 years who are overweight should have a diabetes screening test once every three years.     -Other screening tests & preventive services for persons with diabetes include: an eye exam to screen for diabetic retinopathy, a kidney function test, a foot exam, and stricter control over your cholesterol.     -Cardiovascular screening for adults with routine risk involves an electrocardiogram (ECG) at intervals determined by the provider.     -Colorectal cancer screenings should be done for adults age 54-65 years with normal risk. There are a number of acceptable methods of screening for this type of cancer. Each test has its own benefits and drawbacks. Discuss with your provider what is most appropriate for you during your annual wellness visit. The different tests include: colonoscopy (considered the best screening method), a fecal occult blood test, a fecal DNA test, and sigmoidoscopy. -Breast cancer screenings are recommended every other year for women of normal risk age 54-69 years.     -Cervical cancer screenings for women over age 72 are only recommended with certain risk factors.     -All adults born between [facility] should be screened once for Hepatitis C. Here is a list of your current Health Maintenance items (your personalized list of preventive services) with a due date:  Health Maintenance Due   Topic Date Due    Breast Cancer Screening  07/13/2018       As you can see, your mammogram is due. Read the book about Reyes and get one done so that we can file it electronically. Follow up with GI as planned.

## 2018-07-03 NOTE — PROGRESS NOTES
HISTORY OF PRESENT ILLNESS  Octavia Paul is a 65 y.o. female. HPI Comments: Ms. Ralph Edge is here to get her thyroid labs rechecked, it has been almost 1 years. She takes Synthroid 137 mcg. She also wants a refill of her prilosec, and her rheumatologist told her to stop the Diclofenac and get on something else. Because of her GERD, I will use Meloxicam instead of Mobic. She is planning on seeing a GI doctor in [place] because of IBS    Physical   Pertinent negatives include no chest pain, no abdominal pain, no headaches and no shortness of breath. Review of Systems   Constitutional: Negative for chills and fever. HENT: Negative for hearing loss and sore throat. Eyes: Negative for blurred vision and double vision. Respiratory: Negative for cough and shortness of breath. Cardiovascular: Negative for chest pain and palpitations. Gastrointestinal: Positive for heartburn. Negative for abdominal pain, nausea and vomiting. Genitourinary: Negative for dysuria and urgency. Musculoskeletal: Positive for back pain. Joint pain: knees. Neurological: Negative for headaches. Physical Exam   Constitutional: Vital signs are normal. She appears well-developed and well-nourished. HENT:   Right Ear: Tympanic membrane and ear canal normal.   Left Ear: Tympanic membrane and ear canal normal.   Nose: Nose normal.   Mouth/Throat: Uvula is midline, oropharynx is clear and moist and mucous membranes are normal.   Eyes: Pupils are equal, round, and reactive to light. Neck: No thyromegaly present. Cardiovascular: Normal rate, regular rhythm and normal heart sounds. Pulmonary/Chest: Effort normal and breath sounds normal. No respiratory distress. She has no rales. Abdominal: She exhibits no distension. There is no tenderness. There is no rebound. Skin: No rash noted. Nursing note and vitals reviewed. ASSESSMENT and PLAN    ICD-10-CM ICD-9-CM    1.  Hypothyroidism due to acquired atrophy of thyroid E03.4 244.8 TSH 3RD GENERATION     246.8 T4, FREE   2. Gastroesophageal reflux disease without esophagitis K21.9 530.81    3. Chronic pain of both knees M25.561 719.46 meloxicam (MOBIC) 15 mg tablet    M25.562 338.29     G89.29         This is the Subsequent Medicare Annual Wellness Exam, performed 12 months or more after the Initial AWV or the last Subsequent AWV    I have reviewed the patient's medical history in detail and updated the computerized patient record. History     Past Medical History:   Diagnosis Date    Abnormal Papanicolaou smear of cervix     1988 HPV    Asthma     Bipolar 1 disorder (HCC)     Bipolar 1 disorder, depressed (HCC)     Bursitis     Carpal tunnel syndrome     Cirrhosis, hepatitis C     Connective tissue disease ([facility]     DDD (degenerative disc disease), lumbosacral     Gastric ulcer     Hashimoto's disease     Hashimoto's disease     Hepatitis C     Herniated intervertebral disc of lumbar spine     Hypermobility syndrome     Liver disease     Osteoarthritis     Plantar fasciitis, bilateral     RA (rheumatoid arthritis) ([facility]       Past Surgical History:   Procedure Laterality Date    COLONOSCOPY N/A 2/9/2017     Colonoscopy w/polyp bxs x3 performed by Crow Chopra MD at [address] [place] [address] HX APPENDECTOMY      HX DILATION AND CURETTAGE  1988    cryso    HX GYN      BTL    HX HEENT      malofacial surgery     Current Outpatient Prescriptions   Medication Sig Dispense Refill    meloxicam (MOBIC) 15 mg tablet Take 1 Tab by mouth daily as needed (inflammation). 90 Tab 1    omeprazole (PRILOSEC) 20 mg capsule take 1 capsule by mouth once daily 30 Cap 2    levothyroxine (SYNTHROID) 137 mcg tablet take 1 tablet by mouth once daily BEFORE BREAKFAST 90 Tab 0    PROAIR HFA 90 mcg/actuation inhaler Take 2 Puffs by inhalation every six (6) hours as needed for Wheezing. 1 Inhaler 3    multivitamin (ONE A DAY) tablet Take 1 Tab by mouth daily.       tiZANidine (ZANAFLEX) 2 mg tablet Take 1 Tab by mouth three (3) times daily. 90 Tab 2    conjugated estrogens (PREMARIN) 0.625 mg/gram vaginal cream 1/2 gm twice weekly on sunday and wednesday 45 g 2    diclofenac (VOLTAREN) 1 % gel apply 2 grams to affected area four times a day  0    zolpidem (AMBIEN) 10 mg tablet   0    guaiFENesin-codeine (ROBITUSSIN AC) 100-10 mg/5 mL solution Take 10 mL by mouth three (3) times daily as needed for Cough. Max Daily Amount: 30 mL. 120 mL 1    benztropine (COGENTIN) 1 mg tablet Take 1 mg by mouth three (3) times daily.  ARIPiprazole (ABILIFY) 5 mg tablet Take 10 mg by mouth daily. Allergies   Allergen Reactions    Adhesive Tape-Silicones Rash     Family History   Problem Relation Age of Onset    No Known Problems Mother     No Known Problems Father      Social History   Substance Use Topics    Smoking status: Former Smoker     Quit date: 7/1/2005    Smokeless tobacco: Never Used    Alcohol use No     Patient Active Problem List   Diagnosis Code    Hypothyroidism E03.9    Chronic hepatitis C ([facility] B18.2    Rheumatoid arthritis(714.0) M06.9    Carpal tunnel syndrome G56.00    Chronic back pain M54.9, G89.29    Microscopic hematuria R31.29    Nocturia R35.1    RAMONA (stress urinary incontinence, female) N39.3    Screening for depression Z13.89    Intrinsic (urethral) sphincter deficiency (ISD) N36.42    Advanced care planning/counseling discussion Z71.89       Depression Risk Factor Screening:     PHQ over the last two weeks 7/3/2018   Little interest or pleasure in doing things Not at all   Feeling down, depressed or hopeless Not at all   Total Score PHQ 2 0     Alcohol Risk Factor Screening: You do not drink alcohol or very rarely. Functional Ability and Level of Safety:   Hearing Loss  Hearing is good. Activities of Daily Living  The home contains: no safety equipment. Patient does total self care    Fall Risk  No flowsheet data found.     Abuse Screen  Patient is not abused    Cognitive Screening   Evaluation of Cognitive Function:  Has your family/caregiver stated any concerns about your memory: no  Normal    Patient Care Team   Patient Care Team:  Armen Saldana MD as PCP - General (Family Practice)  Warden Lizeth MD (Rheumatology)    Assessment/Plan   Education and counseling provided:  Are appropriate based on today's review and evaluation  End-of-Life planning (with patient's consent)  Screening Mammography    Diagnoses and all orders for this visit:    1. Hypothyroidism due to acquired atrophy of thyroid  -     TSH 3RD GENERATION; Future  -     T4, FREE; Future    2. Gastroesophageal reflux disease without esophagitis    3. Chronic pain of both knees  -     meloxicam (MOBIC) 15 mg tablet; Take 1 Tab by mouth daily as needed (inflammation).     4. Medicare annual wellness visit, subsequent        Health Maintenance Due   Topic Date Due    BREAST CANCER SCRN MAMMOGRAM  07/13/2018

## 2018-07-03 NOTE — MR AVS SNAPSHOT
[facility] 
 
 
 Tramaine Aponte [address] [facility] [address] [id number] 
[phone] Patient: Juno Flowers MRN: [id number] :1955 Visit Information Date & Time Provider Department Dept. Phone Encounter #  
 7/3/2018 10:30 AM Benedicto Holloway, [address] [facility] [phone] [id number] Your Appointments 10/2/2018  4:00 PM  
Follow Up with Flore[place] [address] [facility] ([facility]) Appt Note: 6 month f/u  
 [address], [address] [facility] [address], [address] [place] [address] Upcoming Health Maintenance Date Due  
 BREAST CANCER SCRN MAMMOGRAM 2018 Influenza Age 5 to Adult 2018 PAP AKA CERVICAL CYTOLOGY 1/3/2022 COLONOSCOPY 2022 DTaP/Tdap/Td series (2 - Td) 10/28/2024 Allergies as of 7/3/2018  Review Complete On: 7/3/2018 By: Benedicto Holloway MD  
  
 Severity Noted Reaction Type Reactions Adhesive Tape-silicones     Not Verified Rash Current Immunizations  Never Reviewed Name Date Hep A Vaccine 2012, 4/10/2012, 3/5/2012 Hep B Vaccine 2012, 4/10/2012, 3/5/2012 Influenza Vaccine 9/15/2017, 10/28/2014 12:00 AM, 10/8/2014, 2013 12:00 AM, 2013, 2013 12:00 AM  
 Influenza Vaccine (Quad) PF 10/12/2016, 10/15/2015 12:00 AM  
 Pneumococcal Conjugate (PCV-13) 2017 Pneumococcal Polysaccharide (PPSV-23) 2013 12:00 AM  
 Tdap 10/28/2014 12:00 AM  
  
 Not reviewed this visit You Were Diagnosed With   
  
 Codes Comments Hypothyroidism due to acquired atrophy of thyroid    -  Primary ICD-10-CM: E03.4 ICD-9-CM: 244.8, 246.8 Gastroesophageal reflux disease without esophagitis     ICD-10-CM: K21.9 ICD-9-CM: 530.81 Chronic pain of both knees     ICD-10-CM: M25.561, M25.562, G89.29 ICD-9-CM: 719.46, 338.29   
 Medicare annual wellness visit, subsequent     ICD-10-CM: Z00.00 ICD-9-CM: V70.0 Vitals BP Pulse Temp Resp Height(growth percentile) Weight(growth percentile) (!) 153/107 (BP 1 Location: Right arm, BP Patient Position: Sitting) 85 98.3 °F (36.8 °C) (Oral) 18 5' 4\" (1.626 m) 198 lb (89.8 kg) SpO2 BMI OB Status Smoking Status 93% 33.99 kg/m2 Postmenopausal Former Smoker Vitals History BMI and BSA Data Body Mass Index Body Surface Area  
 33.99 kg/m 2 2.01 m 2 Preferred Pharmacy Pharmacy Name Phone [facility] [address] [place], [place] - [address] [facility] [phone] Your Updated Medication List  
  
   
This list is accurate as of 7/3/18 11:16 AM.  Always use your most recent med list.  
  
  
  
  
 ABILIFY 5 mg tablet Generic drug:  ARIPiprazole Take 10 mg by mouth daily. benztropine 1 mg tablet Commonly known as:  COGENTIN Take 1 mg by mouth three (3) times daily. conjugated estrogens 0.625 mg/gram vaginal cream  
Commonly known as:  PREMARIN  
1/2 gm twice weekly on sunday and wednesday  
  
 diclofenac 1 % Gel Commonly known as:  VOLTAREN  
apply 2 grams to affected area four times a day  
  
 levothyroxine 137 mcg tablet Commonly known as:  SYNTHROID  
take 1 tablet by mouth once daily BEFORE BREAKFAST  
  
 meloxicam 15 mg tablet Commonly known as:  MOBIC Take 1 Tab by mouth daily as needed (inflammation). multivitamin tablet Commonly known as:  ONE A DAY Take 1 Tab by mouth daily. omeprazole 20 mg capsule Commonly known as:  PRILOSEC  
take 1 capsule by mouth once daily PROAIR HFA 90 mcg/actuation inhaler Generic drug:  albuterol Take 2 Puffs by inhalation every six (6) hours as needed for Wheezing. tiZANidine 2 mg tablet Commonly known as:  Santiago Bone Take 1 Tab by mouth three (3) times daily. zolpidem 10 mg tablet Commonly known as:  AMBIEN  
  
  
  
  
 Prescriptions Sent to Pharmacy Refills  
 meloxicam (MOBIC) 15 mg tablet 1 Sig: Take 1 Tab by mouth daily as needed (inflammation). Class: Normal  
 Pharmacy: [address] Ph #: [phone] Route: Oral  
  
To-Do List   
 10/08/2018 11:30 AM  
  Appointment with [facility] CELESTINO VIMAL [address] RM 1 at [facility] ([phone]) PAYMENT  For Non-Medicare patients - $15.00 will be collected from you at the time of your exam.  You will be billed $35.00 from the reading [facility]. OUTSIDE FILMS  - Any outside films related to the study being scheduled should be brought with you on the day of the exam.  If this cannot be done there may be a delay in the reading of the study. MEDICATIONS  - Patient must bring a complete list of all medications currently taking to include prescriptions, over-the-counter meds, herbals, vitamins & any dietary supplements  GENERAL INSTRUCTIONS  - On the day of your exam do not use any bath powder, deodorant or lotions on the armpit area. -Tenderness of breasts may cause an increase of discomfort during procedure. If you are experiencing breast tenderness on the day of your appointment and would like to reschedule, please call [phone]. Patient Instructions Medicare Wellness Visit, Female The best way to live healthy is to have a lifestyle where you eat a well-balanced diet, exercise regularly, limit alcohol use, and quit all forms of tobacco/nicotine, if applicable. Regular preventive services are another way to keep healthy. Preventive services (vaccines, screening tests, monitoring & exams) can help personalize your care plan, which helps you manage your own care. Screening tests can find health problems at the earliest stages, when they are easiest to treat.  
  
[facility] follows the current, evidence-based guidelines published by the \[facility] [place] [facility]\"" (USPSTF) when recommending preventive services for our patients. Because we follow these guidelines, sometimes recommendations change over time as research supports it. (For example, mammograms used to be recommended annually. Even though Medicare will still pay for an annual mammogram, the newer guidelines recommend a mammogram every two years for women of average risk.) Of course, you and your provider may decide to screen more often for some diseases, based on your risk and co-morbidities (chronic disease you are already diagnosed with). Preventive services for you include: - Medicare offers their members a free annual wellness visit, which is time for you and your primary care provider to discuss and plan for your preventive service needs. Take advantage of this benefit every year! 
 
-All people over age 72 should receive the recommended pneumonia vaccines. Current USPSTF guidelines recommend a series of two vaccines for the best pneumonia protection.  
 
-All adults should have a yearly flu vaccine and a tetanus vaccine every 10 years. All adults age 61 years should receive a shingles vaccine once in their lifetime.   
 
-A bone mass density test is recommended when a woman turns 65 to screen for osteoporosis. This test is only recommended once as a screening. Some providers will use this same test as a disease monitoring tool if you already have osteoporosis. -All adults age 38-68 years who are overweight should have a diabetes screening test once every three years.  
 
-Other screening tests & preventive services for persons with diabetes include: an eye exam to screen for diabetic retinopathy, a kidney function test, a foot exam, and stricter control over your cholesterol.  
 
-Cardiovascular screening for adults with routine risk involves an electrocardiogram (ECG) at intervals determined by the provider. -Colorectal cancer screenings should be done for adults age 54-65 years with normal risk. There are a number of acceptable methods of screening for this type of cancer. Each test has its own benefits and drawbacks. Discuss with your provider what is most appropriate for you during your annual wellness visit. The different tests include: colonoscopy (considered the best screening method), a fecal occult blood test, a fecal DNA test, and sigmoidoscopy. -Breast cancer screenings are recommended every other year for women of normal risk age 54-69 years.  
 
-Cervical cancer screenings for women over age 72 are only recommended with certain risk factors.  
 
-All adults born between [facility] should be screened once for Hepatitis C. Here is a list of your current Health Maintenance items (your personalized list of preventive services) with a due date: 
Health Maintenance Due Topic Date Due  
 Breast Cancer Screening  07/13/2018 As you can see, your mammogram is due. Read the book about [place] and get one done so that we can file it electronically. Follow up with GI as planned. Introducing [facility] & HEALTH SERVICES! Dear Terry Best: Thank you for requesting a [employer] account. Our records indicate that you already have an active [employer] account. You can access your account anytime at [email or web address] Did you know that you can access your hospital and ER discharge instructions at any time in Microweber? You can also review all of your test results from your hospital stay or ER visit. Additional Information If you have questions, please visit the Frequently Asked Questions section of the [employer] website at [email or web address] Remember, [employer] is NOT to be used for urgent needs. For medical emergencies, dial 911. Now available from your iPhone and Android! Please provide this summary of care documentation to your next provider. Your primary care clinician is listed as Albaro Anders. If you have any questions after today's visit, please call [phone].

## 2018-07-05 RX ORDER — LEVOTHYROXINE SODIUM 125 UG/1
125 TABLET ORAL
Qty: 90 TAB | Refills: 1 | Status: SHIPPED | OUTPATIENT
Start: 2018-07-05 | End: 2019-01-15 | Stop reason: SDUPTHER

## 2018-07-13 ENCOUNTER — TELEPHONE (OUTPATIENT)
Dept: FAMILY MEDICINE CLINIC | Age: 63
End: 2018-07-13

## 2018-07-13 RX ORDER — GUAIFENESIN 600 MG/1
1200 TABLET, EXTENDED RELEASE ORAL
Qty: 60 TAB | Refills: 2 | Status: SHIPPED | OUTPATIENT
Start: 2018-07-13 | End: 2022-05-02

## 2018-08-28 DIAGNOSIS — K21.9 GASTROESOPHAGEAL REFLUX DISEASE WITHOUT ESOPHAGITIS: ICD-10-CM

## 2018-08-29 RX ORDER — OMEPRAZOLE 20 MG/1
CAPSULE, DELAYED RELEASE ORAL
Qty: 30 CAP | Refills: 2 | Status: SHIPPED | OUTPATIENT
Start: 2018-08-29 | End: 2018-11-23 | Stop reason: SDUPTHER

## 2018-10-02 ENCOUNTER — OFFICE VISIT (OUTPATIENT)
Dept: HEMATOLOGY | Age: 63
End: 2018-10-02

## 2018-10-02 ENCOUNTER — HOSPITAL ENCOUNTER (OUTPATIENT)
Dept: LAB | Age: 63
Discharge: HOME | End: 2018-10-02
Payer: MEDICAID

## 2018-10-02 VITALS
HEART RATE: 77 BPM | RESPIRATION RATE: 14 BRPM | BODY MASS INDEX: 34.25 KG/M2 | DIASTOLIC BLOOD PRESSURE: 91 MMHG | OXYGEN SATURATION: 96 % | WEIGHT: 200.6 LBS | HEIGHT: 64 IN | TEMPERATURE: 97.8 F | SYSTOLIC BLOOD PRESSURE: 139 MMHG

## 2018-10-02 DIAGNOSIS — K74.60 CIRRHOSIS OF LIVER WITHOUT ASCITES, UNSPECIFIED HEPATIC CIRRHOSIS TYPE (HCC): Primary | ICD-10-CM

## 2018-10-02 DIAGNOSIS — K74.60 CIRRHOSIS OF LIVER WITHOUT ASCITES, UNSPECIFIED HEPATIC CIRRHOSIS TYPE (HCC): ICD-10-CM

## 2018-10-02 LAB
ALBUMIN SERPL-MCNC: 4.1 G/DL (ref 3.4–5)
ALBUMIN/GLOB SERPL: 1.3 {RATIO} (ref 0.8–1.7)
ALP SERPL-CCNC: 61 U/L (ref 45–117)
ALT SERPL-CCNC: 32 U/L (ref 13–56)
ANION GAP SERPL CALC-SCNC: 10 MMOL/L (ref 3–18)
AST SERPL-CCNC: 22 U/L (ref 15–37)
BASOPHILS # BLD: 0 K/UL (ref 0–0.1)
BASOPHILS NFR BLD: 0 % (ref 0–2)
BILIRUB DIRECT SERPL-MCNC: 0.2 MG/DL (ref 0–0.2)
BILIRUB SERPL-MCNC: 0.7 MG/DL (ref 0.2–1)
BUN SERPL-MCNC: 8 MG/DL (ref 7–18)
BUN/CREAT SERPL: 9 (ref 12–20)
CALCIUM SERPL-MCNC: 9 MG/DL (ref 8.5–10.1)
CHLORIDE SERPL-SCNC: 102 MMOL/L (ref 100–108)
CO2 SERPL-SCNC: 26 MMOL/L (ref 21–32)
CREAT SERPL-MCNC: 0.91 MG/DL (ref 0.6–1.3)
DIFFERENTIAL METHOD BLD: ABNORMAL
EOSINOPHIL # BLD: 0.1 K/UL (ref 0–0.4)
EOSINOPHIL NFR BLD: 2 % (ref 0–5)
ERYTHROCYTE [DISTWIDTH] IN BLOOD BY AUTOMATED COUNT: 12.8 % (ref 11.6–14.5)
GLOBULIN SER CALC-MCNC: 3.2 G/DL (ref 2–4)
GLUCOSE SERPL-MCNC: 90 MG/DL (ref 74–99)
HCT VFR BLD AUTO: 40.9 % (ref 35–45)
HGB BLD-MCNC: 14.2 G/DL (ref 12–16)
INR PPP: 1.1 (ref 0.8–1.2)
LYMPHOCYTES # BLD: 1 K/UL (ref 0.9–3.6)
LYMPHOCYTES NFR BLD: 22 % (ref 21–52)
MCH RBC QN AUTO: 29.2 PG (ref 24–34)
MCHC RBC AUTO-ENTMCNC: 34.7 G/DL (ref 31–37)
MCV RBC AUTO: 84 FL (ref 74–97)
MONOCYTES # BLD: 0.4 K/UL (ref 0.05–1.2)
MONOCYTES NFR BLD: 8 % (ref 3–10)
NEUTS SEG # BLD: 3.3 K/UL (ref 1.8–8)
NEUTS SEG NFR BLD: 68 % (ref 40–73)
PLATELET # BLD AUTO: 93 K/UL (ref 135–420)
PMV BLD AUTO: 9.8 FL (ref 9.2–11.8)
POTASSIUM SERPL-SCNC: 3.5 MMOL/L (ref 3.5–5.5)
PROT SERPL-MCNC: 7.3 G/DL (ref 6.4–8.2)
PROTHROMBIN TIME: 14 SEC (ref 11.5–15.2)
RBC # BLD AUTO: 4.87 M/UL (ref 4.2–5.3)
SODIUM SERPL-SCNC: 138 MMOL/L (ref 136–145)
WBC # BLD AUTO: 4.8 K/UL (ref 4.6–13.2)

## 2018-10-02 PROCEDURE — 82107 ALPHA-FETOPROTEIN L3: CPT | Performed by: NURSE PRACTITIONER

## 2018-10-02 PROCEDURE — 80076 HEPATIC FUNCTION PANEL: CPT | Performed by: NURSE PRACTITIONER

## 2018-10-02 PROCEDURE — 85025 COMPLETE CBC W/AUTO DIFF WBC: CPT | Performed by: NURSE PRACTITIONER

## 2018-10-02 PROCEDURE — 36415 COLL VENOUS BLD VENIPUNCTURE: CPT | Performed by: NURSE PRACTITIONER

## 2018-10-02 PROCEDURE — 87521 HEPATITIS C PROBE&RVRS TRNSC: CPT | Performed by: NURSE PRACTITIONER

## 2018-10-02 PROCEDURE — 85610 PROTHROMBIN TIME: CPT | Performed by: NURSE PRACTITIONER

## 2018-10-02 PROCEDURE — 80048 BASIC METABOLIC PNL TOTAL CA: CPT | Performed by: NURSE PRACTITIONER

## 2018-10-02 NOTE — MR AVS SNAPSHOT
[address] [place] [address] 
[phone] Patient: Nae Zafar MRN: [id number] :1955 Visit Information Date & Time Provider Department Dept. Phone Encounter #  
 10/2/2018  4:30 PM MELANIE Carter [address] [id number] Follow-up Instructions Return in about 6 months (around 2019). Upcoming Health Maintenance Date Due Shingrix Vaccine Age 50> (1 of 2) 10/21/2006 BREAST CANCER SCRN MAMMOGRAM 2018 Influenza Age 5 to Adult 2018 PAP AKA CERVICAL CYTOLOGY 1/3/2022 COLONOSCOPY 2022 DTaP/Tdap/Td series (2 - Td) 10/28/2024 Allergies as of 10/2/2018  Review Complete On: 10/2/2018 By: Jamal Bennett Severity Noted Reaction Type Reactions Adhesive Tape-silicones     Not Verified Rash Current Immunizations  Never Reviewed Name Date Hep A Vaccine 2012, 4/10/2012, 3/5/2012 Hep B Vaccine 2012, 4/10/2012, 3/5/2012 Influenza Vaccine 9/15/2017, 10/28/2014 12:00 AM, 10/8/2014, 2013 12:00 AM, 2013, 2013 12:00 AM  
 Influenza Vaccine (Quad) PF 10/12/2016, 10/15/2015 12:00 AM  
 Pneumococcal Conjugate (PCV-13) 2017 Pneumococcal Polysaccharide (PPSV-23) 2013 12:00 AM  
 Tdap 10/28/2014 12:00 AM  
  
 Not reviewed this visit You Were Diagnosed With   
  
 Codes Comments Cirrhosis of liver without ascites, unspecified hepatic cirrhosis type ([facility] [address]    -  Primary ICD-10-CM: K74.60 ICD-9-CM: 571.5 Vitals BP Pulse Temp Resp Height(growth percentile) (!) 139/91 (BP 1 Location: Left arm, BP Patient Position: Sitting) 77 97.8 °F (36.6 °C) (Tympanic) 14 5' 4\" (1.626 m) Weight(growth percentile) SpO2 BMI OB Status Smoking Status 200 lb 9.6 oz (91 kg) 96% 34.43 kg/m2 Postmenopausal Former Smoker Vitals History BMI and BSA Data Body Mass Index Body Surface Area 34.43 kg/m 2 2.03 m 2 Preferred Pharmacy Pharmacy Name Phone [facility] [place], [place] - [address] [facility] [phone] Your Updated Medication List  
  
   
This list is accurate as of 10/2/18  4:54 PM.  Always use your most recent med list.  
  
  
  
  
 ABILIFY 5 mg tablet Generic drug:  ARIPiprazole Take 10 mg by mouth daily. benztropine 1 mg tablet Commonly known as:  COGENTIN Take 1 mg by mouth three (3) times daily. conjugated estrogens 0.625 mg/gram vaginal cream  
Commonly known as:  PREMARIN  
1/2 gm twice weekly on sunday and wednesday  
  
 diclofenac 1 % Gel Commonly known as:  VOLTAREN  
apply 2 grams to affected area four times a day  
  
 guaiFENesin  mg ER tablet Commonly known as:  [address] Take 2 Tabs by mouth two (2) times daily as needed for Congestion. levothyroxine 125 mcg tablet Commonly known as:  SYNTHROID Take 1 Tab by mouth Daily (before breakfast). meloxicam 15 mg tablet Commonly known as:  MOBIC Take 1 Tab by mouth daily as needed (inflammation). multivitamin tablet Commonly known as:  ONE A DAY Take 1 Tab by mouth daily. omeprazole 20 mg capsule Commonly known as:  PRILOSEC  
take 1 capsule by mouth once daily PROAIR HFA 90 mcg/actuation inhaler Generic drug:  albuterol Take 2 Puffs by inhalation every six (6) hours as needed for Wheezing. tiZANidine 2 mg tablet Commonly known as:  Vivica Greener Take 1 Tab by mouth three (3) times daily. zolpidem 10 mg tablet Commonly known as:  AMBIEN Follow-up Instructions Return in about 6 months (around 4/2/2019). To-Do List   
 10/02/2018 Imaging:  US ABD LTD W ELASTOGRAPHY   
  
 10/08/2018 11:30 AM  
  Appointment with [facility] CELESTINO MAYBERRY RM 1 at [facility] ([phone]) PAYMENT  For Non-Medicare patients - $15.00 will be collected from you at the time of your exam.  You will be billed $35.00 from the reading [facility]. OUTSIDE FILMS  - Any outside films related to the study being scheduled should be brought with you on the day of the exam.  If this cannot be done there may be a delay in the reading of the study. MEDICATIONS  - Patient must bring a complete list of all medications currently taking to include prescriptions, over-the-counter meds, herbals, vitamins & any dietary supplements  GENERAL INSTRUCTIONS  - On the day of your exam do not use any bath powder, deodorant or lotions on the armpit area. -Tenderness of breasts may cause an increase of discomfort during procedure. If you are experiencing breast tenderness on the day of your appointment and would like to reschedule, please call [phone]. Introducing [facility] & [facility] SERVICES! Dear Lizzeth Smyth: Thank you for requesting a S[employer] account. Our records indicate that you already have an active S[employer] account. You can access your account anytime at [email or web address] Did you know that you can access your hospital and ER discharge instructions at any time in Sulfagenix? You can also review all of your test results from your hospital stay or ER visit. Additional Information If you have questions, please visit the Frequently Asked Questions section of the [employer] website at [email or web address] Remember, Sulfagenix is NOT to be used for urgent needs. For medical emergencies, dial 911. Now available from your iPhone and Android! Please provide this summary of care documentation to your next provider. Your primary care clinician is listed as Albaro Anders. If you have any questions after today's visit, please call [phone].

## 2018-10-02 NOTE — PROGRESS NOTES
[address] V[address]k, [address], [facility], [place]       MELANIE Sapp PA-C Fanny Archer, [facility]   MELANIE Foote NP        at [facility]     [address], [address] [address]     [phone]     FAX: [phone]    at [address], [address] [facility], [address]     [phone]     FAX: [phone]       Patient Care Team:  Nayely Doss MD as PCP - General (Family Practice)  Dipti Tate MD (Rheumatology)      Problem List  Date Reviewed: 10/2/2018          Codes Class Noted    Negative depression screening ICD-10-CM: Z13.31  ICD-9-CM: V79.0  7/3/2018        Advanced care planning/counseling discussion ICD-10-CM: Z71.89  ICD-9-CM: V65.49  7/3/2018        Intrinsic (urethral) sphincter deficiency (ISD) ICD-10-CM: N36.42  ICD-9-CM: 599.82  5/3/2017        Nocturia ICD-10-CM: R35.1  ICD-9-CM: 788.43  4/4/2017        RAMONA (stress urinary incontinence, female) ICD-10-CM: N39.3  ICD-9-CM: 625.6  4/4/2017        Microscopic hematuria ICD-10-CM: R31.29  ICD-9-CM: 599.72  3/26/2017        Chronic back pain ICD-10-CM: M54.9, G89.29  ICD-9-CM: 724.5, 338.29  9/23/2015    Overview Signed 9/23/2015 12:00 PM by Zena Mera NP     Nerve ablation 09/16/2015. Hypothyroidism ICD-10-CM: E03.9  ICD-9-CM: 244.9  2/6/2013        Chronic hepatitis C ([facility] ICD-10-CM: B18.2  ICD-9-CM: 070.54  2/6/2013    Overview Addendum 3/19/2015 10:22 AM by Zena Mera NP     SVR achieved with SOF/SIM (2014). Standard interferon TIW x 6 months 1997. Non-response. Standard interferon/ribavirin x 6months 1999.   Non-response             Rheumatoid arthritis(714.0) ICD-10-CM: M06.9  ICD-9-CM: 714.0  2/6/2013        Carpal tunnel syndrome ICD-10-CM: G56.00  ICD-9-CM: 354.0  2/6/2013              Celeste Martinez returns to the [facility] today for education and management of cirrhosis. She was treated for HCV and was SVR three years post treatment. She began treatment on 05/15/2014 with dual therapy of SOF/SIM and completed her 12 weeks of therapy on 08/05/2014. The patient is a 65 y.o.  female who was noted to have abnormalities in liver chemistries and subsequently tested positive for chronic HCV in 1990s. Risk factors for acquiring HCV are IV drug use in 1980s. There was no history of acute incteric hepatitis at the time of these risk factors. Ultrasound of the liver was performed 04/2018. The liver has a heterogeneous echotexture with a nodular contour, compatible with known cirrhosis. No focal hepatic lesions are evident. Repeat ultrasound with shear wave elastography was performed in 10/2017. The ultrasound demonstrates a coarse heterogeneous echotexture. No focal mass. The shear wave elastography suggests that the cirrhosis has resolved. A liver biopsy has not been performed. The patient was treated with standard interferon in 1997 and with standard interferon and ribavirin in 1999. Both treatments lasted for 24 weeks. The patient tolerated treatment reasonably well. HCV RNA levels obtained during treatment are not available at this time. The patient is unaware of the virologic response pattern. The most recent laboratory studies indicate that the liver transaminases are normal, alkaline phosphatase is normal, tests of hepatic synthetic and metabolic function are normal, and the platelet count is depressed. The patient has no complaints which can be attributed to liver disease. The patient completes all daily activities without any functional limitations.  The patient has not experienced fatigue, fevers, chills, shortness of breath, chest pain, pain in the right side over the liver, diffuse abdominal pain, nausea, vomiting, constipation, diarrhrea, dry eyes, dry mouth, arthralgias, myalgias, yellowing of the eyes or skin, itching, dark urine, problems concentrating, swelling of the abdomen, swelling of the lower extremities, hematemesis, or hematochezia. ALLERGIES  Allergies   Allergen Reactions    Adhesive Tape-Silicones Rash       MEDICATIONS:  Current Outpatient Prescriptions   Medication Sig Dispense Refill    omeprazole (PRILOSEC) 20 mg capsule take 1 capsule by mouth once daily 30 Cap 2    guaiFENesin ER (MUCINEX) 600 mg ER tablet Take 2 Tabs by mouth two (2) times daily as needed for Congestion. 60 Tab 2    levothyroxine (SYNTHROID) 125 mcg tablet Take 1 Tab by mouth Daily (before breakfast). 90 Tab 1    meloxicam (MOBIC) 15 mg tablet Take 1 Tab by mouth daily as needed (inflammation). 90 Tab 1    PROAIR HFA 90 mcg/actuation inhaler Take 2 Puffs by inhalation every six (6) hours as needed for Wheezing. 1 Inhaler 3    multivitamin (ONE A DAY) tablet Take 1 Tab by mouth daily.  tiZANidine (ZANAFLEX) 2 mg tablet Take 1 Tab by mouth three (3) times daily. 90 Tab 2    conjugated estrogens (PREMARIN) 0.625 mg/gram vaginal cream 1/2 gm twice weekly on sunday and wednesday 45 g 2    zolpidem (AMBIEN) 10 mg tablet   0    benztropine (COGENTIN) 1 mg tablet Take 1 mg by mouth three (3) times daily.  ARIPiprazole (ABILIFY) 5 mg tablet Take 10 mg by mouth daily.  diclofenac (VOLTAREN) 1 % gel apply 2 grams to affected area four times a day  0     REVIEW OF SYSTEMS:  Systems related to all organ systems were reviewed. All were negative except as noted above. FAMILY/SOCIAL HISTORY:  The patient is . The patient has 2 children, and 4 grandchildren. The patient used to smoke a little but stopped in 2005. She used to consumed alcohol in excess. The patient has been abstinent from alcohol since 5/2012. 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 The patient is currently receiving disability.       PHYSICAL EXAMINATION:  Visit Vitals    BP (!) 139/91 (BP 1 Location: Left arm, BP Patient Position: Sitting)    Pulse 77    Temp 97.8 °F (36.6 °C) (Tympanic)    Resp 14    Ht 5' 4\" (1.626 m)    Wt 200 lb 9.6 oz (91 kg)    SpO2 96%    BMI 34.43 kg/m2     General: No acute distress. Eyes: Sclera anicteric. ENT: No oral lesions. Thyroid normal.  Nodes: No adenopathy. Skin: No spider angiomata. No jaundice. No palmar erythema. Respiratory: Lungs clear to auscultation. Cardiovascular: Regular heart rate. No murmurs. No JVD. Abdomen: Soft non-tender. Liver size normal to percussion/palpation. Spleen not palpable. No obvious ascites. Extremities: No lower extremity edema. No muscle wasting. No gross arthritic changes. Neurologic: Alert and oriented. Cranial nerves grossly intact. No asterixsis.     LABORATORY STUDIES:   [facility] [place] [facility] [address] & Units 10/2/2018 4/2/2018   WBC 4.6 - 13.2 K/uL 4.8 5.5   ANC 1.8 - 8.0 K/UL 3.3 3.9   HGB 12.0 - 16.0 g/dL 14.2 13.8    - 420 K/uL 93 (L) 97 (L)   INR 0.8 - 1.2   1.1 1.1   AST 15 - 37 U/L 22 29   ALT 13 - 56 U/L 32 46   Alk Phos 45 - 117 U/L 61 65   Bili, Total 0.2 - 1.0 MG/DL 0.7 0.8   Bili, Direct 0.0 - 0.2 MG/DL 0.2 0.2   Albumin 3.4 - 5.0 g/dL 4.1 4.1   BUN 7.0 - 18 MG/DL 8 13   Creat 0.6 - 1.3 MG/DL 0.91 0.88   Na 136 - 145 mmol/L 138 146 (H)   K 3.5 - 5.5 mmol/L 3.5 4.0   Cl 100 - 108 mmol/L 102 107   CO2 21 - 32 mmol/L 26 28   Glucose 74 - 99 mg/dL 90 99     Cancer Screening Latest Ref Rng & Units 4/2/2018 10/11/2017 10/3/2016   AFP, Serum 0.0 - 8.0 ng/mL 2.8 2.8 3.4   AFP-L3% 0.0 - 9.9 % Comment Comment Comment     SEROLOGIES:  Serologies Latest Ref Rng 2/6/2013   Hep A Ab, Total Negative Positive (A)   Hep B Surface Ag Negative Negative   Hep B Core Ab, Total Negative Negative   Hep B Surface Ab 0.00 - 0.99 Index Value 0.52   Hep C Genotype See Note 1a   IL28B Genotype  CT genotype   Ferritin 13 - 150 ng/mL 464 (H)   Iron % Saturation 15 - 55 % 67 (H)     2/2013. HCV RNA Log 6.36 IU/ml. Eradicated. LIVER HISTOLOGY:  12/2016. Shear wave elastography. E Median is 11.66, suggestive of F3, bridging fibrosis. Wide E Std of 4.41 is suggestive of fatty liver disease. 10/2017. TRANSIENT HEPATIC ELASTOGRAPHY:  E Range: 7-12.0 kPa (previously 9.1-23.0), E Mean: 9.3 kPa (previously 13.05),   E Median: 8.6 kPa (recently 11.7), E Std: 2.0 kPa (previously 4.4)     ENDOSCOPIC PROCEDURES:  09/2014. EGD by Dr. Alissa Cabrera. Normal esophagus. H.Pylori is negative. RADIOLOGY:  04/2014. Ultrasound of liver. Consistent with cirrhosis. No masses. 10/2014. Ultrasound of liver. Consistent with cirrhosis. No masses. 04/2015. Ultrasound of the liver. Consistent with cirrhosis. No mention of masses in the report. 10/2015. Ultrasound of the liver. Consistent with cirrhosis. No mention of masses in the report.  06/2016. Ultrasound of the liver. Heterogeneous appearing hepatic echotexture without mass. 12/2016. Ultrasound of the liver. Coarse heterogeneous echotexture. No focal mass. 10/2018. Ultrasound of the liver, performed with elastography. Persistent sonographic findings of hepatic cirrhosis. No focal hepatic mass. 04/2018. Ultrasound of the liver. The liver has a heterogeneous echotexture with a nodular contour, compatible with known cirrhosis. No focal hepatic lesions are evident. 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 The HCV has been treated and cured. Elastography, imaging, and labs suggest that the cirrhosis is resolving post eradication of the HCV. The most recent laboratory studies indicate that the liver transaminases are normal, alkaline phosphatase is normal,  tests of hepatic synthetic and metabolic function are depressed, and the platelet count is depressed. Complications of cirrhosis were discussed in detail.  We discussed thrombocytopenia, portal hypertension, varices, GI bleeding, peripheral edema, ascites, hepatic encephalopathy, and hepatocellular carcinoma. She is very stable and the cirrhosis may have even resolved. We will move her appointments to every 6 months. We discussed the need for every 6 month liver imaging studies. It appears that the cirrhosis is resolving. Hepatic encephalopathy has not developed. Edema. Resolved. Diuretics have been discontinued. [address] screening. Ultrasound was recently performed and does not demonstrate any sign of developing HCC. Repeat imaging ordered today. Will alternate ultrasounds with ultrasounds with shear wave elastography. Elastography  can assess liver fibrosis and determine if a patient has advanced fibrosis or cirrhosis without the need for liver biopsy. HCV. The patient was previously treated for HCV with standard interferon and ribavirin in the 1990s. There was a non-response that was not clearly defined. She was retreated with SOF/SIM last year and is SVR more than 3 years post treatment. The patient was directed to continue all current medications at the current dosages except for the NSAIDs, which she should stop. There are no contraindications for the patient to take any medications that are necessary for treatment of other medical issues. The patient was counseled regarding alcohol consumption. Vaccination for viral hepatitis A is not needed. The patient has serologic evidence of prior exposure or vaccination with immunity. [facility] in 6 months.      Kwaku Marie NP   [facility] [place] [facility], [address] [place] [address] [place] Izabela Montanez, [facility]   [phone]

## 2018-10-02 NOTE — PROGRESS NOTES
1. Have you been to the ER, urgent care clinic since your last visit? Hospitalized since your last visit? No    2. Have you seen or consulted any other health care providers outside of the [address] [place] [address] since your last visit? Include any pap smears or colon screening. Yes When: July 2,2018 Pcp visit.

## 2018-10-03 LAB
AFP L3 MFR SERPL: NORMAL % (ref 0–9.9)
AFP SERPL-MCNC: 2.9 NG/ML (ref 0–8)

## 2018-10-08 ENCOUNTER — HOSPITAL ENCOUNTER (OUTPATIENT)
Dept: MAMMOGRAPHY | Age: 63
Discharge: HOME | End: 2018-10-08
Attending: FAMILY MEDICINE
Payer: MEDICARE

## 2018-10-08 DIAGNOSIS — Z12.31 VISIT FOR SCREENING MAMMOGRAM: ICD-10-CM

## 2018-10-08 PROCEDURE — 77063 BREAST TOMOSYNTHESIS BI: CPT

## 2018-10-27 ENCOUNTER — HOSPITAL ENCOUNTER (OUTPATIENT)
Dept: ULTRASOUND IMAGING | Age: 63
Discharge: HOME | End: 2018-10-27
Payer: MEDICARE

## 2018-10-27 DIAGNOSIS — K74.60 CIRRHOSIS OF LIVER WITHOUT ASCITES, UNSPECIFIED HEPATIC CIRRHOSIS TYPE (HCC): ICD-10-CM

## 2018-10-27 PROCEDURE — 0346T US ABD LTD W ELASTOGRAPHY: CPT

## 2018-11-12 ENCOUNTER — OFFICE VISIT (OUTPATIENT)
Dept: ORTHOPEDIC SURGERY | Age: 63
End: 2018-11-12

## 2018-11-12 VITALS
BODY MASS INDEX: 34.43 KG/M2 | TEMPERATURE: 98.2 F | RESPIRATION RATE: 18 BRPM | HEART RATE: 83 BPM | DIASTOLIC BLOOD PRESSURE: 80 MMHG | SYSTOLIC BLOOD PRESSURE: 130 MMHG | HEIGHT: 64 IN

## 2018-11-12 DIAGNOSIS — M54.59 MECHANICAL LOW BACK PAIN: ICD-10-CM

## 2018-11-12 DIAGNOSIS — M62.838 MUSCLE SPASM: Primary | ICD-10-CM

## 2018-11-12 DIAGNOSIS — M79.18 MYOFASCIAL PAIN: ICD-10-CM

## 2018-11-12 DIAGNOSIS — M79.18 CERVICAL MYOFASCIAL PAIN SYNDROME: ICD-10-CM

## 2018-11-12 NOTE — PROGRESS NOTES
[address]  [address], [address]  [place], [address] [place] [address] Street  Phone: [phone]  Fax: [phone]        Fidencio Tee  : 1955  PCP: Yash Ny MD  2018    PROGRESS NOTE      ASSESSMENT AND PLAN    Penelope Tyler comes in to the office today for an exacerbation of her cervical and lumbar myofascial pain. She is interested in another referral to PT as it proved effective in the past. She remains active with walking and caring for her grandson. She rates her pain as a 2/10 today. On examination, she has tenderness to palpation of her lumbar paraspinals. She had no pain reproduced with lumbar flexion or extension. Her pain remains myofascial in nature. I referred to PT with optional dry needling. I advised she begin the [employer] program.  I advised that she has no weight restriction with lifting and that form is much more important than weight. Pt will f/u in 6 weeks or sooner as needed. Diagnoses and all orders for this visit:    1. Muscle spasm  -     REFERRAL TO PHYSICAL THERAPY    2. Mechanical low back pain  -     REFERRAL TO PHYSICAL THERAPY    3. Cervical myofascial pain syndrome  -     REFERRAL TO PHYSICAL THERAPY    4. Myofascial pain  -     REFERRAL TO PHYSICAL THERAPY         Follow-up Disposition: Not on File      HISTORY OF PRESENT ILLNESS  Penelope Tyler is a 59 y.o. female. A&P / HPI from 2016:  Penelope Tyler comes in to the office today c/o cervical and lumbar pain. She has brought her MRI with her today which does not reveal any obvious causes for her pain. She has a diagnosis of myofascial pain.      She was referred to PT as it has previously provided significant relief for her pain. Pt was advised to continue taking Mobic prn.      Pt will f/u in 6 weeks or prn.      Updates from 17:  Pt presents for a cervical and lumbar PT continuation f/u.  She has found significant relief with her visits and is experiencing little to no pain today. She only has complaints of an sporadic, low grade pain in her lumbar region.      Pt mentions she recently had a mechanical fall which injured her right shoulder. She was treated with a cortisone injection and PT which has provided relief.     Updates from 05/10/17:  Pt presents for a prn f/u. Pt previously found relief with cervical and lumbar PT. She returns today after straining her lumbar region about 2 weeks ago while moving heavy tables. Her pain still appears to be myofascial in nature although there is potential for it to be due to sacroiliitis or a lumbar radiculopathy.     Updates from 06/29/17:  Pt presents for pain in the lumbar region. At the last visit I referred her to get PT and a trigger point injection which provided her great relief. She reports to an increase in exercise which has also provided her relief. Updates from 11/12/18:  Pt presents for an exacerbation of her cervical and lumbar myofascial pain. She is interested in another referral to PT as it proved effective in the past. She is unsure if she needs dry needling at this time. She remains active with walking and caring for her grandson. She has attempted to refrain from lifting, but has to lift her grandson. I advised she can lift as much weight as she would like, but to lift properly.        PAST MEDICAL HISTORY   Past Medical History:   Diagnosis Date    Abnormal Papanicolaou smear of cervix     1988 HPV    Asthma     Bipolar 1 disorder ([facility]     Bipolar 1 disorder, depressed (HCC)     Bursitis     Carpal tunnel syndrome     Cirrhosis, hepatitis C     Connective tissue disease ([facility]     DDD (degenerative disc disease), lumbosacral     Gastric ulcer     Hashimoto's disease     Hashimoto's disease     Hepatitis C     Herniated intervertebral disc of lumbar spine     Hypermobility syndrome     Liver disease     Menopause     Osteoarthritis     Plantar fasciitis, bilateral     RA (rheumatoid arthritis) ([facility] [address] [facility]        Past Surgical History:   Procedure Laterality Date    HX APPENDECTOMY      HX DILATION AND CURETTAGE  1988    cryso    HX GYN      BTL    HX HEENT      malofacial surgery   . MEDICATIONS    Current Outpatient Medications   Medication Sig Dispense Refill    omeprazole (PRILOSEC) 20 mg capsule take 1 capsule by mouth once daily 30 Cap 2    guaiFENesin ER (MUCINEX) 600 mg ER tablet Take 2 Tabs by mouth two (2) times daily as needed for Congestion. 60 Tab 2    levothyroxine (SYNTHROID) 125 mcg tablet Take 1 Tab by mouth Daily (before breakfast). 90 Tab 1    meloxicam (MOBIC) 15 mg tablet Take 1 Tab by mouth daily as needed (inflammation). 90 Tab 1    PROAIR HFA 90 mcg/actuation inhaler Take 2 Puffs by inhalation every six (6) hours as needed for Wheezing. 1 Inhaler 3    multivitamin (ONE A DAY) tablet Take 1 Tab by mouth daily.  tiZANidine (ZANAFLEX) 2 mg tablet Take 1 Tab by mouth three (3) times daily. 90 Tab 2    conjugated estrogens (PREMARIN) 0.625 mg/gram vaginal cream 1/2 gm twice weekly on sunday and wednesday 45 g 2    diclofenac (VOLTAREN) 1 % gel apply 2 grams to affected area four times a day  0    zolpidem (AMBIEN) 10 mg tablet   0    benztropine (COGENTIN) 1 mg tablet Take 1 mg by mouth three (3) times daily.  ARIPiprazole (ABILIFY) 5 mg tablet Take 10 mg by mouth daily.           ALLERGIES  Allergies   Allergen Reactions    Adhesive Tape-Silicones Rash          SOCIAL HISTORY    Social History     Socioeconomic History    Marital status: SINGLE     Spouse name: Not on file    Number of children: Not on file    Years of education: Not on file    Highest education level: Not on file   Social Needs    Financial resource strain: Not on file    Food insecurity - worry: Not on file    Food insecurity - inability: Not on file    Transportation needs - medical: Not on file   Greats needs - non-medical: Not on file   Occupational History    Not on file   Tobacco Use    Smoking status: Former Smoker     Last attempt to quit: 2005     Years since quittin.3    Smokeless tobacco: Never Used   Substance and Sexual Activity    Alcohol use: No     Alcohol/week: 0.0 oz    Drug use: Yes     Types: Marijuana    Sexual activity: No   Other Topics Concern    Not on file   Social History Narrative    Not on file       FAMILY HISTORY  Family History   Problem Relation Age of Onset    No Known Problems Mother     No Known Problems Father          REVIEW OF SYSTEMS  Review of Systems   Musculoskeletal: Positive for back pain, myalgias and neck pain. PHYSICAL EXAMINATION  There were no vitals taken for this visit. Pain Assessment  2017   Location of Pain Neck;Back   Location Modifiers -   Severity of Pain 2   Quality of Pain Aching;Dull   Duration of Pain Persistent   Frequency of Pain Constant   Aggravating Factors -   Aggravating Factors Comment -   Limiting Behavior -   Relieving Factors -   Result of Injury No           Constitutional:  Well developed, well nourished, in no acute distress. Psychiatric: Affect and mood are appropriate. Integumentary: No rashes or abrasions noted on exposed areas. SPINE/MUSCULOSKELETAL EXAM    Cervical spine:  Neck is midline. Normal muscle tone. No focal atrophy is noted. ROM pain free. Shoulder ROM intact. Mild tenderness to palpation, R>L. Negative Spurling's sign. Negative Tinel's sign. Negative Dillon's sign.       Sensation in the bilateral arms grossly intact to light touch.       Lumbar spine:  No rash, ecchymosis, or gross obliquity. No fasciculations. No focal atrophy is noted. No pain with hip ROM. Full range of motion. Diffuse tenderness to palpation, L>R. No tenderness to palpation at the sciatic notch. SI joints non-tender.    Trochanters non tender.      Sensation in the bilateral legs grossly intact to light touch.     Updates from 05/10/17:  Bilateral SI joints tender. Negative right PORTILLO test.  Negative right p4 test.  Negative bilateral straight leg raise. Tenderness to palpation in the lumbar region. MOTOR:      Biceps  Triceps Deltoids Wrist Ext Wrist Flex Hand Intrin   Right 5/5 5/5 5/5 5/5 5/5 5/5   Left 5/5 5/5 5/5 5/5 5/5 5/5             Hip Flex  Quads Hamstrings Ankle DF EHL Ankle PF   Right 5/5 5/5 5/5 5/5 5/5 5/5   Left 5/5 5/5 5/5 5/5 5/5 5/5     DTRs are 2+ biceps, triceps, brachioradialis, patella, and Achilles.      Negative Straight Leg raise. Squat not tested. No difficulty with tandem gait.       Ambulation without assistive device. FWB.       RADIOGRAPHS  Lumbar MRI images taken on June/July 2016 personally reviewed with patient:  1) No obvious stenosis    15 minutes of face-to-face contact were spent with the patient during today's visit extensively discussing symptoms and treatment plan. All questions were answered. More than half of this visit today was spent on counseling.      Written by Jalen Glass as dictated by Crow Herrmann MD

## 2018-11-19 ENCOUNTER — HOSPITAL ENCOUNTER (OUTPATIENT)
Dept: PHYSICAL THERAPY | Age: 63
Discharge: HOME | End: 2018-11-19
Payer: MEDICARE

## 2018-11-19 PROCEDURE — 97530 THERAPEUTIC ACTIVITIES: CPT

## 2018-11-19 PROCEDURE — G8979 MOBILITY GOAL STATUS: HCPCS

## 2018-11-19 PROCEDURE — 97162 PT EVAL MOD COMPLEX 30 MIN: CPT

## 2018-11-19 PROCEDURE — 97110 THERAPEUTIC EXERCISES: CPT

## 2018-11-19 PROCEDURE — G8978 MOBILITY CURRENT STATUS: HCPCS

## 2018-11-19 NOTE — PROGRESS NOTES
[facility] at the [address], [place], [address] [facility]  Phone: [phone]      Fax:  [phone]    Plan of Care/ Statement of Necessity for Physical Therapy Services    Patient name: Isac Renee Start of Care: 2018   Referral source: Kourtney Armando MD : [id number]    Medical Diagnosis: Low back pain [M54.5]  Myalgia, other site [M79.18]  Other muscle spasm [M62.838]  Myalgia, other site [M79.18]   Onset Date:2 months ago    Treatment Diagnosis: LBP, lumbo pelvic dysfunction    Prior Hospitalization: see medical history Provider#: [id number]   Medications: Verified on Patient summary List    Comorbidities: Anxiety or Panic Disorders, Arthritis, Asthma, Back pain, BMI over 30, Depression,  Gastrointestinal Disease, Hepatitis, Tuberculosis, HIV, AIDS, or other blood-borne condition, Incontinence   Prior Level of Function: prior to 2 months unrestricted in all daily and household activities           The Plan of Care and following information is based on the information from the initial evaluation. Assessment/ key information:   Pt is a very pleasant right hand dominant 59year old female with C/C of right UT and cervical pain and lower thoracic and lumbar pain. Pt has hx of LBP and myofascial pain and has been successfully treated in PT previously. At present signs/sympotms consistent with mechanical LBP and possible lumbo-pelvic dysfunction. Suspect mechanical cervical pain as well. Pt reports most recent onset of pain insidious ~2 months ago and pain has progressed since then. Objective findings include decreased alteral flexion cervical ROM, scapular dyskinesia, decreased trunk rotation, hypomobility at pelvis, glut inhibition, overlengthened and weak hamstrings and decreased core stability. 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      Evaluation Complexity History HIGH Complexity :3+ comorbidities / personal factors will impact the outcome/ POC ; Examination HIGH Complexity : 4+ Standardized tests and measures addressing body structure, function, activity limitation and / or participation in recreation  ;Presentation MEDIUM Complexity : Evolving with changing characteristics  ; Clinical Decision Making MEDIUM Complexity : FOTO score of 26-74  Overall Complexity Rating: MEDIUM    Problem List: pain affecting function, decrease ROM, decrease strength, impaired gait/ balance, decrease ADL/ functional abilitiies, decrease activity tolerance and decrease flexibility/ joint mobility   Treatment Plan may include any combination of the following: Therapeutic exercise, Therapeutic activities, Neuromuscular re-education, Physical agent/modality, Gait/balance training, Manual therapy and Patient education  Patient / Family readiness to learn indicated by: asking questions, trying to perform skills and interest  Persons(s) to be included in education: patient (P)  Barriers to Learning/Limitations: None  Patient Goal (s): core strength to be more limber, to alleviate this pain and keep it from getting worse  Patient Self Reported Health Status: good  Rehabilitation Potential: good    Short Term Goals: STG- To be accomplished in 2 week(s):  1. Pt will be independent with HEP to encourage prophylaxis. Eval:dispensed  Current: NA     Long Term Goals: LTG- To be accomplished in 6 week(s):  1. Pt will demonstrate ability to bridge to full height with good form >10  Reps to indicate functional glut and hamstring strength. Eval:decreased height  Current: NA     2.  Pt will be able to bend and stoop for household chores and  without pain to increase tolerance to daily activities. Eval:forward flexion 6 in with no reversal of lordosis and pain to stand  Current: NA     3.  Pt increase trunk rotation to less than 16 in in hooklying to indicate mobility needed for gait.  .  Eval:19 in Current: NA     4. Pt FOTO score will increase by 10 points to show improvement in  function. Eval:54  Current: will address at visit 5     Frequency / Duration: Patient to be seen 2 times per week for 6 weeks. Patient/ Caregiver education and instruction: Diagnosis, prognosis, self care, activity modification and exercises   [x]  Plan of care has been reviewed with ANGEL    G-Codes (GP)  Mobility   Current  CK= 40-59%   Goal  CJ= 20-39%    The severity rating is based on clinical judgment and the FOTO score. Certification Period: 11/19/18 - 1/18/19   Mandi Grider, PT, DPT 11/19/2018 12:44 PM  _____________________________________________________________________  I certify that the above Therapy Services are being furnished while the patient is under my care. I agree with the treatment plan and certify that this therapy is necessary.     Physician's Signature:____________Date:_________TIME:________    [employer], Date and Time must be completed for valid certification **      Please sign and return to   [facility] at the [address], [facility], [address] [facility]  Phone: [phone]      Fax:  [phone]

## 2018-11-19 NOTE — PROGRESS NOTES
PT DAILY TREATMENT NOTE - [facility]-16    Patient Name: Yvan Cid  Date:2018  : 1955  [x]  Patient  Verified  Payor: Roni DamonGarnett Yasir / Plan: [facility] COMMUNITY PLAN MCR / Product Type: Managed Care Medicare /    In time:10:25 am  Out time:11:15 am  Total Treatment Time (min): 50  Total Timed Codes (min): 23  1:1 Treatment Time ( only): 50   Visit #: 1 of 12    Treatment Area: Low back pain [M54.5]  Myalgia, other site [M79.18]  Other muscle spasm [M62.838]  Myalgia, other site [M79.18]    SUBJECTIVE  Pain Level (0-10 scale): 5 - primarily in L-spine   Any medication changes, allergies to medications, adverse drug reactions, diagnosis change, or new procedure performed?: [x] No    [] Yes (see summary sheet for update)  Subjective functional status/changes:   [] No changes reported    Hx Present Illness: C/C of right shoulder and cervical pain and mid to lower back, primarily left sided  \"it feels like a grinding feeling\"  Pt has hx of LBP and myofascial pain ongoing w/ multi-year hx  Recent exacerbation in last 2 months with progression of pain   Denies numbness and tingling into bilateral UE and LE  Reports near fall last week, slipping on dogs water bowl   Right hand dominant   No recent imaging - MRI ~2 years ago  Increase in muscle spasms over last month  Increase in pain with carrying groceries, lifting grandson, bending/stooping (as with household chores - laundry)  PLOF: prior to 2 months unrestricted in all daily and household activities     Pain:  _6__/10 max       __3_/10 min     _5___/10 now    Location: indicates right UT and cervical spine  Lower thoracic and lumbar spine - primarily to the left side      [] Sharp    [x] Dull      [] Burning     []  Aching     [] Throbbing      [] Tingling     [x] Other: \"feels real tight in the mid section of my back\"   Intermittent grinding   Neck \"it feels really tight. 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 \"    Barriers: []pain []financial []time []transportation []other  Motivation: moderate-high  Substance use: []Alcohol []Tobacco []other:   FABQ Score: []low []elevate  Cognition: A & O x 4  Other    OBJECTIVE    27 min [x]Eval                  []Re-Eval             With   [x] TE - 10 min   [x] TA - 13 min    [] neuro   [] other: Patient Education: [x] Review HEP    [] Progressed/Changed HEP based on:   [] positioning   [] body mechanics   [] transfers   [] heat/ice application    [x] other: pt education regarding exam findings, anatomy involved, POC, indications for dry needling, benefits of community exercise     Other Objective/Functional Measures:    Movement/gait:  Decreased arm swing bilaterally     Visual Inspection: Thoracic: flattened  Lumbar: appears WFL  Shoulder/Scapula: superior translation of humeral head, bilateral scap IR    Palpation: TTP and increased tone bilateral UT and cervical paraspinals  TTP and increased tone in bilateral posterior hip musculature and increased tone along bilateral thoracic-lumbar paraspinals                            AROM/PROM Right Left   Cervical Flex: 32     Ext: 40      Rot 50 55   Lat Flex 32 30        Trunk  Rot (hooklying) 19 in 19 in    **noted grossly decreased UE horizontal abduction   Grossly limited hip IR and ER bilaterally     Strength Right Left   UQS 4 4        Hip Flex 5 5   Knee ext 5 5   hamstrings 4- 4-        Bridging: decreased height, no pain   Glut inhibition: right>left with MMT of hip ext     Special Tests Right Left Spurlings - -   Slump - -   SLR - -   Passive SLR 90 90   PORTILLO - -   Brian  - +               Other Right Left                                       Other Comments: Standing Forward Flexion 7 in from floor, no reversal of lordodis, decreased use of gluts with stand  Gillet: hypomobile Left >Right   Scapular Rhythm: superior translation of humeral head, dyskinesia bilaterally      Pain Level (0-10 scale) post treatment: 3    ASSESSMENT/Changes in Function:   Pt is a very pleasant right hand dominant 59year old female with C/C of right UT and cervical pain and lower thoracic and lumbar pain. Pt has hx of LBP and myofascial pain and has been successfully treated in PT previously. At present signs/sympotms consistent with mechanical LBP and possible lumbo-pelvic dysfunction. Suspect mechanical cervical pain as well. Pt reports most recent onset of pain insidious ~2 months ago and pain has progressed since then. Objective findings include decreased alteral flexion cervical ROM, scapular dyskinesia, decreased trunk rotation, hypomobility at pelvis, glut inhibition, overlengthened and weak hamstrings and decreased core stability. Pt cares for 3 y.o. Grandson and could benefit from skilled PT to address strength, stability and mobility. Patient will continue to benefit from skilled PT services to modify and progress therapeutic interventions, address functional mobility deficits, address ROM deficits, address strength deficits, analyze and address soft tissue restrictions, analyze and cue movement patterns, analyze and modify body mechanics/ergonomics, assess and modify postural abnormalities and instruct in home and community integration to attain remaining goals. [x]  See Plan of Care  []  See progress note/recertification  []  See Discharge Summary         Progress towards goals / Updated goals:  Short Term Goals: STG- To be accomplished in 2 week(s):  1.   Pt will be independent with HEP to encourage prophylaxis. Eval:dispensed  Current: NA    Long Term Goals: LTG- To be accomplished in 6 week(s):  1. Pt will demonstrate ability to bridge to full height with good form >10  Reps to indicate functional glut and hamstring strength. Eval:decreased height  Current: NA    2. Pt will be able to bend and stoop for household chores and  without pain to increase tolerance to daily activities. Eval:forward flexion 6 in with no reversal of lordosis and pain to stand  Current: NA    3. Pt increase trunk rotation to less than 16 in in hooklying to indicate mobility needed for gait. .  Eval:19 in   Current: NA    4. Pt FOTO score will increase by 10 points to show improvement in  function.   Eval:54  Current: will address at visit 5    PLAN  [x]  Upgrade activities as tolerated     []  Continue plan of care  []  Update interventions per flow sheet       []  Discharge due to:_  []  Other:_      Tali Arias PT, DPT 11/19/2018  10:29 AM    Future Appointments   Date Time Provider Aniya Brady   11/23/2018 10:30 AM Kiley Serrano MD [facility] SCHED   12/17/2018  8:50 AM Cinthya Jessica  [address]   4/11/2019  4:30 PM Amie Belcher NP [address] [facility]

## 2018-11-23 ENCOUNTER — HOSPITAL ENCOUNTER (OUTPATIENT)
Dept: LAB | Age: 63
Discharge: HOME | End: 2018-11-23
Payer: MEDICAID

## 2018-11-23 ENCOUNTER — OFFICE VISIT (OUTPATIENT)
Dept: FAMILY MEDICINE CLINIC | Age: 63
End: 2018-11-23

## 2018-11-23 VITALS
HEIGHT: 64 IN | TEMPERATURE: 98 F | OXYGEN SATURATION: 94 % | SYSTOLIC BLOOD PRESSURE: 120 MMHG | DIASTOLIC BLOOD PRESSURE: 62 MMHG | WEIGHT: 205.6 LBS | HEART RATE: 78 BPM | BODY MASS INDEX: 35.1 KG/M2 | RESPIRATION RATE: 16 BRPM

## 2018-11-23 DIAGNOSIS — R53.83 FATIGUE, UNSPECIFIED TYPE: ICD-10-CM

## 2018-11-23 DIAGNOSIS — R03.0 ELEVATED BP WITHOUT DIAGNOSIS OF HYPERTENSION: ICD-10-CM

## 2018-11-23 DIAGNOSIS — E03.4 HYPOTHYROIDISM DUE TO ACQUIRED ATROPHY OF THYROID: Primary | ICD-10-CM

## 2018-11-23 DIAGNOSIS — J45.20 MILD INTERMITTENT ASTHMA WITHOUT COMPLICATION: ICD-10-CM

## 2018-11-23 DIAGNOSIS — K21.9 GASTROESOPHAGEAL REFLUX DISEASE WITHOUT ESOPHAGITIS: ICD-10-CM

## 2018-11-23 DIAGNOSIS — E03.4 HYPOTHYROIDISM DUE TO ACQUIRED ATROPHY OF THYROID: ICD-10-CM

## 2018-11-23 PROBLEM — E66.01 SEVERE OBESITY (HCC): Status: ACTIVE | Noted: 2018-11-23

## 2018-11-23 LAB
ALBUMIN SERPL-MCNC: 4 G/DL (ref 3.4–5)
ALBUMIN/GLOB SERPL: 1.2 {RATIO} (ref 0.8–1.7)
ALP SERPL-CCNC: 72 U/L (ref 45–117)
ALT SERPL-CCNC: 33 U/L (ref 13–56)
ANION GAP SERPL CALC-SCNC: 6 MMOL/L (ref 3–18)
AST SERPL-CCNC: 25 U/L (ref 15–37)
BASOPHILS # BLD: 0 K/UL (ref 0–0.1)
BASOPHILS NFR BLD: 0 % (ref 0–2)
BILIRUB SERPL-MCNC: 0.8 MG/DL (ref 0.2–1)
BUN SERPL-MCNC: 11 MG/DL (ref 7–18)
BUN/CREAT SERPL: 12 (ref 12–20)
CALCIUM SERPL-MCNC: 9 MG/DL (ref 8.5–10.1)
CHLORIDE SERPL-SCNC: 106 MMOL/L (ref 100–108)
CO2 SERPL-SCNC: 27 MMOL/L (ref 21–32)
CREAT SERPL-MCNC: 0.89 MG/DL (ref 0.6–1.3)
DIFFERENTIAL METHOD BLD: ABNORMAL
EOSINOPHIL # BLD: 0.2 K/UL (ref 0–0.4)
EOSINOPHIL NFR BLD: 3 % (ref 0–5)
ERYTHROCYTE [DISTWIDTH] IN BLOOD BY AUTOMATED COUNT: 12.7 % (ref 11.6–14.5)
GLOBULIN SER CALC-MCNC: 3.3 G/DL (ref 2–4)
GLUCOSE SERPL-MCNC: 103 MG/DL (ref 74–99)
HCT VFR BLD AUTO: 42.1 % (ref 35–45)
HGB BLD-MCNC: 14.7 G/DL (ref 12–16)
LYMPHOCYTES # BLD: 1.4 K/UL (ref 0.9–3.6)
LYMPHOCYTES NFR BLD: 27 % (ref 21–52)
MCH RBC QN AUTO: 30 PG (ref 24–34)
MCHC RBC AUTO-ENTMCNC: 34.9 G/DL (ref 31–37)
MCV RBC AUTO: 85.9 FL (ref 74–97)
MONOCYTES # BLD: 0.5 K/UL (ref 0.05–1.2)
MONOCYTES NFR BLD: 9 % (ref 3–10)
NEUTS SEG # BLD: 3.2 K/UL (ref 1.8–8)
NEUTS SEG NFR BLD: 61 % (ref 40–73)
PLATELET # BLD AUTO: 108 K/UL (ref 135–420)
PMV BLD AUTO: 9.8 FL (ref 9.2–11.8)
POTASSIUM SERPL-SCNC: 4 MMOL/L (ref 3.5–5.5)
PROT SERPL-MCNC: 7.3 G/DL (ref 6.4–8.2)
RBC # BLD AUTO: 4.9 M/UL (ref 4.2–5.3)
SODIUM SERPL-SCNC: 139 MMOL/L (ref 136–145)
T4 FREE SERPL-MCNC: 1.8 NG/DL (ref 0.7–1.5)
TSH SERPL DL<=0.05 MIU/L-ACNC: 1.4 UIU/ML (ref 0.36–3.74)
WBC # BLD AUTO: 5.2 K/UL (ref 4.6–13.2)

## 2018-11-23 PROCEDURE — 84443 ASSAY THYROID STIM HORMONE: CPT

## 2018-11-23 PROCEDURE — 80053 COMPREHEN METABOLIC PANEL: CPT

## 2018-11-23 PROCEDURE — 84439 ASSAY OF FREE THYROXINE: CPT

## 2018-11-23 PROCEDURE — 85025 COMPLETE CBC W/AUTO DIFF WBC: CPT

## 2018-11-23 RX ORDER — OMEPRAZOLE 20 MG/1
CAPSULE, DELAYED RELEASE ORAL
Qty: 90 CAP | Refills: 1 | Status: SHIPPED | OUTPATIENT
Start: 2018-11-23

## 2018-11-23 RX ORDER — ALBUTEROL SULFATE 90 UG/1
2 AEROSOL, METERED RESPIRATORY (INHALATION)
Qty: 1 INHALER | Refills: 3 | Status: SHIPPED | OUTPATIENT
Start: 2018-11-23

## 2018-11-23 NOTE — PATIENT INSTRUCTIONS
I will send you an e-mail with any recommendations about the lab results. Recheck 6 months if labs are ok. Monitor BP-find a pharmacy with a machine and check once or twice weekly.  Record the readings and recheck if it isn't consistently below 140/90

## 2018-11-23 NOTE — PROGRESS NOTES
Rm 1  Patient presents to the clinic   Chief Complaint   Patient presents with    Thyroid Problem     follow-up for blood work     Medication Refill       Depression Screening:  PHQ over the last two weeks 7/3/2018 7/3/2018 3/23/2018 10/11/2017 9/26/2017 6/30/2017 6/8/2017   Little interest or pleasure in doing things Not at all Not at all Not at all Not at all Not at all Not at all Not at all   Feeling down, depressed, irritable, or hopeless Not at all Not at all Not at all More than half the days Not at all Not at all Not at all   Total Score PHQ 2 0 0 0 2 0 0 0       Learning Assessment:  Learning Assessment 4/2/2018 6/8/2017 7/18/2016 3/19/2015 11/18/2014   PRIMARY LEARNER Patient Patient Patient Patient Patient   HIGHEST LEVEL OF EDUCATION - PRIMARY LEARNER  GRADUATED HIGH SCHOOL OR GED GRADUATED HIGH SCHOOL OR GED GRADUATED HIGH SCHOOL OR GED - GRADUATED HIGH SCHOOL OR GED   BARRIERS PRIMARY LEARNER VISUAL NONE NONE - [address] CAREGIVER - No No - No   PRIMARY LANGUAGE ENGLISH ENGLISH ENGLISH ENGLISH ENGLISH   LEARNER PREFERENCE PRIMARY DEMONSTRATION DEMONSTRATION DEMONSTRATION LISTENING DEMONSTRATION   ANSWERED BY patient patient patient patient Patient   RELATIONSHIP SELF SELF SELF SELF SELF       Abuse Screening:  Abuse Screening Questionnaire 7/3/2018 6/8/2017   Do you ever feel afraid of your partner? N N   Are you in a relationship with someone who physically or mentally threatens you? N N   Is it safe for you to go home? Y Y       Health Maintenance reviewed and discussed per provider: yes     Coordination of Care:    1. Have you been to the ER, urgent care clinic since your last visit? Hospitalized since your last visit? no    2. Have you seen or consulted any other health care providers outside of the [address] [place] [address] since your last visit? Include any pap smears or colon screening. No    Does patient want flu shot?  No, already got it           Requested Prescriptions     Pending Prescriptions Disp Refills    PROAIR HFA 90 mcg/actuation inhaler 1 Inhaler 3     Sig: Take 2 Puffs by inhalation every six (6) hours as needed for Wheezing.     omeprazole (PRILOSEC) 20 mg capsule 30 Cap 2

## 2018-11-27 ENCOUNTER — HOSPITAL ENCOUNTER (OUTPATIENT)
Dept: PHYSICAL THERAPY | Age: 63
Discharge: HOME | End: 2018-11-27
Payer: MEDICARE

## 2018-11-27 PROCEDURE — 97110 THERAPEUTIC EXERCISES: CPT

## 2018-11-27 NOTE — PROGRESS NOTES
PT DAILY TREATMENT NOTE - [facility]     Patient Name: Lavell Soliman  Date:2018  : 1955  [x]  Patient  Verified  Payor: Roni DamonGarnett Yasir / Plan: [facility] COMMUNITY PLAN MCR / Product Type: Managed Care Medicare /    In time:5:25 pm  Out time:6:10 pm   Total Treatment Time (min): 45  Total Timed Codes (min): 45  1:1 Treatment Time ( only): 44   Visit #: 2 of 12    Treatment Area: Low back pain [M54.5]  Myalgia, other site [M79.18]  Other muscle spasm [M62.838]    SUBJECTIVE  Pain Level (0-10 scale): 4  Any medication changes, allergies to medications, adverse drug reactions, diagnosis change, or new procedure performed?: [x] No    [] Yes (see summary sheet for update)  Subjective functional status/changes:   [] No changes reported  \"Ok. Just my middle and lower back. \"    OBJECTIVE    Modality rationale:    Type Additional Details   [] Estim:  []Unatt       []IFC  []Premod                        []Other:  []w/ice   []w/heat  Position:  Location:   [] Estim: []Att    []TENS instruct  []NMES                    []Other:  []w/US   []w/ice   []w/heat  Position:  Location:   []  Traction: [] Cervical       []Lumbar                       [] Prone          []Supine                       []Intermittent   []Continuous Lbs:  [] before manual  [] after manual   []  Ultrasound: []Continuous   [] Pulsed                           []1MHz   []3MHz W/cm2:  Location:   []  Iontophoresis with dexamethasone         Location: [] Take home patch   [] In clinic   []  Ice     []  heat  []  Ice massage  []  Laser   []  Anodyne Position:  Location:   []  Laser with stim  []  Other:  Position:  Location:   []  Vasopneumatic Device Pressure:       [] lo [] med [] hi   Temperature: [] lo [] med [] hi   [] Skin assessment post-treatment:  []intact []redness- no adverse reaction    []redness - adverse reaction:       45 min Therapeutic Exercise:  [x] See flow sheet :   Rationale: increase ROM, increase strength, improve coordination and increase proprioception to improve the patients ability to perform daily activities with decreased pain and symptom levels          With   [] TE   [] TA   [] neuro   [] other: Patient Education: [x] Review HEP    [] Progressed/Changed HEP based on:   [] positioning   [] body mechanics   [] transfers   [] heat/ice application    [] other:      Other Objective/Functional Measures:   Improved ability to PPT with elevation under shoulders     Pain Level (0-10 scale) post treatment: 2-3    ASSESSMENT/Changes in Function:   Pt appropriately challenged with interventions this session. Reported significant glut fatigue with clamshells. Discussed with pt starting with several weeks of exercises to establish base and then if needed can discuss adding in dry needling. Patient will continue to benefit from skilled PT services to modify and progress therapeutic interventions, address functional mobility deficits, address ROM deficits, address strength deficits, analyze and address soft tissue restrictions, analyze and cue movement patterns, analyze and modify body mechanics/ergonomics, assess and modify postural abnormalities and instruct in home and community integration to attain remaining goals. []  See Plan of Care  []  See progress note/recertification  []  See Discharge Summary         Progress towards goals / Updated goals:  Short Term Goals: STG- To be accomplished in 2 week(s):  1.  Pt will be independent with HEP to encourage prophylaxis. Eval:dispensed  Current: PROGRESSING: Reports compliance, reviewed this session     Long Term Goals: LTG- To be accomplished in 6 week(s):  1.  Pt will demonstrate ability to bridge to full height with good form >10  Reps to indicate functional glut and hamstring strength.   Eval:decreased height  Current: NA     2.  Pt will be able to bend and stoop for household chores and  without pain to increase tolerance to daily activities.   Eval:forward flexion 6 in with no reversal of lordosis and pain to stand  Current: NA     3.  Pt increase trunk rotation to less than 16 in in hooklying to indicate mobility needed for gait. .  Eval:19 in   Current: NA     4.  Pt FOTO score will increase by 10 points to show improvement in  function.   Eval:54  Current: will address at visit 5       PLAN  [x]  Upgrade activities as tolerated     []  Continue plan of care  []  Update interventions per flow sheet       []  Discharge due to:_  []  Other:_      Prema Madden PT, DPT 11/27/2018  5:56 PM    Future Appointments   Date Time Provider Aniya Brady   12/4/2018  6:00 PM Keri Joe PT, DPT [facility]   12/6/2018  5:30 PM Keri Joe PT, DPT [facility]   12/11/2018  5:30 PM Keri Joe PT, DPT [facility]   12/13/2018  5:30 PM Keri Joe PT, DPT [facility]   12/17/2018  8:50 AM Jeannie Millan  [address]   12/18/2018  5:30 PM Keri Joe PT, DPT [facility]   12/20/2018  5:30 PM Keri Joe PT, DPT [facility]   12/26/2018  5:00 PM Keri Joe PT, DPT [facility]   12/27/2018  5:30 PM Joie Miller [facility]   4/11/2019  4:30 PM Arden Jefferson NP [facility]   5/23/2019 10:30 AM Gilles Dyer MD [address] [place] [address]

## 2018-12-04 ENCOUNTER — HOSPITAL ENCOUNTER (OUTPATIENT)
Dept: PHYSICAL THERAPY | Age: 63
Discharge: HOME | End: 2018-12-04
Payer: MEDICARE

## 2018-12-04 PROCEDURE — 97110 THERAPEUTIC EXERCISES: CPT

## 2018-12-04 NOTE — PROGRESS NOTES
PT DAILY TREATMENT NOTE     Patient Name: Claudean Form  Date:2018  : 1955  [x]  Patient  Verified  Payor: Roni Rubivard / Plan: [facility] COMMUNITY PLAN MCR / Product Type: Managed Care Medicare /    In time:5:49  Out time:6:38  Total Treatment Time (min): 49  Visit #: 3 of 12    Treatment Area: Low back pain [M54.5]  Myalgia, other site [M79.18]  Other muscle spasm [M62.838]  Myalgia, other site [M79.18]    SUBJECTIVE  Pain Level (0-10 scale): 3  Any medication changes, allergies to medications, adverse drug reactions, diagnosis change, or new procedure performed?: [x] No    [] Yes (see summary sheet for update)  Subjective functional status/changes:   [] No changes reported  \"The doctor says its my muscles but I don't think so. Its pain across my back and I don't thin thats muscle.  \"     OBJECTIVE    Modality rationale: decrease edema, decrease inflammation and decrease pain to improve the patients ability to perform pain free ADL\"s    Type Additional Details   [] Estim:  []Unatt       []IFC  []Premod                        []Other:  []w/ice   []w/heat  Position:  Location:   [] Estim: []Att    []TENS instruct  []NMES                    []Other:  []w/US   []w/ice   []w/heat  Position:  Location:   []  Traction: [] Cervical       []Lumbar                       [] Prone          []Supine                       []Intermittent   []Continuous Lbs:  [] before manual  [] after manual   []  Ultrasound: []Continuous   [] Pulsed                           []1MHz   []3MHz W/cm2:  Location:   []  Iontophoresis with dexamethasone         Location: [] Take home patch   [] In clinic   []  Ice     []  heat  []  Ice massage  []  Laser   []  Anodyne Position:  Location:   []  Laser with stim  []  Other:  Position:  Location:   []  Vasopneumatic Device Pressure:       [] lo [] med [] hi   Temperature: [] lo [] med [] hi   [x] Skin assessment post-treatment:  []intact []redness- no adverse reaction    []redness - adverse reaction:       49 min Therapeutic Exercise:  [x] See flow sheet :   Rationale: increase ROM, increase strength, improve coordination and improve balance to improve the patients ability to perform pain free ADL's     With   [] TE   [] TA   [] neuro   [] other: Patient Education: [x] Review HEP    [] Progressed/Changed HEP based on:   [] positioning   [] body mechanics   [] transfers   [] heat/ice application    [] other:      Other Objective/Functional Measures:        Pain Level (0-10 scale) post treatment: 2-3    ASSESSMENT/Changes in Function: Pt reported pain across low back at T12/L1. Tolerated exercises well however c/o several muscle spasms during session. Added cross connect in supine. Patient will continue to benefit from skilled PT services to modify and progress therapeutic interventions, address functional mobility deficits, address ROM deficits, address strength deficits, analyze and address soft tissue restrictions, analyze and cue movement patterns, analyze and modify body mechanics/ergonomics, assess and modify postural abnormalities, address imbalance/dizziness and instruct in home and community integration to attain remaining goals. []  See Plan of Care  []  See progress note/recertification  []  See Discharge Summary         Progress towards goals / Updated goals:  Short Term Goals: STG- To be accomplished in 2 week(s):  1.  Pt will be independent with HEP to encourage prophylaxis. Eval:dispensed  Current: PROGRESSING: Reports compliance, reviewed this session     Long Term Goals: LTG- To be accomplished in 6 week(s):  1.  Pt will demonstrate ability to bridge to full height with good form >10  Reps to indicate functional glut and hamstring strength.   Eval:decreased height  Current: NA     2.  Pt will be able to bend and stoop for household chores and  without pain to increase tolerance to daily activities.   Eval:forward flexion 6 in with no reversal of lordosis and pain to stand  Current: NA     3.  Pt increase trunk rotation to less than 16 in in hooklying to indicate mobility needed for gait. .  Eval:19 in   Current: NA     4.  Pt FOTO score will increase by 10 points to show improvement in  function.   Eval:54  Current: will address at visit 5           PLAN  [x]  Upgrade activities as tolerated     [x]  Continue plan of care  []  Update interventions per flow sheet       []  Discharge due to:_  []  Other:_      Thompson Sanderson PTA 12/4/2018  5:50 PM    Future Appointments   Date Time Provider Aniya Brady   12/4/2018  6:00 PM Pampatricia Owen [facility]   12/6/2018  5:30 PM Baldemar Lv, PT, DPT [facility]   12/11/2018  5:30 PM Baldemar Lv, PT, DPT [facility]   12/13/2018  5:30 PM Baldemar Lv, PT, DPT [facility]   12/17/2018  8:50 AM Rita Wilkerson  [address]   12/18/2018  5:30 PM Baldemar Lv, PT, DPT [facility]   12/20/2018  5:30 PM Baldemar Lv, PT, DPT [facility]   12/26/2018  5:00 PM Baldemar Lv, PT, DPT [facility]   12/27/2018  5:30 PM Hyacinth Pink PTA [facility]   4/11/2019  4:30 PM Jimmy Stafford NP [facility]   5/23/2019 10:30 AM Melida Dyer MD [address] [place] [address]

## 2018-12-06 ENCOUNTER — HOSPITAL ENCOUNTER (OUTPATIENT)
Dept: PHYSICAL THERAPY | Age: 63
Discharge: HOME | End: 2018-12-06
Payer: MEDICARE

## 2018-12-06 PROCEDURE — 97110 THERAPEUTIC EXERCISES: CPT

## 2018-12-06 NOTE — PROGRESS NOTES
PT DAILY TREATMENT NOTE - [facility]     Patient Name: Yamileth Schneider  Date:2018  : 1955  [x]  Patient  Verified  Payor: Roni Garnett Yasir / Plan: [facility] COMMUNITY PLAN MCR / Product Type: Managed Care Medicare /    In time:5:29 pm  Out time:6:30 pm   Total Treatment Time (min): 61  Total Timed Codes (min): 61  1:1 Treatment Time ( only): 39   Visit #: 4 of 12    Treatment Area: Low back pain [M54.5]  Myalgia, other site [M79.18]  Other muscle spasm [M62.838]  Myalgia, other site [M79.18]    SUBJECTIVE  Pain Level (0-10 scale): 3  Any medication changes, allergies to medications, adverse drug reactions, diagnosis change, or new procedure performed?: [x] No    [] Yes (see summary sheet for update)  Subjective functional status/changes:   [] No changes reported  \"I feel better. More springy. \"    OBJECTIVE    Modality rationale:    Type Additional Details   [] Estim:  []Unatt       []IFC  []Premod                        []Other:  []w/ice   []w/heat  Position:  Location:   [] Estim: []Att    []TENS instruct  []NMES                    []Other:  []w/US   []w/ice   []w/heat  Position:  Location:   []  Traction: [] Cervical       []Lumbar                       [] Prone          []Supine                       []Intermittent   []Continuous Lbs:  [] before manual  [] after manual   []  Ultrasound: []Continuous   [] Pulsed                           []1MHz   []3MHz W/cm2:  Location:   []  Iontophoresis with dexamethasone         Location: [] Take home patch   [] In clinic   []  Ice     []  heat  []  Ice massage  []  Laser   []  Anodyne Position:  Location:   []  Laser with stim  []  Other:  Position:  Location:   []  Vasopneumatic Device Pressure:       [] lo [] med [] hi   Temperature: [] lo [] med [] hi   [] Skin assessment post-treatment:  []intact []redness- no adverse reaction    []redness - adverse reaction:       61 min Therapeutic Exercise:  [x] See flow sheet :   Rationale: increase ROM, increase strength, improve coordination and increase proprioception to improve the patients ability to perform daily activities with decreased pain and symptom levels          With   [] TE   [x] TA   [] neuro   [] other: Patient Education: [x] Review HEP    [] Progressed/Changed HEP based on:   [] positioning   [] body mechanics   [] transfers   [] heat/ice application    [] other:      Other Objective/Functional Measures:   Trunk Rot: 16.5 in bilaterally      Pain Level (0-10 scale) post treatment: 0    ASSESSMENT/Changes in Function:   Pt reports noticing a difference in energy level and ability to do activities around house. Initially had lumbar paraspinal spasms with 90-90, but eliminated with cues to engage hamstrings with PPT. Updated HEP this session. Patient will continue to benefit from skilled PT services to modify and progress therapeutic interventions, address functional mobility deficits, address ROM deficits, address strength deficits, analyze and address soft tissue restrictions, analyze and cue movement patterns, analyze and modify body mechanics/ergonomics, assess and modify postural abnormalities and instruct in home and community integration to attain remaining goals. []  See Plan of Care  []  See progress note/recertification  []  See Discharge Summary         Progress towards goals / Updated goals:  Short Term Goals: STG- To be accomplished in 2 week(s):  1.  Pt will be independent with HEP to encourage prophylaxis. Eval:dispensed  Current: PROGRESSING: Reports compliance, reviewed this session     Long Term Goals: LTG- To be accomplished in 6 week(s):  1.  Pt will demonstrate ability to bridge to full height with good form >10  Reps to indicate functional glut and hamstring strength.   Eval:decreased height  Current: progressing,     2.  Pt will be able to bend and stoop for household chores and  without pain to increase tolerance to daily activities.   Eval:forward flexion 6 in with no reversal of lordosis and pain to stand  Current: NA     3.  Pt increase trunk rotation to less than 16 in in hooklying to indicate mobility needed for gait. .  Eval:19 in   Current: progressing 16.5 in bilaterally      4.  Pt FOTO score will increase by 10 points to show improvement in  function.   Eval:54  Current: will address at visit 5        PLAN  [x]  Upgrade activities as tolerated     []  Continue plan of care  []  Update interventions per flow sheet       []  Discharge due to:_  []  Other:_      Johanne Rivera PT, DPT 12/6/2018  5:37 PM    Future Appointments   Date Time Provider Aniya Caitlyn   12/11/2018  5:30 PM Jose Barn PT, DPT [facility]   12/13/2018  5:30 PM Jose Bran PT, DPT [facility]   12/17/2018  8:50 AM Henry Bojorquez  [address]   12/18/2018  5:30 PM Jose Bran PT, DPT [facility]   12/20/2018  5:30 PM Jose Bran PT, DPT [facility]   12/26/2018  5:00 PM Jose Bran PT, DPT [facility]   12/27/2018  5:30 PM Jose Luis David PTA [facility]   4/11/2019  4:30 PM Jairo Bradford NP [facility]   5/23/2019 10:30 AM Dona Dyer MD [address] [place] [address]

## 2018-12-11 ENCOUNTER — HOSPITAL ENCOUNTER (OUTPATIENT)
Dept: PHYSICAL THERAPY | Age: 63
Discharge: HOME | End: 2018-12-11
Payer: MEDICARE

## 2018-12-11 PROCEDURE — 97530 THERAPEUTIC ACTIVITIES: CPT

## 2018-12-11 PROCEDURE — 97110 THERAPEUTIC EXERCISES: CPT

## 2018-12-11 NOTE — PROGRESS NOTES
PT DAILY TREATMENT NOTE - [facility]     Patient Name: Vince Fonseca  Date:2018  : 1955  [x]  Patient  Verified  Payor: Roni Deniseulevard / Plan: [facility] COMMUNITY PLAN MCR / Product Type: Managed Care Medicare /    In time: 5:35 pm  Out time:6:42 pm  Total Treatment Time (min): 67  Total Timed Codes (min): 67  1:1 Treatment Time ( only): 28   Visit #: 5 of 12    Treatment Area: Low back pain [M54.5]  Myalgia, other site [M79.18]  Other muscle spasm [M62.838]  Myalgia, other site [M79.18]    SUBJECTIVE  Pain Level (0-10 scale): 4  Any medication changes, allergies to medications, adverse drug reactions, diagnosis change, or new procedure performed?: [x] No    [] Yes (see summary sheet for update)  Subjective functional status/changes:   [] No changes reported  \"It feels like a grinding in spine. I am going to see him on Monday and I am going to insist he take an x-ray. \"    OBJECTIVE    Modality rationale:    Type Additional Details   [] Estim:  []Unatt       []IFC  []Premod                        []Other:  []w/ice   []w/heat  Position:  Location:   [] Estim: []Att    []TENS instruct  []NMES                    []Other:  []w/US   []w/ice   []w/heat  Position:  Location:   []  Traction: [] Cervical       []Lumbar                       [] Prone          []Supine                       []Intermittent   []Continuous Lbs:  [] before manual  [] after manual   []  Ultrasound: []Continuous   [] Pulsed                           []1MHz   []3MHz W/cm2:  Location:   []  Iontophoresis with dexamethasone         Location: [] Take home patch   [] In clinic   []  Ice     []  heat  []  Ice massage  []  Laser   []  Anodyne Position:  Location:   []  Laser with stim  []  Other:  Position:  Location:   []  Vasopneumatic Device Pressure:       [] lo [] med [] hi   Temperature: [] lo [] med [] hi   [] Skin assessment post-treatment:  []intact []redness- no adverse reaction    []redness - adverse reaction:     57 min Therapeutic Exercise:  [x] See flow sheet :   Rationale: increase ROM, increase strength, improve coordination and increase proprioception to improve the patients ability to perform daily activities with decreased pain and symptom levels    10 min Therapeutic Activity:  []  See flow sheet : included FOTO   Rationale: increase ROM, increase strength, improve coordination and increase proprioception  to improve the patients ability to perform daily activities with decreased pain and symptom levels         With   [] TE   [] TA   [] neuro   [] other: Patient Education: [x] Review HEP    [] Progressed/Changed HEP based on:   [] positioning   [] body mechanics   [] transfers   [] heat/ice application    [] other:      Other Objective/Functional Measures:   FOTO Neck:57  Lumbar: 58     Pain Level (0-10 scale) post treatment: 0    ASSESSMENT/Changes in Function:   Reported glut fatigue with exercises. Reports that feels \"grinding\" in lumbar spine with activities like washing dishes and bending to care for dog and grandson. Patient will continue to benefit from skilled PT services to modify and progress therapeutic interventions, address functional mobility deficits, address ROM deficits, address strength deficits, analyze and address soft tissue restrictions, analyze and cue movement patterns, analyze and modify body mechanics/ergonomics, assess and modify postural abnormalities and instruct in home and community integration to attain remaining goals. []  See Plan of Care  []  See progress note/recertification  []  See Discharge Summary         Progress towards goals / Updated goals:  Short Term Goals: STG- To be accomplished in 2 week(s):  1.  Pt will be independent with HEP to encourage prophylaxis.   Eval:dispensed  Current: PROGRESSING: Reports compliance, reviewed this session     Long Term Goals: LTG- To be accomplished in 6 week(s):  1.  Pt will demonstrate ability to bridge to full height with good form >10  Reps to indicate functional glut and hamstring strength. Eval:decreased height  Current: progressing, decreased height but no pain      2.  Pt will be able to bend and stoop for household chores and  without pain to increase tolerance to daily activities.   Eval:forward flexion 6 in with no reversal of lordosis and pain to stand  Current: NA     3.  Pt increase trunk rotation to less than 16 in in hooklying to indicate mobility needed for gait. .  Eval:19 in   Current: progressing 16.5 in bilaterally      4.  Pt FOTO score will increase by 10 points to show improvement in  function.   Eval:54  Current: progressing 62        PLAN  [x]  Upgrade activities as tolerated     []  Continue plan of care  []  Update interventions per flow sheet       []  Discharge due to:_  []  Other:_      Rakesh Clarke, PT, DPT 12/11/2018  5:49 PM    Future Appointments   Date Time Provider Aniya Brady   12/13/2018  5:30 PM Johny Villar PT, DPT [facility]   12/17/2018  8:50 AM Katie Mancuso  [address]    12/18/2018  5:30 PM Johny Villar PT, DPT [facility]   12/20/2018  5:30 PM Johny Villar PT, DPT [facility]   12/26/2018  5:00 PM Johny Villar PT, DPT [facility]   12/27/2018  5:30 PM Mikie Aceves PTA [facility]   4/11/2019  4:30 PM Ori Barahona NP [address] [facility]   5/23/2019 10:30 AM Mona Dyer MD [address] [facility] [address]

## 2018-12-13 ENCOUNTER — APPOINTMENT (OUTPATIENT)
Dept: PHYSICAL THERAPY | Age: 63
End: 2018-12-13
Payer: MEDICARE

## 2018-12-17 ENCOUNTER — OFFICE VISIT (OUTPATIENT)
Dept: ORTHOPEDIC SURGERY | Age: 63
End: 2018-12-17

## 2018-12-17 VITALS
RESPIRATION RATE: 18 BRPM | TEMPERATURE: 97.9 F | SYSTOLIC BLOOD PRESSURE: 133 MMHG | DIASTOLIC BLOOD PRESSURE: 80 MMHG | WEIGHT: 204 LBS | OXYGEN SATURATION: 97 % | HEIGHT: 64 IN | BODY MASS INDEX: 34.83 KG/M2 | HEART RATE: 82 BPM

## 2018-12-17 DIAGNOSIS — M79.18 MYOFASCIAL PAIN: ICD-10-CM

## 2018-12-17 DIAGNOSIS — M62.838 MUSCLE SPASM: ICD-10-CM

## 2018-12-17 DIAGNOSIS — M54.59 MECHANICAL LOW BACK PAIN: ICD-10-CM

## 2018-12-17 DIAGNOSIS — M54.2 NECK PAIN: Primary | ICD-10-CM

## 2018-12-17 DIAGNOSIS — M54.50 LUMBAR BACK PAIN: ICD-10-CM

## 2018-12-17 DIAGNOSIS — M79.18 CERVICAL MYOFASCIAL PAIN SYNDROME: ICD-10-CM

## 2018-12-17 RX ORDER — TIZANIDINE 2 MG/1
2 TABLET ORAL
Qty: 90 TAB | Refills: 2 | Status: SHIPPED | OUTPATIENT
Start: 2018-12-17 | End: 2020-07-14 | Stop reason: SDUPTHER

## 2018-12-17 NOTE — PROGRESS NOTES
[address]  [address], [address]  [place], [facility]  Phone: [phone]  Fax: [phone]        Brenda Hwang  : 1955  PCP: Lidya Alejo MD  2018    PROGRESS NOTE      ASSESSMENT AND PLAN    Yany Harrison comes in to the office today for PT f/u. She has seen some improvement with PT, but continues to have neck and low back pain. Her pains remain axial and do not radiate into her extremities. She has not had dry needling in PT yet. She rates her pain as a 3/10 today. On examination, she continues to have tenderness to palpation of her lumbar paraspinals. She remains neurologically intact. Her pain likely remains myofascial in nature. I advised she continue with PT (with dry needling) and maintaining an HEP. We may consider an updated MRI after she completes PT. I refilled her Tizanidine 2mg TID PRN. Pt will f/u in 5-6 weeks or sooner as needed. Diagnoses and all orders for this visit:    1. Neck pain  -     AMB POC XRAY, SPINE, CERVICAL; 2 OR 3    2. Lumbar back pain  -     AMB POC XRAY, SPINE, LUMBOSACRAL; 2 O    3. Muscle spasm    4. Mechanical low back pain    5. Cervical myofascial pain syndrome    6. Myofascial pain      Follow-up Disposition: Not on File      HISTORY OF PRESENT ILLNESS  Yany Harrison is a 59 y.o. female. A&P / HPI from 2016:  Yany Harrison comes in to the office today c/o cervical and lumbar pain. She has brought her MRI with her today which does not reveal any obvious causes for her pain. She has a diagnosis of myofascial pain.      She was referred to PT as it has previously provided significant relief for her pain. Pt was advised to continue taking Mobic prn.      Pt will f/u in 6 weeks or prn.      Updates from 17:  Pt presents for a cervical and lumbar PT continuation f/u. She has found significant relief with her visits and is experiencing little to no pain today.  She only has complaints of an sporadic, low grade pain in her lumbar region.      Pt mentions she recently had a mechanical fall which injured her right shoulder. She was treated with a cortisone injection and PT which has provided relief.     Updates from 05/10/17:  Pt presents for a prn f/u. Pt previously found relief with cervical and lumbar PT. She returns today after straining her lumbar region about 2 weeks ago while moving heavy tables. Her pain still appears to be myofascial in nature although there is potential for it to be due to sacroiliitis or a lumbar radiculopathy.     Updates from 06/29/17:  Pt presents for pain in the lumbar region. At the last visit I referred her to get PT and a trigger point injection which provided her great relief. She reports to an increase in exercise which has also provided her relief.      Updates from 11/12/18:  Pt presents for an exacerbation of her cervical and lumbar myofascial pain. She is interested in another referral to PT as it proved effective in the past. She is unsure if she needs dry needling at this time. She remains active with walking and caring for her grandson. She has attempted to refrain from lifting, but has to lift her grandson. I advised she can lift as much weight as she would like, but to lift properly. Updates from 12/17/18:  Pt presents for PT f/u. She has seen some improvement with PT, but continues to have neck and low back pain. She describes her right-sided neck pain as \"someone is twisting a wrench in there. \" She is a side-sleeper. Her symptoms remain axial and do not radiate into her extremities. She reports muscle spasms \"that roll up my back\" and a \"different\" pain that causes her knees to buckle and almost causes her to fall.        PAST MEDICAL HISTORY   Past Medical History:   Diagnosis Date    Abnormal Papanicolaou smear of cervix     1988 HPV    Asthma     [facility] ([facility] [address]     Bipolar 1 disorder, depressed ([facility] [address]     Bursitis     Carpal tunnel syndrome     Cirrhosis, hepatitis C     Connective tissue disease (HCC)     DDD (degenerative disc disease), lumbosacral     Gastric ulcer     Hashimoto's disease     Hashimoto's disease     Hepatitis C     Herniated intervertebral disc of lumbar spine     Hypermobility syndrome     Liver disease     Menopause     Osteoarthritis     Plantar fasciitis, bilateral     RA [facility]umatoid arthritis) ([facility] [address]        Past Surgical History:   Procedure Laterality Date    COLONOSCOPY N/A 2/9/2017     Colonoscopy w/polyp bxs x3 performed by Martha Mendoza MD at [address] HX APPENDECTOMY      HX [address]    cryso    HX GYN      BTL    HX HEENT      malofacial surgery   . MEDICATIONS    Current Outpatient Medications   Medication Sig Dispense Refill    PROAIR HFA 90 mcg/actuation inhaler Take 2 Puffs by inhalation every six (6) hours as needed for Wheezing. 1 Inhaler 3    omeprazole (PRILOSEC) 20 mg capsule take 1 capsule by mouth once daily 90 Cap 1    guaiFENesin ER (MUCINEX) 600 mg ER tablet Take 2 Tabs by mouth two (2) times daily as needed for Congestion. 60 Tab 2    levothyroxine (SYNTHROID) 125 mcg tablet Take 1 Tab by mouth Daily (before breakfast). 90 Tab 1    meloxicam (MOBIC) 15 mg tablet Take 1 Tab by mouth daily as needed (inflammation). 90 Tab 1    multivitamin (ONE A DAY) tablet Take 1 Tab by mouth daily.  tiZANidine (ZANAFLEX) 2 mg tablet Take 1 Tab by mouth three (3) times daily. 90 Tab 2    conjugated estrogens (PREMARIN) 0.625 mg/gram vaginal cream 1/2 gm twice weekly on sunday and wednesday 45 g 2    diclofenac (VOLTAREN) 1 % gel apply 2 grams to affected area four times a day  0    zolpidem (AMBIEN) 10 mg tablet   0    benztropine (COGENTIN) 1 mg tablet Take 1 mg by mouth three (3) times daily.  ARIPiprazole (ABILIFY) 5 mg tablet Take 15 mg by mouth daily.           ALLERGIES  Allergies   Allergen Reactions    Adhesive Tape-Silicones Rash          SOCIAL HISTORY    Social History     Socioeconomic History    Marital status: SINGLE     Spouse name: Not on file    Number of children: Not on file    Years of education: Not on file    Highest education level: Not on file   Tobacco Use    Smoking status: Former Smoker     Last attempt to quit: 2005     Years since quittin.4    Smokeless tobacco: Never Used   Substance and Sexual Activity    Alcohol use: No     Alcohol/week: 0.0 oz    Drug use: Yes     Types: Marijuana    Sexual activity: No       FAMILY HISTORY  Family History   Problem Relation Age of Onset    No Known Problems Mother     No Known Problems Father          REVIEW OF SYSTEMS  Review of Systems   Musculoskeletal: Positive for back pain, myalgias and neck pain. PHYSICAL EXAMINATION  Visit Vitals  /80   Pulse 82   Temp 97.9 °F (36.6 °C)   Resp 18   Ht 5' 4\" (1.626 m)   Wt 204 lb (92.5 kg)   SpO2 97%   BMI 35.02 kg/m²       Pain Assessment  2017   Location of Pain Neck;Back   Location Modifiers -   Severity of Pain 2   Quality of Pain Aching;Dull   Duration of Pain Persistent   Frequency of Pain Constant   Aggravating Factors -   Aggravating Factors Comment -   Limiting Behavior -   Relieving Factors -   Result of Injury No           Constitutional:  Well developed, well nourished, in no acute distress. Psychiatric: Affect and mood are appropriate. Integumentary: No rashes or abrasions noted on exposed areas. SPINE/MUSCULOSKELETAL EXAM    Cervical spine:  Neck is midline. Normal muscle tone. No focal atrophy is noted. ROM pain free. Shoulder ROM intact. Mild tenderness to palpation, R>L. Negative Spurling's sign. Negative Tinel's sign. Negative Dillon's sign.       Sensation in the bilateral arms grossly intact to light touch.       Lumbar spine:  No rash, ecchymosis, or gross obliquity. No fasciculations. No focal atrophy is noted. No pain with hip ROM. Full range of motion. Diffuse tenderness to palpation, L>R. No tenderness to palpation at the sciatic notch. SI joints non-tender. Trochanters non tender.      Sensation in the bilateral legs grossly intact to light touch.     Updates from 05/10/17:  Bilateral SI joints tender. Negative right PORTILLO test.  Negative right p4 test.  Negative bilateral straight leg raise. Tenderness to palpation in the lumbar region. MOTOR:      Biceps  Triceps Deltoids Wrist Ext Wrist Flex Hand Intrin   Right 5/5 5/5 5/5 5/5 5/5 5/5   Left 5/5 5/5 5/5 5/5 5/5 5/5             Hip Flex  Quads Hamstrings Ankle DF EHL Ankle PF   Right 5/5 5/5 5/5 5/5 5/5 5/5   Left 5/5 5/5 5/5 5/5 5/5 5/5     DTRs are 2+ biceps, triceps, brachioradialis, patella, and Achilles.      Negative Straight Leg raise. Squat not tested. No difficulty with tandem gait.       Ambulation without assistive device. FWB.       RADIOGRAPHS  2V Cervical XR images taken on 12/17/18 personally reviewed with patient:  Straightening of the cervical lordosis  Normal alignment. Mild facet sclerosis. No obvious compression fractures or instabilities. 2V Lumbar XR images taken on 12/17/18 personally reviewed with patient:  Facet sclerosis  Disc space narrowing at L4-5 and L5-S1  Normal alignment  No obvious compression fractures or instabilities. Lumbar MRI images taken on 6/27/16 personally reviewed with patient:  Five lumbar vertebrae are present.  Somewhat hypolordotic sagittal  alignment. Disc desiccation from L3-4 through L5-S1 levels.  Mild L3-4 and L5-S1 disc  space narrowing.  Moderate L4-5 disc space narrowing. Edema associate with the anterior superior endplate of L5 may suggest  relative acuity.  No marrow replacing process. Mild multilevel facet osteoarthritis. The conus medullaris is at the L1-2 level.  There is normal signal  throughout the spinal cord. L1-2:No posterior disc bulge.  No foraminal or central canal stenosis.   L2-3:No posterior disc bulge.  Mild hypertrophy ligamentum flavum.  Mild  facet osteoarthritis.  Mild foraminal stenosis.  No central canal stenosis. L3-4:Minimal broad based posterior disc bulge.  Mild hypertrophy  ligamentum flavum.  Mild facet osteoarthritis.  Mild foraminal stenosis. No central canal stenosis. Page 1 of 2  Performing Location:[facility]  [id number]             6/27/2016 8:53:30 AM  L4-5:Small broad based posterior disc bulge.  Moderate hypertrophy  ligamentum flavum and moderate facet osteoarthritis.  Mild foraminal  stenosis.  No central canal stenosis. L5-S1:Minimal broad based posterior disc bulge.  Mild hypertrophy  ligamentum flavum and mild facet osteoarthritis.  Moderate left and mild  right foraminal stenosis.  No central canal stenosis. IMPRESSION  Overall mild degenerative changes.  Edema associated with superior endplate  of L5 may suggest relative acuity.  Mild to moderate foraminal stenosis in  the lower lumbar spine. 15 minutes of face-to-face contact were spent with the patient during today's visit extensively discussing symptoms and treatment plan. All questions were answered. More than half of this visit today was spent on counseling.      Written by Elizabeth Monte as dictated by Mirna Briceno MD

## 2018-12-18 ENCOUNTER — HOSPITAL ENCOUNTER (OUTPATIENT)
Dept: PHYSICAL THERAPY | Age: 63
Discharge: HOME | End: 2018-12-18
Payer: MEDICARE

## 2018-12-18 PROCEDURE — 97110 THERAPEUTIC EXERCISES: CPT

## 2018-12-18 PROCEDURE — G8978 MOBILITY CURRENT STATUS: HCPCS

## 2018-12-18 PROCEDURE — G8979 MOBILITY GOAL STATUS: HCPCS

## 2018-12-18 NOTE — PROGRESS NOTES
PT DAILY TREATMENT NOTE - [facility]     Patient Name: Lavell Soliman  Date:2018  : 1955  [x]  Patient  Verified  Payor: Roni Garnett Yasir / Plan: [facility] COMMUNITY PLAN MCR / Product Type: Managed Care Medicare /    In time:5:12 pm   Out time:6:01 pm   Total Treatment Time (min): 49  Total Timed Codes (min): 49  1:1 Treatment Time ( only): 25   Visit #: 6 of 12    Treatment Area: Low back pain [M54.5]  Myalgia, other site [M79.18]  Other muscle spasm [M62.838]  Myalgia, other site [M79.18]    SUBJECTIVE  Pain Level (0-10 scale): 3  Any medication changes, allergies to medications, adverse drug reactions, diagnosis change, or new procedure performed?: [x] No    [] Yes (see summary sheet for update)  Subjective functional status/changes:   [] No changes reported  \"About a 3. He did take some x-rays and said if it isn't better in 5weeks when I see him he wants to do a MRI. \"    OBJECTIVE    Modality rationale:    Type Additional Details   [] Estim:  []Unatt       []IFC  []Premod                        []Other:  []w/ice   []w/heat  Position:  Location:   [] Estim: []Att    []TENS instruct  []NMES                    []Other:  []w/US   []w/ice   []w/heat  Position:  Location:   []  Traction: [] Cervical       []Lumbar                       [] Prone          []Supine                       []Intermittent   []Continuous Lbs:  [] before manual  [] after manual   []  Ultrasound: []Continuous   [] Pulsed                           []1MHz   []3MHz W/cm2:  Location:   []  Iontophoresis with dexamethasone         Location: [] Take home patch   [] In clinic   []  Ice     []  heat  []  Ice massage  []  Laser   []  Anodyne Position:  Location:   []  Laser with stim  []  Other:  Position:  Location:   []  Vasopneumatic Device Pressure:       [] lo [] med [] hi   Temperature: [] lo [] med [] hi   [] Skin assessment post-treatment:  []intact []redness- no adverse reaction    []redness - adverse reaction:     49 min Therapeutic Exercise:  [x] See flow sheet :   Rationale: increase ROM, increase strength, improve coordination and increase proprioception to improve the patients ability to perform daily activities with decreased pain and symptom levels           With   [] TE   [] TA   [] neuro   [] other: Patient Education: [x] Review HEP    [] Progressed/Changed HEP based on:   [] positioning   [] body mechanics   [] transfers   [] heat/ice application    [] other:      Other Objective/Functional Measures:   See goals for objective data     Pain Level (0-10 scale) post treatment: 0    ASSESSMENT/Changes in Function:   Pt cancelled appointment last week, unable to reassess for MD appointment. Pt has been seen for 6 visits including initial evaluation. Reports continued muscle spasms in lumbar spine and pain in right UT. Pt reports intermittent compliance with HEP. Always feels better at end of PT session, indicating HEP would be beneficial. Treatment has included and focussed on pelvis repositioning, glut and hamstring facilitation, thoracic mobility and abdominal facilitation. Discussed with pt adding dry needling in next 2 sessions as well as increased compliance with HEP. Pt could benefit from continued skilled PT to address mobility, stability and tolerance to daily activities. Patient will continue to benefit from skilled PT services to modify and progress therapeutic interventions, address functional mobility deficits, address ROM deficits, address strength deficits, analyze and address soft tissue restrictions, analyze and cue movement patterns, analyze and modify body mechanics/ergonomics, assess and modify postural abnormalities and instruct in home and community integration to attain remaining goals.      []  See Plan of Care  [x]  See progress note/recertification  []  See Discharge Summary         Progress towards goals / Updated goals:  Short Term Goals: STG- To be accomplished in 2 week(s):  1.  Pt will be independent with HEP to encourage prophylaxis. Eval:dispensed  Current: PROGRESSING: Reports intermittent compliance     Long Term Goals: LTG- To be accomplished in 6 week(s):  1.  Pt will demonstrate ability to bridge to full height with good form >10  Reps to indicate functional glut and hamstring strength. Eval:decreased height  Current: progressing, decreased height but no pain      2.  Pt will be able to bend and stoop for household chores and  without pain to increase tolerance to daily activities.   Eval:forward flexion 6 in with no reversal of lordosis and pain to stand  Current: 5 in from floor - mild progress     3.  Pt increase trunk rotation to less than 16 in in hooklying to indicate mobility needed for gait. .  Eval:19 in   Current: progressing 16.5 in bilaterally      4.  Pt FOTO score will increase by 10 points to show improvement in  function.   Eval:54  Current: progressing 62    PLAN  [x]  Upgrade activities as tolerated     []  Continue plan of care  []  Update interventions per flow sheet       []  Discharge due to:_  []  Other:_      Chirag Del Valle PT, DPT 12/18/2018  5:52 PM    Future Appointments   Date Time Provider Aniya Brady   12/20/2018  5:30 PM Sunday Ankit PT, DPT [facility]   12/26/2018  5:00 PM Sunday Ankit PT, DPT [facility]   12/27/2018  5:30 PM Abdirahman Ferguson PTA [facility]   1/29/2019 11:15 AM Jorge Harding  [address]    4/11/2019  4:30 PM Juan Hamm NP [address] [place] [address]   5/23/2019 10:30 AM Filiberto Dyer  [facility]

## 2018-12-19 NOTE — PROGRESS NOTES
[facility] at the [address], [facility], [address] [facility]  Phone: [phone]      Fax:  [phone]        Medicare Progress Report    Patient name: Yamileth Schneider Start of Care: 2018   Referral source: Fernando Chanel MD : 1955               Medical Diagnosis: Low back pain [M54.5]  Myalgia, other site [M79.18]  Other muscle spasm [M62.838]  Myalgia, other site [M79.18]    Onset Date:2 months ago               Treatment Diagnosis: LBP, lumbo pelvic dysfunction    Prior Hospitalization: see medical history Provider#: [id number]   Medications: Verified on Patient summary List    Comorbidities: Anxiety or Panic Disorders, Arthritis, Asthma, Back pain, BMI over 30, Depression,  Gastrointestinal Disease, Hepatitis, Tuberculosis, HIV, AIDS, or other blood-borne condition, Incontinence   Prior Level of Function: prior to 2 months unrestricted in all daily and household activities       Visits from Start of Care: 6    Missed Visits: 1    Progress Toward Goals:   Short Term Goals: STG- To be accomplished in 2 week(s):  1.  Pt will be independent with HEP to encourage prophylaxis. Eval:dispensed  Current: PROGRESSING: Reports intermittent compliance     Long Term Goals: LTG- To be accomplished in 6 week(s):  1.  Pt will demonstrate ability to bridge to full height with good form >10  Reps to indicate functional glut and hamstring strength.   Eval:decreased height  Current: progressing, decreased height but no pain      2.  Pt will be able to bend and stoop for household chores and  without pain to increase tolerance to daily activities.   Eval:forward flexion 6 in with no reversal of lordosis and pain to stand  Current: 5 in from floor - mild progress     3.  Pt increase trunk rotation to less than 16 in in hooklying to indicate mobility needed for gait. .  Eval:19 in   Current: progressing 16.5 in bilaterally      4.  Pt FOTO score will increase by 10 points to show improvement in  function. Eval:54  Current: progressing 58    Problems/ barriers to goal attainment: none     Assessment / Recommendations:  Pt has been seen for 6 visits including initial evaluation. Reports continued muscle spasms in lumbar spine and pain in right UT. Pt reports intermittent compliance with HEP. Always feels better at end of PT session, indicating HEP would be beneficial. Treatment has included and focussed on pelvis repositioning, glut and hamstring facilitation, thoracic mobility and abdominal facilitation. Discussed with pt adding dry needling in next 2 sessions as well as increased compliance with HEP. Pt could benefit from continued skilled PT to address mobility, stability and tolerance to daily activities. Problem List: pain affecting function, decrease ROM, decrease strength, impaired gait/ balance, decrease ADL/ functional abilitiies, decrease activity tolerance and decrease flexibility/ joint mobility   Treatment Plan: Therapeutic exercise, Therapeutic activities, Neuromuscular re-education, Physical agent/modality, Gait/balance training, Manual therapy and Patient education    Patient Goal (s) has been updated and includes: core strength to be more limber, to alleviate this pain and keep it from getting worse     Updated Goals to be accomplished in 5 weeks:  Same as unmet above    Frequency / Duration: Patient to be seen 2 times per week for 5 weeks:    G-Codes (GP)  Mobility  [id number] Current  CK= 40-59%   Goal  CJ= 20-39%    The severity rating is based on clinical judgement and the FOTO score.       Elle Smith, PT, DPT 12/19/2018 1:51 PM

## 2018-12-20 ENCOUNTER — HOSPITAL ENCOUNTER (OUTPATIENT)
Dept: PHYSICAL THERAPY | Age: 63
Discharge: HOME | End: 2018-12-20
Payer: MEDICARE

## 2018-12-20 PROCEDURE — 97110 THERAPEUTIC EXERCISES: CPT

## 2018-12-20 NOTE — PROGRESS NOTES
PT DAILY TREATMENT NOTE - [facility]     Patient Name: Yany Harrison  Date:2018  : 1955  [x]  Patient  Verified  Payor: Roni Rubivard / Plan: [facility] COMMUNITY PLAN [facility] / Product Type: Managed Care Medicare /    In time:5:30 pm  Out time:6:30 pm   Total Treatment Time (min): 60  Total Timed Codes (min): 60  1:1 Treatment Time ( only): 60   Visit #: 7 of 16    Treatment Area: Low back pain [M54.5]  Myalgia, other site [M79.18]  Other muscle spasm [M62.838]  Myalgia, other site [M79.18]    SUBJECTIVE  Pain Level (0-10 scale): 3  Any medication changes, allergies to medications, adverse drug reactions, diagnosis change, or new procedure performed?: [x] No    [] Yes (see summary sheet for update)  Subjective functional status/changes:   [] No changes reported  \"It is not that bad today. \"    OBJECTIVE    Modality rationale:    Type Additional Details   [] Estim:  []Unatt       []IFC  []Premod                        []Other:  []w/ice   []w/heat  Position:  Location:   [] Estim: []Att    []TENS instruct  []NMES                    []Other:  []w/US   []w/ice   []w/heat  Position:  Location:   []  Traction: [] Cervical       []Lumbar                       [] Prone          []Supine                       []Intermittent   []Continuous Lbs:  [] before manual  [] after manual   []  Ultrasound: []Continuous   [] Pulsed                           []1MHz   []3MHz W/cm2:  Location:   []  Iontophoresis with dexamethasone         Location: [] Take home patch   [] In clinic   []  Ice     []  heat  []  Ice massage  []  Laser   []  Anodyne Position:  Location:   []  Laser with stim  []  Other:  Position:  Location:   []  Vasopneumatic Device Pressure:       [] lo [] med [] hi   Temperature: [] lo [] med [] hi   [] Skin assessment post-treatment:  []intact []redness- no adverse reaction    []redness - adverse reaction:       60 min Therapeutic Exercise:  [x] See flow sheet :   Rationale: increase ROM, increase strength, improve coordination and increase proprioception to improve the patients ability to perform daily activities with decreased pain and symptom levels            With   [] TE   [] TA   [] neuro   [] other: Patient Education: [x] Review HEP    [] Progressed/Changed HEP based on:   [] positioning   [] body mechanics   [] transfers   [] heat/ice application    [] other:      Other Objective/Functional Measures:   Standing forward flexion: 5 in      Pain Level (0-10 scale) post treatment: 0    ASSESSMENT/Changes in Function:   Pt responded well to posterior hip capsule inhibition. Continues to have significant glut fatigue at end of session. Patient will continue to benefit from skilled PT services to modify and progress therapeutic interventions, address functional mobility deficits, address ROM deficits, address strength deficits, analyze and address soft tissue restrictions, analyze and cue movement patterns, analyze and modify body mechanics/ergonomics, assess and modify postural abnormalities and instruct in home and community integration to attain remaining goals. []  See Plan of Care  []  See progress note/recertification  []  See Discharge Summary         Progress towards goals / Updated goals:  Short Term Goals: STG- To be accomplished in 2 week(s):  1.  Pt will be independent with HEP to encourage prophylaxis. Eval:dispensed  Current: PROGRESSING: Reports intermittent compliance     Long Term Goals: LTG- To be accomplished in 6 week(s):  1.  Pt will demonstrate ability to bridge to full height with good form >10  Reps to indicate functional glut and hamstring strength.   Eval:decreased height  Last PN: progressing, decreased height but no pain   Current:      2.  Pt will be able to bend and stoop for household chores and  without pain to increase tolerance to daily activities.   Eval:forward flexion 6 in with no reversal of lordosis and pain to stand  Last PN: 5 in from floor - mild progress  Current: 4.5 in from floor with good reversal of lordosis      3.  Pt increase trunk rotation to less than 16 in in hooklying to indicate mobility needed for gait. .  Eval:19 in   Last PN: progressing 16.5 in bilaterally   Current     4.  Pt FOTO score will increase by 10 points to show improvement in  function.   Eval:54  Last PN: progressing 58  Current: reassess at visit 10      PLAN  [x]  Upgrade activities as tolerated     []  Continue plan of care  []  Update interventions per flow sheet       []  Discharge due to:_  []  Other:_      Maria C Stapleton, PT, DPT 12/20/2018  5:39 PM    Future Appointments   Date Time Provider Aniya Delgadoi   12/26/2018  5:00 PM Senia Ornelas, PT, DPT [facility]   12/27/2018  5:30 PM Tarun Pereyra PTA [facility]   1/29/2019 11:15 AM Jason Araya  [address]   4/11/2019  4:30 PM Alistair Chacko NP [address] [facility]   5/23/2019 10:30 AM Scout Dyer  [facility]

## 2018-12-26 ENCOUNTER — HOSPITAL ENCOUNTER (OUTPATIENT)
Dept: PHYSICAL THERAPY | Age: 63
Discharge: HOME | End: 2018-12-26
Payer: MEDICARE

## 2018-12-26 PROCEDURE — 97140 MANUAL THERAPY 1/> REGIONS: CPT

## 2018-12-26 PROCEDURE — 97530 THERAPEUTIC ACTIVITIES: CPT

## 2018-12-26 PROCEDURE — 97110 THERAPEUTIC EXERCISES: CPT

## 2018-12-26 NOTE — PROGRESS NOTES
PT DAILY TREATMENT NOTE - [facility]     Patient Name: Tash Mirza  Date:2018  : 1955  [x]  Patient  Verified  Payor: Roni Garnett Yasir / Plan: [facility] COMMUNITY PLAN [facility] / Product Type: Managed Care Medicare /    In time:5:08 pm  Out time:6:05 pm  Total Treatment Time (min): 57  Total Timed Codes (min): 47  1:1 Treatment Time ( only): 52   Visit #: 8 of 16    Treatment Area: Low back pain [M54.5]  Myalgia, other site [M79.18]  Other muscle spasm [M62.838]  Myalgia, other site [M79.18]    SUBJECTIVE  Pain Level (0-10 scale): 3  Any medication changes, allergies to medications, adverse drug reactions, diagnosis change, or new procedure performed?: [x] No    [] Yes (see summary sheet for update)  Subjective functional status/changes:   [] No changes reported  \"Not too bad about a 3 today. \"    OBJECTIVE    Modality rationale: decrease inflammation and decrease pain to improve the patients ability to perform daily activities with decreased pain and symptom levels     Type Additional Details   [] Estim:  []Unatt       []IFC  []Premod                        []Other:  []w/ice   []w/heat  Position:  Location:   [] Estim: []Att    []TENS instruct  []NMES                    []Other:  []w/US   []w/ice   []w/heat  Position:  Location:   []  Traction: [] Cervical       []Lumbar                       [] Prone          []Supine                       []Intermittent   []Continuous Lbs:  [] before manual  [] after manual   []  Ultrasound: []Continuous   [] Pulsed                           []1MHz   []3MHz W/cm2:  Location:   []  Iontophoresis with dexamethasone         Location: [] Take home patch   [] In clinic   [x]  Ice     []  heat  []  Ice massage  []  Laser   []  Anodyne Position: supine with LE elevated  Location: bilateral gluts   []  Laser with stim  []  Other:  Position:  Location:   []  Vasopneumatic Device Pressure:       [] lo [] med [] hi   Temperature: [] lo [] med [] hi   [x] Skin assessment post-treatment:  [x]intact []redness- no adverse reaction    []redness - adverse reaction:     20 min Therapeutic Exercise:  [x] See flow sheet :   Rationale: increase ROM, increase strength, improve coordination and increase proprioception to improve the patients ability to perform daily activities with decreased pain and symptom levels    10 min Therapeutic Activity:  []  See flow sheet : reviewed dry needling consent form, process and procedure   Rationale:    10 min Manual Therapy:  STM and palpation of trigger points in gluts and posterior hip musculature  Time not accounted for is needle insertion time    Rationale: decrease pain, increase ROM, increase tissue extensibility and decrease trigger points to perform daily activities with decreased pain and symptom levels    Dry Needling Procedure Note    Procedure: An intramuscular manual therapy (dry needling) and a neuro-muscular re-education treatment was done to deactivate myofascial trigger points with a 30 gauge filament needle under aseptic technique. Indications:  [x] Myofascial pain and dysfunction [] Muscled spasms  [x] Myalgia/myositis   [] Muscle cramps  [] Muscle imbalances  [] Other:    Chart reviewed for the following:  I, Noelle Phoenix, PT, DPT, have reviewed the medical history, summary list and precautions/contraindications for Isac Renee.   TIME OUT performed immediately prior to start of procedure:  I, Noelle Phoenix, PT, DPT, have performed the following reviews on Isac Renee prior to the start of the session:      [x] Verified patient identification by name and date of birth    [x] Agreement on all muscles being treated was verified   [x] Purpose of dry needling, side effects, possible complications, risks and benefits were explained to the patient   [x] Procedure site(s) verified  [x] Patient was positioned for comfort and draped for privacy  [x] Informed Consent was signed (initial visit) and verified verbally (subsequent visits)  [x] Patient was instructed on the need to report the use of blood thinners and/or immunosuppressant medications  [x] How to respond to possible adverse effects of treatment  [x] Self treatment of post needling soreness: ice, heat (moist heat, heat wraps) and stretching  [x] Opportunity was given to ask any questions, all questions were answered            Time: 5:24 pm  Date of procedure: 12/26/2018    Treatment: The following muscles were treated today with intramuscular dry needling  [x] Left [x] Right Danita Brooklyn / Caryl Miguel / Dmitriyraffy Pradojamie    Patient's response to today's treatment:  [x] Latent Twitch Response  [] Muscle relaxation [] Pain Relief  [x] Post needling soreness [x] without complications  [] Increased Range of Motion  [] Other:     Performed by: Tmi Grier, PT, DPT            With   [] TE   [] TA   [] neuro   [] other: Patient Education: [x] Review HEP    [] Progressed/Changed HEP based on:   [] positioning   [] body mechanics   [] transfers   [] heat/ice application    [] other:      Other Objective/Functional Measures:   Significant twitch response in bilateral gluts      Pain Level (0-10 scale) post treatment: \"sore\"    ASSESSMENT/Changes in Function:   Initiated trigger point dry needling this session with no adverse reactions. Continued significant glut weakness and glut/hamstring inhibition. Patient will continue to benefit from skilled PT services to modify and progress therapeutic interventions, address functional mobility deficits, address ROM deficits, address strength deficits, analyze and address soft tissue restrictions, analyze and cue movement patterns, analyze and modify body mechanics/ergonomics, assess and modify postural abnormalities and instruct in home and community integration to attain remaining goals.      []  See Plan of Care  []  See progress note/recertification  []  See Discharge Summary         Progress towards goals / Updated goals:  Short Term Goals: STG- To be accomplished in 2 week(s):  1.  Pt will be independent with HEP to encourage prophylaxis. Eval:dispensed  Current: PROGRESSING: Reports intermittent compliance     Long Term Goals: LTG- To be accomplished in 6 week(s):  1.  Pt will demonstrate ability to bridge to full height with good form >10  Reps to indicate functional glut and hamstring strength. Eval:decreased height  Last PN: progressing, decreased height but no pain   Current:      2.  Pt will be able to bend and stoop for household chores and  without pain to increase tolerance to daily activities.   Eval:forward flexion 6 in with no reversal of lordosis and pain to stand  Last PN: 5 in from floor - mild progress  Current: 4.5 in from floor with good reversal of lordosis      3.  Pt increase trunk rotation to less than 16 in in hooklying to indicate mobility needed for gait. .  Eval:19 in   Last PN: progressing 16.5 in bilaterally   Current     4.  Pt FOTO score will increase by 10 points to show improvement in  function.   Eval:54  Last PN: progressing 58  Current: reassess at visit 10    PLAN  [x]  Upgrade activities as tolerated     []  Continue plan of care  []  Update interventions per flow sheet       []  Discharge due to:_  []  Other:_      Rakesh Clarke, PT, DPT 12/26/2018  5:43 PM    Future Appointments   Date Time Provider Aniya Brady   12/27/2018  5:30 PM Pardeep Guzman [facility]   1/29/2019 11:15 AM Katie Mancuso  [address]   4/11/2019  4:30 PM Ori Barahona NP [address] [facility]   5/23/2019 10:30 AM Mona Dyer  [facility]

## 2018-12-27 ENCOUNTER — HOSPITAL ENCOUNTER (OUTPATIENT)
Dept: PHYSICAL THERAPY | Age: 63
Discharge: HOME | End: 2018-12-27
Payer: MEDICARE

## 2018-12-27 PROCEDURE — 97530 THERAPEUTIC ACTIVITIES: CPT

## 2018-12-27 PROCEDURE — 97110 THERAPEUTIC EXERCISES: CPT

## 2018-12-27 NOTE — PROGRESS NOTES
PT DAILY TREATMENT NOTE - [facility]     Patient Name: Tolu Forrest  Date:2018  : 1955  [x]  Patient  Verified  Payor: Reymundo Eduardo / Plan: [facility] COMMUNITY PLAN MCR / Product Type: Managed Care Medicare /    In time:5:25  Out time:6:25  Total Treatment Time (min): 60  Total Timed Codes (min): 60  1:1 Treatment Time ( only): 45   Visit #: 9 of 16    Treatment Area: Low back pain [M54.5]  Myalgia, other site [M79.18]  Other muscle spasm [M62.838]  Myalgia, other site [M79.18]    SUBJECTIVE  Pain Level (0-10 scale): 2  Any medication changes, allergies to medications, adverse drug reactions, diagnosis change, or new procedure performed?: [x] No    [] Yes (see summary sheet for update)  Subjective functional status/changes:   [] No changes reported  \"Im a little sore from the needling last night but not bad. \"     OBJECTIVE    Modality rationale: decrease edema, decrease inflammation and decrease pain to improve the patients ability to perform pain free ADL's    Type Additional Details   [] Estim:  []Unatt       []IFC  []Premod                        []Other:  []w/ice   []w/heat  Position:  Location:   [] Estim: []Att    []TENS instruct  []NMES                    []Other:  []w/US   []w/ice   []w/heat  Position:  Location:   []  Traction: [] Cervical       []Lumbar                       [] Prone          []Supine                       []Intermittent   []Continuous Lbs:  [] before manual  [] after manual   []  Ultrasound: []Continuous   [] Pulsed                           []1MHz   []3MHz W/cm2:  Location:   []  Iontophoresis with dexamethasone         Location: [] Take home patch   [] In clinic   []  Ice     []  heat  []  Ice massage  []  Laser   []  Anodyne Position:  Location:   []  Laser with stim  []  Other:  Position:  Location:   []  Vasopneumatic Device Pressure:       [] lo [] med [] hi   Temperature: [] lo [] med [] hi   [x] Skin assessment post-treatment:  []intact []redness- no adverse reaction    []redness - adverse reaction:       50 min Therapeutic Exercise:  [] See flow sheet :   Rationale: increase ROM, increase strength, improve coordination and improve balance to improve the patients ability to perform pain free ADL\"s     10 min Therapeutic Activity:  []  See flow sheet :   Rationale: increase ROM, increase strength, improve coordination and improve balance  to improve the patients ability to perform pain free ADL\"s           With   [] TE   [] TA   [] neuro   [] other: Patient Education: [x] Review HEP    [] Progressed/Changed HEP based on:   [] positioning   [] body mechanics   [] transfers   [] heat/ice application    [] other:      Other Objective/Functional Measures:        Pain Level (0-10 scale) post treatment: 1    ASSESSMENT/Changes in Function: Pt reported mild soreness after receiving dry needling. Pt reported compliance with HEP . Decreased pain after session . very Continue to work on gluts and abs . Patient will continue to benefit from skilled PT services to modify and progress therapeutic interventions, address functional mobility deficits, address ROM deficits, address strength deficits, analyze and address soft tissue restrictions, analyze and cue movement patterns, analyze and modify body mechanics/ergonomics, assess and modify postural abnormalities, address imbalance/dizziness and instruct in home and community integration to attain remaining goals. []  See Plan of Care  []  See progress note/recertification  []  See Discharge Summary         Progress towards goals / Updated goals:  Short Term Goals: STG- To be accomplished in 2 week(s):  1.  Pt will be independent with HEP to encourage prophylaxis.   Eval:dispensed  Current: PROGRESSING: Reports intermittent compliance     Long Term Goals: LTG- To be accomplished in 6 week(s):  1.  Pt will demonstrate ability to bridge to full height with good form >10  Reps to indicate functional glut and hamstring strength. Eval:decreased height  Last PN: progressing, decreased height but no pain   Current:      2.  Pt will be able to bend and stoop for household chores and  without pain to increase tolerance to daily activities.   Eval:forward flexion 6 in with no reversal of lordosis and pain to stand  Last PN: 5 in from floor - mild progress  Current: 4.5 in from floor with good reversal of lordosis      3.  Pt increase trunk rotation to less than 16 in in hooklying to indicate mobility needed for gait. .  Eval:19 in   Last PN: progressing 16.5 in bilaterally   Current     4.  Pt FOTO score will increase by 10 points to show improvement in  function.   Eval:54  Last PN: progressing 58  Current: reassess at visit 10        PLAN  [x]  Upgrade activities as tolerated     [x]  Continue plan of care  []  Update interventions per flow sheet       []  Discharge due to:_  []  Other:_      Wenda Heads, PTA 12/27/2018  5:19 PM    Future Appointments   Date Time Provider Aniya Caitlyn   12/27/2018  5:30 PM Teena Reah [facility]   1/29/2019 11:15 AM Bharat Soliman  [address]   4/11/2019  4:30 PM Alfredo Jolly NP [address] [facility]   5/23/2019 10:30 AM Judson Dyer  [facility]

## 2019-01-08 ENCOUNTER — HOSPITAL ENCOUNTER (OUTPATIENT)
Dept: PHYSICAL THERAPY | Age: 64
Discharge: HOME | End: 2019-01-08
Payer: MEDICARE

## 2019-01-08 PROCEDURE — 97110 THERAPEUTIC EXERCISES: CPT

## 2019-01-08 NOTE — PROGRESS NOTES
PT DAILY TREATMENT NOTE - [facility]     Patient Name: Claudy Motley  Date:2019  : 1955  [x]  Patient  Verified  Payor: Shawanda Bobo / Plan: [facility] COMMUNITY PLAN MCR / Product Type: Managed Care Medicare /    In time:5:25 pm   Out time:6:27 pm  Total Treatment Time (min): 62  Total Timed Codes (min): 62  1:1 Treatment Time ( [address] only): 20   Visit #: 10 of 16    Treatment Area: Low back pain [M54.5]  Myalgia, other site [M79.18]  Other muscle spasm [M62.838]  Myalgia, other site [M79.18]    SUBJECTIVE  Pain Level (0-10 scale): 2  Any medication changes, allergies to medications, adverse drug reactions, diagnosis change, or new procedure performed?: [x] No    [] Yes (see summary sheet for update)  Subjective functional status/changes:   [] No changes reported  \"I feel good good today. \"    OBJECTIVE    Modality rationale:    Type Additional Details   [] Estim:  []Unatt       []IFC  []Premod                        []Other:  []w/ice   []w/heat  Position:  Location:   [] Estim: []Att    []TENS instruct  []NMES                    []Other:  []w/US   []w/ice   []w/heat  Position:  Location:   []  Traction: [] Cervical       []Lumbar                       [] Prone          [x]Supine                       []Intermittent   []Continuous Lbs:  [] before manual  [] after manual   []  Ultrasound: []Continuous   [] Pulsed                           []1MHz   []3MHz W/cm2:  Location:   []  Iontophoresis with dexamethasone         Location: [] Take home patch   [] In clinic   []  Ice     []  heat  []  Ice massage  []  Laser   []  Anodyne Position:  Location:   []  Laser with stim  []  Other:  Position:  Location:   []  Vasopneumatic Device Pressure:       [] lo [] med [] hi   Temperature: [] lo [] med [] hi   [] Skin assessment post-treatment:  []intact []redness- no adverse reaction    []redness - adverse reaction:       62 min Therapeutic Exercise:  [x] See flow sheet :   Rationale: increase ROM, increase strength, improve coordination and increase proprioception to improve the patients ability to perform daily activities with decreased pain and symptom levels          With   [] TE   [] TA   [] neuro   [] other: Patient Education: [x] Review HEP    [] Progressed/Changed HEP based on:   [] positioning   [] body mechanics   [] transfers   [] heat/ice application    [] other:      Other Objective/Functional Measures:   No change in FOTO     Pain Level (0-10 scale) post treatment: 0    ASSESSMENT/Changes in Function:   Was able to tolerate mild progression of exercises. Need to progress oblique exercises. Patient will continue to benefit from skilled PT services to modify and progress therapeutic interventions, address functional mobility deficits, address ROM deficits, address strength deficits, analyze and address soft tissue restrictions, analyze and cue movement patterns, analyze and modify body mechanics/ergonomics, assess and modify postural abnormalities and instruct in home and community integration to attain remaining goals. []  See Plan of Care  []  See progress note/recertification  []  See Discharge Summary         Progress towards goals / Updated goals:  Long Term Goals: LTG- To be accomplished in 6 week(s):  1.  Pt will demonstrate ability to bridge to full height with good form >10  Reps to indicate functional glut and hamstring strength. Eval:decreased height  Last PN: progressing, decreased height but no pain   Current:      2.  Pt will be able to bend and stoop for household chores and  without pain to increase tolerance to daily activities.   Eval:forward flexion 6 in with no reversal of lordosis and pain to stand  Last PN: 5 in from floor - mild progress  Current: 4.5 in from floor with good reversal of lordosis      3.  Pt increase trunk rotation to less than 16 in in hooklying to indicate mobility needed for gait. .  Eval:19 in   Last PN: progressing 16.5 in bilaterally   Current     4.  Pt FOTO score will increase by 10 points to show improvement in  function.   Eval:54  Last PN: progressing 58  Current: no change    PLAN  [x]  Upgrade activities as tolerated     []  Continue plan of care  []  Update interventions per flow sheet       []  Discharge due to:_  []  Other:_      Tai Ruiz, PT, DPT 1/8/2019  6:01 PM    Future Appointments   Date Time Provider Aniya Caitlyn   1/10/2019  5:00 PM Sedrick Donahue PT, DPT [facility]   1/15/2019  5:30 PM Sedrick Donahue PT, DPT [facility]   1/17/2019  5:30 PM Sedrick Donahue PT, DPT [facility]   1/22/2019  5:30 PM Sedrick Donahue PT, DPT [facility]   1/24/2019  5:30 PM Sedrick Donahue, PT, DPT [facility]   1/29/2019 11:15 AM Andrey Quarles  [address]   1/29/2019  5:30 PM Gertrude Zee, PTA [facility]   1/31/2019  6:00 PM Sedrick Donahue PT, DPT [facility]   4/11/2019  4:30 PM Shonna Walton NP [facility]   5/23/2019 10:30 AM Enrique Dyer MD [address] [place] [address]

## 2019-01-10 ENCOUNTER — HOSPITAL ENCOUNTER (OUTPATIENT)
Dept: PHYSICAL THERAPY | Age: 64
Discharge: HOME | End: 2019-01-10
Payer: MEDICARE

## 2019-01-10 PROCEDURE — 97140 MANUAL THERAPY 1/> REGIONS: CPT

## 2019-01-10 PROCEDURE — 97110 THERAPEUTIC EXERCISES: CPT

## 2019-01-10 NOTE — PROGRESS NOTES
PT DAILY TREATMENT NOTE - [facility]     Patient Name: Dayanara Scott  Date:1/10/2019  : 1955  [x]  Patient  Verified  Payor: [facility] MEDICARE / Plan: [facility] COMMUNITY PLAN MCR / Product Type: Managed Care Medicare /    In time: 5:00 pm   Out time: 6:05 pm   Total Treatment Time (min): 65  Total Timed Codes (min): 55  1:1 Treatment Time ( only): 28   Visit #: 11 of 16    Treatment Area: Low back pain [M54.5]  Myalgia, other site [M79.18]  Other muscle spasm [M62.838]  Myalgia, other site [M79.18]    SUBJECTIVE  Pain Level (0-10 scale): 0  Any medication changes, allergies to medications, adverse drug reactions, diagnosis change, or new procedure performed?: [x] No    [] Yes (see summary sheet for update)  Subjective functional status/changes:   [] No changes reported  \"Just a few spasms earlier today. \"    OBJECTIVE    Modality rationale: decrease pain to improve the patients ability to perform daily activities with decreased pain and symptom levels     Min Type Additional Details    [] Estim:  []Unatt       []IFC  []Premod                        []Other:  []w/ice   []w/heat  Position:  Location:    [] Estim: []Att    []TENS instruct  []NMES                    []Other:  []w/US   []w/ice   []w/heat  Position:  Location:    []  Traction: [] Cervical       []Lumbar                       [] Prone          []Supine                       []Intermittent   []Continuous Lbs:  [] before manual  [] after manual    []  Ultrasound: []Continuous   [] Pulsed                           []1MHz   []3MHz W/cm2:  Location:    []  Iontophoresis with dexamethasone         Location: [] Take home patch   [] In clinic   10 [x]  Ice     []  heat  []  Ice massage  []  Laser   []  Anodyne Position:  Location:    []  Laser with stim  []  Other:  Position:  Location:    []  Vasopneumatic Device Pressure:       [] lo [] med [] hi   Temperature: [] lo [] med [] hi   [] Skin assessment post-treatment:  []intact []redness- no adverse reaction    []redness - adverse reaction:     38 min Therapeutic Exercise:  [x] See flow sheet :   Rationale: increase ROM, increase strength, improve coordination and increase proprioception to improve the patients ability to perform daily activities with decreased pain and symptom levels     10 min Manual Therapy:  STM and palpation of trigger points in gluts and posterior hip musculature  Time not accounted for is needle insertion time    Rationale: decrease pain, increase ROM, increase tissue extensibility and decrease trigger points to perform daily activities with decreased pain and symptom levels     Dry Needling Procedure Note    Procedure: An intramuscular manual therapy (dry needling) and a neuro-muscular re-education treatment was done to deactivate myofascial trigger points with a 30 gauge filament needle under aseptic technique. Indications:  [x] Myofascial pain and dysfunction [] Muscled spasms  [x] Myalgia/myositis   [] Muscle cramps  [] Muscle imbalances  [] Other:    Chart reviewed for the following:  Gaetano LING, PT, DPT, have reviewed the medical history, summary list and precautions/contraindications for [email or web address].   TIME OUT performed immediately prior to start of procedure:  Gaetano LING, PT, DPT, have performed the following reviews on [email or web address] prior to the start of the session:      [x] Verified patient identification by name and date of birth    [x] Agreement on all muscles being treated was verified   [x] Purpose of dry needling, side effects, possible complications, risks and benefits were explained to the patient   [x] Procedure site(s) verified  [x] Patient was positioned for comfort and draped for privacy  [x] Informed Consent was signed (initial visit) and verified verbally (subsequent visits)  [x] Patient was instructed on the need to report the use of blood thinners and/or immunosuppressant medications  [x] How to respond to possible adverse effects of treatment  [x] Self treatment of post needling soreness: ice, heat (moist heat, heat wraps) and stretching  [x] Opportunity was given to ask any questions, all questions were answered            Time: 5:38 pm   Date of procedure: 1/10/2019    Treatment: The following muscles were treated today with intramuscular dry needling  [] Left [x] Right Quadratus Lumborum  [] Left [x] Right Tamie Carrasco / Ashli Penn / Ez Rebolledo    Patient's response to today's treatment:  [x] Latent Twitch Response  [] Muscle relaxation [] Pain Relief  [x] Post needling soreness [] without complications  [] Increased Range of Motion  [] Other:     Performed by: Garrison Shields, PT, DPT            With   [] TE   [] TA   [] neuro   [] other: Patient Education: [x] Review HEP    [] Progressed/Changed HEP based on:   [] positioning   [] body mechanics   [] transfers   [] heat/ice application    [] other:      Other Objective/Functional Measures:   Significant twitch in gluts      Pain Level (0-10 scale) post treatment: \"sore\"    ASSESSMENT/Changes in Function:   Pt reports increased compliance with HEP and has only had intermittent spasms in last couple days. Continues to have significant glut weakness. Patient will continue to benefit from skilled PT services to modify and progress therapeutic interventions, address functional mobility deficits, address ROM deficits, address strength deficits, analyze and address soft tissue restrictions, analyze and cue movement patterns, analyze and modify body mechanics/ergonomics, assess and modify postural abnormalities and instruct in home and community integration to attain remaining goals.      []  See Plan of Care  []  See progress note/recertification  []  See Discharge Summary         Progress towards goals / Updated goals:  Long Term Goals: LTG- To be accomplished in 6 week(s):  1.  Pt will demonstrate ability to bridge to full height with good form >10  Reps to indicate functional glut and hamstring strength. Eval:decreased height  Last PN: progressing, decreased height but no pain   Current:  continued decreased      2.  Pt will be able to bend and stoop for household chores and  without pain to increase tolerance to daily activities.   Eval:forward flexion 6 in with no reversal of lordosis and pain to stand  Last PN: 5 in from floor - mild progress  Current: 4.5 in from floor with good reversal of lordosis      3.  Pt increase trunk rotation to less than 16 in in hooklying to indicate mobility needed for gait. .  Eval:19 in   Last PN: progressing 16.5 in bilaterally   Current     4.  Pt FOTO score will increase by 10 points to show improvement in  function.   Eval:54  Last PN: progressing 58  Current: no change           PLAN  []  Upgrade activities as tolerated     []  Continue plan of care  []  Update interventions per flow sheet       []  Discharge due to:_  []  Other:_      Jennifer Gamble PT, DPT 1/10/2019  5:36 PM    Future Appointments   Date Time Provider Aniya Plummeristi   1/15/2019  5:30 PM Twyla Brannon, PT, DPT [facility]   1/17/2019  5:30 PM Twyla Petrace, PT, DPT [facility]   1/22/2019  5:30 PM Twyla Petrace, PT, DPT [facility]   1/24/2019  5:30 PM Twyla Petrace, PT, DPT [facility]   1/29/2019 11:15 AM Frank Macias  [address]    1/29/2019  5:30 PM Teri Elizalde PTA [facility]   1/31/2019  6:00 PM Twyla Brannon, PT, DPT [facility]   4/11/2019  4:30 PM Brandi Ramos NP [address] [facility]   5/23/2019 10:30 AM Aldo Dyer  [facility]

## 2019-01-15 ENCOUNTER — APPOINTMENT (OUTPATIENT)
Dept: PHYSICAL THERAPY | Age: 64
End: 2019-01-15
Payer: MEDICARE

## 2019-01-15 DIAGNOSIS — M25.561 CHRONIC PAIN OF BOTH KNEES: ICD-10-CM

## 2019-01-15 DIAGNOSIS — M25.562 CHRONIC PAIN OF BOTH KNEES: ICD-10-CM

## 2019-01-15 DIAGNOSIS — G89.29 CHRONIC PAIN OF BOTH KNEES: ICD-10-CM

## 2019-01-15 DIAGNOSIS — E03.4 HYPOTHYROIDISM DUE TO ACQUIRED ATROPHY OF THYROID: ICD-10-CM

## 2019-01-15 RX ORDER — LEVOTHYROXINE SODIUM 125 UG/1
TABLET ORAL
Qty: 90 TAB | Refills: 1 | Status: SHIPPED | OUTPATIENT
Start: 2019-01-15

## 2019-01-15 RX ORDER — MELOXICAM 15 MG/1
TABLET ORAL
Qty: 90 TAB | Refills: 1 | Status: SHIPPED | OUTPATIENT
Start: 2019-01-15 | End: 2019-10-31

## 2019-01-17 ENCOUNTER — HOSPITAL ENCOUNTER (OUTPATIENT)
Dept: PHYSICAL THERAPY | Age: 64
Discharge: HOME | End: 2019-01-17
Payer: MEDICARE

## 2019-01-17 PROCEDURE — 97110 THERAPEUTIC EXERCISES: CPT

## 2019-01-17 PROCEDURE — 97140 MANUAL THERAPY 1/> REGIONS: CPT

## 2019-01-17 NOTE — PROGRESS NOTES
PT DAILY TREATMENT NOTE - [facility]     Patient Name: Bird Portillo  Date:2019  : 1955  [x]  Patient  Verified  Payor: [facility] MEDICARE / Plan: [facility] PLAN MCR / Product Type: Managed Care Medicare /    In time:5:20  Out time:6:29  Total Treatment Time (min): 69  Total Timed Codes (min): 69  1:1 Treatment Time ( only): 55   Visit #: 12 of 16    Treatment Area: Low back pain [M54.5]  Myalgia, other site [M79.18]  Other muscle spasm [M62.838]  Myalgia, other site [M79.18]    SUBJECTIVE  Pain Level (0-10 scale): 0  Any medication changes, allergies to medications, adverse drug reactions, diagnosis change, or new procedure performed?: [x] No    [] Yes (see summary sheet for update)  Subjective functional status/changes:   [] No changes reported  \"No pain tonight. \"     OBJECTIVE    Modality rationale: decrease edema, decrease inflammation and decrease pain to improve the patients ability to perform pain free ADLS\"    Type Additional Details   [] Estim:  []Unatt       []IFC  []Premod                        []Other:  []w/ice   []w/heat  Position:  Location:   [] Estim: []Att    []TENS instruct  []NMES                    []Other:  []w/US   []w/ice   []w/heat  Position:  Location:   []  Traction: [] Cervical       []Lumbar                       [] Prone          []Supine                       []Intermittent   []Continuous Lbs:  [] before manual  [] after manual   []  Ultrasound: []Continuous   [] Pulsed                           []1MHz   []3MHz W/cm2:  Location:   []  Iontophoresis with dexamethasone         Location: [] Take home patch   [] In clinic   []  Ice     []  heat  []  Ice massage  []  Laser   []  Anodyne Position:  Location:   []  Laser with stim  []  Other:  Position:  Location:   []  Vasopneumatic Device Pressure:       [] lo [] med [] hi   Temperature: [] lo [] med [] hi   [x] Skin assessment post-treatment:  []intact []redness- no adverse reaction    []redness - adverse reaction: 54 min Therapeutic Exercise:  [] See flow sheet :   Rationale: increase ROM, increase strength, improve coordination and improve balance to improve the patients ability to perform pain free ADLS\"       15 min Manual Therapy:  STM to kelvin Fernandes LS in supine   SOR    Rationale: decrease pain, increase ROM and increase tissue extensibility to perform pain free ADL\"S           With   [] TE   [] TA   [] neuro   [] other: Patient Education: [x] Review HEP    [] Progressed/Changed HEP based on:   [] positioning   [] body mechanics   [] transfers   [] heat/ice application    [] other:      Other Objective/Functional Measures:   Tension in right Levator with TTP      Pain Level (0-10 scale) post treatment: 0    ASSESSMENT/Changes in Function: Pt reported decreased pain since starting therapy. Pt reported no pain today. reported no pain after needling, and reduced occurrence of muscle spasms. Performed all therex very well. Patient will continue to benefit from skilled PT services to modify and progress therapeutic interventions, address functional mobility deficits, address ROM deficits, address strength deficits, analyze and address soft tissue restrictions, analyze and cue movement patterns, analyze and modify body mechanics/ergonomics, assess and modify postural abnormalities, address imbalance/dizziness and instruct in home and community integration to attain remaining goals. []  See Plan of Care  []  See progress note/recertification  []  See Discharge Summary         Progress towards goals / Updated goals:  Long Term Goals: LTG- To be accomplished in 6 week(s):  1.  Pt will demonstrate ability to bridge to full height with good form >10  Reps to indicate functional glut and hamstring strength.   Eval:decreased height  Last PN: progressing, decreased height but no pain   Current:  continued decreased      2.  Pt will be able to bend and stoop for household chores and  without pain to increase tolerance to daily activities.   Eval:forward flexion 6 in with no reversal of lordosis and pain to stand  Last PN: 5 in from floor - mild progress  Current: 4.5 in from floor with good reversal of lordosis   Current : 1.5 from floor.      3.  Pt increase trunk rotation to less than 16 in in hooklying to indicate mobility needed for gait. .  Eval:19 in   Last PN: progressing 16.5 in bilaterally   Current 16.5 Bilaterally  No change 1/17     4.  Pt FOTO score will increase by 10 points to show improvement in  function.   Eval:54  Last PN: progressing 58  Current: no change  Current         PLAN  [x]  Upgrade activities as tolerated     [x]  Continue plan of care  []  Update interventions per flow sheet       []  Discharge due to:_  []  Other:_      Clifm Prader, PTA 1/17/2019  5:22 PM    Future Appointments   Date Time Provider Aniya Brady   1/17/2019  5:30 PM Billy King [facility]   1/22/2019  5:30 PM Twyla Brannon PT, DPT [facility]   1/24/2019  5:30 PM Twyla Brannon PT, DPT [facility]   1/29/2019 11:15 AM Frank Macias  [address]   1/29/2019  5:30 PM Teri Elizalde PTA [facility]   1/31/2019  6:00 PM Twyla Brannon PT, DPT [facility]   4/11/2019  4:30 PM Brandi Ramos NP [address] [facility]   5/23/2019 10:30 AM Aldo Dyer  [facility]

## 2019-01-22 ENCOUNTER — HOSPITAL ENCOUNTER (OUTPATIENT)
Dept: PHYSICAL THERAPY | Age: 64
Discharge: HOME | End: 2019-01-22
Payer: MEDICARE

## 2019-01-22 PROCEDURE — 97140 MANUAL THERAPY 1/> REGIONS: CPT

## 2019-01-22 PROCEDURE — 97110 THERAPEUTIC EXERCISES: CPT

## 2019-01-22 NOTE — PROGRESS NOTES
PT DAILY TREATMENT NOTE - [facility]     Patient Name: Joss Jordan  Date:2019  : 1955  [x]  Patient  Verified  Payor: [facility] MEDICARE / Plan: [facility] COMMUNITY PLAN MCR / Product Type: Managed Care Medicare /    In time: 5:20 pm  Out time:6:25 pm   Total Treatment Time (min): 65  Total Timed Codes (min): 55  1:1 Treatment Time ( only): 40   Visit #: 13 of 16    Treatment Area: Low back pain [M54.5]  Myalgia, other site [M79.18]  Other muscle spasm [M62.838]  Myalgia, other site [M79.18]    SUBJECTIVE  Pain Level (0-10 scale): 4 - neck  Any medication changes, allergies to medications, adverse drug reactions, diagnosis change, or new procedure performed?: [x] No    [] Yes (see summary sheet for update)  Subjective functional status/changes:   [] No changes reported  \"No pain in my back but my neck is about a 4. It felt so good when she did the massage last week. \"    OBJECTIVE    Modality rationale: decrease pain to improve the patients ability to perform daily activities with decreased pain and symptom levels     Min Type Additional Details    [] Estim:  []Unatt       []IFC  []Premod                        []Other:  []w/ice   []w/heat  Position:  Location:    [] Estim: []Att    []TENS instruct  []NMES                    []Other:  []w/US   []w/ice   []w/heat  Position:  Location:    []  Traction: [] Cervical       []Lumbar                       [] Prone          []Supine                       []Intermittent   []Continuous Lbs:  [] before manual  [] after manual    []  Ultrasound: []Continuous   [] Pulsed                           []1MHz   []3MHz W/cm2:  Location:    []  Iontophoresis with dexamethasone         Location: [] Take home patch   [] In clinic   10 [x]  Ice     []  heat  []  Ice massage  []  Laser   []  Anodyne Position: supine with LE elevated  Location: right UT    []  Laser with stim  []  Other:  Position:  Location:    []  Vasopneumatic Device Pressure:       [] lo [] med [] hi Temperature: [] lo [] med [] hi   [x] Skin assessment post-treatment:  [x]intact []redness- no adverse reaction    []redness - adverse reaction:     40 min Therapeutic Exercise:  [x] See flow sheet :   Rationale: increase ROM, increase strength, improve coordination and increase proprioception to improve the patients ability to perform daily activities with decreased pain and symptom levels     8 min Manual Therapy:  STM and palpation of trigger points in bilateral UT  Hemphill taping to inhibit right UT  Time not accounted for is needle insertion time    Rationale: decrease pain, increase ROM, increase tissue extensibility and decrease trigger points to perform daily activities with decreased pain and symptom levels    Dry Needling Procedure Note    Procedure: An intramuscular manual therapy (dry needling) and a neuro-muscular re-education treatment was done to deactivate myofascial trigger points with a 30 gauge filament needle under aseptic technique. Indications:  [x] Myofascial pain and dysfunction [] Muscled spasms  [x] Myalgia/myositis   [] Muscle cramps  [] Muscle imbalances  [] Temporomandibular Dysfunction  [] Other:    Chart reviewed for the following:  IChaka, PT, DPT, have reviewed the medical history, summary list and precautions/contraindications for Juani Haste.   TIME OUT performed immediately prior to start of procedure:  Chaka LING PT, DPT, have performed the following reviews on Juani Haste prior to the start of the session:      [x] Verified patient identification by name and date of birth    [x] Agreement on all muscles being treated was verified   [x] Purpose of dry needling, side effects, possible complications, risks and benefits were explained to the patient   [x] Procedure site(s) verified  [x] Patient was positioned for comfort and draped for privacy  [x] Informed Consent was signed (initial visit) and verified verbally (subsequent visits)  [x] Patient was instructed on the need to report the use of blood thinners and/or immunosuppressant medications  [x] How to respond to possible adverse effects of treatment  [x] Self treatment of post needling soreness: ice, heat (moist heat, heat wraps) and stretching  [x] Opportunity was given to ask any questions, all questions were answered            Time: 5:48 pm  Date of procedure: 1/22/2019  Treatment: The following muscles were treated today with intramuscular dry needling  [] Left [x] Right Upper Trapezius  [] Left [x] Right Middle Trapezius    Patient's response to today's treatment:  [x] Latent Twitch Response     [] Muscle relaxation [] Pain Relief  [x] Post needling soreness    [x] without complications  [] Increased Range of Motion   [] Decreased headaches    [] Decreased Tinnitus  [] Other:     Performed by: Julienne Ames, PT, DPT              With   [] TE   [] TA   [] neuro   [] other: Patient Education: [x] Review HEP    [] Progressed/Changed HEP based on:   [] positioning   [] body mechanics   [] transfers   [] heat/ice application    [] other:      Other Objective/Functional Measures:   Significant twitch response in right UT     Pain Level (0-10 scale) post treatment: \"Sore\"    ASSESSMENT/Changes in Function:   Pt is progressing nicely with PT. Reports decrease in LBP, continued intermittent cervical and UT pain. Pt reports overall 80% improvement in sx since starting PT. Has HA 2-3 times per week in conjunction with increased cervical tightness. At present pt reports most limitation with carrying heavier household items - bag of potatoes, boxes. Treatment has included therapeutic exercises and activities for mobility, stability, strengthening and manual therapy including trigger point dry needling to address myofascial restrictions. Pt could benefit from continued skilled PT to address pain with daily activities, HA and strength deficits.      Patient will continue to benefit from skilled PT services to modify and progress therapeutic interventions, address functional mobility deficits, address ROM deficits, address strength deficits, analyze and address soft tissue restrictions, analyze and cue movement patterns, analyze and modify body mechanics/ergonomics, assess and modify postural abnormalities and instruct in home and community integration to attain remaining goals. []  See Plan of Care  [x]  See progress note/recertification  []  See Discharge Summary         Progress towards goals / Updated goals:  Long Term Goals: LTG- To be accomplished in 6 week(s):  1.  Pt will demonstrate ability to bridge to full height with good form >10  Reps to indicate functional glut and hamstring strength. Eval:decreased height  Last PN: progressing, decreased height but no pain   Current:  continued de creased height but no pain      2.  Pt will be able to bend and stoop for household chores and  without pain to increase tolerance to daily activities.   Eval:forward flexion 6 in with no reversal of lordosis and pain to stand  Last PN: 5 in from floor - mild progress  Current: 4.5 in from floor with good reversal of lordosis   Current : 1.5 from floor.      3.  Pt increase trunk rotation to less than 16 in in hooklying to indicate mobility needed for gait. .  Eval:19 in   Last PN: progressing 16.5 in bilaterally   Current 16.5 Bilaterally       4.  Pt FOTO score will increase by 10 points to show improvement in  function.   Eval:54  Last PN: progressing 58  Current: no change          PLAN  [x]  Upgrade activities as tolerated     []  Continue plan of care  []  Update interventions per flow sheet       []  Discharge due to:_  []  Other:_      Pj Gallagher, PT, DPT 1/22/2019  5:40 PM    Future Appointments   Date Time Provider Aniya Brady   1/24/2019  5:30 PM Luis Spaulding, PT, DPT [facility]   1/29/2019 11:15 AM Gabby Ortiz  [address]   1/29/2019  5:30 PM ANGEL ChapaHPNIKKY [facility] 1/31/2019  6:00 PM Juan Massa, PTA [facility]   2/5/2019  5:30 PM Juan Massa, PTA [facility]   2/7/2019  5:30 PM Mary Kate Ava, PT, DPT [facility]   2/12/2019  5:30 PM Juan Massa, PTA [facility]   2/14/2019  5:30 PM Mary Kate Escalante, PT, DPT [facility]   2/19/2019  5:30 PM Juan Massa, PTA [facility]   2/21/2019  5:30 PM Mary Kate Escalante, PT, DPT [facility]   2/26/2019  5:30 PM Juan Massa, PTA [facility]   2/28/2019  5:30 PM Mary Kate Escalante, PT, DPT [facility]   4/11/2019  4:30 PM Jose Nuñez, NP [address] [facility]   5/23/2019 10:30 AM Jil Archuleta MD [address]

## 2019-01-22 NOTE — PROGRESS NOTES
[facility] at the [address], [place], [address] [facility]  Phone: [phone]      Fax:  [phone]    Continued Plan of Care/ Re-certification for Physical Therapy Services    Patient name: Collette Olvera Start of Care: 2018   Referral source: Ronny Stroud MD : [id number]               Medical Diagnosis: Low back pain [M54.5]  Myalgia, other site [M79.18]  Other muscle spasm [M62.838]  Myalgia, other site [M79.18]    Onset Date:2 months ago               Treatment Diagnosis: LBP, lumbo pelvic dysfunction    Prior Hospitalization: see medical history Provider#: [id number]   Medications: Verified on Patient summary List    Comorbidities: Anxiety or Panic Disorders, Arthritis, Asthma, Back pain, BMI over 30, Depression,  Gastrointestinal Disease, Hepatitis, Tuberculosis, HIV, AIDS, or other blood-borne condition, Incontinence   Prior Level of Function: prior to 2 months unrestricted in all daily and household activities     Visits from Start of Care: 13    Missed Visits: 2    The Plan of Care and following information is based on the patient's current status:  Long Term Goals: LTG- To be accomplished in 6 week(s):  1.  Pt will demonstrate ability to bridge to full height with good form >10  Reps to indicate functional glut and hamstring strength.   Eval:decreased height  Last PN: progressing, decreased height but no pain   Current:  continued de creased height but no pain      2.  Pt will be able to bend and stoop for household chores and  without pain to increase tolerance to daily activities.   Eval:forward flexion 6 in with no reversal of lordosis and pain to stand  Last PN: 5 in from floor - mild progress  Current: 4.5 in from floor with good reversal of lordosis   Current : 1.5 from floor.      3.  Pt increase trunk rotation to less than 16 in in hooklying to indicate mobility needed for gait. .  Eval:19 in   Last PN: progressing 16.5 in bilaterally   Current 16.5 Bilaterally       4.  Pt FOTO score will increase by 10 points to show improvement in  function. Eval:54  Last PN: progressing 58  Current: no change       Key functional changes:   Pt reports HA 2-3 times per week  Pain with carrying >10 pounds and heavy household chores      Problems/ barriers to goal attainment: none     Problem List: pain affecting function, decrease ROM, decrease strength, impaired gait/ balance, decrease ADL/ functional abilitiies, decrease activity tolerance and decrease flexibility/ joint mobility    Treatment Plan: Therapeutic exercise, Therapeutic activities, Neuromuscular re-education, Physical agent/modality, Gait/balance training, Manual therapy and Patient education     Patient Goal (s) has been updated and includes: \"core strength to be more limber, to alleviate this pain and keep it from getting worse\"     Goals for this certification period to be accomplished in 6 treatments:  1. Pt will be able to carry >10 pounds without back pain to increase tolerance to daily activities including childcare. 2. Pt will report 50% or greater reduction in frequency of HA to imporve quality of life. Frequency / Duration: Patient to be seen 6treatments:    Assessment / Recommendations:  Pt is progressing nicely with PT. Reports decrease in LBP, continued intermittent cervical and UT pain. Pt reports overall 80% improvement in sx since starting PT. Has HA 2-3 times per week in conjunction with increased cervical tightness. At present pt reports most limitation with carrying heavier household items - bag of potatoes, boxes. Treatment has included therapeutic exercises and activities for mobility, stability, strengthening and manual therapy including trigger point dry needling to address myofascial restrictions. Pt could benefit from continued skilled PT to address pain with daily activities, HA and strength deficits.      Certification Period: 1/22/19 - 3/21/19    Santos Lawrence, PT, DPT 1/22/2019 6:49 PM    ________________________________________________________________________  I certify that the above Therapy Services are being furnished while the patient is under my care. I agree with the treatment plan and certify that this therapy is necessary. [] I have read the above and request that my patient continue as recommended.   [] I have read the above report and request that my patient continue therapy with the following changes/special instructions: ______________________________________  [] I have read the above report and request that my patient be discharged from therapy    Physician's Signature:____________Date:_________TIME:________    ** Signature, Date and Time must be completed for valid certification **    Please sign and return to [facility] [address], [address] [facility]  Phone: [phone]      Fax:  [phone]

## 2019-01-24 ENCOUNTER — TELEPHONE (OUTPATIENT)
Dept: FAMILY MEDICINE CLINIC | Age: 64
End: 2019-01-24

## 2019-01-24 ENCOUNTER — HOSPITAL ENCOUNTER (OUTPATIENT)
Dept: PHYSICAL THERAPY | Age: 64
Discharge: HOME | End: 2019-01-24
Payer: MEDICARE

## 2019-01-24 PROCEDURE — 97110 THERAPEUTIC EXERCISES: CPT

## 2019-01-24 NOTE — TELEPHONE ENCOUNTER
Pt stating her BP has been around 140/90 since November, even when shes not doing anything but dishes. She notes her pulse was also 95 this morning. She states shes has a few spells of dizziness throughout the last couple months.

## 2019-01-24 NOTE — PROGRESS NOTES
PT DAILY TREATMENT NOTE - [facility]     Patient Name: Sara Brooke  Date:2019  : 1955  [x]  Patient  Verified  Payor: [facility] MEDICARE / Plan: [facility] COMMUNITY PLAN MCR / Product Type: Managed Care Medicare /    In time:5:25 pm  Out time:6:23 pm   Total Treatment Time (min): 58  Total Timed Codes (min): 58  1:1 Treatment Time ( only): 25   Visit #: 14 of 19    Treatment Area: Low back pain [M54.5]  Myalgia, other site [M79.18]  Other muscle spasm [M62.838]  Myalgia, other site [M79.18]    SUBJECTIVE  Pain Level (0-10 scale): 0  Any medication changes, allergies to medications, adverse drug reactions, diagnosis change, or new procedure performed?: [x] No    [] Yes (see summary sheet for update)  Subjective functional status/changes:   [] No changes reported  \"I am sore from the needling on Tuesday. \"    OBJECTIVE    Modality rationale:    Type Additional Details   [] Estim:  []Unatt       []IFC  []Premod                        []Other:  []w/ice   []w/heat  Position:  Location:   [] Estim: []Att    []TENS instruct  []NMES                    []Other:  []w/US   []w/ice   []w/heat  Position:  Location:   []  Traction: [] Cervical       []Lumbar                       [] Prone          []Supine                       []Intermittent   []Continuous Lbs:  [] before manual  [] after manual   []  Ultrasound: []Continuous   [] Pulsed                           []1MHz   []3MHz W/cm2:  Location:   []  Iontophoresis with dexamethasone         Location: [] Take home patch   [] In clinic   []  Ice     []  heat  []  Ice massage  []  Laser   []  Anodyne Position:  Location:   []  Laser with stim  []  Other:  Position:  Location:   []  Vasopneumatic Device Pressure:       [] lo [] med [] hi   Temperature: [] lo [] med [] hi   [] Skin assessment post-treatment:  []intact []redness- no adverse reaction    []redness - adverse reaction:     58 min Therapeutic Exercise:  [x] See flow sheet :   Rationale: increase ROM, increase strength, improve coordination and increase proprioception to improve the patients ability to perform daily activities with decreased pain and symptom levels          With   [] TE   [] TA   [] neuro   [] other: Patient Education: [x] Review HEP    [] Progressed/Changed HEP based on:   [] positioning   [] body mechanics   [] transfers   [] heat/ice application    [] other:      Other Objective/Functional Measures:   BP: 134/80 mmHg     Pain Level (0-10 scale) post treatment: 0    ASSESSMENT/Changes in Function:   Pt challenged with addition and progression of strengthening exercises. Patient will continue to benefit from skilled PT services to modify and progress therapeutic interventions, address functional mobility deficits, address ROM deficits, address strength deficits, analyze and address soft tissue restrictions, analyze and cue movement patterns, analyze and modify body mechanics/ergonomics, assess and modify postural abnormalities and instruct in home and community integration to attain remaining goals. []  See Plan of Care  []  See progress note/recertification  []  See Discharge Summary         Progress towards goals / Updated goals:  Long Term Goals: LTG- To be accomplished in 6 week(s):  1.  Pt will demonstrate ability to bridge to full height with good form >10  Reps to indicate functional glut and hamstring strength.   Eval:decreased height  Last PN: progressing, decreased height but no pain   Current:  continued de creased height but no pain      2.  Pt will be able to bend and stoop for household chores and  without pain to increase tolerance to daily activities.   Eval:forward flexion 6 in with no reversal of lordosis and pain to stand  Last PN: 5 in from floor - mild progress  Current: 4.5 in from floor with good reversal of lordosis   Current : 1.5 from floor.      3.  Pt increase trunk rotation to less than 16 in in hooklying to indicate mobility needed for gait. .  Eval:19 in   Last PN: progressing 16.5 in bilaterally   Current 16.5 Bilaterally       4.  Pt FOTO score will increase by 10 points to show improvement in  function. Eval:54  Last PN: progressing 58  Current: no change      Updated Goals:  1. Pt will be able to carry >10 pounds without back pain to increase tolerance to daily activities including childcare.      2.  Pt will report 50% or greater reduction in frequency of HA to imporve quality of life.            PLAN  [x]  Upgrade activities as tolerated     []  Continue plan of care  []  Update interventions per flow sheet       []  Discharge due to:_  []  Other:_      Garrett Oh, PT, DPT 1/24/2019  5:37 PM    Future Appointments   Date Time Provider [place]   1/29/2019 11:15 AM Katharine Rodriguez  [address]   1/29/2019  5:30 PM Stephany Crystal, PTA [facility]   1/31/2019  6:00 PM Stephany Crystal, PTA [facility]   2/5/2019  5:30 PM Stephany Crystal, PTA [facility]   2/7/2019  5:30 PM Verrosalinoda Gil, PT, DPT [facility]   2/12/2019  5:30 PM Stephany Crystal, PTA [facility]   2/14/2019  5:30 PM Verlinda Gil, PT, DPT [facility]   2/19/2019  5:30 PM Stephany Crystal, PTA [facility]   2/21/2019  5:30 PM Verrosalinoda Pock, PT, DPT [facility]   2/26/2019  5:30 PM Stephany Crystal, PTA [facility]   2/28/2019  5:30 PM Verjack Cordero, PT, DPT [facility]   4/11/2019  4:30 PM Berta Nina NP [facility]   5/23/2019 10:30 AM Sariah Giles MD [address] [place] [address]

## 2019-01-29 ENCOUNTER — APPOINTMENT (OUTPATIENT)
Dept: PHYSICAL THERAPY | Age: 64
End: 2019-01-29
Payer: MEDICARE

## 2019-01-31 ENCOUNTER — APPOINTMENT (OUTPATIENT)
Dept: PHYSICAL THERAPY | Age: 64
End: 2019-01-31
Payer: MEDICARE

## 2019-02-01 ENCOUNTER — OFFICE VISIT (OUTPATIENT)
Dept: FAMILY MEDICINE CLINIC | Age: 64
End: 2019-02-01

## 2019-02-01 VITALS
BODY MASS INDEX: 34.83 KG/M2 | HEIGHT: 64 IN | TEMPERATURE: 98 F | HEART RATE: 75 BPM | RESPIRATION RATE: 20 BRPM | OXYGEN SATURATION: 97 % | DIASTOLIC BLOOD PRESSURE: 107 MMHG | WEIGHT: 204 LBS | SYSTOLIC BLOOD PRESSURE: 154 MMHG

## 2019-02-01 DIAGNOSIS — I10 ESSENTIAL HYPERTENSION: Primary | ICD-10-CM

## 2019-02-01 DIAGNOSIS — E66.01 SEVERE OBESITY (HCC): ICD-10-CM

## 2019-02-01 RX ORDER — HYDROCHLOROTHIAZIDE 12.5 MG/1
12.5 TABLET ORAL DAILY
Qty: 90 TAB | Refills: 1 | Status: SHIPPED | OUTPATIENT
Start: 2019-02-01

## 2019-02-01 NOTE — PROGRESS NOTES
HISTORY OF PRESENT ILLNESS  Kali Siddiqui is a 59 y.o. female. Ms. Jaiden Platt is here to follow up of her elevated BP readings. She has a list of her BPs and the majority of them are high. Yesterday 163/103 and 150/110. She gets headaches and doesn't feel well when her BP is up. She gets a little exercise but admits that she needs to do more. Review of Systems   Constitutional: Negative for chills and fever. Eyes: Negative for blurred vision and double vision. Respiratory: Negative for cough and shortness of breath. Cardiovascular: Negative for chest pain and palpitations. Gastrointestinal: Negative for abdominal pain, nausea and vomiting. Neurological: Positive for dizziness and headaches. Physical Exam   Constitutional: Vital signs are normal. She appears well-developed and well-nourished. HENT:   Right Ear: Tympanic membrane and ear canal normal.   Left Ear: Tympanic membrane and ear canal normal.   Nose: Nose normal.   Mouth/Throat: Uvula is midline, oropharynx is clear and moist and mucous membranes are normal.   Eyes: Pupils are equal, round, and reactive to light. Neck: No thyromegaly present. Cardiovascular: Normal rate, regular rhythm and normal heart sounds. Pulmonary/Chest: Effort normal and breath sounds normal. No respiratory distress. She has no wheezes. Lymphadenopathy:     She has no cervical adenopathy. Skin: No rash noted. Nursing note and vitals reviewed. ASSESSMENT and PLAN    ICD-10-CM ICD-9-CM    1. Essential hypertension I10 401.9 hydroCHLOROthiazide (HYDRODIURIL) 12.5 mg tablet   2.  Severe obesity (HCC) E66.01 278.01        Electrolytes normal two months ago, will start HCTZ

## 2019-02-01 NOTE — PATIENT INSTRUCTIONS
Begin HCTZ 12.5 mg daily (in the morning). Monitor your BP as you have been doing. Recheck if it doesn't come down, e-mail me with questions. Read the following info on DASH diet and exercise to lower BP. DASH Diet: Care Instructions  Your Care Instructions    The DASH diet is an eating plan that can help lower your blood pressure. DASH stands for Dietary Approaches to Stop Hypertension. Hypertension is high blood pressure. The DASH diet focuses on eating foods that are high in calcium, potassium, and magnesium. These nutrients can lower blood pressure. The foods that are highest in these nutrients are fruits, vegetables, low-fat dairy products, nuts, seeds, and legumes. But taking calcium, potassium, and magnesium supplements instead of eating foods that are high in those nutrients does not have the same effect. The DASH diet also includes whole grains, fish, and poultry. The DASH diet is one of several lifestyle changes your doctor may recommend to lower your high blood pressure. Your doctor may also want you to decrease the amount of sodium in your diet. Lowering sodium while following the DASH diet can lower blood pressure even further than just the DASH diet alone. Follow-up care is a key part of your treatment and safety. Be sure to make and go to all appointments, and call your doctor if you are having problems. It's also a good idea to know your test results and keep a list of the medicines you take. How can you care for yourself at home? Following the DASH diet  · Eat 4 to 5 servings of fruit each day. A serving is 1 medium-sized piece of fruit, ½ cup chopped or canned fruit, 1/4 cup dried fruit, or 4 ounces (½ cup) of fruit juice. Choose fruit more often than fruit juice. · Eat 4 to 5 servings of vegetables each day. A serving is 1 cup of lettuce or raw leafy vegetables, ½ cup of chopped or cooked vegetables, or 4 ounces (½ cup) of vegetable juice.  Choose vegetables more often than vegetable juice. · Get 2 to 3 servings of low-fat and fat-free dairy each day. A serving is 8 ounces of milk, 1 cup of yogurt, or 1 ½ ounces of cheese. · Eat 6 to 8 servings of grains each day. A serving is 1 slice of bread, 1 ounce of dry cereal, or ½ cup of cooked rice, pasta, or cooked cereal. Try to choose whole-grain products as much as possible. · Limit lean meat, poultry, and fish to 2 servings each day. A serving is 3 ounces, about the size of a deck of cards. · Eat 4 to 5 servings of nuts, seeds, and legumes (cooked dried beans, lentils, and split peas) each week. A serving is 1/3 cup of nuts, 2 tablespoons of seeds, or ½ cup of cooked beans or peas. · Limit fats and oils to 2 to 3 servings each day. A serving is 1 teaspoon of vegetable oil or 2 tablespoons of salad dressing. · Limit sweets and added sugars to 5 servings or less a week. A serving is 1 tablespoon jelly or jam, ½ cup sorbet, or 1 cup of lemonade. · Eat less than 2,300 milligrams (mg) of sodium a day. If you limit your sodium to 1,500 mg a day, you can lower your blood pressure even more. Tips for success  · Start small. Do not try to make dramatic changes to your diet all at once. You might feel that you are missing out on your favorite foods and then be more likely to not follow the plan. Make small changes, and stick with them. Once those changes become habit, add a few more changes. · Try some of the following:  ? Make it a goal to eat a fruit or vegetable at every meal and at snacks. This will make it easy to get the recommended amount of fruits and vegetables each day. ? Try yogurt topped with fruit and nuts for a snack or healthy dessert. ? Add lettuce, tomato, cucumber, and onion to sandwiches. ? Combine a ready-made pizza crust with low-fat mozzarella cheese and lots of vegetable toppings. Try using tomatoes, squash, spinach, broccoli, carrots, cauliflower, and onions. ?  Have a variety of cut-up vegetables with a low-fat dip as an appetizer instead of chips and dip. ? Sprinkle sunflower seeds or chopped almonds over salads. Or try adding chopped walnuts or almonds to cooked vegetables. ? Try some vegetarian meals using beans and peas. Add garbanzo or kidney beans to salads. Make burritos and tacos with mashed christianson beans or black beans. Where can you learn more? Go to http://dena-denton.info/. Enter A070 in the search box to learn more about \"DASH Diet: Care Instructions. \"  Current as of: July 22, 2018  Content Version: 11.9  © 3025-9273 [employer]. Care instructions adapted under license by [employer] (which disclaims liability or warranty for this information). If you have questions about a medical condition or this instruction, always ask your healthcare professional. [address] any warranty or liability for your use of this information.

## 2019-02-01 NOTE — PROGRESS NOTES
Rm;1    Chief Complaint   Patient presents with    Blood Pressure Check     pt states that yesterday her pressure was 163/103     Depression Screening:  PHQ over the last two weeks 2/1/2019 11/23/2018 7/3/2018 7/3/2018 3/23/2018 10/11/2017 9/26/2017   Little interest or pleasure in doing things Not at all Not at all Not at all Not at all Not at all Not at all Not at all   Feeling down, depressed, irritable, or hopeless Not at all Not at all Not at all Not at all Not at all More than half the days Not at all   Total Score PHQ 2 0 0 0 0 0 2 0       Learning Assessment:  Learning Assessment 4/2/2018 6/8/2017 7/18/2016 3/19/2015 11/18/2014   PRIMARY LEARNER Patient Patient Patient Patient Patient   HIGHEST LEVEL OF EDUCATION - PRIMARY LEARNER  GRADUATED HIGH SCHOOL OR GED GRADUATED HIGH SCHOOL OR GED GRADUATED HIGH SCHOOL OR GED - GRADUATED HIGH SCHOOL OR GED   BARRIERS PRIMARY LEARNER VISUAL NONE NONE - NONE   CO-LEARNER CAREGIVER - No No - No   PRIMARY LANGUAGE ENGLISH ENGLISH ENGLISH ENGLISH ENGLISH   LEARNER PREFERENCE PRIMARY DEMONSTRATION DEMONSTRATION DEMONSTRATION LISTENING DEMONSTRATION   ANSWERED BY patient patient patient patient Patient   RELATIONSHIP SELF SELF SELF SELF SELF       Abuse Screening:  Abuse Screening Questionnaire 7/3/2018 6/8/2017   Do you ever feel afraid of your partner? N N   Are you in a relationship with someone who physically or mentally threatens you? N N   Is it safe for you to go home? Y Y       Health Maintenance reviewed and discussed per provider: yes     Coordination of Care:    1. Have you been to the ER, urgent care clinic since your last visit? Hospitalized since your last visit? no    2. Have you seen or consulted any other health care providers outside of the [address] [place] [address] since your last visit? Include any pap smears or colon screening.  no

## 2019-02-05 ENCOUNTER — HOSPITAL ENCOUNTER (OUTPATIENT)
Dept: PHYSICAL THERAPY | Age: 64
Discharge: HOME | End: 2019-02-05
Payer: MEDICAID

## 2019-02-05 PROCEDURE — 97110 THERAPEUTIC EXERCISES: CPT

## 2019-02-05 NOTE — PROGRESS NOTES
PT DAILY TREATMENT NOTE     Patient Name: Peter Boss  Date:2019  : 1955  [x]  Patient  Verified  Payor: [facility] MEDICARE / Plan: [facility] COMMUNITY PLAN MCR / Product Type: Managed Care Medicare /    In time:5:30  Out time:6:23  Total Treatment Time (min): 53  Visit #: 15 of 19    Treatment Area: Low back pain [M54.5]  Myalgia, other site [M79.18]  Other muscle spasm [M62.838]  Myalgia, other site [M79.18]    SUBJECTIVE  Pain Level (0-10 scale): 3  Any medication changes, allergies to medications, adverse drug reactions, diagnosis change, or new procedure performed?: [x] No    [] Yes (see summary sheet for update)  Subjective functional status/changes:   [] No changes reported  \"My back is stiff. A lot of bending over. \"     OBJECTIVE    Modality rationale: decrease pain to improve the patients ability to perform pain free ADL\"S    Min Type Additional Details    [] Estim:  []Unatt       []IFC  []Premod                        []Other:  []w/ice   []w/heat  Position:  Location:    [] Estim: []Att    []TENS instruct  []NMES                    []Other:  []w/US   []w/ice   []w/heat  Position:  Location:    []  Traction: [] Cervical       []Lumbar                       [] Prone          []Supine                       []Intermittent   []Continuous Lbs:  [] before manual  [] after manual    []  Ultrasound: []Continuous   [] Pulsed                           []1MHz   []3MHz W/cm2:  Location:    []  Iontophoresis with dexamethasone         Location: [] Take home patch   [] In clinic    []  Ice     [x]  Heat with TE   []  Ice massage  []  Laser   []  Anodyne Position: supine   Location: low back     []  Laser with stim  []  Other:  Position:  Location:    []  Vasopneumatic Device Pressure:       [] lo [] med [] hi   Temperature: [] lo [] med [] hi   [] Skin assessment post-treatment:  []intact []redness- no adverse reaction    []redness -       53 min Therapeutic Exercise:  [] See flow sheet :   Rationale: increase ROM, increase strength, improve coordination and improve balance to improve the patients ability to perform pain free ADL's           With   [] TE   [] TA   [] neuro   [] other: Patient Education: [x] Review HEP    [] Progressed/Changed HEP based on:   [] positioning   [] body mechanics   [] transfers   [] heat/ice application    [] other:      Other Objective/Functional Measures:   Weakness with left leg with leg press      Pain Level (0-10 scale) post treatment: 3    ASSESSMENT/Changes in Function: Pt reported increased LBP this session dues to increased lumbar flexion activity with forward flexion. Pt reported good progress and will plan to transition to Home and gym program at the end of 4 sessions. Very challenged with left leg on leg press. Patient will continue to benefit from skilled PT services to modify and progress therapeutic interventions, address functional mobility deficits, address ROM deficits, address strength deficits, analyze and address soft tissue restrictions, analyze and cue movement patterns, analyze and modify body mechanics/ergonomics, assess and modify postural abnormalities, address imbalance/dizziness and instruct in home and community integration to attain remaining goals. []  See Plan of Care  []  See progress note/recertification  []  See Discharge Summary         Progress towards goals / Updated goals:  Long Term Goals: LTG- To be accomplished in 6 week(s):  1.  Pt will demonstrate ability to bridge to full height with good form >10  Reps to indicate functional glut and hamstring strength.   Eval:decreased height  Last PN: progressing, decreased height but no pain   Current:  continued de creased height but no pain      2.  Pt will be able to bend and stoop for household chores and  without pain to increase tolerance to daily activities.   Eval:forward flexion 6 in with no reversal of lordosis and pain to stand  Last PN: 5 in from floor - mild progress  Current: 4.5 in from floor with good reversal of lordosis   Current : 1.5 from floor.      3.  Pt increase trunk rotation to less than 16 in in hooklying to indicate mobility needed for gait. .  Eval:19 in   Last PN: progressing 16.5 in bilaterally   Current 16.5 Bilaterally       4.  Pt FOTO score will increase by 10 points to show improvement in  function. Eval:54  Last PN: progressing 58  Current: no change        Updated Goals:  1. Pt will be able to carry >10 pounds without back pain to increase tolerance to daily activities including childcare.      2.  Pt will report 50% or greater reduction in frequency of HA to imporve quality of life.          PLAN  [x]  Upgrade activities as tolerated     [x]  Continue plan of care  []  Update interventions per flow sheet       []  Discharge due to:_  []  Other:_      Lamine ANGEL Hi 2/5/2019  5:36 PM    Future Appointments   Date Time Provider Aniya Brady   2/7/2019  5:30 PM Robert Do PT, DPT [facility]   2/12/2019  5:30 PM Nettie Peraza PTA [facility]   2/14/2019  5:30 PM Robert Do PT, DPT [facility]   2/19/2019  5:30 PM Nettie Peraza PTA [facility]   2/21/2019  5:30 PM Robert Do PT, DPT [facility]   2/26/2019  5:30 PM Nettie Peraza PTA [facility]   2/28/2019  5:30 PM Robert Do PT, DPT [facility]   4/11/2019  4:30 PM Elizabeth Griffith NP [address]   5/23/2019 10:30 AM Bud Medicus, Julio César Lloyd MD [address] [place] [address] Rd

## 2019-02-07 ENCOUNTER — HOSPITAL ENCOUNTER (OUTPATIENT)
Dept: PHYSICAL THERAPY | Age: 64
Discharge: HOME | End: 2019-02-07
Payer: MEDICAID

## 2019-02-07 PROCEDURE — 97140 MANUAL THERAPY 1/> REGIONS: CPT

## 2019-02-07 PROCEDURE — 97110 THERAPEUTIC EXERCISES: CPT

## 2019-02-07 NOTE — PROGRESS NOTES
PT DAILY TREATMENT NOTE - [facility]     Patient Name: Leandro Salazar  Date:2019  : 1955  [x]  Patient  Verified  Payor: Roni Garnett Yasir / Plan: [facility] COMMUNITY PLAN MCR / Product Type: Managed Care Medicare /    In time:5:26 pm  Out time:6:32 pm    Total Treatment Time (min): 66  Total Timed Codes (min): 56  1:1 Treatment Time ( only): 25   Visit #: 16 of 19    Treatment Area: Low back pain [M54.5]  Myalgia, other site [M79.18]  Other muscle spasm [M62.838]  Myalgia, other site [M79.18]    SUBJECTIVE  Pain Level (0-10 scale): 2  Any medication changes, allergies to medications, adverse drug reactions, diagnosis change, or new procedure performed?: [x] No    [] Yes (see summary sheet for update)  Subjective functional status/changes:   [] No changes reported  \"I think I would rather have my back needled today even though my shoulder and neck have been sore. \"    OBJECTIVE    Modality rationale: decrease pain to improve the patients ability to perform daily activities with decreased pain and symptom levels     Type Additional Details   [] Estim:  []Unatt       []IFC  []Premod                        []Other:  []w/ice   []w/heat  Position:  Location:   [] Estim: []Att    []TENS instruct  []NMES                    []Other:  []w/US   []w/ice   []w/heat  Position:  Location:   []  Traction: [] Cervical       []Lumbar                       [] Prone          []Supine                       []Intermittent   []Continuous Lbs:  [] before manual  [] after manual   []  Ultrasound: []Continuous   [] Pulsed                           []1MHz   []3MHz W/cm2:  Location:   []  Iontophoresis with dexamethasone         Location: [] Take home patch   [] In clinic   []  Ice     []  heat  []  Ice massage  []  Laser   []  Anodyne Position:  Location:   []  Laser with stim  []  Other:  Position:  Location:   []  Vasopneumatic Device Pressure:       [] lo [] med [] hi   Temperature: [] lo [] med [] hi   [] Skin assessment post-treatment:  []intact []redness- no adverse reaction    []redness - adverse reaction:     40 min Therapeutic Exercise:  [x] See flow sheet :   Rationale: increase ROM, increase strength, improve coordination and increase proprioception to improve the patients ability to perform daily activities with decreased pain and symptom levels     8 min Manual Therapy:  STM and palpation of trigger points in bilateral UT  Hemphill taping to inhibit right UT  Time not accounted for is needle insertion time    Rationale: decrease pain, increase ROM, increase tissue extensibility and decrease trigger points to perform daily activities with decreased pain and symptom levels              With   [] TE   [] TA   [] neuro   [] other: Patient Education: [x] Review HEP    [] Progressed/Changed HEP based on:   [] positioning   [] body mechanics   [] transfers   [] heat/ice application    [] other:      Other Objective/Functional Measures:   Significant twitch response in gluts      Pain Level (0-10 scale) post treatment: \"sore\"    ASSESSMENT/Changes in Function:   Pt overall reports decrease in pain. Is demonstrating increased motivation for changing lifestyle. Has changed diet and is looking into joining a gym. Patient will continue to benefit from skilled PT services to modify and progress therapeutic interventions, address functional mobility deficits, address ROM deficits, address strength deficits, analyze and address soft tissue restrictions, analyze and cue movement patterns, analyze and modify body mechanics/ergonomics, assess and modify postural abnormalities and instruct in home and community integration to attain remaining goals.      []  See Plan of Care  []  See progress note/recertification  []  See Discharge Summary         Progress towards goals / Updated goals:  Long Term Goals: LTG- To be accomplished in 6 week(s):  1.  Pt will demonstrate ability to bridge to full height with good form >10  Reps to indicate functional glut and hamstring strength. Eval:decreased height  Last PN: progressing, decreased height but no pain   Current:  continued de creased height but no pain      2.  Pt will be able to bend and stoop for household chores and  without pain to increase tolerance to daily activities.   Eval:forward flexion 6 in with no reversal of lordosis and pain to stand  Last PN: 5 in from floor - mild progress  Current: 4.5 in from floor with good reversal of lordosis   Current : 1.5 from floor. -progressing      3.  Pt increase trunk rotation to less than 16 in in hooklying to indicate mobility needed for gait. .  Eval:19 in   Last PN: progressing 16.5 in bilaterally   Current 16.5 Bilaterally       4.  Pt FOTO score will increase by 10 points to show improvement in  function. Eval:54  Last PN: progressing 58  Current: no change, reassess next session         Updated Goals:  1. Pt will be able to carry >10 pounds without back pain to increase tolerance to daily activities including childcare.      2.  Pt will report 50% or greater reduction in frequency of HA to imporve quality of life.        PLAN  [x]  Upgrade activities as tolerated     []  Continue plan of care  []  Update interventions per flow sheet       []  Discharge due to:_  []  Other:_      Yaritza Chew PT, DPT 2/7/2019  6:01 PM    Future Appointments   Date Time Provider Aniya Brady   2/12/2019  5:30 PM Jenny SANTIAGO [facility]   2/14/2019  5:30 PM Pranav Shields PT DPT [facility]   2/19/2019  5:30 PM [facility]   2/21/2019  5:30 PM Pranav Shields PT, DPT [facility]   2/26/2019  5:30 PM Yoana Price PTA [facility]   2/28/2019  5:30 PM Pranav Shields PT, DPT [facility]   4/11/2019  4:30 PM Manuela Martell NP [address]   5/23/2019 10:30 AM Frank Vasquez  Marvin Doyle

## 2019-02-12 ENCOUNTER — HOSPITAL ENCOUNTER (OUTPATIENT)
Dept: PHYSICAL THERAPY | Age: 64
Discharge: HOME | End: 2019-02-12
Payer: MEDICAID

## 2019-02-12 PROCEDURE — 97110 THERAPEUTIC EXERCISES: CPT

## 2019-02-12 NOTE — PROGRESS NOTES
PT DAILY TREATMENT NOTE - [facility]     Patient Name: Elsi Griffin  Date:2019  : 1955  [x]  Patient  Verified  Payor: [facility] MEDICARE / Plan: [facility] COMMUNITY PLAN MCR / Product Type: Managed Care Medicare /    In time:5:30  Out time:6:25  Total Treatment Time (min): 55  Total Timed Codes (min): 55  1:1 Treatment Time ( only): 35   Visit #: 17 of 19    Treatment Area: Low back pain [M54.5]  Myalgia, other site [M79.18]  Other muscle spasm [M62.838]  Myalgia, other site [M79.18]    SUBJECTIVE  Pain Level (0-10 scale): 3  Any medication changes, allergies to medications, adverse drug reactions, diagnosis change, or new procedure performed?: [x] No    [] Yes (see summary sheet for update)  Subjective functional status/changes:   [] No changes reported  \"Its better. I woke up the other day and everything was stiff and tight , but today I was fine. \"     OBJECTIVE      55 min Therapeutic Exercise:  [] See flow sheet :   Rationale: increase ROM, increase strength, improve coordination and improve balance to improve the patients ability to perform pain free ADLS\"        With   [] TE   [] TA   [] neuro   [] other: Patient Education: [x] Review HEP    [] Progressed/Changed HEP based on:   [] positioning   [] body mechanics   [] transfers   [] heat/ice application    [] other:      Other Objective/Functional Measures:   Decreased height with swiss ball bridge and HS curl     Pain Level (0-10 scale) post treatment: 0    ASSESSMENT/Changes in Function: Pt reported pain upon waking up in the morning . Stated MD suggested to stop sleeping with a pillow at night. No neck pain this session. Noted continued weakness in glutes and Hamstrings and very challenged with swiss ball bridge with hamstring curl.      Patient will continue to benefit from skilled PT services to modify and progress therapeutic interventions, address functional mobility deficits, address ROM deficits, address strength deficits, analyze and address soft tissue restrictions, analyze and cue movement patterns, analyze and modify body mechanics/ergonomics, assess and modify postural abnormalities, address imbalance/dizziness and instruct in home and community integration to attain remaining goals. []  See Plan of Care  []  See progress note/recertification  []  See Discharge Summary         Progress towards goals / Updated goals:  Long Term Goals: LTG- To be accomplished in 6 week(s):  1.  Pt will demonstrate ability to bridge to full height with good form >10  Reps to indicate functional glut and hamstring strength. Eval:decreased height  Last PN: progressing, decreased height but no pain   Current:  continued de creased height but no pain      2.  Pt will be able to bend and stoop for household chores and  without pain to increase tolerance to daily activities.   Eval:forward flexion 6 in with no reversal of lordosis and pain to stand  Last PN: 5 in from floor - mild progress  Current: 4.5 in from floor with good reversal of lordosis   Current : 1.5 from floor. -progressing      3.  Pt increase trunk rotation to less than 16 in in hooklying to indicate mobility needed for gait. .  Eval:19 in   Last PN: progressing 16.5 in bilaterally   Current 16.5 Bilaterally       4.  Pt FOTO score will increase by 10 points to show improvement in  function. Eval:54  Last PN: progressing 58  Current: no change, reassess next session         Updated Goals:  1. Pt will be able to carry >10 pounds without back pain to increase tolerance to daily activities including childcare.      2.  Pt will report 50% or greater reduction in frequency of HA to imporve quality of life.            PLAN  [x]  Upgrade activities as tolerated     [x]  Continue plan of care  []  Update interventions per flow sheet       []  Discharge due to:_  []  Other:_      Tiago Salazar, PTA 2/12/2019  5:22 PM    Future Appointments   Date Time Provider Aniya Brady   2/12/2019  5:30 PM Rj Hanna [facility]   2/14/2019  5:30 PM Kika Bailey PT, DPT [facility]   2/19/2019  5:30 PM Esdras Hayward PTA [facility]   2/21/2019  5:30 PM Kika Bailey PT, DPT [facility]   2/26/2019  5:30 PM Esdras Hayward PTA [facility]   4/11/2019  4:30 PM Sujit Christian NP [address] [facility]   5/23/2019 10:30 AM Sunita Martinez MD [address] [facility]

## 2019-02-14 ENCOUNTER — HOSPITAL ENCOUNTER (OUTPATIENT)
Dept: PHYSICAL THERAPY | Age: 64
Discharge: HOME | End: 2019-02-14
Payer: MEDICAID

## 2019-02-14 PROCEDURE — 97110 THERAPEUTIC EXERCISES: CPT

## 2019-02-14 PROCEDURE — 97140 MANUAL THERAPY 1/> REGIONS: CPT

## 2019-02-14 NOTE — PROGRESS NOTES
PT DAILY TREATMENT NOTE - [facility]     Patient Name: Haley Schmidt  Date:2019  : 1955  [x]  Patient  Verified  Payor: Mariella Gray / Plan: [facility] COMMUNITY PLAN MCR / Product Type: Managed Care Medicare /    In time:5:30 pm  Out time:6:35 pm  Total Treatment Time (min): 65  Total Timed Codes (min): 55  1:1 Treatment Time ( only): 25   Visit #: 18 of 19    Treatment Area: Low back pain [M54.5]  Myalgia, other site [M79.18]  Other muscle spasm [M62.838]  Myalgia, other site [M79.18]    SUBJECTIVE  Pain Level (0-10 scale): 2-3  Any medication changes, allergies to medications, adverse drug reactions, diagnosis change, or new procedure performed?: [x] No    [] Yes (see summary sheet for update)  Subjective functional status/changes:   [] No changes reported  \"I was hurting real bad yesterday. I moved a table and a microwave. I had to take a muscle relaxer. \"    OBJECTIVE    Modality rationale: decrease pain to improve the patients ability to perform daily activities with decreased pain and symptom levels     Type Additional Details   [] Estim:  []Unatt       []IFC  []Premod                        []Other:  []w/ice   []w/heat  Position:  Location:   [] Estim: []Att    []TENS instruct  []NMES                    []Other:  []w/US   []w/ice   []w/heat  Position:  Location:   []  Traction: [] Cervical       []Lumbar                       [] Prone          []Supine                       []Intermittent   []Continuous Lbs:  [] before manual  [] after manual   []  Ultrasound: []Continuous   [] Pulsed                           []1MHz   []3MHz W/cm2:  Location:   []  Iontophoresis with dexamethasone         Location: [] Take home patch   [] In clinic   [x]  Ice - 10 min     []  heat  []  Ice massage  []  Laser   []  Anodyne Position:supine LE elevated  Location: bilateral gluts   []  Laser with stim  []  Other:  Position:  Location:   []  Vasopneumatic Device Pressure:       [] lo [] med [] hi   Temperature: [] lo [] med [] hi   [x] Skin assessment post-treatment:  [x]intact []redness- no adverse reaction    []redness - adverse reaction:     40 min Therapeutic Exercise:  [x] See flow sheet :   Rationale: increase ROM, increase strength, improve coordination and increase proprioception to improve the patients ability to perform daily activities with decreased pain and symptom levels     10 min Manual Therapy:  STM and palpation of trigger points in gluts and QL  Time not accounted for is needle insertion time    Rationale: decrease pain, increase ROM, increase tissue extensibility and decrease trigger points to perform daily activities with decreased pain and symptom levels     Dry Needling Procedure Note    Procedure: An intramuscular manual therapy (dry needling) and a neuro-muscular re-education treatment was done to deactivate myofascial trigger points with a 30 gauge filament needle under aseptic technique. Indications:  [x] Myofascial pain and dysfunction [] Muscled spasms  [x] Myalgia/myositis   [] Muscle cramps  [] Muscle imbalances  [] Other:    Chart reviewed for the following:  IKade PT, DPT, have reviewed the medical history, summary list and precautions/contraindications for Kali Siddiqui.   TIME OUT performed immediately prior to start of procedure:  Kade LING PT, DPT, have performed the following reviews on Kali Siddiqui prior to the start of the session:      [x] Verified patient identification by name and date of birth    [x] Agreement on all muscles being treated was verified   [x] Purpose of dry needling, side effects, possible complications, risks and benefits were explained to the patient   [x] Procedure site(s) verified  [x] Patient was positioned for comfort and draped for privacy  [x] Informed Consent was signed (initial visit) and verified verbally (subsequent visits)  [x] Patient was instructed on the need to report the use of blood thinners and/or immunosuppressant medications  [x] How to respond to possible adverse effects of treatment  [x] Self treatment of post needling soreness: ice, heat (moist heat, heat wraps) and stretching  [x] Opportunity was given to ask any questions, all questions were answered            Time: 6:10 pm  Date of procedure: 2/14/2019    Treatment: The following muscles were treated today with intramuscular dry needling  [] Left [x] Right Quadratus Lumborum  [x] Left [x] Right Faisal Bruins / Starlette Eves / Bakari    Patient's response to today's treatment:  [x] Latent Twitch Response  [] Muscle relaxation [] Pain Relief  [x] Post needling soreness [x] without complications  [] Increased Range of Motion  [] Other:     Performed by: Gabriel Bhatt, PT, DPT            With   [] TE   [] TA   [] neuro   [] other: Patient Education: [x] Review HEP    [] Progressed/Changed HEP based on:   [] positioning   [] body mechanics   [] transfers   [] heat/ice application    [] other:      Other Objective/Functional Measures:   Most twitch response in right QL     Pain Level (0-10 scale) post treatment: \"sore\"    ASSESSMENT/Changes in Function:   Pt had increased back pain after moving table and microwave - suspect due to poor mechanics. Patient will continue to benefit from skilled PT services to modify and progress therapeutic interventions, address functional mobility deficits, address ROM deficits, address strength deficits, analyze and address soft tissue restrictions, analyze and cue movement patterns, analyze and modify body mechanics/ergonomics, assess and modify postural abnormalities and instruct in home and community integration to attain remaining goals.      []  See Plan of Care  []  See progress note/recertification  []  See Discharge Summary         Progress towards goals / Updated goals:  Long Term Goals: LTG- To be accomplished in 6 week(s):  1.  Pt will demonstrate ability to bridge to full height with good form >10  Reps to indicate functional glut and hamstring strength. Eval:decreased height  Last PN: progressing, decreased height but no pain   Current:  continued de creased height but no pain      2.  Pt will be able to bend and stoop for household chores and  without pain to increase tolerance to daily activities.   Eval:forward flexion 6 in with no reversal of lordosis and pain to stand  Last PN: 5 in from floor - mild progress  Current: 4.5 in from floor with good reversal of lordosis   Current : 1.5 from floor. -progressing      3.  Pt increase trunk rotation to less than 16 in in hooklying to indicate mobility needed for gait. .  Eval:19 in   Last PN: progressing 16.5 in bilaterally   Current 16.5 Bilaterally       4.  Pt FOTO score will increase by 10 points to show improvement in  function.   Eval:54  Last PN: progressing 58  Current: assess at visit 20         PLAN  [x]  Upgrade activities as tolerated     []  Continue plan of care  []  Update interventions per flow sheet       []  Discharge due to:_  []  Other:_      Kade Pappas, PT, DPT 2/14/2019  6:06 PM    Future Appointments   Date Time Provider Aniya Brady   2/19/2019  5:30 PM Acey Born [facility]   2/21/2019  5:30 PM Marian Denson, PT, DPT [facility]   2/26/2019  5:30 PM Sandra Patel PTA [facility]   4/11/2019  4:30 PM Leslie Boothe NP [address] [facility]   5/23/2019 10:30 AM Angeles Berg, Allyn Hobbs MD [address] [place] [address]

## 2019-02-19 ENCOUNTER — HOSPITAL ENCOUNTER (OUTPATIENT)
Dept: PHYSICAL THERAPY | Age: 64
Discharge: HOME | End: 2019-02-19
Payer: MEDICAID

## 2019-02-19 PROCEDURE — 97140 MANUAL THERAPY 1/> REGIONS: CPT

## 2019-02-19 PROCEDURE — 97110 THERAPEUTIC EXERCISES: CPT

## 2019-02-19 NOTE — PROGRESS NOTES
PT DAILY TREATMENT NOTE - [facility]     Patient Name: Dinesh Mendoza  Date:2019  : 1955  [x]  Patient  Verified  Payor: [facility] MEDICARE / Plan: [facility] COMMUNITY PLAN MCR / Product Type: Managed Care Medicare /    In time:5:30  Out time:6:27  Total Treatment Time (min): 57  Total Timed Codes (min): 47  1:1 Treatment Time ( only): 52   Visit #: 19 of 19    Treatment Area: Low back pain [M54.5]  Myalgia, other site [M79.18]  Other muscle spasm [M62.838]  Myalgia, other site [M79.18]    SUBJECTIVE  Pain Level (0-10 scale): 0  Any medication changes, allergies to medications, adverse drug reactions, diagnosis change, or new procedure performed?: [x] No    [] Yes (see summary sheet for update)  Subjective functional status/changes:   [] No changes reported  \"Im sore today.  I moved into my new apartment \"     OBJECTIVE    Modality rationale: decrease pain to improve the patients ability to perform pain free ADL's    Type Additional Details   [] Estim:  []Unatt       []IFC  []Premod                        []Other:  []w/ice   []w/heat  Position:  Location:   [] Estim: []Att    []TENS instruct  []NMES                    []Other:  []w/US   []w/ice   []w/heat  Position:  Location:   []  Traction: [] Cervical       []Lumbar                       [] Prone          []Supine                       []Intermittent   []Continuous Lbs:  [] before manual  [] after manual   []  Ultrasound: []Continuous   [] Pulsed                           []1MHz   []3MHz W/cm2:  Location:   []  Iontophoresis with dexamethasone         Location: [] Take home patch   [] In clinic   []  Ice     [x]  Heat 10 minutes      Ice massage  []  Laser   []  Anodyne Position supine :  Location: neck    []  Laser with stim  []  Other:  Position:  Location:   []  Vasopneumatic Device Pressure:       [] lo [] med [] hi   Temperature: [] lo [] med [] hi   [x] Skin assessment post-treatment:  [x]intact []redness- no adverse reaction    []redness -       37 min Therapeutic Exercise:  [x] See flow sheet :   Rationale: increase ROM, increase strength, improve coordination and improve balance to improve the patients ability to perform pain janes ADL\"S     10 min Manual Therapy:  STM and trigger point release to bilatearl upper trap / levator    Rationale: decrease pain, increase ROM and increase tissue extensibility to perform pain free ADL's             With   [] TE   [] TA   [] neuro   [] other: Patient Education: [x] Review HEP    [] Progressed/Changed HEP based on:   [] positioning   [] body mechanics   [] transfers   [] heat/ice application    [] other:      Other Objective/Functional Measures:   Challenged with clams      Pain Level (0-10 scale) post treatment: 0    ASSESSMENT/Changes in Function: Pt reported mild increase in pain due to increased activity with painting and moving. C/o muscle soreness in bilateral gluts and hips, and increase pain in right shoulder. Noted tension in right upper trap. Patient will continue to benefit from skilled PT services to modify and progress therapeutic interventions, address functional mobility deficits, address ROM deficits, address strength deficits, analyze and address soft tissue restrictions, analyze and cue movement patterns, analyze and modify body mechanics/ergonomics, assess and modify postural abnormalities, address imbalance/dizziness and instruct in home and community integration to attain remaining goals. []  See Plan of Care  []  See progress note/recertification  []  See Discharge Summary         Progress towards goals / Updated goals:  Long Term Goals: LTG- To be accomplished in 6 week(s):  1.  Pt will demonstrate ability to bridge to full height with good form >10  Reps to indicate functional glut and hamstring strength.   Eval:decreased height  Last PN: progressing, decreased height but no pain   Current:  continued de creased height but no pain      2.  Pt will be able to bend and stoop for household chores and  without pain to increase tolerance to daily activities.   Eval:forward flexion 6 in with no reversal of lordosis and pain to stand  Last PN: 5 in from floor - mild progress  Current: 4.5 in from floor with good reversal of lordosis   Current : 1.5 from floor. -progressing      3.  Pt increase trunk rotation to less than 16 in in hooklying to indicate mobility needed for gait. .  Eval:19 in   Last PN: progressing 16.5 in bilaterally   Current 16.5 Bilaterally       4.  Pt FOTO score will increase by 10 points to show improvement in  function.   Eval:54  Last PN: progressing 58  Current: assess at visit 20                 PLAN  [x]  Upgrade activities as tolerated     [x]  Continue plan of care  []  Update interventions per flow sheet       []  Discharge due to:_  []  Other:_      Jerman Perdomo PTA 2/19/2019  5:58 PM    Future Appointments   Date Time Provider Aniya Brady   2/21/2019  5:30 PM Marii Salazar PT, DPT [facility]   2/26/2019  5:30 PM Ole Gardner PTA [facility]   4/11/2019  4:30 PM Tremayne Benitez NP [address] [facility]   5/23/2019 10:30 AM Carson Ho MD [address] [place] [address]

## 2019-02-21 ENCOUNTER — HOSPITAL ENCOUNTER (OUTPATIENT)
Dept: PHYSICAL THERAPY | Age: 64
Discharge: HOME | End: 2019-02-21
Payer: MEDICAID

## 2019-02-21 PROCEDURE — 97110 THERAPEUTIC EXERCISES: CPT

## 2019-02-21 PROCEDURE — 97140 MANUAL THERAPY 1/> REGIONS: CPT

## 2019-02-21 NOTE — PROGRESS NOTES
PT DAILY TREATMENT NOTE - [facility]     Patient Name: Leroy Valdes  Date:2019  : 1955  [x]  Patient  Verified  Payor: [facility] MEDICARE / Plan: [facility] PLAN MCR / Product Type: Managed Care Medicare /    In time:5:30 pm   Out time:6:38 pm  Total Treatment Time (min): 68  Total Timed Codes (min): 58  1:1 Treatment Time ( only): 25   Visit #: 20 of 29    Treatment Area: Low back pain [M54.5]  Myalgia, other site [M79.18]  Other muscle spasm [M62.838]  Myalgia, other site [M79.18]    SUBJECTIVE  Pain Level (0-10 scale): 0  Any medication changes, allergies to medications, adverse drug reactions, diagnosis change, or new procedure performed?: [x] No    [] Yes (see summary sheet for update)  Subjective functional status/changes:   [] No changes reported  \"I feel good, just sore. \"    OBJECTIVE    Modality rationale: decrease pain to improve the patients ability to perform daily activities with decreased pain and symptom levels     Type Additional Details   [] Estim:  []Unatt       []IFC  []Premod                        []Other:  []w/ice   []w/heat  Position:  Location:   [] Estim: []Att    []TENS instruct  []NMES                    []Other:  []w/US   []w/ice   []w/heat  Position:  Location:   []  Traction: [] Cervical       []Lumbar                       [] Prone          []Supine                       []Intermittent   []Continuous Lbs:  [] before manual  [] after manual   []  Ultrasound: []Continuous   [] Pulsed                           []1MHz   []3MHz W/cm2:  Location:   []  Iontophoresis with dexamethasone         Location: [] Take home patch   [] In clinic   [x]  Ice - 10 min      []  heat  []  Ice massage  []  Laser   []  Anodyne Position: supine with LE elevated  Location:Right QL, Left glut   []  Laser with stim  []  Other:  Position:  Location:   []  Vasopneumatic Device Pressure:       [] lo [] med [] hi   Temperature: [] lo [] med [] hi   [x] Skin assessment post-treatment:  [x]intact []redness- no adverse reaction    []redness - adverse reaction:     40 min Therapeutic Exercise:  [x] See flow sheet :   Rationale: increase ROM, increase strength, improve coordination and increase proprioception to improve the patients ability to perform daily activities with decreased pain and symptom levels     8 min Manual Therapy:  STM and palpation of trigger points in gluts and QL  Time not accounted for is needle insertion time    Rationale: decrease pain, increase ROM, increase tissue extensibility and decrease trigger points to perform daily activities with decreased pain and symptom levels    Dry Needling Procedure Note    Procedure: An intramuscular manual therapy (dry needling) and a neuro-muscular re-education treatment was done to deactivate myofascial trigger points with a 30 gauge filament needle under aseptic technique. Indications:  [x] Myofascial pain and dysfunction [] Muscled spasms  [x] Myalgia/myositis   [] Muscle cramps  [] Muscle imbalances  [] Other:    Chart reviewed for the following:  Vielka LING, PT, DPT, have reviewed the medical history, summary list and precautions/contraindications for Leandro Salazar.   TIME OUT performed immediately prior to start of procedure:  Vielka LING, PT, DPT, have performed the following reviews on Leandro Caleroter prior to the start of the session:      [x] Verified patient identification by name and date of birth    [x] Agreement on all muscles being treated was verified   [x] Purpose of dry needling, side effects, possible complications, risks and benefits were explained to the patient   [x] Procedure site(s) verified  [x] Patient was positioned for comfort and draped for privacy  [x] Informed Consent was signed (initial visit) and verified verbally (subsequent visits)  [x] Patient was instructed on the need to report the use of blood thinners and/or immunosuppressant medications  [x] How to respond to possible adverse effects of treatment  [x] Self treatment of post needling soreness: ice, heat (moist heat, heat wraps) and stretching  [x] Opportunity was given to ask any questions, all questions were answered            Time: 6:02 pm  Date of procedure: 2/21/2019    Treatment: The following muscles were treated today with intramuscular dry needling  [] Left [x] Right Quadratus Lumborum  [x] Left [] Right Kristian Olguin / Neyda Celestina / Junior [employer]    Patient's response to today's treatment:  [x] Latent Twitch Response  [] Muscle relaxation [] Pain Relief  [x] Post needling soreness [x] without complications  [] Increased Range of Motion  [] Other:     Performed by: Santos Lawrence, PT, DPT              With   [] TE   [] TA   [] neuro   [] other: Patient Education: [x] Review HEP    [] Progressed/Changed HEP based on:   [] positioning   [] body mechanics   [] transfers   [] heat/ice application    [] other:      Other Objective/Functional Measures:   Significant twitch response in all muscles needled this session      Pain Level (0-10 scale) post treatment: \"sore\"    ASSESSMENT/Changes in Function:   Continues to be very challenged with clamshells and gluts fatigue quickly. Patient will continue to benefit from skilled PT services to modify and progress therapeutic interventions, address functional mobility deficits, address ROM deficits, address strength deficits, analyze and address soft tissue restrictions, analyze and cue movement patterns, analyze and modify body mechanics/ergonomics, assess and modify postural abnormalities and instruct in home and community integration to attain remaining goals. []  See Plan of Care  []  See progress note/recertification  []  See Discharge Summary         Progress towards goals / Updated goals:  Long Term Goals: LTG- To be accomplished in 6 week(s):  1.  Pt will demonstrate ability to bridge to full height with good form >10  Reps to indicate functional glut and hamstring strength.   Eval:decreased height  Last PN: progressing, decreased height but no pain   Current:  continued decreased height but no pain      2.  Pt will be able to bend and stoop for household chores and  without pain to increase tolerance to daily activities.   Eval:forward flexion 6 in with no reversal of lordosis and pain to stand  Last PN: 5 in from floor - mild progress  Current: 4.5 in from floor with good reversal of lordosis   Current : 1.5 from floor. -progressing      3.  Pt increase trunk rotation to less than 16 in in hooklying to indicate mobility needed for gait. .  Eval:19 in   Last PN: progressing 16.5 in bilaterally   Current 16.5 Bilaterally - no change       4.  Pt FOTO score will increase by 10 points to show improvement in  function.   Eval:54  Last PN: progressing 58  Current: assess at visit 20      PLAN  [x]  Upgrade activities as tolerated     []  Continue plan of care  []  Update interventions per flow sheet       []  Discharge due to:_  []  Other:_      Tai Ruiz PT, DPT 2/21/2019  6:55 PM    Future Appointments   Date Time Provider Aniya Brady   2/26/2019  5:30 PM Yue Hedrick [facility]   3/5/2019  5:30 PM Gertrude Zee PTA [facility]   3/7/2019  5:30 PM Sedrick Donahue PT, DPT [facility]   3/12/2019  5:30 PM Gertrude Zee PTA [facility]   3/14/2019  5:30 PM Sedrick Donahue PT, DPT [facility]   3/19/2019  5:30 PM Sedrick Donahue PT, DPT [facility]   3/21/2019  5:00 PM Gertrude Zee PTA [facility]   3/26/2019  5:30 PM Sedrick Donahue PT, DPT [facility]   3/28/2019  5:00 PM Gertrude Zee PTA [facility]   4/11/2019  4:30 PM Shonna Walton NP [address]   5/23/2019 10:30 AM Enrique Dyer MD [address] [facility] [address]

## 2019-02-22 NOTE — PROGRESS NOTES
[facility] at the [address], [facility] [place], [address] [facility]  Phone: [phone]      Fax:  [phone]    Progress Note  Patient name: Collette Perdomo Start of Care: 2018   Referral source: Jack Stroud MD : [id number]               Medical Diagnosis: Low back pain [M54.5]  Myalgia, other site [M79.18]  Other muscle spasm [M62.838]  Myalgia, other site [M79.18]    Onset Date:2 months ago               Treatment Diagnosis: LBP, lumbo pelvic dysfunction    Prior Hospitalization: see medical history Provider#: [id number]   Medications: Verified on Patient summary List    Comorbidities: Anxiety or Panic Disorders, Arthritis, Asthma, Back pain, BMI over 30, Depression,  Gastrointestinal Disease, Hepatitis, Tuberculosis, HIV, AIDS, or other blood-borne condition, Incontinence   Prior Level of Function: prior to 2 months unrestricted in all daily and household activities     Visits from Start of Care: 19    Missed Visits: 3    Progress Toward Goals:   [address]- To be accomplished in 6 week(s):  1.  Pt will demonstrate ability to bridge to full height with good form >10  Reps to indicate functional glut and hamstring strength.   Eval:decreased height  Last PN: progressing, decreased height but no pain   Current:  continued decreased height but no pain      2.  Pt will be able to bend and stoop for household chores and  without pain to increase tolerance to daily activities.   Eval:forward flexion 6 in with no reversal of lordosis and pain to stand  Last PN: 5 in from floor - mild progress  Current: 4.5 in from floor with good reversal of lordosis   Current : 1.5 from floor. -progressing      3.  Pt increase trunk rotation to less than 16 in in hooklying to indicate mobility needed for gait. .  Eval:19 in   Last PN: progressing 16.5 in bilaterally   Current 16.5 Bilaterally - no change       4.  Pt FOTO score will increase by 10 points to show improvement in  function. Eval:54  Last PN: progressing 58  Current: assess at visit 20     Key Functional Changes:   Pt is progressing nicely with PT. Reports decrease in LBP, continued intermittent cervical and UT pain. Pt reports overall 80-90% improvement in sx since starting PT. Has HA 2 times per week in conjunction with increased cervical tightness. At present pt reports most limitation with carrying heavier household items - bag of potatoes, boxes. Treatment has included therapeutic exercises and activities for mobility, stability, strengthening and manual therapy including trigger point dry needling to address myofascial restrictions. Pt is getting ready to move to a new apartment. Has expressed interest in starting a gym program after move. Continues to be very challenged with glut activities. Pt could benefit from continued skilled PT to address pain with daily activities, HA and strength deficits.  WOuld like to decrease frequency to 1 time a week to ease transition to gym program and independence with HEP      Updated Goals: to be achieved in 10 treatments:  1. Pt will be compliant with gym or independent HEP 3-4 days a week to maintain functional gains.      ASSESSMENT/RECOMMENDATIONS:  [x]Continue therapy per initial plan/protocol at a frequency of  10 treatments     []Continue therapy with the following recommended changes:_____________________      _____________________________________________________________________  []Discontinue therapy progressing towards or have reached established goals  []Discontinue therapy due to lack of appreciable progress towards goals  []Discontinue therapy due to lack of attendance or compliance  []Await Physician's recommendations/decisions regarding therapy  []Other:________________________________________________________________    Thank you for this referral.   Kourtney Jorge, PT, DPT 2/22/2019 10:42 AM  NOTE TO PHYSICIAN:  PLEASE COMPLETE THE ORDERS BELOW AND FAX TO [facility]: [phone]  If you are unable to process this request in 24 hours please contact our office: [phone]        []  I have read the above report and request that my patient continue as recommended. []  I have read the above report and request that my patient continue therapy with the following changes/special instructions:________________________________________  []I have read the above report and request that my patient be discharged from therapy.     [de-identified] Signature:____________Date:_________TIME:________    [employer], Date and Time must be completed for valid certification **

## 2019-02-26 ENCOUNTER — APPOINTMENT (OUTPATIENT)
Dept: PHYSICAL THERAPY | Age: 64
End: 2019-02-26
Payer: MEDICAID

## 2019-02-28 ENCOUNTER — APPOINTMENT (OUTPATIENT)
Dept: PHYSICAL THERAPY | Age: 64
End: 2019-02-28
Payer: MEDICAID

## 2019-03-05 ENCOUNTER — HOSPITAL ENCOUNTER (OUTPATIENT)
Dept: PHYSICAL THERAPY | Age: 64
Discharge: HOME | End: 2019-03-05
Payer: MEDICARE

## 2019-03-05 PROCEDURE — 97140 MANUAL THERAPY 1/> REGIONS: CPT

## 2019-03-05 PROCEDURE — 97110 THERAPEUTIC EXERCISES: CPT

## 2019-03-05 NOTE — PROGRESS NOTES
PT DAILY TREATMENT NOTE    Patient Name: Bird Portillo  Date:3/5/2019  : 1955  [x]  Patient  Verified  Payor: Carin [employer]agers / Plan: [facility] COMMUNITY PLAN [facility] CCCP / Product Type: Managed Care Medicaid /    In time:5:30  Out time:6:40  Total Treatment Time (min): 70  Total Timed Codes (min): 70  1:1 Treatment Time (MC only): 54  Visit #: 20 of 30    Treatment Area: Low back pain [M54.5]  Myalgia, other site [M79.18]  Other muscle spasm [M62.838]  Myalgia, other site [M79.18]    SUBJECTIVE  Pain Level (0-10 scale): 0  Any medication changes, allergies to medications, adverse drug reactions, diagnosis change, or new procedure performed?: [x] No    [] Yes (see summary sheet for update)  Subjective functional status/changes:   [] No changes reported  \"My knee are hurting today. My ribs really hurt\"     OBJECTIVE    55 min Therapeutic Exercise:  [x] See flow sheet :   Rationale: increase ROM, increase strength and improve coordination to improve the patients ability to perform pain free ADLs       15 min Manual Therapy:  Left patella mobs   Gentle rib pumping   STM to Bilateral Upper trap , SCM, levator   Rationale: decrease pain, increase ROM and increase tissue extensibility to improve the patients ability to perform pain free ADLS\"       With   [] TE   [] TA   [] neuro   [] other: Patient Education: [x] Review HEP    [] Progressed/Changed HEP based on:   [] positioning   [] body mechanics   [] transfers   [] heat/ice application    [] other:      Other Objective/Functional Measures:   Left knee pain      Pain Level (0-10 scale) post treatment: 0    ASSESSMENT/Changes in Function: Pt reported increased knee pain after moving into new apartment and negotiating several flights of stairs. Reported significant improvement in neck and back pain after dry needling . Discussed with pt to decrease to 1x per week. Pt c/o bilateral rib pain.      Patient will continue to benefit from skilled PT services to modify and progress therapeutic interventions, address functional mobility deficits, address ROM deficits, address strength deficits, analyze and address soft tissue restrictions, analyze and cue movement patterns, analyze and modify body mechanics/ergonomics, assess and modify postural abnormalities, address imbalance/dizziness and instruct in home and community integration to attain remaining goals. []  See Plan of Care  []  See progress note/recertification  []  See Discharge Summary         Progress towards goals / Updated goals:  Long Term Goals: LTG- To be accomplished in 6 week(s):  1.  Pt will demonstrate ability to bridge to full height with good form >10  Reps to indicate functional glut and hamstring strength. Eval:decreased height  Last PN: progressing, decreased height but no pain   Current:  continued decreased height but no pain      2.  Pt will be able to bend and stoop for household chores and  without pain to increase tolerance to daily activities.   Eval:forward flexion 6 in with no reversal of lordosis and pain to stand  Last PN: 5 in from floor - mild progress  Current: 4.5 in from floor with good reversal of lordosis   Current : 1.5 from floor. -progressing      3.  Pt increase trunk rotation to less than 16 in in hooklying to indicate mobility needed for gait. .  Eval:19 in   Last PN: progressing 16.5 in bilaterally   Current 16.5 Bilaterally - no change       4.  Pt FOTO score will increase by 10 points to show improvement in  function.   Eval:54  Last PN: progressing 58  Current: assess at visit 20         PLAN  [x]  Upgrade activities as tolerated     [x]  Continue plan of care  []  Update interventions per flow sheet       []  Discharge due to:_  []  Other:_      Deborah Osorio PTA 3/5/2019  5:31 PM    Future Appointments   Date Time Provider Aniya Brady   3/7/2019  5:30 PM Loy Lares, PT, DPT [facility]   3/12/2019  5:30 PM ANGEL YuanHPNIKKY [facility]   3/14/2019 5:30 PM Sedrick Donahue, PT, DPT [facility]   4/11/2019  4:30 PM Shonna Walton NP [address] [facility]   5/23/2019 10:30 AM Enrique Broderick MD [address] [place] [address]

## 2019-03-07 ENCOUNTER — HOSPITAL ENCOUNTER (OUTPATIENT)
Dept: PHYSICAL THERAPY | Age: 64
Discharge: HOME | End: 2019-03-07
Payer: MEDICARE

## 2019-03-07 PROCEDURE — 97110 THERAPEUTIC EXERCISES: CPT

## 2019-03-07 PROCEDURE — 97140 MANUAL THERAPY 1/> REGIONS: CPT

## 2019-03-07 NOTE — PROGRESS NOTES
PT DAILY TREATMENT NOTE    Patient Name: Leroy Valdes  Date:3/7/2019  : 1955  [x]  Patient  Verified  Payor: Franca Mojica / Plan: [facility] COMMUNITY PLAN [employer] / Product Type: Managed Care Medicaid /    In time:5:30 pm  Out time:6:22 pm   Total Treatment Time (min): 52  Total Timed Codes (min): 42  1:1 Treatment Time ( only): 30   Visit #: 21 of 30    Treatment Area: Low back pain [M54.5]  Myalgia, other site [M79.18]  Other muscle spasm [M62.838]  Myalgia, other site [M79.18]    SUBJECTIVE  Pain Level (0-10 scale): 0  Any medication changes, allergies to medications, adverse drug reactions, diagnosis change, or new procedure performed?: [x] No    [] Yes (see summary sheet for update)  Subjective functional status/changes:   [] No changes reported  \"My knee has been swollen and sore from going up and down the stairs. \"    OBJECTIVE    Modalities Rationale:     decrease inflammation and decrease pain to improve patient's ability to perform daily activities with decreased pain and symptom levels     min [] Estim, type/location:                                      []  att     []  unatt     []  w/US     []  w/ice    []  w/heat    min []  Mechanical Traction: type/lbs                   []  pro   []  sup   []  int   []  cont    []  before manual    []  after manual    min []  Ultrasound, settings/location:      min []  Iontophoresis w/ dexamethasone, location:                                               []  take home patch       []  in clinic   10 min [x]  Ice     []  Heat    location/position: Supine with LE elevated to bilateral gluts     min []  Vasopneumatic Device, press/temp:     min []  Other:    [] Skin assessment post-treatment (if applicable):    []  intact    []  redness- no adverse reaction     []redness - adverse reaction:        34 min Therapeutic Exercise:  [x] See flow sheet :   Rationale: increase ROM, increase strength, improve coordination and increase proprioception to improve the patients ability to perform daily activities with decreased pain and symptom levels     8 min Manual Therapy:  STM and palpation of trigger points in gluts  Time not accounted for is needle insertion time    Rationale: decrease pain, increase ROM, increase tissue extensibility and decrease trigger points to perform daily activities with decreased pain and symptom levels    Dry Needling Procedure Note    Procedure: An intramuscular manual therapy (dry needling) and a neuro-muscular re-education treatment was done to deactivate myofascial trigger points with a 30 gauge filament needle under aseptic technique. Indications:  [x] Myofascial pain and dysfunction [] Muscled spasms  [x] Myalgia/myositis   [] Muscle cramps  [x] Muscle imbalances  [] Other:    Chart reviewed for the following:  IYanna, PT, DPT, have reviewed the medical history, summary list and precautions/contraindications for Ana Cristina Scale.   TIME OUT performed immediately prior to start of procedure:  Yanna LING, DANIEL, DPT, have performed the following reviews on Ana Cristina Scale prior to the start of the session:      [x] Verified patient identification by name and date of birth    [x] Agreement on all muscles being treated was verified   [x] Purpose of dry needling, side effects, possible complications, risks and benefits were explained to the patient   [x] Procedure site(s) verified  [x] Patient was positioned for comfort and draped for privacy  [x] Informed Consent was signed (initial visit) and verified verbally (subsequent visits)  [x] Patient was instructed on the need to report the use of blood thinners and/or immunosuppressant medications  [x] How to respond to possible adverse effects of treatment  [x] Self treatment of post needling soreness: ice, heat (moist heat, heat wraps) and stretching  [x] Opportunity was given to ask any questions, all questions were answered            Time: 5:55 pm   Date of procedure: 3/7/2019    Treatment: The following muscles were treated today with intramuscular dry needling  [x] Left [x] Right Jacobs Pont / Rayne Flair / Bakari    Patient's response to today's treatment:  [x] Latent Twitch Response  [] Muscle relaxation [] Pain Relief  [x] Post needling soreness [] without complications  [] Increased Range of Motion  [] Other:     Performed by: Yanna Tena, PT, DPT          With   [] TE   [] TA   [] neuro   [] other: Patient Education: [x] Review HEP    [] Progressed/Changed HEP based on:   [] positioning   [] body mechanics   [] transfers   [] heat/ice application    [] other:      Other Objective/Functional Measures:   Hip add drop positive bilaterally, improved to positive with drop at end of session      Pain Level (0-10 scale) post treatment: 0    ASSESSMENT/Changes in Function:   Deferred exercises due to left knee pain. Reports increase in pain since moving to new apartment with a flight of stairs. Has MD appointment next week. Patient will continue to benefit from skilled PT services to modify and progress therapeutic interventions, address functional mobility deficits, address ROM deficits, address strength deficits, analyze and address soft tissue restrictions, analyze and cue movement patterns, analyze and modify body mechanics/ergonomics, assess and modify postural abnormalities and instruct in home and community integration to attain remaining goals. []  See Plan of Care  []  See progress note/recertification  []  See Discharge Summary         Progress towards goals / Updated goals:  Long Term Goals: LTG- To be accomplished in 6 week(s):  1.  Pt will demonstrate ability to bridge to full height with good form >10  Reps to indicate functional glut and hamstring strength.   Eval:decreased height  Last PN: progressing, decreased height but no pain   Current:  continued decreased height but no pain      2.  Pt will be able to bend and stoop for household chores and  without pain to increase tolerance to daily activities.   Eval:forward flexion 6 in with no reversal of lordosis and pain to stand  Last PN: 5 in from floor - mild progress  Current: 4.5 in from floor with good reversal of lordosis   Current : 1.5 from floor. -progressing      3.  Pt increase trunk rotation to less than 16 in in hooklying to indicate mobility needed for gait. .  Eval:19 in   Last PN: progressing 16.5 in bilaterally   Current 16.5 Bilaterally - no change       4.  Pt FOTO score will increase by 10 points to show improvement in  function.   Eval:54  Last PN: progressing 58  Current: assess at visit 20         PLAN  [x]  Upgrade activities as tolerated     []  Continue plan of care  []  Update interventions per flow sheet       []  Discharge due to:_  []  Other:_      Kristin Sevilla PT, DPT 3/7/2019  5:44 PM    Future Appointments   Date Time Provider Aniya Brady   3/12/2019  5:30 PM Milla SANTIAGO [facility]   3/14/2019  5:30 PM [facility] D[facility]   4/11/2019  4:30 PM Jose Nuñez NP [address] [facility]   5/23/2019 10:30 AM [facility]

## 2019-03-12 ENCOUNTER — APPOINTMENT (OUTPATIENT)
Dept: PHYSICAL THERAPY | Age: 64
End: 2019-03-12
Payer: MEDICARE

## 2019-03-14 ENCOUNTER — HOSPITAL ENCOUNTER (OUTPATIENT)
Dept: PHYSICAL THERAPY | Age: 64
Discharge: HOME | End: 2019-03-14
Payer: MEDICARE

## 2019-03-14 PROCEDURE — 97140 MANUAL THERAPY 1/> REGIONS: CPT

## 2019-03-14 PROCEDURE — 97110 THERAPEUTIC EXERCISES: CPT

## 2019-03-14 NOTE — PROGRESS NOTES
PT DISCHARGE DAILY NOTE AND [id number]    Date:3/14/2019  Patient name: Collette Alarcon Start of Care: 2018   Referral source: Yissel Stroud MD : [id number]               Medical Diagnosis: Low back pain [M54.5]  Myalgia, other site [M79.18]  Other muscle spasm [M62.838]  Myalgia, other site [M79.18]    Onset Date:2 months ago               Treatment Diagnosis: LBP, lumbo pelvic dysfunction    Prior Hospitalization: see medical history Provider#: [id number]   Medications: Verified on Patient summary List    Comorbidities: Anxiety or Panic Disorders, Arthritis, Asthma, Back pain, BMI over 30, Depression,  Gastrointestinal Disease, Hepatitis, Tuberculosis, HIV, AIDS, or other blood-borne condition, Incontinence   Prior Level of Function: prior to 2 months unrestricted in all daily and household activities     Visits from Start of Care: 23    Missed Visits: 3    [x]  Patient  Verified  Payor: [address] [place] [id number] / Plan: [facility] COMMUNITY PLAN MCR / Product Type: Managed Care Medicare /    In time:5:34 pm  Out time: 6:30 pm   Total Treatment Time (min): 56  Total Timed Codes (min): 46  1:1 Treatment Time ([address]  only): 26   Visit #:     SUBJECTIVE  Pain Level (0-10 scale): 0  Any medication changes, allergies to medications, adverse drug reactions, diagnosis change, or new procedure performed?: [x] No    [] Yes (see summary sheet for update)  Subjective functional status/changes:   [] No changes reported  \"My knee feels better. I got that shot. I think I am ready to graduate. \"    OBJECTIVE    Modality rationale: decrease inflammation and decrease pain to improve the patients ability to perform daily activities with decreased pain and symptom levels     Type Additional Details   [] Estim:  []Unatt       []IFC  []Premod                        []Other:  []w/ice   []w/heat  Position:  Location:   [] Estim: []Att    []TENS instruct  []NMES                    []Other:  []w/US   []w/ice []w/heat  Position:  Location:   []  Traction: [] Cervical       []Lumbar                       [] Prone          []Supine                       []Intermittent   []Continuous Lbs:  [] before manual  [] after manual   []  Ultrasound: []Continuous   [] Pulsed                           []1MHz   []3MHz W/cm2:  Location:   []  Iontophoresis with dexamethasone         Location: [] Take home patch   [] In clinic   [x]  Ice - 10 min      []  heat  []  Ice massage  []  Laser   []  Anodyne To bilateral gluts in supine with LE elevated     []  Laser with stim  []  Other:  Position:  Location:   []  Vasopneumatic Device Pressure:       [] lo [] med [] hi   Temperature: [] lo [] med [] hi   [] Skin assessment post-treatment:  []intact []redness- no adverse reaction    []redness - adverse reaction:     30 min Therapeutic Exercise:  [x] See flow sheet :   Rationale: increase ROM, increase strength, improve coordination and increase proprioception to improve the patients ability to perform daily activities with decreased pain and symptom levels     10 min Manual Therapy:  STM and palpation of trigger points in gluts  Time not accounted for is needle insertion time    Rationale: decrease pain, increase ROM, increase tissue extensibility and decrease trigger points to perform daily activities with decreased pain and symptom levels              With   [] TE   [] TA   [] neuro   [] other: Patient Education: [x] Review HEP    [] Progressed/Changed HEP based on:   [] positioning   [] body mechanics   [] transfers   [] heat/ice application    [] other:      Other Objective/Functional Measures:   See gaols for objective data     Pain Level (0-10 scale) post treatment: 0    Summary of Care:  Long Term Goals: LTG- To be accomplished in 6 week(s):  1.  Pt will demonstrate ability to bridge to full height with good form >10  Reps to indicate functional glut and hamstring strength.   Eval:decreased height  Last PN: progressing, decreased height but no pain   Current:  MET- fatigued with 10      2.  Pt will be able to bend and stoop for household chores and  without pain to increase tolerance to daily activities.   Eval:forward flexion 6 in with no reversal of lordosis and pain to stand  Last PN: 5 in from floor - mild progress  Current: 4.5 in from floor with good reversal of lordosis   Current : 1.5 from floor - partially MET - able to bend to floor but tires with repeated motions      3.  Pt increase trunk rotation to less than 16 in in hooklying to indicate mobility needed for gait. .  Eval:19 in   Last PN: progressing 16.5 in bilaterally   Current 16.5 Bilaterally - no change       4.  Pt FOTO score will increase by 10 points to show improvement in  function. Eval:54  Last PN: progressing 58  Current: Not MET but within 3 points of goal: 61       ASSESSMENT/Changes in Function:   Pt is progressing nicely with PT. Reports decrease in LBP, continued intermittent cervical and UT pain. Pt reports overall 80-90% improvement in sx since starting PT. Has HA 2 times per week in conjunction with increased cervical tightness. At present pt reports most limitation with carrying heavier household items - bag of potatoes, boxes. Treatment has included therapeutic exercises and activities for mobility, stability, strengthening and manual therapy including trigger point dry needling to address myofascial restrictions. Pt is getting ready to move to a new apartment. Has expressed interest in starting a gym program after move. Continues to be very challenged with glut activities. Have provided with updated HEP.          Thank you for this referral!     PLAN  [x]Discontinue therapy: [x]Patient has reached or is progressing toward set goals      []Patient is non-compliant or has abdicated      []Due to lack of appreciable progress towards set goals    Santos Lawrence, PT, DPT 3/14/2019  5:43 PM

## 2019-03-19 ENCOUNTER — APPOINTMENT (OUTPATIENT)
Dept: PHYSICAL THERAPY | Age: 64
End: 2019-03-19
Payer: MEDICARE

## 2019-03-21 ENCOUNTER — APPOINTMENT (OUTPATIENT)
Dept: PHYSICAL THERAPY | Age: 64
End: 2019-03-21
Payer: MEDICARE

## 2019-03-26 ENCOUNTER — APPOINTMENT (OUTPATIENT)
Dept: PHYSICAL THERAPY | Age: 64
End: 2019-03-26
Payer: MEDICARE

## 2019-03-28 ENCOUNTER — APPOINTMENT (OUTPATIENT)
Dept: PHYSICAL THERAPY | Age: 64
End: 2019-03-28
Payer: MEDICARE

## 2019-04-11 ENCOUNTER — OFFICE VISIT (OUTPATIENT)
Dept: HEMATOLOGY | Age: 64
End: 2019-04-11

## 2019-04-11 ENCOUNTER — HOSPITAL ENCOUNTER (OUTPATIENT)
Dept: LAB | Age: 64
Discharge: HOME | End: 2019-04-11
Payer: MEDICARE

## 2019-04-11 VITALS
WEIGHT: 201 LBS | TEMPERATURE: 99.1 F | OXYGEN SATURATION: 98 % | BODY MASS INDEX: 34.31 KG/M2 | HEART RATE: 76 BPM | HEIGHT: 64 IN | DIASTOLIC BLOOD PRESSURE: 76 MMHG | SYSTOLIC BLOOD PRESSURE: 109 MMHG

## 2019-04-11 DIAGNOSIS — K74.60 CIRRHOSIS OF LIVER WITHOUT ASCITES, UNSPECIFIED HEPATIC CIRRHOSIS TYPE (HCC): ICD-10-CM

## 2019-04-11 DIAGNOSIS — K74.60 CIRRHOSIS OF LIVER WITHOUT ASCITES, UNSPECIFIED HEPATIC CIRRHOSIS TYPE (HCC): Primary | ICD-10-CM

## 2019-04-11 DIAGNOSIS — R76.8 HCV ANTIBODY POSITIVE: ICD-10-CM

## 2019-04-11 LAB
ALBUMIN SERPL-MCNC: 4 G/DL (ref 3.4–5)
ALBUMIN/GLOB SERPL: 1.3 {RATIO} (ref 0.8–1.7)
ALP SERPL-CCNC: 70 U/L (ref 45–117)
ALT SERPL-CCNC: 34 U/L (ref 13–56)
ANION GAP SERPL CALC-SCNC: 8 MMOL/L (ref 3–18)
AST SERPL-CCNC: 26 U/L (ref 15–37)
BASOPHILS # BLD: 0 K/UL (ref 0–0.1)
BASOPHILS NFR BLD: 0 % (ref 0–2)
BILIRUB DIRECT SERPL-MCNC: 0.2 MG/DL (ref 0–0.2)
BILIRUB SERPL-MCNC: 0.5 MG/DL (ref 0.2–1)
BUN SERPL-MCNC: 7 MG/DL (ref 7–18)
BUN/CREAT SERPL: 9 (ref 12–20)
CALCIUM SERPL-MCNC: 8.8 MG/DL (ref 8.5–10.1)
CHLORIDE SERPL-SCNC: 102 MMOL/L (ref 100–108)
CO2 SERPL-SCNC: 27 MMOL/L (ref 21–32)
CREAT SERPL-MCNC: 0.78 MG/DL (ref 0.6–1.3)
DIFFERENTIAL METHOD BLD: ABNORMAL
EOSINOPHIL # BLD: 0.1 K/UL (ref 0–0.4)
EOSINOPHIL NFR BLD: 2 % (ref 0–5)
ERYTHROCYTE [DISTWIDTH] IN BLOOD BY AUTOMATED COUNT: 12.8 % (ref 11.6–14.5)
GLOBULIN SER CALC-MCNC: 3.1 G/DL (ref 2–4)
GLUCOSE SERPL-MCNC: 102 MG/DL (ref 74–99)
HCT VFR BLD AUTO: 40 % (ref 35–45)
HGB BLD-MCNC: 13.7 G/DL (ref 12–16)
INR PPP: 1 (ref 0.8–1.2)
LYMPHOCYTES # BLD: 1.2 K/UL (ref 0.9–3.6)
LYMPHOCYTES NFR BLD: 26 % (ref 21–52)
MCH RBC QN AUTO: 28.8 PG (ref 24–34)
MCHC RBC AUTO-ENTMCNC: 34.3 G/DL (ref 31–37)
MCV RBC AUTO: 84.2 FL (ref 74–97)
MONOCYTES # BLD: 0.4 K/UL (ref 0.05–1.2)
MONOCYTES NFR BLD: 8 % (ref 3–10)
NEUTS SEG # BLD: 3.1 K/UL (ref 1.8–8)
NEUTS SEG NFR BLD: 64 % (ref 40–73)
PLATELET # BLD AUTO: 103 K/UL (ref 135–420)
PMV BLD AUTO: 9.7 FL (ref 9.2–11.8)
POTASSIUM SERPL-SCNC: 3.2 MMOL/L (ref 3.5–5.5)
PROT SERPL-MCNC: 7.1 G/DL (ref 6.4–8.2)
PROTHROMBIN TIME: 13.2 SEC (ref 11.5–15.2)
RBC # BLD AUTO: 4.75 M/UL (ref 4.2–5.3)
SODIUM SERPL-SCNC: 137 MMOL/L (ref 136–145)
WBC # BLD AUTO: 4.7 K/UL (ref 4.6–13.2)

## 2019-04-11 PROCEDURE — 36415 COLL VENOUS BLD VENIPUNCTURE: CPT

## 2019-04-11 PROCEDURE — 85610 PROTHROMBIN TIME: CPT

## 2019-04-11 PROCEDURE — 85025 COMPLETE CBC W/AUTO DIFF WBC: CPT

## 2019-04-11 PROCEDURE — 80076 HEPATIC FUNCTION PANEL: CPT

## 2019-04-11 PROCEDURE — 80048 BASIC METABOLIC PNL TOTAL CA: CPT

## 2019-04-11 PROCEDURE — 82107 ALPHA-FETOPROTEIN L3: CPT

## 2019-04-11 NOTE — PROGRESS NOTES
[address], [place], [place], [place]       JESICA Cox, [facility]   MELANIE Gonzalez Cancer, NP        at [address]     [facility], [address] [place] [address], [place]  [address].     [phone]     FAX: [phone]    at [address], [address], [address]     [phone]     FAX: [phone]       Patient Care Team:  Rory Wilson MD as PCP - General (Family Practice)  Kam Pitt MD (Rheumatology)      Problem List  Date Reviewed: 12/17/2018          Codes Class Noted    Intrinsic (urethral) sphincter deficiency (ISD) ICD-10-CM: N36.42  ICD-9-CM: 599.82  5/3/2017        Microscopic hematuria ICD-10-CM: R31.29  ICD-9-CM: 599.72  3/26/2017        Nocturia ICD-10-CM: R35.1  ICD-9-CM: 788.43  4/4/2017        RAMONA (stress urinary incontinence, female) ICD-10-CM: N39.3  ICD-9-CM: 625.6  4/4/2017        HCV antibody positive ICD-10-CM: R76.8  ICD-9-CM: 795.79  4/11/2019    Overview Signed 4/11/2019  4:45 PM by Bebeto Mark NP     HCV treated and cured with SOF + ROBBY in 2014             Severe obesity ([facility] [address] ICD-10-CM: E66.01  ICD-9-CM: 278.01  11/23/2018        Negative depression screening ICD-10-CM: Z13.31  ICD-9-CM: V79.0  7/3/2018        Advanced care planning/counseling discussion ICD-10-CM: Z71.89  ICD-9-CM: V65.49  7/3/2018        Chronic back pain ICD-10-CM: M54.9, G89.29  ICD-9-CM: 724.5, 338.29  9/23/2015    Overview Signed 9/23/2015 12:00 PM by Bebeto Mark NP     Nerve ablation 09/16/2015. Hypothyroidism ICD-10-CM: E03.9  ICD-9-CM: 244.9  2/6/2013        Rheumatoid arthritis(714.0) ICD-10-CM: M06.9  ICD-9-CM: 714.0  2/6/2013        Carpal tunnel syndrome ICD-10-CM: G56.00  ICD-9-CM: 354.0  2/6/2013              Martha Jolly returns to the [place] [address] today for education and management of cirrhosis. She was treated for HCV and was SVR five years post treatment. She began treatment on 05/15/2014 with dual therapy of SOF/SIM and completed her 12 weeks of therapy on 08/05/2014. The patient is a 59 y.o.  female who was noted to have abnormalities in liver chemistries and subsequently tested positive for chronic HCV in 1990s. Risk factors for acquiring HCV are IV drug use in 1980s. There was no history of acute incteric hepatitis at the time of these risk factors. Ultrasound with shear wave elastography was performed in 10/2018. The ultrasound demonstrates a coarse heterogeneous echotexture. No focal mass. The shear wave elastography suggests that the cirrhosis has resolved. A liver biopsy has not been performed. The patient was treated with standard interferon in 1997 and with standard interferon and ribavirin in 1999. Both treatments lasted for 24 weeks. The patient tolerated treatment reasonably well. HCV RNA levels obtained during treatment are not available at this time. The patient is unaware of the virologic response pattern. The most recent laboratory studies indicate that the liver transaminases are normal, alkaline phosphatase is normal, tests of hepatic synthetic and metabolic function are normal, and the platelet count is depressed. The patient has no complaints which can be attributed to liver disease. The patient completes all daily activities without any functional limitations. The patient has not experienced fatigue, fevers, chills, shortness of breath, chest pain, pain in the right side over the liver, diffuse abdominal pain, nausea, vomiting, constipation, diarrhrea, dry eyes, dry mouth, arthralgias, myalgias, yellowing of the eyes or skin, itching, dark urine, problems concentrating, swelling of the abdomen, swelling of the lower extremities, hematemesis, or hematochezia.     ALLERGIES  Allergies   Allergen Reactions    Adhesive Tape-Silicones Rash MEDICATIONS:  Current Outpatient Medications   Medication Sig Dispense Refill    hydroCHLOROthiazide (HYDRODIURIL) 12.5 mg tablet Take 1 Tab by mouth daily. 90 Tab 1    levothyroxine (SYNTHROID) 125 mcg tablet take 1 tablet by mouth once daily BEFORE BREAKFAST 90 Tab 1    omeprazole (PRILOSEC) 20 mg capsule take 1 capsule by mouth once daily 90 Cap 1    multivitamin (ONE A DAY) tablet Take 1 Tab by mouth daily.  conjugated estrogens (PREMARIN) 0.625 mg/gram vaginal cream 1/2 gm twice weekly on sunday and wednesday 45 g 2    benztropine (COGENTIN) 1 mg tablet Take 1 mg by mouth three (3) times daily.  ARIPiprazole (ABILIFY) 5 mg tablet Take 15 mg by mouth daily.  meloxicam (MOBIC) 15 mg tablet take 1 tablet by mouth once daily if needed for inflammation 90 Tab 1    tiZANidine (ZANAFLEX) 2 mg tablet Take 1 Tab by mouth three (3) times daily as needed. 90 Tab 2    PROAIR HFA 90 mcg/actuation inhaler Take 2 Puffs by inhalation every six (6) hours as needed for Wheezing. 1 Inhaler 3    guaiFENesin ER (MUCINEX) 600 mg ER tablet Take 2 Tabs by mouth two (2) times daily as needed for Congestion. 60 Tab 2    diclofenac (VOLTAREN) 1 % gel apply 2 grams to affected area four times a day  0    zolpidem (AMBIEN) 10 mg tablet   0     REVIEW OF SYSTEMS:  Systems related to all organ systems were reviewed. All were negative except as noted above. FAMILY/SOCIAL HISTORY:  The patient is . The patient has 2 children, and 4 grandchildren. The patient used to smoke a little but stopped in 2005. She used to consumed alcohol in excess. The patient has been abstinent from alcohol since 5/2012. 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 The patient is currently receiving disability.       PHYSICAL EXAMINATION:  Visit Vitals  /76 (BP 1 Location: Right arm, BP Patient Position: Sitting)   Pulse 76   Temp 99.1 °F (37.3 °C)   Ht 5' 4\" (1.626 m)   Wt 201 lb (91.2 kg)   SpO2 98%   BMI 34.50 kg/m²     General: No acute distress. Eyes: Sclera anicteric. ENT: No oral lesions. Thyroid normal.  Nodes: No adenopathy. Skin: No spider angiomata. No jaundice. No palmar erythema. Respiratory: Lungs clear to auscultation. Cardiovascular: Regular heart rate. No murmurs. No JVD. Abdomen: Soft non-tender. Liver size normal to percussion/palpation. Spleen not palpable. No obvious ascites. Extremities: No lower extremity edema. No muscle wasting. No gross arthritic changes. Neurologic: Alert and oriented. Cranial nerves grossly intact. No asterixsis. LABORATORY STUDIES:   Liver [place] of [address] Units 4/11/2019 11/23/2018   WBC 4.6 - 13.2 K/uL 4.7 5.2   ANC 1.8 - 8.0 K/UL 3.1 3.2   HGB 12.0 - 16.0 g/dL 13.7 14.7    - 420 K/uL 103 (L) 108 (L)   INR 0.8 - 1.2   1.0    AST 15 - 37 U/L 26 25   ALT 13 - 56 U/L 34 33   Alk Phos 45 - 117 U/L 70 72   Bili, Total 0.2 - 1.0 MG/DL 0.5 0.8   Bili, Direct 0.0 - 0.2 MG/DL 0.2    Albumin 3.4 - 5.0 g/dL 4.0 4.0   BUN 7.0 - 18 MG/DL 7 11   Creat 0.6 - 1.3 MG/DL 0.78 0.89   Na 136 - 145 mmol/L 137 139   K 3.5 - 5.5 mmol/L 3.2 (L) 4.0   Cl 100 - 108 mmol/L 102 106   CO2 21 - 32 mmol/L 27 27   Glucose 74 - 99 mg/dL 102 (H) 103 (H)     Cancer Screening Latest Ref Rng & Units 4/11/2019 10/2/2018 4/2/2018   AFP, Serum 0.0 - 8.0 ng/mL 2.6 2.9 2.8   AFP-L3% 0.0 - 9.9 % Comment Comment Comment     SEROLOGIES:  Serologies Latest Ref Rng 2/6/2013   Hep A Ab, Total Negative Positive (A)   Hep B Surface Ag Negative Negative   Hep B Core Ab, Total Negative Negative   Hep B Surface Ab 0.00 - 0.99 Index Value 0.52   Hep C Genotype See Note 1a   IL28B Genotype  CT genotype   Ferritin 13 - 150 ng/mL 464 (H)   Iron % Saturation 15 - 55 % 67 (H)     2/2013. HCV RNA Log 6.36 IU/ml. Eradicated. LIVER HISTOLOGY:  12/2016. Shear wave elastography. E Median is 11.66, suggestive of F3, bridging fibrosis.   Wide E Std of 4.41 is suggestive of fatty liver disease. 10/2017. TRANSIENT HEPATIC ELASTOGRAPHY:  E Range: 7-12.0 kPa (previously 9.1-23.0), E Mean: 9.3 kPa (previously 13.05),   E Median: 8.6 kPa (recently 11.7), E Std: 2.0 kPa (previously 4.4)     10/2018. TRANSIENT HEPATIC ELASTOGRAPHY:   E Range: 7.18-10.40 kPa  E Mean: 8.80 kPa  E Median: 8.75 kPa  E Std: 1.16 kPa    ENDOSCOPIC PROCEDURES:  09/2014. EGD by Dr. Iván Shipman. Normal esophagus. H.Pylori is negative. RADIOLOGY:  04/2014. Ultrasound of liver. Consistent with cirrhosis. No masses. 10/2014. Ultrasound of liver. Consistent with cirrhosis. No masses. 04/2015. Ultrasound of the liver. Consistent with cirrhosis. No mention of masses in the report. 10/2015. Ultrasound of the liver. Consistent with cirrhosis. No mention of masses in the report.  06/2016. Ultrasound of the liver. Heterogeneous appearing hepatic echotexture without mass. 12/2016. Ultrasound of the liver. Coarse heterogeneous echotexture. No focal mass. 10/2018. Ultrasound of the liver, performed with elastography. Persistent sonographic findings of hepatic cirrhosis. No focal hepatic mass. 04/2018. Ultrasound of the liver. The liver has a heterogeneous echotexture with a nodular contour, compatible with known cirrhosis. No focal hepatic lesions are evident. 10/2018. Ultrasound of the liver. Sonographic findings of hepatic cirrhosis and steatosis. No focal hepatic mass. 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 The HCV has been treated and cured. Elastography, imaging, and labs suggest that the cirrhosis is resolving post eradication of the HCV. The most recent laboratory studies indicate that the liver transaminases are normal, alkaline phosphatase is normal,  tests of hepatic synthetic and metabolic function are depressed, and the platelet count is depressed.       Complications of cirrhosis were discussed in detail. We discussed thrombocytopenia, portal hypertension, varices, GI bleeding, peripheral edema, ascites, hepatic encephalopathy, and hepatocellular carcinoma. She is very stable and the cirrhosis may have even resolved. We will move her appointments to every 6 months. We discussed the need for every 6 month liver imaging studies. It appears that the cirrhosis is resolving. Hepatic encephalopathy has not developed. Edema. Resolved. Diuretics have been discontinued. [address] screening. Ultrasound was recently performed and does not demonstrate any sign of developing HCC. Repeat imaging ordered today. Will alternate ultrasounds with ultrasounds with shear wave elastography. Elastography  can assess liver fibrosis and determine if a patient has advanced fibrosis or cirrhosis without the need for liver biopsy. HCV. The patient was previously treated for HCV with standard interferon and ribavirin in the 1990s. There was a non-response that was not clearly defined. She was retreated with SOF/SIM last year and is SVR more than 3 years post treatment. The patient was directed to continue all current medications at the current dosages except for the NSAIDs, which she should stop. There are no contraindications for the patient to take any medications that are necessary for treatment of other medical issues. The patient was counseled regarding alcohol consumption. Vaccination for viral hepatitis A is not needed. The patient has serologic evidence of prior exposure or vaccination with immunity. [facility] 87 in 6 months.      Liss Ramos NP   Liver [place] of 1 [facility], [address] [place] [address]   [phone]

## 2019-04-11 NOTE — PROGRESS NOTES
1. Have you been to the ER, urgent care clinic since your last visit? Hospitalized since your last visit? No    2. Have you seen or consulted any other health care providers outside of the [address] [place] [address] since your last visit? Include any pap smears or colon screening. Yes .

## 2019-04-15 LAB
AFP L3 MFR SERPL: NORMAL % (ref 0–9.9)
AFP SERPL-MCNC: 2.6 NG/ML (ref 0–8)

## 2019-04-27 ENCOUNTER — HOSPITAL ENCOUNTER (OUTPATIENT)
Dept: ULTRASOUND IMAGING | Age: 64
Discharge: HOME | End: 2019-04-27
Payer: MEDICARE

## 2019-04-27 DIAGNOSIS — K74.60 CIRRHOSIS OF LIVER WITHOUT ASCITES, UNSPECIFIED HEPATIC CIRRHOSIS TYPE (HCC): ICD-10-CM

## 2019-04-27 PROCEDURE — 76705 ECHO EXAM OF ABDOMEN: CPT

## 2019-05-10 NOTE — PROGRESS NOTES
Please let Ms. Jaqueline Lee know that there is nothing suspicious on her ultrasound. No hepatic masses. Thank you.

## 2019-07-19 NOTE — TELEPHONE ENCOUNTER
Patient is requesting a refill of her medication. Requested Prescriptions     Pending Prescriptions Disp Refills    omeprazole (PRILOSEC) 20 mg capsule 30 Cap 2     Sig: Take 1 Cap by mouth daily. 3

## 2019-10-31 ENCOUNTER — HOSPITAL ENCOUNTER (OUTPATIENT)
Dept: LAB | Age: 64
Discharge: HOME | End: 2019-10-31
Payer: MEDICAID

## 2019-10-31 ENCOUNTER — OFFICE VISIT (OUTPATIENT)
Dept: HEMATOLOGY | Age: 64
End: 2019-10-31

## 2019-10-31 VITALS
HEIGHT: 64 IN | BODY MASS INDEX: 34.49 KG/M2 | SYSTOLIC BLOOD PRESSURE: 123 MMHG | WEIGHT: 202 LBS | TEMPERATURE: 98.5 F | HEART RATE: 75 BPM | OXYGEN SATURATION: 98 % | DIASTOLIC BLOOD PRESSURE: 70 MMHG

## 2019-10-31 DIAGNOSIS — K74.60 CIRRHOSIS OF LIVER WITHOUT ASCITES, UNSPECIFIED HEPATIC CIRRHOSIS TYPE (HCC): Primary | ICD-10-CM

## 2019-10-31 DIAGNOSIS — K74.60 CIRRHOSIS OF LIVER WITHOUT ASCITES, UNSPECIFIED HEPATIC CIRRHOSIS TYPE (HCC): ICD-10-CM

## 2019-10-31 LAB
ALBUMIN SERPL-MCNC: 4.3 G/DL (ref 3.4–5)
ALBUMIN/GLOB SERPL: 1.3 {RATIO} (ref 0.8–1.7)
ALP SERPL-CCNC: 64 U/L (ref 45–117)
ALT SERPL-CCNC: 23 U/L (ref 13–56)
ANION GAP SERPL CALC-SCNC: 10 MMOL/L (ref 3–18)
AST SERPL-CCNC: 13 U/L (ref 10–38)
BASOPHILS # BLD: 0 K/UL (ref 0–0.1)
BASOPHILS NFR BLD: 0 % (ref 0–2)
BILIRUB DIRECT SERPL-MCNC: 0.2 MG/DL (ref 0–0.2)
BILIRUB SERPL-MCNC: 0.5 MG/DL (ref 0.2–1)
BUN SERPL-MCNC: 11 MG/DL (ref 7–18)
BUN/CREAT SERPL: 13 (ref 12–20)
CALCIUM SERPL-MCNC: 9.6 MG/DL (ref 8.5–10.1)
CHLORIDE SERPL-SCNC: 101 MMOL/L (ref 100–111)
CO2 SERPL-SCNC: 26 MMOL/L (ref 21–32)
CREAT SERPL-MCNC: 0.85 MG/DL (ref 0.6–1.3)
DIFFERENTIAL METHOD BLD: ABNORMAL
EOSINOPHIL # BLD: 0 K/UL (ref 0–0.4)
EOSINOPHIL NFR BLD: 0 % (ref 0–5)
ERYTHROCYTE [DISTWIDTH] IN BLOOD BY AUTOMATED COUNT: 12.8 % (ref 11.6–14.5)
GLOBULIN SER CALC-MCNC: 3.3 G/DL (ref 2–4)
GLUCOSE SERPL-MCNC: 111 MG/DL (ref 74–99)
HCT VFR BLD AUTO: 41.4 % (ref 35–45)
HGB BLD-MCNC: 14.4 G/DL (ref 12–16)
INR PPP: 1.1 (ref 0.8–1.2)
LYMPHOCYTES # BLD: 0.8 K/UL (ref 0.9–3.6)
LYMPHOCYTES NFR BLD: 6 % (ref 21–52)
MCH RBC QN AUTO: 29.1 PG (ref 24–34)
MCHC RBC AUTO-ENTMCNC: 34.8 G/DL (ref 31–37)
MCV RBC AUTO: 83.8 FL (ref 74–97)
MONOCYTES # BLD: 0.4 K/UL (ref 0.05–1.2)
MONOCYTES NFR BLD: 3 % (ref 3–10)
NEUTS SEG # BLD: 11.6 K/UL (ref 1.8–8)
NEUTS SEG NFR BLD: 91 % (ref 40–73)
PLATELET # BLD AUTO: 141 K/UL (ref 135–420)
PMV BLD AUTO: 9.8 FL (ref 9.2–11.8)
POTASSIUM SERPL-SCNC: 3.6 MMOL/L (ref 3.5–5.5)
PROT SERPL-MCNC: 7.6 G/DL (ref 6.4–8.2)
PROTHROMBIN TIME: 14.3 SEC (ref 11.5–15.2)
RBC # BLD AUTO: 4.94 M/UL (ref 4.2–5.3)
SODIUM SERPL-SCNC: 137 MMOL/L (ref 136–145)
WBC # BLD AUTO: 12.8 K/UL (ref 4.6–13.2)

## 2019-10-31 PROCEDURE — 85025 COMPLETE CBC W/AUTO DIFF WBC: CPT

## 2019-10-31 PROCEDURE — 80076 HEPATIC FUNCTION PANEL: CPT

## 2019-10-31 PROCEDURE — 85610 PROTHROMBIN TIME: CPT

## 2019-10-31 PROCEDURE — 82107 ALPHA-FETOPROTEIN L3: CPT

## 2019-10-31 PROCEDURE — 80048 BASIC METABOLIC PNL TOTAL CA: CPT

## 2019-10-31 PROCEDURE — 36415 COLL VENOUS BLD VENIPUNCTURE: CPT

## 2019-10-31 RX ORDER — LIFITEGRAST 50 MG/ML
SOLUTION/ DROPS OPHTHALMIC
Refills: 0 | COMMUNITY
Start: 2019-09-20 | End: 2020-10-01 | Stop reason: SINTOL

## 2019-10-31 NOTE — PROGRESS NOTES
[facility], MD, MD Shiela Soto, PA-C    Lloyd Dunn, ACNP-BC     Rosa Ernandez, AGPCNP-BC   Omayra Riley FNP-C    Issac Zepedarid, [facility]       Darien Lea [facility] [address]    at [address], [address] [address]    [phone]    FAX: [address] [place] [address], [address]    [phone]    FAX: [phone]         Patient Care Team:  None as PCP - General  Alberta Leigh MD (Rheumatology)      Problem List  Date Reviewed: 10/31/2019          Codes Class Noted    Intrinsic (urethral) sphincter deficiency (ISD) ICD-10-CM: N36.42  ICD-9-CM: 599.82  5/3/2017        Microscopic hematuria ICD-10-CM: R31.29  ICD-9-CM: 599.72  3/26/2017        Nocturia ICD-10-CM: R35.1  ICD-9-CM: 788.43  4/4/2017        RAMONA (stress urinary incontinence, female) ICD-10-CM: N39.3  ICD-9-CM: 625.6  4/4/2017        HCV antibody positive ICD-10-CM: R76.8  ICD-9-CM: 795.79  4/11/2019    Overview Signed 4/11/2019  4:45 PM by Sánchez Fraire NP     HCV treated and cured with SOF + ROBBY in 2014             Severe obesity ([address] ICD-10-CM: E66.01  ICD-9-CM: 278.01  11/23/2018        Negative depression screening ICD-10-CM: Z13.31  ICD-9-CM: V79.0  7/3/2018        Advanced care planning/counseling discussion ICD-10-CM: Z71.89  ICD-9-CM: V65.49  7/3/2018        Chronic back pain ICD-10-CM: M54.9, G89.29  ICD-9-CM: 724.5, 338.29  9/23/2015    Overview Signed 9/23/2015 12:00 PM by Sánchez Fraire NP     Nerve ablation 09/16/2015.              Hypothyroidism ICD-10-CM: E03.9  ICD-9-CM: 244.9  2/6/2013        Rheumatoid arthritis(714.0) ICD-10-CM: M06.9  ICD-9-CM: 714.0  2/6/2013        Carpal tunnel syndrome ICD-10-CM: G56.00  ICD-9-CM: 354.0  2/6/2013              Amadou Maria returns to the [place] [address] today for education and management of cirrhosis. The active problem list, all pertinent past medical history, medications, liver histology, endoscopic studies, radiologic findings and laboratory findings related to the liver disorder were reviewed with the patient. The patient is a 62 y.o.  female who was noted to have abnormalities in liver chemistries and subsequently tested positive for chronic HCV in 1990s. Risk factors for acquiring HCV are IV drug use in 1980s. There was no history of acute incteric hepatitis at the time of these risk factors. She was treated for HCV and was SVR five years post treatment. She began treatment on 05/15/2014 with dual therapy of SOF/SIM and completed her 12 weeks of therapy on 08/05/2014. Ultrasound with shear wave elastography was performed in 10/2018. The ultrasound demonstrates a coarse heterogeneous echotexture. No focal mass. The shear wave elastography suggests that the cirrhosis has resolved. A liver biopsy has not been performed. The patient was treated with standard interferon in 1997 and with standard interferon and ribavirin in 1999. Both treatments lasted for 24 weeks. The patient tolerated treatment reasonably well. HCV RNA levels obtained during treatment are not available at this time. The patient is unaware of the virologic response pattern. The most recent laboratory studies indicate that the liver transaminases are normal, alkaline phosphatase is normal, tests of hepatic synthetic and metabolic function are normal, and the platelet count is depressed. The patient has no complaints which can be attributed to liver disease. The patient completes all daily activities without any functional limitations.  The patient has not experienced fatigue, fevers, chills, shortness of breath, chest pain, pain in the right side over the liver, diffuse abdominal pain, nausea, vomiting, constipation, diarrhrea, dry eyes, dry mouth, arthralgias, myalgias, yellowing of the eyes or skin, itching, dark urine, problems concentrating, swelling of the abdomen, swelling of the lower extremities, hematemesis, or hematochezia. ALLERGIES  Allergies   Allergen Reactions    Adhesive Tape-Silicones Rash       MEDICATIONS:  Current Outpatient Medications   Medication Sig Dispense Refill    XIIDRA 5 % dpet   0    CALCIUM PO Take 1 Tab by mouth.  vitamin E acetate (VITAMIN E PO) Take 1 Cap by mouth.  hydroCHLOROthiazide (HYDRODIURIL) 12.5 mg tablet Take 1 Tab by mouth daily. 90 Tab 1    levothyroxine (SYNTHROID) 125 mcg tablet take 1 tablet by mouth once daily BEFORE BREAKFAST 90 Tab 1    omeprazole (PRILOSEC) 20 mg capsule take 1 capsule by mouth once daily 90 Cap 1    multivitamin (ONE A DAY) tablet Take 1 Tab by mouth daily.  benztropine (COGENTIN) 1 mg tablet Take 1 mg by mouth three (3) times daily.  ARIPiprazole (ABILIFY) 5 mg tablet Take 15 mg by mouth daily.  tiZANidine (ZANAFLEX) 2 mg tablet Take 1 Tab by mouth three (3) times daily as needed. 90 Tab 2    PROAIR HFA 90 mcg/actuation inhaler Take 2 Puffs by inhalation every six (6) hours as needed for Wheezing. 1 Inhaler 3    guaiFENesin ER (MUCINEX) 600 mg ER tablet Take 2 Tabs by mouth two (2) times daily as needed for Congestion. 60 Tab 2    diclofenac (VOLTAREN) 1 % gel apply 2 grams to affected area four times a day  0    zolpidem (AMBIEN) 10 mg tablet   0     REVIEW OF SYSTEMS:  Systems related to all organ systems were reviewed. All were negative except as noted above. FAMILY/SOCIAL HISTORY:  The patient is . The patient has 2 children, and 4 grandchildren. The patient used to smoke a little but stopped in 2005. She used to consumed alcohol in excess. The patient has been abstinent from alcohol since 5/2012.   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 The patient is currently receiving disability. PHYSICAL EXAMINATION:  Visit Vitals  /70 (BP 1 Location: Left arm, BP Patient Position: Sitting)   Pulse 75   Temp 98.5 °F (36.9 °C)   Ht 5' 4\" (1.626 m)   Wt 202 lb (91.6 kg)   SpO2 98%   BMI 34.67 kg/m²     General: No acute distress. Eyes: Sclera anicteric. ENT: No oral lesions. Thyroid normal.  Nodes: No adenopathy. Skin: No spider angiomata. No jaundice. No palmar erythema. Respiratory: Lungs clear to auscultation. Cardiovascular: Regular heart rate. No murmurs. No JVD. Abdomen: Soft non-tender. Liver size normal to percussion/palpation. Spleen not palpable. No obvious ascites. Extremities: No lower extremity edema. No muscle wasting. No gross arthritic changes. Neurologic: Alert and oriented. Cranial nerves grossly intact. No asterixsis.     LABORATORY STUDIES:   Liver [place] of [address] Units 4/11/2019 11/23/2018   WBC 4.6 - 13.2 K/uL 4.7 5.2   ANC 1.8 - 8.0 K/UL 3.1 3.2   HGB 12.0 - 16.0 g/dL 13.7 14.7    - 420 K/uL 103 (L) 108 (L)   INR 0.8 - 1.2   1.0    AST 15 - 37 U/L 26 25   ALT 13 - 56 U/L 34 33   Alk Phos 45 - 117 U/L 70 72   Bili, Total 0.2 - 1.0 MG/DL 0.5 0.8   Bili, Direct 0.0 - 0.2 MG/DL 0.2    Albumin 3.4 - 5.0 g/dL 4.0 4.0   BUN 7.0 - 18 MG/DL 7 11   Creat 0.6 - 1.3 MG/DL 0.78 0.89   Na 136 - 145 mmol/L 137 139   K 3.5 - 5.5 mmol/L 3.2 (L) 4.0   Cl 100 - 108 mmol/L 102 106   CO2 21 - 32 mmol/L 27 27   Glucose 74 - 99 mg/dL 102 (H) 103 (H)     Cancer Screening Latest Ref Rng & Units 4/11/2019 10/2/2018 4/2/2018   AFP, Serum 0.0 - 8.0 ng/mL 2.6 2.9 2.8   AFP-L3% 0.0 - 9.9 % Comment Comment Comment     SEROLOGIES:  Serologies Latest Ref Rng 2/6/2013   Hep A Ab, Total Negative Positive (A)   Hep B Surface Ag Negative Negative   Hep B Core Ab, Total Negative Negative   Hep B Surface Ab 0.00 - 0.99 Index Value 0.52   Hep C Genotype See Note 1a   IL28B Genotype  CT genotype   Ferritin 13 - 150 ng/mL 464 (H)   Iron % Saturation 15 - 55 % 67 (H)     2/2013. HCV RNA Log 6.36 IU/ml. Eradicated. LIVER HISTOLOGY:  12/2016. Shear wave elastography. E Median is 11.66, suggestive of F3, bridging fibrosis. Wide E Std of 4.41 is suggestive of fatty liver disease. 10/2017. TRANSIENT HEPATIC ELASTOGRAPHY:  E Range: 7-12.0 kPa (previously 9.1-23.0), E Mean: 9.3 kPa (previously 13.05),   E Median: 8.6 kPa (recently 11.7), E Std: 2.0 kPa (previously 4.4)     10/2018. TRANSIENT HEPATIC ELASTOGRAPHY:   E Range: 7.18-10.40 kPa  E Mean: 8.80 kPa  E Median: 8.75 kPa  E Std: 1.16 kPa    ENDOSCOPIC PROCEDURES:  09/2014. EGD by Dr. Maryanne Guerra. Normal esophagus. H.Pylori is negative. RADIOLOGY:  04/2014. Ultrasound of liver. Consistent with cirrhosis. No masses. 10/2014. Ultrasound of liver. Consistent with cirrhosis. No masses. 04/2015. Ultrasound of the liver. Consistent with cirrhosis. No mention of masses in the report. 10/2015. Ultrasound of the liver. Consistent with cirrhosis. No mention of masses in the report.  06/2016. Ultrasound of the liver. Heterogeneous appearing hepatic echotexture without mass. 12/2016. Ultrasound of the liver. Coarse heterogeneous echotexture. No focal mass. 10/2018. Ultrasound of the liver, performed with elastography. Persistent sonographic findings of hepatic cirrhosis. No focal hepatic mass. 04/2018. Ultrasound of the liver. The liver has a heterogeneous echotexture with a nodular contour, compatible with known cirrhosis. No focal hepatic lesions are evident. 10/2018. Ultrasound of the liver. Sonographic findings of hepatic cirrhosis and steatosis. No focal hepatic mass. 04/2019. Ultrasound of the liver. Coarsened hepatic echotexture and lobular surface contour noted as previously. No focal mass.      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 The HCV has been treated and cured. Elastography, imaging, and labs suggest that the cirrhosis is resolving post eradication of the HCV. The most recent laboratory studies indicate that the liver transaminases are normal, alkaline phosphatase is normal,  tests of hepatic synthetic and metabolic function are depressed, and the platelet count is depressed. Complications of cirrhosis were discussed in detail. We discussed thrombocytopenia, portal hypertension, varices, GI bleeding, peripheral edema, ascites, hepatic encephalopathy, and hepatocellular carcinoma. She is very stable and the cirrhosis may have even resolved. We will move her appointments to every 6 months. We discussed the need for every 6 month liver imaging studies. It appears that the cirrhosis is resolving. Hepatic encephalopathy has not developed. Edema. Resolved. Diuretics have been discontinued. [address] screening. Ultrasound was recently performed and does not demonstrate any sign of developing HCC. Repeat imaging ordered today. Will alternate ultrasounds with ultrasounds with shear wave elastography. Elastography  can assess liver fibrosis and determine if a patient has advanced fibrosis or cirrhosis without the need for liver biopsy. HCV. The patient was previously treated for HCV with standard interferon and ribavirin in the 1990s. There was a non-response that was not clearly defined. She was retreated with SOF/SIM last year and is SVR more than 3 years post treatment. The patient was directed to continue all current medications at the current dosages except for the NSAIDs, which she should stop. There are no contraindications for the patient to take any medications that are necessary for treatment of other medical issues. The patient was counseled regarding alcohol consumption. Vaccination for viral hepatitis A is not needed.   The patient has serologic evidence of prior exposure or vaccination with immunity. [address] [facility] 87 in 6 months.      Paxton Mauricio NP   Liver [place] of 1 [facility], [address] [place] [address] Francisca Mcmillan, [facility]   [phone]

## 2019-10-31 NOTE — PROGRESS NOTES
Luisa Bustillo is a 62 y.o. female      1. Have you been to the ER, urgent care clinic or hospitalized since your last visit? NO.     2. Have you seen or consulted any other health care providers outside of the [address] [place] [address] since your last visit (Include any pap smears or colon screening)? YES    Patient has seen PCP since last visit for routine checkup.            Learning Assessment 4/2/2018   PRIMARY LEARNER Patient   HIGHEST LEVEL OF EDUCATION - PRIMARY LEARNER  GRADUATED HIGH SCHOOL OR GED   BARRIERS PRIMARY LEARNER VISUAL   CO-LEARNER CAREGIVER -   PRIMARY LANGUAGE ENGLISH   LEARNER PREFERENCE PRIMARY DEMONSTRATION   ANSWERED BY patient   RELATIONSHIP SELF

## 2019-11-01 LAB
AFP L3 MFR SERPL: NORMAL % (ref 0–9.9)
AFP SERPL-MCNC: 2.9 NG/ML (ref 0–8)

## 2019-11-16 ENCOUNTER — HOSPITAL ENCOUNTER (OUTPATIENT)
Dept: ULTRASOUND IMAGING | Age: 64
Discharge: HOME | End: 2019-11-16
Payer: MEDICARE

## 2019-11-16 DIAGNOSIS — K74.60 CIRRHOSIS OF LIVER WITHOUT ASCITES, UNSPECIFIED HEPATIC CIRRHOSIS TYPE (HCC): ICD-10-CM

## 2019-11-16 PROCEDURE — 76705 ECHO EXAM OF ABDOMEN: CPT

## 2019-11-25 NOTE — PROGRESS NOTES
Please let her know that her ultrasound is unremarkable. No hepatic masses. Consistent with cirrhosis. Thank you.

## 2020-04-30 ENCOUNTER — VIRTUAL VISIT (OUTPATIENT)
Dept: HEMATOLOGY | Age: 65
End: 2020-04-30

## 2020-04-30 DIAGNOSIS — K74.60 CIRRHOSIS OF LIVER WITHOUT ASCITES, UNSPECIFIED HEPATIC CIRRHOSIS TYPE (HCC): Primary | ICD-10-CM

## 2020-04-30 NOTE — PROGRESS NOTES
[address] [place] [address], MD, MD Trevor Abreu PA-C Janina Brasil, ACNP-BC     [facility] S [facility]   Rip Councilman, FNP-LEANA [facility]       D[address]    at [address], [address] Sunny Lawson  22. [phone]    FAX: [address], [address], [address]    [phone]    FAX: [phone]         Patient Care Team:  None as PCP - General  Edilia Horner MD (Rheumatology)      Problem List  Date Reviewed: 10/31/2019          Codes Class Noted    Intrinsic (urethral) sphincter deficiency (ISD) ICD-10-CM: N36.42  ICD-9-CM: 599.82  5/3/2017        Microscopic hematuria ICD-10-CM: R31.29  ICD-9-CM: 599.72  3/26/2017        Nocturia ICD-10-CM: R35.1  ICD-9-CM: 788.43  4/4/2017        RAMONA (stress urinary incontinence, female) ICD-10-CM: N39.3  ICD-9-CM: 625.6  4/4/2017        HCV antibody positive ICD-10-CM: R76.8  ICD-9-CM: 795.79  4/11/2019    Overview Signed 4/11/2019  4:45 PM by Navdeep Carrillo NP     HCV treated and cured with SOF + ROBBY in 2014             Severe obesity ([address] ICD-10-CM: E66.01  ICD-9-CM: 278.01  11/23/2018        Negative depression screening ICD-10-CM: Z13.31  ICD-9-CM: V79.0  7/3/2018        Advanced care planning/counseling discussion ICD-10-CM: Z71.89  ICD-9-CM: V65.49  7/3/2018        Chronic back pain ICD-10-CM: M54.9, G89.29  ICD-9-CM: 724.5, 338.29  9/23/2015    Overview Signed 9/23/2015 12:00 PM by Navdeep Carrillo NP     Nerve ablation 09/16/2015.              Hypothyroidism ICD-10-CM: E03.9  ICD-9-CM: 244.9  2/6/2013        Rheumatoid arthritis(714.0) ICD-10-CM: M06.9  ICD-9-CM: 714.0  2/6/2013        Carpal tunnel syndrome ICD-10-CM: G56.00  ICD-9-CM: 354.0  2/6/2013              VIRTUAL TELEHEALTH VISIT PERFORMED DUE TO COVID-19 EPIDEMIC    CONSENT:  Esthela Vallejo, who was seen by synchronous, real-time, audio-video technology, and/or her healthcare decision maker, is aware that this patient-initiated, Telehealth encounter on 4/30/2020 is a billable service, with coverage as determined by her insurance carrier. She is aware that she may receive a bill and has provided verbal consent to proceed. This patient was evaluated during a Virtual Telehealth visit. A caregiver was present if appropriate. Due to this being a TeleHealth encounter performed during the VVNPS-02 public health emergency, the physical examination was limited to that listed in the [address] was seen today via virtual visit at the [address] for education and management of cirrhosis. The active problem list, all pertinent past medical history, medications, liver histology, endoscopic studies, radiologic findings and laboratory findings related to the liver disorder were reviewed with the patient. The patient is a 62 y.o.  female who was noted to have abnormalities in liver chemistries and subsequently tested positive for chronic HCV in 1990s. Risk factors for acquiring HCV are IV drug use in 1980s. There was no history of acute incteric hepatitis at the time of these risk factors. She was treated for HCV and was SVR five years post treatment. She began treatment on 05/15/2014 with dual therapy of SOF/SIM and completed her 12 weeks of therapy on 08/05/2014. Ultrasound with shear wave elastography was performed in 10/2018. The ultrasound demonstrates a coarse heterogeneous echotexture. No focal mass. The shear wave elastography suggests that the cirrhosis has resolved. A liver biopsy has not been performed.      The patient was treated with standard interferon in 1997 and with standard interferon and ribavirin in 1999. Both treatments lasted for 24 weeks. The patient tolerated treatment reasonably well. HCV RNA levels obtained during treatment are not available at this time. The patient is unaware of the virologic response pattern. The most recent laboratory studies indicate that the liver transaminases are normal, alkaline phosphatase is normal, tests of hepatic synthetic and metabolic function are normal, and the platelet count is depressed. The patient has no complaints which can be attributed to liver disease. The patient completes all daily activities without any functional limitations. The patient has not experienced fatigue, fevers, chills, shortness of breath, chest pain, pain in the right side over the liver, diffuse abdominal pain, nausea, vomiting, constipation, diarrhrea, dry eyes, dry mouth, arthralgias, myalgias, yellowing of the eyes or skin, itching, dark urine, problems concentrating, swelling of the abdomen, swelling of the lower extremities, hematemesis, or hematochezia. ALLERGIES  Allergies   Allergen Reactions    Adhesive Tape-Silicones Rash       MEDICATIONS:  Current Outpatient Medications   Medication Sig Dispense Refill    XIIDRA 5 % dpet   0    CALCIUM PO Take 1 Tab by mouth.  vitamin E acetate (VITAMIN E PO) Take 1 Cap by mouth.  hydroCHLOROthiazide (HYDRODIURIL) 12.5 mg tablet Take 1 Tab by mouth daily. 90 Tab 1    levothyroxine (SYNTHROID) 125 mcg tablet take 1 tablet by mouth once daily BEFORE BREAKFAST 90 Tab 1    tiZANidine (ZANAFLEX) 2 mg tablet Take 1 Tab by mouth three (3) times daily as needed. 90 Tab 2    PROAIR HFA 90 mcg/actuation inhaler Take 2 Puffs by inhalation every six (6) hours as needed for Wheezing. 1 Inhaler 3    omeprazole (PRILOSEC) 20 mg capsule take 1 capsule by mouth once daily 90 Cap 1    guaiFENesin ER (MUCINEX) 600 mg ER tablet Take 2 Tabs by mouth two (2) times daily as needed for Congestion.  60 Tab 2    multivitamin (ONE A DAY) tablet Take 1 Tab by mouth daily.  diclofenac (VOLTAREN) 1 % gel apply 2 grams to affected area four times a day  0    zolpidem (AMBIEN) 10 mg tablet   0    benztropine (COGENTIN) 1 mg tablet Take 1 mg by mouth three (3) times daily.  ARIPiprazole (ABILIFY) 5 mg tablet Take 15 mg by mouth daily. REVIEW OF SYSTEMS:  Systems related to all organ systems were reviewed. All were negative except as noted above. FAMILY/SOCIAL HISTORY:  The patient is . The patient has 2 children, and 4 grandchildren. The patient used to smoke a little but stopped in 2005. She used to consumed alcohol in excess. The patient has been abstinent from alcohol since 5/2012. 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 The patient is currently receiving disability. PHYSICAL EXAMINATION:  There were no vitals taken for this visit. General: No acute distress. Eyes: Sclera anicteric. ENT: No oral lesions. Nodes: Unable to assess. Skin: No spider angiomata. No jaundice. No palmar erythema. Respiratory: Normal respiratory effort. Cardiovascular: Unable to assess rate and rhythm. No obvious JVD. Abdomen: No obvious ascites. Extremities: No lower extremity edema. No muscle wasting. No gross arthritic changes. Neurologic: Alert and oriented. Cranial nerves grossly intact. No asterixsis.     LABORATORY STUDIES:   Liver [place] [address] Units 10/31/2019 4/11/2019   WBC 4.6 - 13.2 K/uL 12.8 4.7   ANC 1.8 - 8.0 K/UL 11.6 (H) 3.1   HGB 12.0 - 16.0 g/dL 14.4 13.7    - 420 K/uL 141 103 (L)   INR 0.8 - 1.2   1.1 1.0   AST 10 - 38 U/L 13 26   ALT 13 - 56 U/L 23 34   Alk Phos 45 - 117 U/L 64 70   Bili, Total 0.2 - 1.0 MG/DL 0.5 0.5   Bili, Direct 0.0 - 0.2 MG/DL 0.2 0.2   Albumin 3.4 - 5.0 g/dL 4.3 4.0   BUN 7.0 - 18 MG/DL 11 7   Creat 0.6 - 1.3 MG/DL 0.85 0.78   Na 136 - 145 mmol/L 137 137   K 3.5 - 5.5 mmol/L 3. 6 3.2 (L)   Cl 100 - 111 mmol/L 101 102   CO2 21 - 32 mmol/L 26 27   Glucose 74 - 99 mg/dL 111 (H) 102 (H)     Cancer Screening Latest Ref Rng & Units 10/31/2019 4/11/2019   AFP, Serum 0.0 - 8.0 ng/mL 2.9 2.6   AFP-L3% 0.0 - 9.9 % Comment Comment     SEROLOGIES:  Serologies Latest Ref Rng 2/6/2013   Hep A Ab, Total Negative Positive (A)   Hep B Surface Ag Negative Negative   Hep B Core Ab, Total Negative Negative   Hep B Surface Ab 0.00 - 0.99 Index Value 0.52   Hep C Genotype See Note 1a   IL28B Genotype  CT genotype   Ferritin 13 - 150 ng/mL 464 (H)   Iron % Saturation 15 - 55 % 67 (H)     2/2013. HCV RNA Log 6.36 IU/ml. Eradicated. LIVER HISTOLOGY:  12/2016. Shear wave elastography. E Median is 11.66, suggestive of F3, bridging fibrosis. Wide E Std of 4.41 is suggestive of fatty liver disease. 10/2017. TRANSIENT HEPATIC ELASTOGRAPHY:  E Range: 7-12.0 kPa (previously 9.1-23.0), E Mean: 9.3 kPa (previously 13.05),   E Median: 8.6 kPa (recently 11.7), E Std: 2.0 kPa (previously 4.4)     10/2018. TRANSIENT HEPATIC ELASTOGRAPHY:   E Range: 7.18-10.40 kPa  E Mean: 8.80 kPa  E Median: 8.75 kPa  E Std: 1.16 kPa    ENDOSCOPIC PROCEDURES:  09/2014. EGD by Dr. Deshaun Rocha. Normal esophagus. H.Pylori is negative. RADIOLOGY:  04/2014. Ultrasound of liver. Consistent with cirrhosis. No masses. 10/2014. Ultrasound of liver. Consistent with cirrhosis. No masses. 04/2015. Ultrasound of the liver. Consistent with cirrhosis. No mention of masses in the report. 10/2015. Ultrasound of the liver. Consistent with cirrhosis. No mention of masses in the report.  06/2016. Ultrasound of the liver. Heterogeneous appearing hepatic echotexture without mass. 12/2016. Ultrasound of the liver. Coarse heterogeneous echotexture. No focal mass. 10/2018. Ultrasound of the liver, performed with elastography. Persistent sonographic findings of hepatic cirrhosis. No focal hepatic mass. 04/2018. Ultrasound of the liver. The liver has a heterogeneous echotexture with a nodular contour, compatible with known cirrhosis. No focal hepatic lesions are evident. 10/2018. Ultrasound of the liver. Sonographic findings of hepatic cirrhosis and steatosis. No focal hepatic mass. 04/2019. Ultrasound of the liver. Coarsened hepatic echotexture and lobular surface contour noted as previously. No focal mass. 11/0219. Ultrasound of the liver. The liver echotexture was heterogeneous. The liver demonstrated a lobular contour. No focal mass. 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 The HCV has been treated and cured. Elastography, imaging, and labs suggest that the cirrhosis is resolving post eradication of the HCV. The most recent laboratory studies indicate that the liver transaminases are normal, alkaline phosphatase is normal,  tests of hepatic synthetic and metabolic function are depressed, and the platelet count is normal.    Will perform laboratory testing to monitor liver function and degree of liver injury. This will include hepatic panel, a CBC w/ diff, a BMP, a PT/INR, and an AFP-L3%. These orders will be mailed to the patient. I instructed the patient not to have these labs drawn until the stay at home order secondary to the [place] 19 pandemic is lifted. Complications of cirrhosis were discussed in detail. We discussed thrombocytopenia, portal hypertension, varices, GI bleeding, peripheral edema, ascites, hepatic encephalopathy, and hepatocellular carcinoma. She is very stable and the cirrhosis may have even resolved. We will move her appointments to every 6 months. We discussed the need for every 6 month liver imaging studies. It appears that the cirrhosis is resolving. Hepatic encephalopathy has not developed. Edema. Resolved. Diuretics have been discontinued. [facility] [address] screening.   Ultrasound was recently performed and does not demonstrate any sign of developing HCC. Repeat imaging ordered today. These orders will be mailed to the patient. I instructed the patient not to have the ultrasound performed until the stay at home order secondary to the [place] 19 pandemic is lifted. The need to perform an assessment of liver fibrosis was discussed with the patient. Fibroscan can assess liver fibrosis and determine if a patient has advanced fibrosis or cirrhosis without the need for liver biopsy. This can be performed in the office at her next appointment in 4 months. HCV. The patient was previously treated for HCV with standard interferon and ribavirin in the 1990s. There was a non-response that was not clearly defined. She was retreated with SOF/SIM and is now cured of the HCV. The patient was directed to continue all current medications at the current dosages except for the NSAIDs, which she should stop. There are no contraindications for the patient to take any medications that are necessary for treatment of other medical issues. The patient was counseled regarding alcohol consumption. Vaccination for viral hepatitis A is not needed. The patient has serologic evidence of prior exposure or vaccination with immunity. FOLLOW-UP:  Pursuant to the emergency declaration under the [facility], [address] [place] [address] authority and the [employer] and [employer] Act, this Virtual  Visit was conducted, with the patient's (and/or their legal guardian's) consent, to reduce the patient's risk of exposure to COVID-19 and provide necessary medical care. Services were provided through a video synchronous discussion virtually to substitute for an in-person clinic visit. The patient was located in their home. The provider was located in the [place] [address] office.        Because of the COVID-19 epidemic all non-emergent diagnostic testing and the in-office visit will be delayed by several months to reduce the risk of patient exposure to and potential infection from the novel corona virus. This follow-up appointment may be delayed further if warranted by the status of the epidemic at that time. Orders to obtain laboratory testing will be mailed to the patient and obtained 1 week prior to the in-person appointment. [address] in 4 months.       FRAN Steel-LEANA  Liver [place] [facility]  4 [facility], [address] [place] [address] Francisca Mcmillan, [address] [place] [address]   [phone]

## 2020-06-10 ENCOUNTER — HOSPITAL ENCOUNTER (OUTPATIENT)
Dept: ULTRASOUND IMAGING | Age: 65
Discharge: HOME | End: 2020-06-10
Payer: MEDICARE

## 2020-06-10 DIAGNOSIS — K74.60 CIRRHOSIS OF LIVER WITHOUT ASCITES, UNSPECIFIED HEPATIC CIRRHOSIS TYPE (HCC): ICD-10-CM

## 2020-06-10 PROCEDURE — 76705 ECHO EXAM OF ABDOMEN: CPT

## 2020-06-30 ENCOUNTER — HOSPITAL ENCOUNTER (OUTPATIENT)
Dept: LAB | Age: 65
Discharge: HOME | End: 2020-06-30
Payer: MEDICAID

## 2020-06-30 DIAGNOSIS — K74.60 CIRRHOSIS OF LIVER WITHOUT ASCITES, UNSPECIFIED HEPATIC CIRRHOSIS TYPE (HCC): ICD-10-CM

## 2020-06-30 LAB
ALBUMIN SERPL-MCNC: 3.9 G/DL (ref 3.4–5)
ALBUMIN/GLOB SERPL: 1.1 {RATIO} (ref 0.8–1.7)
ALP SERPL-CCNC: 68 U/L (ref 45–117)
ALT SERPL-CCNC: 27 U/L (ref 13–56)
ANION GAP SERPL CALC-SCNC: 6 MMOL/L (ref 3–18)
AST SERPL-CCNC: 20 U/L (ref 10–38)
BASOPHILS # BLD: 0 K/UL (ref 0–0.1)
BASOPHILS NFR BLD: 0 % (ref 0–2)
BILIRUB DIRECT SERPL-MCNC: 0.2 MG/DL (ref 0–0.2)
BILIRUB SERPL-MCNC: 0.5 MG/DL (ref 0.2–1)
BUN SERPL-MCNC: 8 MG/DL (ref 7–18)
BUN/CREAT SERPL: 8 (ref 12–20)
CALCIUM SERPL-MCNC: 8.9 MG/DL (ref 8.5–10.1)
CHLORIDE SERPL-SCNC: 102 MMOL/L (ref 100–111)
CO2 SERPL-SCNC: 26 MMOL/L (ref 21–32)
CREAT SERPL-MCNC: 1 MG/DL (ref 0.6–1.3)
DIFFERENTIAL METHOD BLD: ABNORMAL
EOSINOPHIL # BLD: 0 K/UL (ref 0–0.4)
EOSINOPHIL NFR BLD: 1 % (ref 0–5)
ERYTHROCYTE [DISTWIDTH] IN BLOOD BY AUTOMATED COUNT: 12.8 % (ref 11.6–14.5)
GLOBULIN SER CALC-MCNC: 3.6 G/DL (ref 2–4)
GLUCOSE SERPL-MCNC: 103 MG/DL (ref 74–99)
HCT VFR BLD AUTO: 42.2 % (ref 35–45)
HGB BLD-MCNC: 14.3 G/DL (ref 12–16)
INR PPP: 1.1 (ref 0.8–1.2)
LYMPHOCYTES # BLD: 0.8 K/UL (ref 0.9–3.6)
LYMPHOCYTES NFR BLD: 18 % (ref 21–52)
MCH RBC QN AUTO: 29.3 PG (ref 24–34)
MCHC RBC AUTO-ENTMCNC: 33.9 G/DL (ref 31–37)
MCV RBC AUTO: 86.5 FL (ref 74–97)
MONOCYTES # BLD: 0.3 K/UL (ref 0.05–1.2)
MONOCYTES NFR BLD: 8 % (ref 3–10)
NEUTS SEG # BLD: 3.2 K/UL (ref 1.8–8)
NEUTS SEG NFR BLD: 73 % (ref 40–73)
PLATELET # BLD AUTO: 113 K/UL (ref 135–420)
PMV BLD AUTO: 9.5 FL (ref 9.2–11.8)
POTASSIUM SERPL-SCNC: 3.5 MMOL/L (ref 3.5–5.5)
PROT SERPL-MCNC: 7.5 G/DL (ref 6.4–8.2)
PROTHROMBIN TIME: 14 SEC (ref 11.5–15.2)
RBC # BLD AUTO: 4.88 M/UL (ref 4.2–5.3)
SODIUM SERPL-SCNC: 134 MMOL/L (ref 136–145)
WBC # BLD AUTO: 4.4 K/UL (ref 4.6–13.2)

## 2020-06-30 PROCEDURE — 85610 PROTHROMBIN TIME: CPT

## 2020-06-30 PROCEDURE — 36415 COLL VENOUS BLD VENIPUNCTURE: CPT

## 2020-06-30 PROCEDURE — 80048 BASIC METABOLIC PNL TOTAL CA: CPT

## 2020-06-30 PROCEDURE — 85025 COMPLETE CBC W/AUTO DIFF WBC: CPT

## 2020-06-30 PROCEDURE — 80076 HEPATIC FUNCTION PANEL: CPT

## 2020-06-30 PROCEDURE — 82107 ALPHA-FETOPROTEIN L3: CPT

## 2020-07-01 LAB
AFP L3 MFR SERPL: NORMAL %
AFP SERPL-MCNC: NORMAL NG/ML

## 2020-07-06 ENCOUNTER — TELEPHONE (OUTPATIENT)
Dept: HEMATOLOGY | Age: 65
End: 2020-07-06

## 2020-07-06 NOTE — TELEPHONE ENCOUNTER
Updated on recent ultrasound via voice mail. No hepatic masses. Compatible with cirrhosis. Follow up as scheduled.

## 2020-07-14 ENCOUNTER — OFFICE VISIT (OUTPATIENT)
Dept: ORTHOPEDIC SURGERY | Age: 65
End: 2020-07-14

## 2020-07-14 VITALS
TEMPERATURE: 98 F | OXYGEN SATURATION: 94 % | HEIGHT: 65 IN | WEIGHT: 206.6 LBS | BODY MASS INDEX: 34.42 KG/M2 | DIASTOLIC BLOOD PRESSURE: 88 MMHG | SYSTOLIC BLOOD PRESSURE: 120 MMHG | HEART RATE: 84 BPM

## 2020-07-14 DIAGNOSIS — M79.18 CHRONIC PRIMARY MUSCULOSKELETAL PAIN: Primary | ICD-10-CM

## 2020-07-14 DIAGNOSIS — M54.50 LUMBAR PAIN: ICD-10-CM

## 2020-07-14 DIAGNOSIS — G89.29 CHRONIC PRIMARY MUSCULOSKELETAL PAIN: Primary | ICD-10-CM

## 2020-07-14 DIAGNOSIS — M54.59 MECHANICAL LOW BACK PAIN: ICD-10-CM

## 2020-07-14 DIAGNOSIS — M79.18 MYOFASCIAL PAIN: ICD-10-CM

## 2020-07-14 RX ORDER — BETAMETHASONE SODIUM PHOSPHATE AND BETAMETHASONE ACETATE 3; 3 MG/ML; MG/ML
12 INJECTION, SUSPENSION INTRA-ARTICULAR; INTRALESIONAL; INTRAMUSCULAR; SOFT TISSUE ONCE
Qty: 2 ML | Refills: 0
Start: 2020-07-14 | End: 2020-07-14

## 2020-07-14 RX ORDER — BUPIVACAINE HYDROCHLORIDE 2.5 MG/ML
4 INJECTION, SOLUTION INFILTRATION; PERINEURAL ONCE
Qty: 4 ML | Refills: 0
Start: 2020-07-14 | End: 2020-07-14

## 2020-07-14 RX ORDER — LIDOCAINE HYDROCHLORIDE 20 MG/ML
4 INJECTION, SOLUTION EPIDURAL; INFILTRATION; INTRACAUDAL; PERINEURAL ONCE
Qty: 4 ML | Refills: 0
Start: 2020-07-14 | End: 2020-07-14

## 2020-07-14 RX ORDER — TIZANIDINE 2 MG/1
2 TABLET ORAL
Qty: 90 TAB | Refills: 5 | Status: SHIPPED | OUTPATIENT
Start: 2020-07-14 | End: 2021-11-09 | Stop reason: SDUPTHER

## 2020-07-14 NOTE — PROGRESS NOTES
[address]  [address], [address]  [place], [facility]  Phone: [phone]  Fax: [phone]        Kenzie Cover  : 1955  PCP: MARIS Shafer  2020    PROGRESS NOTE      HISTORY OF PRESENT ILLNESS  Sanju Trevino is a 62 y.o. female who was seen in 2016 with c/o cervical and lumbar pain. Lumbar spine MRI dated 16 reviewed. Per report, Overall mild degenerative changes.  Edema associated with superior endplate  of L5 may suggest relative acuity.  Mild to moderate foraminal stenosis in  the lower lumbar spine. She found significant relief with PT and taking Mobic PRN. She had a mechanical fall where she injured her right shoulder. She was treated with a cortisone injection and PT. Sanju Trevino comes in to the office today for f/u. Pt notes that she has had a flare up of her low back pain with muscle spasms over the last 4 months that have been more progressive. She has been walking as an HEP. She also c/o bilateral groin and hip pain. She reports that she has an abdominal hernia for which she has a surgical consult tomorrow. Pain Score: 3/10. PmHx: CTS, hypothyroidism     ASSESSMENT  This is a 62year-old female with an exacerbation of her chronic primary musculoskeletal pain. She has a surgical consult tomorrow for an abdominal hernia. I provided trigger point injections today and we can discuss the option of a referral to formal PT once she recovers from her abdominal surgery. PLAN  1. Trigger point injections into lumbar paraspinals. 2. Refill Tizanidine 2 mg TID PRN. Pt will f/u in 6-8 weeks after hernia surgery or sooner as needed. Diagnoses and all orders for this visit:    1. Chronic primary musculoskeletal pain  -     bupivacaine (Marcaine) 0.25 % (2.5 mg/mL) soln injection; 4 mL by IntraMUSCular route once for 1 dose.   -     INJECTION, BUPIVICAINE HYDRO  -     LIDOCAINE INJECTION  -     lidocaine, PF, (XYLOCAINE) 20 mg/mL (2 %) injection; 4 mL by Other route once for 1 dose. -     betamethasone (Celestone Soluspan) 6 mg/mL injection; 2 mL by IntraMUSCular route once for 1 dose.  -     BETAMETHASONE ACETATE & SODIUM PHOSPHATE INJECTION 3 MG EA.  -     IN INJECT TRIGGER POINTS, > 3    2. Lumbar pain  -     AMB POC XRAY, SPINE, LUMBOSACRAL; 2 O  -     bupivacaine (Marcaine) 0.25 % (2.5 mg/mL) soln injection; 4 mL by IntraMUSCular route once for 1 dose. -     INJECTION, BUPIVICAINE HYDRO  -     LIDOCAINE INJECTION  -     lidocaine, PF, (XYLOCAINE) 20 mg/mL (2 %) injection; 4 mL by Other route once for 1 dose. -     betamethasone (Celestone Soluspan) 6 mg/mL injection; 2 mL by IntraMUSCular route once for 1 dose.  -     BETAMETHASONE ACETATE & SODIUM PHOSPHATE INJECTION 3 MG EA.  -     IN INJECT TRIGGER POINTS, > 3    3. Mechanical low back pain  -     bupivacaine (Marcaine) 0.25 % (2.5 mg/mL) soln injection; 4 mL by IntraMUSCular route once for 1 dose. -     INJECTION, BUPIVICAINE HYDRO  -     LIDOCAINE INJECTION  -     lidocaine, PF, (XYLOCAINE) 20 mg/mL (2 %) injection; 4 mL by Other route once for 1 dose. -     betamethasone (Celestone Soluspan) 6 mg/mL injection; 2 mL by IntraMUSCular route once for 1 dose.  -     BETAMETHASONE ACETATE & SODIUM PHOSPHATE INJECTION 3 MG EA.  -     IN INJECT TRIGGER POINTS, > 3    4. Myofascial pain  -     bupivacaine (Marcaine) 0.25 % (2.5 mg/mL) soln injection; 4 mL by IntraMUSCular route once for 1 dose. -     INJECTION, BUPIVICAINE HYDRO  -     LIDOCAINE INJECTION  -     lidocaine, PF, (XYLOCAINE) 20 mg/mL (2 %) injection; 4 mL by Other route once for 1 dose. -     betamethasone (Celestone Soluspan) 6 mg/mL injection; 2 mL by IntraMUSCular route once for 1 dose.  -     BETAMETHASONE ACETATE & SODIUM PHOSPHATE INJECTION 3 MG EA.  -     IN INJECT TRIGGER POINTS, > 3  -     tiZANidine (ZANAFLEX) 2 mg tablet;  Take 1 Tab by mouth three (3) times daily as needed for Muscle Spasm(s). PAST MEDICAL HISTORY   Past Medical History:   Diagnosis Date    Abnormal Papanicolaou smear of cervix     1988 HPV    Asthma     [facility] [address]     Bipolar 1 disorder, depressed (HCC)     Bursitis     Carpal tunnel syndrome     Cirrhosis, hepatitis C     Connective tissue disease ([facility] [address]     DDD (degenerative disc disease), lumbosacral     Gastric ulcer     Hashimoto's disease     Hashimoto's disease     Hepatitis C     Herniated intervertebral disc of lumbar spine     Hypermobility syndrome     Liver disease     Menopause     Osteoarthritis     Plantar fasciitis, bilateral     RA (rheumatoid arthritis) ([facility] [address]        Past Surgical History:   Procedure Laterality Date    COLONOSCOPY N/A 2/9/2017     Colonoscopy w/polyp bxs x3 performed by Grac[facility] at [facility] ENDOSCOPY    HX APPENDECTOMY      HX DILATION AND CURETTAGE  1988    cryso    HX GYN      BTL    HX HEENT      malofacial surgery   . MEDICATIONS    Current Outpatient Medications   Medication Sig Dispense Refill    XIIDRA 5 % dpet   0    CALCIUM PO Take 1 Tab by mouth.  vitamin E acetate (VITAMIN E PO) Take 1 Cap by mouth.  hydroCHLOROthiazide (HYDRODIURIL) 12.5 mg tablet Take 1 Tab by mouth daily. 90 Tab 1    levothyroxine (SYNTHROID) 125 mcg tablet take 1 tablet by mouth once daily BEFORE BREAKFAST 90 Tab 1    tiZANidine (ZANAFLEX) 2 mg tablet Take 1 Tab by mouth three (3) times daily as needed. 90 Tab 2    PROAIR HFA 90 mcg/actuation inhaler Take 2 Puffs by inhalation every six (6) hours as needed for Wheezing. 1 Inhaler 3    omeprazole (PRILOSEC) 20 mg capsule take 1 capsule by mouth once daily 90 Cap 1    guaiFENesin ER (MUCINEX) 600 mg ER tablet Take 2 Tabs by mouth two (2) times daily as needed for Congestion. 60 Tab 2    multivitamin (ONE A DAY) tablet Take 1 Tab by mouth daily.       diclofenac (VOLTAREN) 1 % gel apply 2 grams to affected area four times a day  0    zolpidem (AMBIEN) 10 mg tablet   0    benztropine (COGENTIN) 1 mg tablet Take 1 mg by mouth three (3) times daily.  ARIPiprazole (ABILIFY) 5 mg tablet Take 15 mg by mouth daily. ALLERGIES  Allergies   Allergen Reactions    Adhesive Tape-Silicones Rash          SOCIAL HISTORY    Social History     Socioeconomic History    Marital status: SINGLE     Spouse name: Not on file    Number of children: Not on file    Years of education: Not on file    Highest education level: Not on file   Tobacco Use    Smoking status: Former Smoker     Last attempt to quit: 7/1/2005     Years since quitting: 15.0    Smokeless tobacco: Never Used   Substance and Sexual Activity    Alcohol use: No     Alcohol/week: 0.0 standard drinks    Drug use: Not Currently     Types: Marijuana    Sexual activity: Never       FAMILY HISTORY  Family History   Problem Relation Age of Onset    No Known Problems Mother     No Known Problems Father          REVIEW OF SYSTEMS  Review of Systems   Musculoskeletal: Positive for back pain. PHYSICAL EXAMINATION  Visit Vitals  /88 (BP 1 Location: Right arm, BP Patient Position: Sitting)   Pulse 84   Temp 98 °F (36.7 °C) (Oral)   Ht 5' 5\" (1.651 m)   Wt 206 lb 9.6 oz (93.7 kg)   SpO2 94%   BMI 34.38 kg/m²       Pain Assessment  7/14/2020   Location of Pain Back;Pelvis; Hip   Location Modifiers Left;Right   Severity of Pain 3   Quality of Pain Aching   Duration of Pain -   Frequency of Pain Constant   Aggravating Factors -   Aggravating Factors Comment -   Limiting Behavior -   Relieving Factors -   Relieving Factors Comment -   Result of Injury -           Constitutional:  Well developed, well nourished, in no acute distress. Psychiatric: Affect and mood are appropriate. Integumentary: No rashes or abrasions noted on exposed areas. SPINE/MUSCULOSKELETAL EXAM    Cervical spine:  Neck is midline. Normal muscle tone. No focal atrophy is noted. ROM pain free. Shoulder ROM intact. Mild tenderness to palpation, R>L. Negative Spurling's sign. Negative Tinel's sign. Negative Dillon's sign.       Sensation in the bilateral arms grossly intact to light touch.       Lumbar spine:  No rash, ecchymosis, or gross obliquity. No fasciculations. No focal atrophy is noted. No pain with hip ROM. Full range of motion. Diffuse tenderness to palpation, L>R. No tenderness to palpation at the sciatic notch. SI joints non-tender. Trochanters non tender.      Sensation in the bilateral legs grossly intact to light touch.     Updates from 05/10/17:  Bilateral SI joints tender. Negative right PORTILLO test.  Negative right p4 test.  Negative bilateral straight leg raise. Tenderness to palpation in the lumbar region.     Updates 7/14/2020:  Tenderness to palpation of lumbar paraspinals  Pain with internal rotation of the hips (L>R)    MOTOR:      Biceps  Triceps Deltoids Wrist Ext Wrist Flex Hand Intrin   Right 5/5 5/5 5/5 5/5 5/5 5/5   Left 5/5 5/5 5/5 5/5 5/5 5/5             Hip Flex  Quads Hamstrings Ankle DF EHL Ankle PF   Right 5/5 5/5 5/5 5/5 5/5 5/5   Left 5/5 5/5 5/5 5/5 5/5 5/5   DTRs are 2+ biceps, triceps, brachioradialis, patella, and Achilles.      Negative Straight Leg raise. Squat not tested. No difficulty with tandem gait.       Ambulation without assistive device. FWB.       RADIOGRAPHS  2V Lumbar XR images taken on 7/14/2020 personally reviewed with patient:  Endplate osteophytes  N0-8 disc space narrowing  Facet sclerosis   Normal alignment    2V Cervical XR images taken on 12/17/18 personally reviewed with patient:  Straightening of the cervical lordosis  Normal alignment. Mild facet sclerosis.   No obvious compression fractures or instabilities.     2V Lumbar XR images taken on 12/17/18 personally reviewed with patient:  Facet sclerosis  Disc space narrowing at L4-5 and L5-S1  Normal alignment  No obvious compression fractures or instabilities. Lumbar MRI images taken on 6/27/16 personally reviewed with patient:  Five lumbar vertebrae are present.  Somewhat hypolordotic sagittal  alignment. Disc desiccation from L3-4 through L5-S1 levels.  Mild L3-4 and L5-S1 disc  space narrowing.  Moderate L4-5 disc space narrowing. Edema associate with the anterior superior endplate of L5 may suggest  relative acuity.  No marrow replacing process. Mild multilevel facet osteoarthritis. The conus medullaris is at the L1-2 level.  There is normal signal  throughout the spinal cord. L1-2:No posterior disc bulge.  No foraminal or central canal stenosis. L2-3:No posterior disc bulge.  Mild hypertrophy ligamentum flavum.  Mild  facet osteoarthritis.  Mild foraminal stenosis.  No central canal stenosis. L3-4:Minimal broad based posterior disc bulge.  Mild hypertrophy  ligamentum flavum.  Mild facet osteoarthritis.  Mild foraminal stenosis. No central canal stenosis. L4-5:Small broad based posterior disc bulge.  Moderate hypertrophy  ligamentum flavum and moderate facet osteoarthritis.  Mild foraminal  stenosis.  No central canal stenosis. L5-S1:Minimal broad based posterior disc bulge.  Mild hypertrophy  ligamentum flavum and mild facet osteoarthritis.  Moderate left and mild  right foraminal stenosis.  No central canal stenosis. IMPRESSION  Overall mild degenerative changes.  Edema associated with superior endplate  of L5 may suggest relative acuity.  Mild to moderate foraminal stenosis in  the lower lumbar spine. [facility]  OFFICE PROCEDURE PROGRESS NOTE      PROCEDURE: In the office today after informed consent using aseptic technique, I administered 10 trigger point injections to the iliocastalis, longissimus, spinalis, multifidus, semispinalis, and deep rotators with a total of 2 cc of Celestone, 4 cc each of Lidocaine and Marcaine.  Pre-injection pain level was 3/10 and post-injection pain level was 0/10.     Chart reviewed for the following:   I, Dr. Avtar Bar, have reviewed the History, Physical and updated the Allergic reactions for [facility] [place] performed immediately prior to start of procedure:   I, Dr. Avtar Bar, have performed the following reviews on Jama Ryan prior to the start of the procedure:            * Patient was identified by name and date of birth   * Agreement on procedure being performed was verified  * Risks and Benefits explained to the patient  * Procedure site verified and marked as necessary  * Patient was positioned for comfort  * Consent was signed and verified     Time: 12:01 PM    Date of procedure: 7/14/2020    Procedure performed by:  Suresh Pan MD    Provider assisted by: None    Patient assisted by: Self    How tolerated by patient: Pt tolerated the procedure well with no complications. 20 minutes of face-to-face contact were spent with the patient during today's visit extensively discussing symptoms and treatment plan. All questions were answered. More than half of this visit today was spent on counseling.      Written by Sandy Haney as dictated by Avtar Bar MD

## 2020-10-01 ENCOUNTER — HOSPITAL ENCOUNTER (OUTPATIENT)
Dept: LAB | Age: 65
Discharge: HOME | End: 2020-10-01
Payer: MEDICARE

## 2020-10-01 ENCOUNTER — OFFICE VISIT (OUTPATIENT)
Dept: HEMATOLOGY | Age: 65
End: 2020-10-01
Payer: MEDICARE

## 2020-10-01 VITALS
DIASTOLIC BLOOD PRESSURE: 87 MMHG | WEIGHT: 203 LBS | HEART RATE: 86 BPM | HEIGHT: 65 IN | TEMPERATURE: 98.1 F | SYSTOLIC BLOOD PRESSURE: 140 MMHG | BODY MASS INDEX: 33.82 KG/M2 | OXYGEN SATURATION: 97 %

## 2020-10-01 DIAGNOSIS — K74.60 CIRRHOSIS OF LIVER WITHOUT ASCITES, UNSPECIFIED HEPATIC CIRRHOSIS TYPE (HCC): Primary | ICD-10-CM

## 2020-10-01 DIAGNOSIS — K74.60 CIRRHOSIS OF LIVER WITHOUT ASCITES, UNSPECIFIED HEPATIC CIRRHOSIS TYPE (HCC): ICD-10-CM

## 2020-10-01 PROBLEM — Z86.19 HEPATITIS C VIRUS INFECTION CURED AFTER ANTIVIRAL DRUG THERAPY: Status: ACTIVE | Noted: 2020-10-01

## 2020-10-01 LAB
ALBUMIN SERPL-MCNC: 4 G/DL (ref 3.4–5)
ALBUMIN/GLOB SERPL: 1.2 {RATIO} (ref 0.8–1.7)
ALP SERPL-CCNC: 65 U/L (ref 45–117)
ALT SERPL-CCNC: 33 U/L (ref 13–56)
ANION GAP SERPL CALC-SCNC: 5 MMOL/L (ref 3–18)
AST SERPL-CCNC: 23 U/L (ref 10–38)
BASOPHILS # BLD: 0 K/UL (ref 0–0.1)
BASOPHILS NFR BLD: 0 % (ref 0–2)
BILIRUB DIRECT SERPL-MCNC: 0.2 MG/DL (ref 0–0.2)
BILIRUB SERPL-MCNC: 1.1 MG/DL (ref 0.2–1)
BUN SERPL-MCNC: 7 MG/DL (ref 7–18)
BUN/CREAT SERPL: 9 (ref 12–20)
CALCIUM SERPL-MCNC: 9.5 MG/DL (ref 8.5–10.1)
CHLORIDE SERPL-SCNC: 103 MMOL/L (ref 100–111)
CO2 SERPL-SCNC: 29 MMOL/L (ref 21–32)
CREAT SERPL-MCNC: 0.76 MG/DL (ref 0.6–1.3)
DIFFERENTIAL METHOD BLD: ABNORMAL
EOSINOPHIL # BLD: 0 K/UL (ref 0–0.4)
EOSINOPHIL NFR BLD: 0 % (ref 0–5)
ERYTHROCYTE [DISTWIDTH] IN BLOOD BY AUTOMATED COUNT: 12.9 % (ref 11.6–14.5)
GLOBULIN SER CALC-MCNC: 3.3 G/DL (ref 2–4)
GLUCOSE SERPL-MCNC: 99 MG/DL (ref 74–99)
HCT VFR BLD AUTO: 42.1 % (ref 35–45)
HGB BLD-MCNC: 14 G/DL (ref 12–16)
INR PPP: 1 (ref 0.8–1.2)
LYMPHOCYTES # BLD: 0.9 K/UL (ref 0.9–3.6)
LYMPHOCYTES NFR BLD: 18 % (ref 21–52)
MCH RBC QN AUTO: 28.9 PG (ref 24–34)
MCHC RBC AUTO-ENTMCNC: 33.3 G/DL (ref 31–37)
MCV RBC AUTO: 87 FL (ref 74–97)
MONOCYTES # BLD: 0.4 K/UL (ref 0.05–1.2)
MONOCYTES NFR BLD: 8 % (ref 3–10)
NEUTS SEG # BLD: 3.4 K/UL (ref 1.8–8)
NEUTS SEG NFR BLD: 74 % (ref 40–73)
PLATELET # BLD AUTO: 121 K/UL (ref 135–420)
PMV BLD AUTO: 9.9 FL (ref 9.2–11.8)
POTASSIUM SERPL-SCNC: 3.6 MMOL/L (ref 3.5–5.5)
PROT SERPL-MCNC: 7.3 G/DL (ref 6.4–8.2)
PROTHROMBIN TIME: 13.4 SEC (ref 11.5–15.2)
RBC # BLD AUTO: 4.84 M/UL (ref 4.2–5.3)
SODIUM SERPL-SCNC: 137 MMOL/L (ref 136–145)
WBC # BLD AUTO: 4.7 K/UL (ref 4.6–13.2)

## 2020-10-01 PROCEDURE — 80076 HEPATIC FUNCTION PANEL: CPT

## 2020-10-01 PROCEDURE — 3017F COLORECTAL CA SCREEN DOC REV: CPT | Performed by: NURSE PRACTITIONER

## 2020-10-01 PROCEDURE — 85025 COMPLETE CBC W/AUTO DIFF WBC: CPT

## 2020-10-01 PROCEDURE — 85610 PROTHROMBIN TIME: CPT

## 2020-10-01 PROCEDURE — G8427 DOCREV CUR MEDS BY ELIG CLIN: HCPCS | Performed by: NURSE PRACTITIONER

## 2020-10-01 PROCEDURE — G9899 SCRN MAM PERF RSLTS DOC: HCPCS | Performed by: NURSE PRACTITIONER

## 2020-10-01 PROCEDURE — 91200 LIVER ELASTOGRAPHY: CPT | Performed by: NURSE PRACTITIONER

## 2020-10-01 PROCEDURE — 82107 ALPHA-FETOPROTEIN L3: CPT

## 2020-10-01 PROCEDURE — G8432 DEP SCR NOT DOC, RNG: HCPCS | Performed by: NURSE PRACTITIONER

## 2020-10-01 PROCEDURE — 80048 BASIC METABOLIC PNL TOTAL CA: CPT

## 2020-10-01 PROCEDURE — 36415 COLL VENOUS BLD VENIPUNCTURE: CPT

## 2020-10-01 PROCEDURE — 99214 OFFICE O/P EST MOD 30 MIN: CPT | Performed by: NURSE PRACTITIONER

## 2020-10-01 PROCEDURE — G8417 CALC BMI ABV UP PARAM F/U: HCPCS | Performed by: NURSE PRACTITIONER

## 2020-10-01 NOTE — PROGRESS NOTES
[facility], [place], [address], [place] Effie Paz, MD Ralph Ramsey, PAMELBA Huitron, ACNP-BC     Rosa Ernandez, [facility]   Bernadette Ricks FNP-C    Andrew Ortez, [facility]       Darien Lea Evelio De Santamaria 136    at [facility]    [address] [facility], [address] [place] [address], [facility] 22.    [phone]    FAX: [address] [place] [address]    at [address], [address]    [phone]    FAX: [phone]         Patient Care Team:  Denae Garza as PCP - General (Nurse Practitioner)  Violeta Uriarte MD (Rheumatology)      Problem List  Date Reviewed: 7/14/2020          Codes Class Noted    Intrinsic (urethral) sphincter deficiency (ISD) ICD-10-CM: N36.42  ICD-9-CM: 599.82  5/3/2017        Microscopic hematuria ICD-10-CM: R31.29  ICD-9-CM: 599.72  3/26/2017        Nocturia ICD-10-CM: R35.1  ICD-9-CM: 788.43  4/4/2017        RAMONA (stress urinary incontinence, female) ICD-10-CM: N39.3  ICD-9-CM: 625.6  4/4/2017        HCV antibody positive ICD-10-CM: R76.8  ICD-9-CM: 795.79  4/11/2019    Overview Signed 4/11/2019  4:45 PM by Ofe Melo NP     HCV treated and cured with SOF + ROBBY in 2014             Severe obesity ([facility] [address] ICD-10-CM: E66.01  ICD-9-CM: 278.01  11/23/2018        Negative depression screening ICD-10-CM: Z13.31  ICD-9-CM: V79.0  7/3/2018        Advanced care planning/counseling discussion ICD-10-CM: Z71.89  ICD-9-CM: V65.49  7/3/2018        Chronic back pain ICD-10-CM: M54.9, G89.29  ICD-9-CM: 724.5, 338.29  9/23/2015    Overview Signed 9/23/2015 12:00 PM by Ofe Melo NP     Nerve ablation 09/16/2015.              Hypothyroidism ICD-10-CM: E03.9  ICD-9-CM: 244.9  2/6/2013        Rheumatoid arthritis(714.0) ICD-10-CM: M06.9  ICD-9-CM: 714.0  2/6/2013        Carpal tunnel syndrome ICD-10-CM: G56.00  ICD-9-CM: 354.0  2/6/2013              Natalie Verma returns [facility] the [facility] today for education and management of cirrhosis. The active problem list, all pertinent past medical history, medications, liver histology, endoscopic studies, radiologic findings and laboratory findings related to the liver disorder were reviewed with the patient. The patient is a 62 y.o.  female who was noted to have abnormalities in liver chemistries and subsequently tested positive for chronic HCV in 1990s. Risk factors for acquiring HCV are IV drug use in 1980s. There was no history of acute incteric hepatitis at the time of these risk factors. She was treated for HCV and was SVR five years post treatment. She began treatment on 05/15/2014 with dual therapy of SOF/SIM and completed her 12 weeks of therapy on 08/05/2014. Ultrasound with shear wave elastography was performed in 10/2018. The ultrasound demonstrates a coarse heterogeneous echotexture. No focal mass. The shear wave elastography suggests that the cirrhosis has resolved. A liver biopsy has not been performed. The patient was treated with standard interferon in 1997 and with standard interferon and ribavirin in 1999. Both treatments lasted for 24 weeks. The patient tolerated treatment reasonably well. HCV RNA levels obtained during treatment are not available at this time. The patient is unaware of the virologic response pattern. The most recent laboratory studies indicate that the liver transaminases are normal, alkaline phosphatase is normal, tests of hepatic synthetic and metabolic function are normal, and the platelet count is depressed. The patient has no complaints which can be attributed to liver disease. The patient completes all daily activities without any functional limitations.  The patient has not experienced fatigue, fevers, chills, shortness of breath, chest pain, pain in the right side over the liver, diffuse abdominal pain, nausea, vomiting, constipation, diarrhrea, dry eyes, dry mouth, arthralgias, myalgias, yellowing of the eyes or skin, itching, dark urine, problems concentrating, swelling of the abdomen, swelling of the lower extremities, hematemesis, or hematochezia. ALLERGIES  Allergies   Allergen Reactions    Adhesive Tape-Silicones Rash       MEDICATIONS:  Current Outpatient Medications   Medication Sig Dispense Refill    tiZANidine (ZANAFLEX) 2 mg tablet Take 1 Tab by mouth three (3) times daily as needed for Muscle Spasm(s). 90 Tab 5    XIIDRA 5 % dpet   0    CALCIUM PO Take 1 Tab by mouth.  vitamin E acetate (VITAMIN E PO) Take 1 Cap by mouth.  hydroCHLOROthiazide (HYDRODIURIL) 12.5 mg tablet Take 1 Tab by mouth daily. 90 Tab 1    levothyroxine (SYNTHROID) 125 mcg tablet take 1 tablet by mouth once daily BEFORE BREAKFAST 90 Tab 1    PROAIR HFA 90 mcg/actuation inhaler Take 2 Puffs by inhalation every six (6) hours as needed for Wheezing. 1 Inhaler 3    omeprazole (PRILOSEC) 20 mg capsule take 1 capsule by mouth once daily 90 Cap 1    guaiFENesin ER (MUCINEX) 600 mg ER tablet Take 2 Tabs by mouth two (2) times daily as needed for Congestion. 60 Tab 2    multivitamin (ONE A DAY) tablet Take 1 Tab by mouth daily.  diclofenac (VOLTAREN) 1 % gel apply 2 grams to affected area four times a day  0    zolpidem (AMBIEN) 10 mg tablet   0    benztropine (COGENTIN) 1 mg tablet Take 1 mg by mouth three (3) times daily.  ARIPiprazole (ABILIFY) 5 mg tablet Take 15 mg by mouth daily. REVIEW OF SYSTEMS:  Systems related to all organ systems were reviewed. All were negative except as noted above. FAMILY/SOCIAL HISTORY:  The patient is . The patient has 2 children, and 4 grandchildren. The patient used to smoke a little but stopped in 2005. She used to consumed alcohol in excess. The patient has been abstinent from alcohol since 5/2012. 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 The patient is currently receiving disability. PHYSICAL EXAMINATION:  Visit Vitals  BP (!) 140/87 (BP 1 Location: Left arm, BP Patient Position: Sitting)   Pulse 86   Temp 98.1 °F (36.7 °C)   Ht 5' 5\" (1.651 m)   Wt 203 lb (92.1 kg)   SpO2 97%   BMI 33.78 kg/m²     General: No acute distress. Eyes: Sclera anicteric. ENT: No oral lesions. Thyroid normal.  Nodes: No adenopathy. Skin: No spider angiomata. No jaundice. No palmar erythema. Respiratory: Lungs clear to auscultation. Cardiovascular: Regular heart rate. No murmurs. No JVD. Abdomen: Soft non-tender. Liver size normal to percussion/palpation. Spleen not palpable. No obvious ascites. Extremities: No lower extremity edema. No muscle wasting. No gross arthritic changes. Neurologic: Alert and oriented. Cranial nerves grossly intact. No asterixsis.     LABORATORY STUDIES:   [facility] [place] of [address] Units 4/11/2019 11/23/2018   WBC 4.6 - 13.2 K/uL 4.7 5.2   ANC 1.8 - 8.0 K/UL 3.1 3.2   HGB 12.0 - 16.0 g/dL 13.7 14.7    - 420 K/uL 103 (L) 108 (L)   INR 0.8 - 1.2   1.0    AST 15 - 37 U/L 26 25   ALT 13 - 56 U/L 34 33   Alk Phos 45 - 117 U/L 70 72   Bili, Total 0.2 - 1.0 MG/DL 0.5 0.8   Bili, Direct 0.0 - 0.2 MG/DL 0.2    Albumin 3.4 - 5.0 g/dL 4.0 4.0   BUN 7.0 - 18 MG/DL 7 11   Creat 0.6 - 1.3 MG/DL 0.78 0.89   Na 136 - 145 mmol/L 137 139   K 3.5 - 5.5 mmol/L 3.2 (L) 4.0   Cl 100 - 108 mmol/L 102 106   CO2 21 - 32 mmol/L 27 27   Glucose 74 - 99 mg/dL 102 (H) 103 (H)     Cancer Screening Latest Ref Rng & Units 4/11/2019 10/2/2018 4/2/2018   AFP, Serum 0.0 - 8.0 ng/mL 2.6 2.9 2.8   AFP-L3% 0.0 - 9.9 % Comment Comment Comment     SEROLOGIES:  Serologies Latest Ref Rng 2/6/2013   Hep A Ab, Total Negative Positive (A)   Hep B Surface Ag Negative Negative   Hep B Core Ab, Total Negative Negative   Hep B Surface Ab 0.00 - 0.99 Index Value 0.52   Hep C Genotype See Note 1a   IL28B Genotype  CT genotype   Ferritin 13 - 150 ng/mL 464 (H)   Iron % Saturation 15 - 55 % 67 (H)     2/2013. HCV RNA Log 6.36 IU/ml. Eradicated. LIVER HISTOLOGY:  12/2016. Shear wave elastography. E Median is 11.66, suggestive of F3, bridging fibrosis. Wide E Std of 4.41 is suggestive of fatty liver disease. 10/2017. TRANSIENT HEPATIC ELASTOGRAPHY:  E Range: 7-12.0 kPa (previously 9.1-23.0), E Mean: 9.3 kPa (previously 13.05),   E Median: 8.6 kPa (recently 11.7), E Std: 2.0 kPa (previously 4.4)     10/2018. TRANSIENT HEPATIC ELASTOGRAPHY:   E Range: 7.18-10.40 kPa  E Mean: 8.80 kPa  E Median: 8.75 kPa  E Std: 1.16 kPa    ENDOSCOPIC PROCEDURES:  09/2014. EGD by Dr. Messi Almendarez. Normal esophagus. H.Pylori is negative. RADIOLOGY:  04/2014. Ultrasound of liver. Consistent with cirrhosis. No masses. 10/2014. Ultrasound of liver. Consistent with cirrhosis. No masses. 04/2015. Ultrasound of the liver. Consistent with cirrhosis. No mention of masses in the report. 10/2015. Ultrasound of the liver. Consistent with cirrhosis. No mention of masses in the report.  06/2016. Ultrasound of the liver. Heterogeneous appearing hepatic echotexture without mass. 12/2016. Ultrasound of the liver. Coarse heterogeneous echotexture. No focal mass. 10/2018. Ultrasound of the liver, performed with elastography. Persistent sonographic findings of hepatic cirrhosis. No focal hepatic mass. 04/2018. Ultrasound of the liver. The liver has a heterogeneous echotexture with a nodular contour, compatible with known cirrhosis. No focal hepatic lesions are evident. 10/2018. Ultrasound of the liver. Sonographic findings of hepatic cirrhosis and steatosis. No focal hepatic mass. 04/2019. Ultrasound of the liver. Coarsened hepatic echotexture and lobular surface contour noted as previously.   No focal mass.     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 The HCV has been treated and cured. Elastography, imaging, and labs suggest that the cirrhosis is resolving post eradication of the HCV. The most recent laboratory studies indicate that the liver transaminases are normal, alkaline phosphatase is normal,  tests of hepatic synthetic and metabolic function are depressed, and the platelet count is depressed. Complications of cirrhosis were discussed in detail. We discussed thrombocytopenia, portal hypertension, varices, GI bleeding, peripheral edema, ascites, hepatic encephalopathy, and hepatocellular carcinoma. She is very stable and the cirrhosis may have even resolved. We will move her appointments to every 6 months. We discussed the need for every 6 month liver imaging studies. It appears that the cirrhosis is resolving. Hepatic encephalopathy has not developed. Edema. Resolved. Diuretics have been discontinued. [address] screening. Ultrasound was recently performed and does not demonstrate any sign of developing HCC. Repeat imaging ordered today. Will alternate ultrasounds with ultrasounds with shear wave elastography. Elastography  can assess liver fibrosis and determine if a patient has advanced fibrosis or cirrhosis without the need for liver biopsy. HCV. The patient was previously treated for HCV with standard interferon and ribavirin in the 1990s. There was a non-response that was not clearly defined. She was retreated with SOF/SIM last year and is SVR more than 3 years post treatment. The patient was directed to continue all current medications at the current dosages except for the NSAIDs, which she should stop. There are no contraindications for the patient to take any medications that are necessary for treatment of other medical issues. The patient was counseled regarding alcohol consumption. Vaccination for viral hepatitis A is not needed. The patient has serologic evidence of prior exposure or vaccination with immunity. [facility] 87 in 6 months.      Mercy Hernandes NP   [facility] [place] of [address] [place] [address], [address] [place] [address] [place] Izabela Montanez, [address] [facility]   [phone]

## 2020-10-01 NOTE — PROGRESS NOTES
Jose F Lawler MD, [address], [place] Effie Paz, MD Shanika Dixon PA-LEANA Leon, ACNP-BC     April S Oma, [facility]   Americo Ely, FNP-C    Josue Dengnc, [facility]       [facility] Evelio De Santamaria 136    at [address], [address] [place] [address], [facility] 22.    [phone]    FAX: [address] [place] [address], [address]    [phone]    FAX: [phone]         Patient Care Team:  Leopoldo Familia, [place] as PCP - General (Nurse Practitioner)  Aimee Patel MD (Rheumatology)      Problem List  Date Reviewed: 7/14/2020          Codes Class Noted    Intrinsic (urethral) sphincter deficiency (ISD) ICD-10-CM: N36.42  ICD-9-CM: 599.82  5/3/2017        Microscopic hematuria ICD-10-CM: R31.29  ICD-9-CM: 599.72  3/26/2017        Nocturia ICD-10-CM: R35.1  ICD-9-CM: 788.43  4/4/2017        RAMONA (stress urinary incontinence, female) ICD-10-CM: N39.3  ICD-9-CM: 625.6  4/4/2017        HCV antibody positive ICD-10-CM: R76.8  ICD-9-CM: 795.79  4/11/2019    Overview Signed 4/11/2019  4:45 PM by Love Burns NP     HCV treated and cured with SOF + ROBBY in 2014             Severe obesity ([facility] [address] ICD-10-CM: E66.01  ICD-9-CM: 278.01  11/23/2018        Negative depression screening ICD-10-CM: Z13.31  ICD-9-CM: V79.0  7/3/2018        Advanced care planning/counseling discussion ICD-10-CM: Z71.89  ICD-9-CM: V65.49  7/3/2018        Chronic back pain ICD-10-CM: M54.9, G89.29  ICD-9-CM: 724.5, 338.29  9/23/2015    Overview Signed 9/23/2015 12:00 PM by Love Burns NP     Nerve ablation 09/16/2015.              Hypothyroidism ICD-10-CM: E03.9  ICD-9-CM: 244.9  2/6/2013        Rheumatoid arthritis(714.0) ICD-10-CM: M06.9  ICD-9-CM: 714.0  2/6/2013        Carpal tunnel syndrome ICD-10-CM: G56.00  ICD-9-CM: 354.0  2/6/2013              Collette Huitron returns to the [place] [address] today for fibroscan assessment of hepatic fibrosis and for education and management of cirrhosis. The active problem list, all pertinent past medical history, medications, liver histology, endoscopic studies, radiologic findings and laboratory findings related to the liver disorder were reviewed with the patient. The patient is a 62 y.o.  female who was noted to have abnormalities in liver chemistries and subsequently tested positive for chronic HCV in 1990s. Risk factors for acquiring HCV are IV drug use in 1980s. There was no history of acute incteric hepatitis at the time of these risk factors. She was treated for HCV and remained SVR five years post treatment. She began treatment on 05/15/2014 with dual therapy of SOF/SIM and completed her 12 weeks of therapy on 08/05/2014. Ultrasound with shear wave elastography was performed in 10/2018. The ultrasound demonstrates a coarse heterogeneous echotexture. No focal mass. The shear wave elastography suggests that the cirrhosis has resolved. Today's fibroscan analysis indicates fatty liver and bridging fibrosis/cirrhosis, Metavir fibrosis score of F3/F4. A liver biopsy has not been performed. The patient was treated with standard interferon in 1997 and with standard interferon and ribavirin in 1999. Both treatments lasted for 24 weeks. The patient tolerated treatment reasonably well. HCV RNA levels obtained during treatment are not available at this time. The patient is unaware of the virologic response pattern. The most recent laboratory studies indicate that the liver transaminases are normal, alkaline phosphatase is normal, tests of hepatic synthetic and metabolic function are normal, and the platelet count is depressed.     The patient has no complaints which can be attributed to liver disease. The patient completes all daily activities without any functional limitations. The patient has not experienced fatigue, fevers, chills, shortness of breath, chest pain, pain in the right side over the liver, diffuse abdominal pain, nausea, vomiting, constipation, diarrhrea, dry eyes, dry mouth, arthralgias, myalgias, yellowing of the eyes or skin, itching, dark urine, problems concentrating, swelling of the abdomen, swelling of the lower extremities, hematemesis, or hematochezia. Since the last office appointment:  Has had no changes in her liver disease. Been diagnosed with an umbilical hernia. Had left shoulder biopsy for a suspicious growth. ALLERGIES  Allergies   Allergen Reactions    Adhesive Tape-Silicones Rash       MEDICATIONS:  Current Outpatient Medications   Medication Sig Dispense Refill    tiZANidine (ZANAFLEX) 2 mg tablet Take 1 Tab by mouth three (3) times daily as needed for Muscle Spasm(s). 90 Tab 5    XIIDRA 5 % dpet   0    CALCIUM PO Take 1 Tab by mouth.  vitamin E acetate (VITAMIN E PO) Take 1 Cap by mouth.  hydroCHLOROthiazide (HYDRODIURIL) 12.5 mg tablet Take 1 Tab by mouth daily. 90 Tab 1    levothyroxine (SYNTHROID) 125 mcg tablet take 1 tablet by mouth once daily BEFORE BREAKFAST 90 Tab 1    PROAIR HFA 90 mcg/actuation inhaler Take 2 Puffs by inhalation every six (6) hours as needed for Wheezing. 1 Inhaler 3    omeprazole (PRILOSEC) 20 mg capsule take 1 capsule by mouth once daily 90 Cap 1    guaiFENesin ER (MUCINEX) 600 mg ER tablet Take 2 Tabs by mouth two (2) times daily as needed for Congestion. 60 Tab 2    multivitamin (ONE A DAY) tablet Take 1 Tab by mouth daily.  diclofenac (VOLTAREN) 1 % gel apply 2 grams to affected area four times a day  0    zolpidem (AMBIEN) 10 mg tablet   0    benztropine (COGENTIN) 1 mg tablet Take 1 mg by mouth three (3) times daily.       ARIPiprazole (ABILIFY) 5 mg tablet Take 15 mg by mouth daily. REVIEW OF SYSTEMS:  Systems related to all organ systems were reviewed. All were negative except as noted above. FAMILY/SOCIAL HISTORY:  The patient is . The patient has 2 children, and 4 grandchildren. The patient used to smoke a little but stopped in 2005. She used to consumed alcohol in excess. The patient has been abstinent from alcohol since 5/2012. 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 The patient is currently receiving disability. PHYSICAL EXAMINATION:  Visit Vitals  BP (!) 140/87 (BP 1 Location: Left arm, BP Patient Position: Sitting)   Pulse 86   Temp 98.1 °F (36.7 °C)   Ht 5' 5\" (1.651 m)   Wt 203 lb (92.1 kg)   SpO2 97%   BMI 33.78 kg/m²     General: No acute distress. Eyes: Sclera anicteric. ENT: No oral lesions. Thyroid normal.  Nodes: No adenopathy. Skin: No spider angiomata. No jaundice. No palmar erythema. Respiratory: Lungs clear to auscultation. Cardiovascular: Regular heart rate. No murmurs. No JVD. Abdomen: Soft non-tender. Liver size normal to percussion/palpation. Spleen not palpable. No obvious ascites. Extremities: No lower extremity edema. No muscle wasting. No gross arthritic changes. Neurologic: Alert and oriented. Cranial nerves grossly intact. No asterixsis.     LABORATORY STUDIES:   Liver [place] of [address] Units 10/1/2020 6/30/2020   WBC 4.6 - 13.2 K/uL 4.7 4.4 (L)   ANC 1.8 - 8.0 K/UL 3.4 3.2   HGB 12.0 - 16.0 g/dL 14.0 14.3    - 420 K/uL 121 (L) 113 (L)   INR 0.8 - 1.2   1.0 1.1   AST 10 - 38 U/L 23 20   ALT 13 - 56 U/L 33 27   Alk Phos 45 - 117 U/L 65 68   Bili, Total 0.2 - 1.0 MG/DL 1.1 (H) 0.5   Bili, Direct 0.0 - 0.2 MG/DL 0.2 0.2   Albumin 3.4 - 5.0 g/dL 4.0 3.9   BUN 7.0 - 18 MG/DL 7 8   Creat 0.6 - 1.3 MG/DL 0.76 1.00   Na 136 - 145 mmol/L 137 134 (L)   K 3.5 - 5.5 mmol/L 3.6 3.5   Cl 100 - 111 mmol/L 103 102   CO2 21 - 32 mmol/L 29 26   Glucose 74 - 99 mg/dL 99 103 (H)                       Liver [place] [address] Ref Rng & Units 10/31/2019   WBC 4.6 - 13.2 K/uL 12.8   ANC 1.8 - 8.0 K/UL 11.6 (H)   HGB 12.0 - 16.0 g/dL 14.4    - 420 K/uL 141   INR 0.8 - 1.2   1.1   AST 10 - 38 U/L 13   ALT 13 - 56 U/L 23   Alk Phos 45 - 117 U/L 64   Bili, Total 0.2 - 1.0 MG/DL 0.5   Bili, Direct 0.0 - 0.2 MG/DL 0.2   Albumin 3.4 - 5.0 g/dL 4.3   BUN 7.0 - 18 MG/DL 11   Creat 0.6 - 1.3 MG/DL 0.85   Na 136 - 145 mmol/L 137   K 3.5 - 5.5 mmol/L 3.6   Cl 100 - 111 mmol/L 101   CO2 21 - 32 mmol/L 26   Glucose 74 - 99 mg/dL 111 (H)     Liver [place] [facility] Ref Rng & Units 4/11/2019 11/23/2018   WBC 4.6 - 13.2 K/uL 4.7 5.2   ANC 1.8 - 8.0 K/UL 3.1 3.2   HGB 12.0 - 16.0 g/dL 13.7 14.7    - 420 K/uL 103 (L) 108 (L)   INR 0.8 - 1.2   1.0    AST 15 - 37 U/L 26 25   ALT 13 - 56 U/L 34 33   Alk Phos 45 - 117 U/L 70 72   Bili, Total 0.2 - 1.0 MG/DL 0.5 0.8   Bili, Direct 0.0 - 0.2 MG/DL 0.2    Albumin 3.4 - 5.0 g/dL 4.0 4.0   BUN 7.0 - 18 MG/DL 7 11   Creat 0.6 - 1.3 MG/DL 0.78 0.89   Na 136 - 145 mmol/L 137 139   K 3.5 - 5.5 mmol/L 3.2 (L) 4.0   Cl 100 - 108 mmol/L 102 106   CO2 21 - 32 mmol/L 27 27   Glucose 74 - 99 mg/dL 102 (H) 103 (H)     Cancer Screening Latest Ref Rng & Units 10/31/2019 4/11/2019   AFP, Serum ng/mL 2.9 2.6   AFP-L3% % Comment Comment     SEROLOGIES:  Serologies Latest Ref Rng & Units 10/3/2016 5/10/2016   HCV RT-PCR, Quant IU/mL HCV Not Detected HCV Not Detected   HCV RT-PCR, Quant IU/mL       Serologies Latest Ref Rng 2/6/2013   Hep A Ab, Total Negative Positive (A)   Hep B Surface Ag Negative Negative   Hep B Core Ab, Total Negative Negative   Hep B Surface Ab 0.00 - 0.99 Index Value 0.52   Hep C Genotype See Note 1a   IL28B Genotype  CT genotype   Ferritin 13 - 150 ng/mL 464 (H)   Iron % Saturation 15 - 55 % 67 (H)     2/2013. HCV RNA Log 6.36 IU/ml. Eradicated. LIVER HISTOLOGY:  12/2016. Shear wave elastography. E Median is 11.66, suggestive of F3, bridging fibrosis. Wide E Std of 4.41 is suggestive of fatty liver disease. 10/2017. TRANSIENT HEPATIC ELASTOGRAPHY:  E Range: 7-12.0 kPa (previously 9.1-23.0), E Mean: 9.3 kPa (previously 13.05),   E Median: 8.6 kPa (recently 11.7), E Std: 2.0 kPa (previously 4.4)     10/2018. TRANSIENT HEPATIC ELASTOGRAPHY:   E Range: 7.18-10.40 kPa  E Mean: 8.80 kPa  E Median: 8.75 kPa  E Std: 1.16 kPa    10/2020. [facility]. EkPa was 12.4. IQR/med 22%. . The results suggested a fibrosis level of F3/F4. The CAP score suggests fatty liver. ENDOSCOPIC PROCEDURES:  09/2014. EGD by Dr. Maria C Ignacio. Normal esophagus. H.Pylori is negative. RADIOLOGY:  04/2014. Ultrasound of liver. Consistent with cirrhosis. No masses. 10/2014. Ultrasound of liver. Consistent with cirrhosis. No masses. 04/2015. Ultrasound of the liver. Consistent with cirrhosis. No mention of masses in the report. 10/2015. Ultrasound of the liver. Consistent with cirrhosis. No mention of masses in the report.  06/2016. Ultrasound of the liver. Heterogeneous appearing hepatic echotexture without mass. 12/2016. Ultrasound of the liver. Coarse heterogeneous echotexture. No focal mass. 10/2018. Ultrasound of the liver, performed with elastography. Persistent sonographic findings of hepatic cirrhosis. No focal hepatic mass. 04/2018. Ultrasound of the liver. The liver has a heterogeneous echotexture with a nodular contour, compatible with known cirrhosis. No focal hepatic lesions are evident. 10/2018. Ultrasound of the liver. Sonographic findings of hepatic cirrhosis and steatosis. No focal hepatic mass. 04/2019. Ultrasound of the liver. Coarsened hepatic echotexture and lobular surface contour noted as previously. No focal mass. 11/2019. Ultrasound f the liver.   Heterogeneous liver with lobular contour compatible with the history of cirrhosis. No suspicious liver masses are demonstrated. 06/2020. Ultrasound of the liver. Cirrhosis. No focal hepatic lesion. 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 The HCV has been treated and cured. Elastography, imaging, and labs suggest that the cirrhosis is resolving post eradication of the HCV. The most recent laboratory studies indicate that the liver transaminases are normal, alkaline phosphatase is normal,  tests of hepatic synthetic and metabolic function are depressed, and the platelet count is depressed. Complications of cirrhosis were discussed in detail. We discussed thrombocytopenia, portal hypertension, varices, GI bleeding, peripheral edema, ascites, hepatic encephalopathy, and hepatocellular carcinoma. We discussed the need for every 6 month liver imaging studies. Hepatic encephalopathy has not developed. Edema. Resolved. Diuretics have been discontinued. [facility] [address] screening. Ultrasound was recently performed and does not demonstrate any sign of developing HCC. Repeat imaging ordered today to be performed in 12/2020. The need to perform an assessment of liver fibrosis was discussed with the patient. Fibroscan can assess liver fibrosis and determine if a patient has advanced fibrosis or cirrhosis without the need for liver biopsy. This was performed in the office today and suggests a Metavir fibrosis score of F3/F4 with fatty liver. Fibroscan will be repeated annually in the office or as often as clinically indicated. HCV. The patient was previously treated for HCV with standard interferon and ribavirin in the 1990s. There was a non-response that was not clearly defined. She was retreated with SOF/SIM in [address] and cured. The patient was directed to continue all current medications at the current dosages except for the NSAIDs, which she should stop.   There are no contraindications for the patient to take any medications that are necessary for treatment of other medical issues. The patient was counseled regarding alcohol consumption. Vaccination for viral hepatitis A is not needed. The patient has serologic evidence of prior exposure or vaccination with immunity. [address] in 6 months.     FRAN Phillip-C  Liver [place] of [address]   [phone]

## 2020-10-02 LAB
AFP L3 MFR SERPL: NORMAL % (ref 0–9.9)
AFP SERPL-MCNC: 2.9 NG/ML (ref 0–8)

## 2020-10-06 ENCOUNTER — TELEPHONE (OUTPATIENT)
Dept: HEMATOLOGY | Age: 65
End: 2020-10-06

## 2020-10-08 ENCOUNTER — OFFICE VISIT (OUTPATIENT)
Dept: ORTHOPEDIC SURGERY | Age: 65
End: 2020-10-08
Payer: MEDICARE

## 2020-10-08 VITALS
WEIGHT: 205.2 LBS | DIASTOLIC BLOOD PRESSURE: 87 MMHG | TEMPERATURE: 98.1 F | BODY MASS INDEX: 34.19 KG/M2 | RESPIRATION RATE: 16 BRPM | HEART RATE: 78 BPM | HEIGHT: 65 IN | SYSTOLIC BLOOD PRESSURE: 127 MMHG | OXYGEN SATURATION: 95 %

## 2020-10-08 DIAGNOSIS — M54.59 MECHANICAL LOW BACK PAIN: ICD-10-CM

## 2020-10-08 DIAGNOSIS — M79.18 MYOFASCIAL PAIN: ICD-10-CM

## 2020-10-08 DIAGNOSIS — M79.18 CHRONIC PRIMARY MUSCULOSKELETAL PAIN: Primary | ICD-10-CM

## 2020-10-08 DIAGNOSIS — G89.29 CHRONIC PRIMARY MUSCULOSKELETAL PAIN: Primary | ICD-10-CM

## 2020-10-08 DIAGNOSIS — M79.18 CERVICAL MYOFASCIAL PAIN SYNDROME: ICD-10-CM

## 2020-10-08 PROCEDURE — G8417 CALC BMI ABV UP PARAM F/U: HCPCS | Performed by: PHYSICAL MEDICINE & REHABILITATION

## 2020-10-08 PROCEDURE — 3017F COLORECTAL CA SCREEN DOC REV: CPT | Performed by: PHYSICAL MEDICINE & REHABILITATION

## 2020-10-08 PROCEDURE — 99213 OFFICE O/P EST LOW 20 MIN: CPT | Performed by: PHYSICAL MEDICINE & REHABILITATION

## 2020-10-08 PROCEDURE — G8432 DEP SCR NOT DOC, RNG: HCPCS | Performed by: PHYSICAL MEDICINE & REHABILITATION

## 2020-10-08 PROCEDURE — G9899 SCRN MAM PERF RSLTS DOC: HCPCS | Performed by: PHYSICAL MEDICINE & REHABILITATION

## 2020-10-08 PROCEDURE — G8427 DOCREV CUR MEDS BY ELIG CLIN: HCPCS | Performed by: PHYSICAL MEDICINE & REHABILITATION

## 2020-10-08 RX ORDER — FLAXSEED OIL 1000 MG
1000 CAPSULE ORAL DAILY
COMMUNITY
End: 2021-09-01

## 2020-10-08 RX ORDER — ZINC GLUCONATE 10 MG
250 LOZENGE ORAL DAILY
COMMUNITY
End: 2021-09-01

## 2020-10-08 NOTE — PROGRESS NOTES
[address]  [address], [address]  [facility], [address]  Phone: [phone]  Fax: [phone]        Marta Miller  : 1955  PCP: MARIS Caruso  10/8/2020    PROGRESS NOTE      HISTORY OF PRESENT ILLNESS  Macho Spencer is a 62 y.o. female who was seen in 2016 with c/o chronic neck and low back pain. There was no radiologic evidence to explain her pain. She found significant relief with PT. She took Mobic PRN. She had a mechanical fall in which she injured her right shoulder. She was treated with a cortisone injection and PT. She returned 5/10/17 with c/o lumbar strain x 2 weeks after moving heavy tables. She found significant relief with PT and TPI. She had returned in 2018 after another exacerbation of cervical and lumbar pain. She remained active with walking and caring for her grandson. She found significant benefit with PT. Macho Spencer comes in to the office today for f/u. Pt notes that she has a hernia that the surgeon does not want to repair because it is caused by adipose tissue, so she has been focusing on weight loss through diet changes. She has been maintaining an HEP of walking. She occasionally has muscle spasms in her back, but she has been maintaining well. Pain Score: 3/10. PmHx: hypothyroidism, RA, Hep C    ASSESSMENT  This is a 62year-old female with history of chronic, episodic neck and low back pain. Her symptoms likely remain chronic primary musculoskeletal pain. PLAN  1. Maintain HEP. 2. I advised a theracane may be beneficial.      Pt will f/u in 6 months or sooner as needed. Diagnoses and all orders for this visit:    1. Chronic primary musculoskeletal pain    2. Mechanical low back pain    3.  Myofascial pain    4. Cervical myofascial pain syndrome         PAST MEDICAL HISTORY   Past Medical History:   Diagnosis Date    Abnormal Papanicolaou smear of cervix      HPV    Asthma     Bipolar 1 disorder ([address]     Bipolar 1 disorder, depressed ([address]     Bursitis     Carpal tunnel syndrome     Cirrhosis, hepatitis C     Connective tissue disease ([address]     DDD (degenerative disc disease), lumbosacral     Gastric ulcer     Hashimoto's disease     Hashimoto's disease     Hepatitis C     Herniated intervertebral disc of lumbar spine     Hypermobility syndrome     Liver disease     Menopause     Osteoarthritis     Plantar fasciitis, bilateral     RA (rheumatoid arthritis) ([address]        Past Surgical History:   Procedure Laterality Date    COLONOSCOPY N/A 2/9/2017     Colonoscopy w/polyp bxs x3 performed by Avelina Martell MD at [facility] ENDOSCOPY    HX APPENDECTOMY      HX DILATION AND CURETTAGE  1988    cryso    HX GYN      BTL    HX HEENT      malofacial surgery   . MEDICATIONS    Current Outpatient Medications   Medication Sig Dispense Refill    tiZANidine (ZANAFLEX) 2 mg tablet Take 1 Tab by mouth three (3) times daily as needed for Muscle Spasm(s). 90 Tab 5    CALCIUM PO Take 1 Tab by mouth.  vitamin E acetate (VITAMIN E PO) Take 1 Cap by mouth.  hydroCHLOROthiazide (HYDRODIURIL) 12.5 mg tablet Take 1 Tab by mouth daily. 90 Tab 1    levothyroxine (SYNTHROID) 125 mcg tablet take 1 tablet by mouth once daily BEFORE BREAKFAST 90 Tab 1    PROAIR HFA 90 mcg/actuation inhaler Take 2 Puffs by inhalation every six (6) hours as needed for Wheezing. 1 Inhaler 3    omeprazole (PRILOSEC) 20 mg capsule take 1 capsule by mouth once daily 90 Cap 1    guaiFENesin ER (MUCINEX) 600 mg ER tablet Take 2 Tabs by mouth two (2) times daily as needed for Congestion. 60 Tab 2    multivitamin (ONE A DAY) tablet Take 1 Tab by mouth daily.  diclofenac (VOLTAREN) 1 % gel apply 2 grams to affected area four times a day  0    zolpidem (AMBIEN) 10 mg tablet   0    benztropine (COGENTIN) 1 mg tablet Take 1 mg by mouth three (3) times daily.       ARIPiprazole (ABILIFY) 5 mg tablet Take 15 mg by mouth daily. ALLERGIES  Allergies   Allergen Reactions    Adhesive Tape-Silicones Rash          SOCIAL HISTORY    Social History     Socioeconomic History    Marital status: SINGLE     Spouse name: Not on file    Number of children: Not on file    Years of education: Not on file    Highest education level: Not on file   Tobacco Use    Smoking status: Former Smoker     Last attempt to quit: 7/1/2005     Years since quitting: 15.2    Smokeless tobacco: Never Used   Substance and Sexual Activity    Alcohol use: No     Alcohol/week: 0.0 standard drinks    Drug use: Not Currently     Types: Marijuana    Sexual activity: Never       FAMILY HISTORY  Family History   Problem Relation Age of Onset    No Known Problems Mother     No Known Problems Father          REVIEW OF SYSTEMS  Review of Systems   Musculoskeletal: Positive for back pain, myalgias and neck pain. PHYSICAL EXAMINATION  Visit Vitals  /87 (BP 1 Location: Right arm, BP Patient Position: Sitting)   Pulse 78   Temp 98.1 °F (36.7 °C)   Resp 16   Ht 5' 5\" (1.651 m)   Wt 205 lb 3.2 oz (93.1 kg)   SpO2 95%   BMI 34.15 kg/m²       Pain Assessment  10/8/2020   Location of Pain Back;Neck   Location Modifiers -   Severity of Pain 3   Quality of Pain Dull   Quality of Pain Comment spasms   Duration of Pain Persistent   Frequency of Pain Constant   Aggravating Factors Bending   Aggravating Factors Comment -   Limiting Behavior Some   Relieving Factors Heat;Other (Comment)   Relieving Factors Comment voltaren gel, muscle relaxants, lidocaine patches   Result of Injury No           Constitutional:  Well developed, well nourished, in no acute distress. Psychiatric: Affect and mood are appropriate. Integumentary: No rashes or abrasions noted on exposed areas. SPINE/MUSCULOSKELETAL EXAM    Cervical spine:  Neck is midline. Normal muscle tone. No focal atrophy is noted. ROM pain free. Shoulder ROM intact.    Mild tenderness to palpation, R>L. Negative Spurling's sign. Negative Tinel's sign. Negative Dillon's sign.       Sensation in the bilateral arms grossly intact to light touch.       Lumbar spine:  No rash, ecchymosis, or gross obliquity. No fasciculations. No focal atrophy is noted. No pain with hip ROM. Full range of motion. Diffuse tenderness to palpation, L>R. No tenderness to palpation at the sciatic notch. SI joints non-tender. Trochanters non tender.      Sensation in the bilateral legs grossly intact to light touch.     Updates from 05/10/17:  Bilateral SI joints tender. Negative right PORTILLO test.  Negative right p4 test.  Negative bilateral straight leg raise. Tenderness to palpation in the lumbar region. MOTOR:      Biceps  Triceps Deltoids Wrist Ext Wrist Flex Hand Intrin   Right 5/5 5/5 5/5 5/5 5/5 5/5   Left 5/5 5/5 5/5 5/5 5/5 5/5             Hip Flex  Quads Hamstrings Ankle DF EHL Ankle PF   Right 5/5 5/5 5/5 5/5 5/5 5/5   Left 5/5 5/5 5/5 5/5 5/5 5/5     DTRs are 2+ biceps, triceps, brachioradialis, patella, and Achilles.      Negative Straight Leg raise. Squat not tested. No difficulty with tandem gait.       Ambulation without assistive device. FWB.       RADIOGRAPHS  2V Cervical XR images taken on 12/17/18 personally reviewed with patient:  Straightening of the cervical lordosis  Normal alignment. Mild facet sclerosis. No obvious compression fractures or instabilities.     2V Lumbar XR images taken on 12/17/18 personally reviewed with patient:  Facet sclerosis  Disc space narrowing at L4-5 and L5-S1  Normal alignment  No obvious compression fractures or instabilities. Lumbar MRI images taken on 6/27/16 personally reviewed with patient:  Five lumbar vertebrae are present.  Somewhat hypolordotic sagittal  alignment. Disc desiccation from L3-4 through L5-S1 levels.  Mild L3-4 and L5-S1 disc  space narrowing.  Moderate L4-5 disc space narrowing.   Edema associate with the anterior superior endplate of L5 may suggest  relative acuity.  No marrow replacing process. Mild multilevel facet osteoarthritis. The conus medullaris is at the L1-2 level.  There is normal signal  throughout the spinal cord. L1-2:No posterior disc bulge.  No foraminal or central canal stenosis. L2-3:No posterior disc bulge.  Mild hypertrophy ligamentum flavum.  Mild  facet osteoarthritis.  Mild foraminal stenosis.  No central canal stenosis. L3-4:Minimal broad based posterior disc bulge.  Mild hypertrophy  ligamentum flavum.  Mild facet osteoarthritis.  Mild foraminal stenosis. No central canal stenosis. Page 1 of 2  Performing Location:[facility]  [id number]             6/27/2016 8:53:30 AM  L4-5:Small broad based posterior disc bulge.  Moderate hypertrophy  ligamentum flavum and moderate facet osteoarthritis.  Mild foraminal  stenosis.  No central canal stenosis. L5-S1:Minimal broad based posterior disc bulge.  Mild hypertrophy  ligamentum flavum and mild facet osteoarthritis.  Moderate left and mild  right foraminal stenosis.  No central canal stenosis. IMPRESSION  Overall mild degenerative changes.  Edema associated with superior endplate  of L5 may suggest relative acuity.  Mild to moderate foraminal stenosis in  the lower lumbar spine. 15 minutes of face-to-face contact were spent with the patient during today's visit extensively discussing symptoms and treatment plan. All questions were answered. More than half of this visit today was spent on counseling.      Written by Macey Buchanan as dictated by Janina Duong MD

## 2020-11-19 ENCOUNTER — OFFICE VISIT (OUTPATIENT)
Dept: ORTHOPEDIC SURGERY | Age: 65
End: 2020-11-19
Payer: MEDICARE

## 2020-11-19 VITALS
WEIGHT: 200 LBS | HEIGHT: 65 IN | DIASTOLIC BLOOD PRESSURE: 82 MMHG | SYSTOLIC BLOOD PRESSURE: 133 MMHG | BODY MASS INDEX: 33.32 KG/M2 | TEMPERATURE: 97.8 F | OXYGEN SATURATION: 98 % | HEART RATE: 83 BPM | RESPIRATION RATE: 11 BRPM

## 2020-11-19 DIAGNOSIS — M79.18 CHRONIC PRIMARY MUSCULOSKELETAL PAIN: Primary | ICD-10-CM

## 2020-11-19 DIAGNOSIS — M79.18 MYOFASCIAL PAIN: ICD-10-CM

## 2020-11-19 DIAGNOSIS — M54.59 MECHANICAL LOW BACK PAIN: ICD-10-CM

## 2020-11-19 DIAGNOSIS — G89.29 CHRONIC PRIMARY MUSCULOSKELETAL PAIN: Primary | ICD-10-CM

## 2020-11-19 PROCEDURE — G8432 DEP SCR NOT DOC, RNG: HCPCS | Performed by: PHYSICAL MEDICINE & REHABILITATION

## 2020-11-19 PROCEDURE — 3017F COLORECTAL CA SCREEN DOC REV: CPT | Performed by: PHYSICAL MEDICINE & REHABILITATION

## 2020-11-19 PROCEDURE — G8417 CALC BMI ABV UP PARAM F/U: HCPCS | Performed by: PHYSICAL MEDICINE & REHABILITATION

## 2020-11-19 PROCEDURE — G8427 DOCREV CUR MEDS BY ELIG CLIN: HCPCS | Performed by: PHYSICAL MEDICINE & REHABILITATION

## 2020-11-19 PROCEDURE — G9899 SCRN MAM PERF RSLTS DOC: HCPCS | Performed by: PHYSICAL MEDICINE & REHABILITATION

## 2020-11-19 PROCEDURE — 99213 OFFICE O/P EST LOW 20 MIN: CPT | Performed by: PHYSICAL MEDICINE & REHABILITATION

## 2020-11-19 RX ORDER — PREDNISONE 10 MG/1
TABLET ORAL
Qty: 21 TAB | Refills: 0 | Status: SHIPPED | OUTPATIENT
Start: 2020-11-19 | End: 2020-12-17 | Stop reason: ALTCHOICE

## 2020-11-19 NOTE — PROGRESS NOTES
Fito Macias presents today for   Chief Complaint   Patient presents with    Back Pain       Is someone accompanying this pt? no    Is the patient using any DME equipment during OV? no    Depression Screening:  3 most recent PHQ Screens 10/1/2020   Little interest or pleasure in doing things Not at all   Feeling down, depressed, irritable, or hopeless Not at all   Total Score PHQ 2 0       Learning Assessment:  Learning Assessment 4/2/2018   PRIMARY LEARNER Patient   HIGHEST LEVEL OF EDUCATION - PRIMARY LEARNER  GRADUATED HIGH SCHOOL OR GED   BARRIERS PRIMARY LEARNER VISUAL   CO-LEARNER CAREGIVER -   PRIMARY LANGUAGE ENGLISH   LEARNER PREFERENCE PRIMARY DEMONSTRATION   ANSWERED BY patient   RELATIONSHIP SELF       Abuse Screening:  Abuse Screening Questionnaire 7/3/2018   Do you ever feel afraid of your partner? N   Are you in a relationship with someone who physically or mentally threatens you? N   Is it safe for you to go home? Y         Coordination of Care:  1. Have you been to the ER, urgent care clinic since your last visit? no  Hospitalized since your last visit? no    2. Have you seen or consulted any other health care providers outside of the [address] [place] [address] since your last visit? Yes, pcp Include any pap smears or colon screening.  no

## 2020-11-19 NOTE — PROGRESS NOTES
[address]  [address], [address]  [place], [address] [place] [address] Street  Phone: [phone]  Fax: [phone]        Sandra Farooq  : 1955  PCP: MARIS Bautista  2020    PROGRESS NOTE      HISTORY OF PRESENT ILLNESS  Yancy Sin is a 60 y.o. female who was seen in 2016 with c/o chronic neck and low back pain. There was no radiologic evidence to explain her pain. She found significant relief with PT. She took Mobic PRN. She had a mechanical fall in which she injured her right shoulder. She was treated with a cortisone injection and PT. She returned 5/10/17 with c/o lumbar strain x 2 weeks after moving heavy tables. She found significant relief with PT and TPI. She had returned in 2018 after another exacerbation of cervical and lumbar pain. She remained active with walking and caring for her grandson. She found significant benefit with PT. She returned 10/2020. Pt noted that she has a hernia that the surgeon does not want to repair because it is caused by adipose tissue, so she has been focusing on weight loss through diet changes. She has been maintaining an HEP of walking. She occasionally had muscle spasms in her back, but she was maintaining well. Yancy Sin comes in to the office today for f/u. SInce her last OV, she has had a few flare ups of her low back and buttock pain to where the pain was a 10/10 and she almost went to the ER. She previously did well with PT with DN and was able to maintain with relatively no pain for 2 years, so she is interested in proceeding with PT again. Pain Score: 6/10. PmHx: hypothyroidism, RA, Hep C    ASSESSMENT  This is a 62year-old female with history of chronic, episodic neck and low back pain. Her symptoms likely remain chronic primary musculoskeletal pain. PLAN  1. Referral to PT with [facility])  2. 10 mg Prednisone dose pack.      Pt will f/u in 6-8 weeks or sooner as needed. Diagnoses and all orders for this visit:    1. Chronic primary musculoskeletal pain  -     REFERRAL TO PHYSICAL THERAPY  -     predniSONE (STERAPRED DS) 10 mg dose pack; See administration instruction per 10mg dose pack    2. Mechanical low back pain  -     REFERRAL TO PHYSICAL THERAPY  -     predniSONE (STERAPRED DS) 10 mg dose pack; See administration instruction per 10mg dose pack    3. Myofascial pain  -     REFERRAL TO PHYSICAL THERAPY  -     predniSONE (STERAPRED DS) 10 mg dose pack; See administration instruction per 10mg dose pack         PAST MEDICAL HISTORY   Past Medical History:   Diagnosis Date    Abnormal Papanicolaou smear of cervix     1988 HPV    Asthma     [facility]     Bipolar 1 disorder, depressed (HCC)     Bursitis     Carpal tunnel syndrome     Cirrhosis, hepatitis C     Connective tissue disease ([facility]     DDD (degenerative disc disease), lumbosacral     Gastric ulcer     Hashimoto's disease     Hashimoto's disease     Hepatitis C     Herniated intervertebral disc of lumbar spine     Hypermobility syndrome     Liver disease     Menopause     Osteoarthritis     Plantar fasciitis, bilateral     RA (rheumatoid arthritis) ([facility] [address] [facility]        Past Surgical History:   Procedure Laterality Date    COLONOSCOPY N/A 2/9/2017     Colonoscopy w/polyp bxs x3 performed by Sergio Vallejo [facility] at [facility] ENDOSCOPY    HX APPENDECTOMY      HX DILATION AND CURETTAGE  1988    cryso    HX GYN      BTL    HX HEENT      malofacial surgery   . MEDICATIONS      Current Outpatient Medications   Medication Sig Dispense Refill    flaxseed oil 1,000 mg cap Take 1,000 mg by mouth daily.  magnesium 250 mg tab Take 250 mg by mouth daily.  CYANOCOBALAMIN, VITAMIN B-12, Take 500 mcg by mouth daily.  tiZANidine (ZANAFLEX) 2 mg tablet Take 1 Tab by mouth three (3) times daily as needed for Muscle Spasm(s). 90 Tab 5    CALCIUM PO Take 1 Tab by mouth daily.       vitamin E acetate (VITAMIN E PO) Take 1 Cap by mouth.  hydroCHLOROthiazide (HYDRODIURIL) 12.5 mg tablet Take 1 Tab by mouth daily. 90 Tab 1    levothyroxine (SYNTHROID) 125 mcg tablet take 1 tablet by mouth once daily BEFORE BREAKFAST 90 Tab 1    PROAIR HFA 90 mcg/actuation inhaler Take 2 Puffs by inhalation every six (6) hours as needed for Wheezing. 1 Inhaler 3    omeprazole (PRILOSEC) 20 mg capsule take 1 capsule by mouth once daily 90 Cap 1    guaiFENesin ER (MUCINEX) 600 mg ER tablet Take 2 Tabs by mouth two (2) times daily as needed for Congestion. 60 Tab 2    multivitamin (ONE A DAY) tablet Take 1 Tab by mouth daily.  diclofenac (VOLTAREN) 1 % gel apply 2 grams to affected area four times a day  0    zolpidem (AMBIEN) 10 mg tablet Take 10 mg by mouth nightly as needed. 0    benztropine (COGENTIN) 1 mg tablet Take 1 mg by mouth three (3) times daily.  ARIPiprazole (ABILIFY) 5 mg tablet Take 15 mg by mouth daily. ALLERGIES    Allergies   Allergen Reactions    Latex Rash    Adhesive Tape-Silicones Rash          SOCIAL HISTORY    Social History     Socioeconomic History    Marital status: SINGLE     Spouse name: Not on file    Number of children: Not on file    Years of education: Not on file    Highest education level: Not on file   Tobacco Use    Smoking status: Former Smoker     Last attempt to quit: 7/1/2005     Years since quitting: 15.3    Smokeless tobacco: Never Used   Substance and Sexual Activity    Alcohol use: No     Alcohol/week: 0.0 standard drinks    Drug use: Not Currently     Types: Marijuana    Sexual activity: Never       FAMILY HISTORY    Family History   Problem Relation Age of Onset    No Known Problems Mother     No Known Problems Father          REVIEW OF SYSTEMS  Review of Systems   Constitutional: Negative for chills, fever and weight loss. Respiratory: Negative for shortness of breath. Cardiovascular: Negative for chest pain.    Gastrointestinal: Negative for constipation. Negative for fecal incontinence    Genitourinary: Negative for dysuria. Negative for urinary incontinence   Musculoskeletal: Positive for back pain, myalgias and neck pain. Skin: Negative for rash. Neurological: Negative for dizziness, tingling, tremors, focal weakness and headaches. Endo/Heme/Allergies: Does not bruise/bleed easily. Psychiatric/Behavioral: The patient does not have insomnia. PHYSICAL EXAMINATION  Visit Vitals  /82 (BP 1 Location: Left arm, BP Patient Position: Sitting)   Pulse 83   Temp 97.8 °F (36.6 °C) (Skin)   Resp 11   Ht 5' 5\" (1.651 m)   Wt 200 lb (90.7 kg)   SpO2 98% Comment: RA   BMI 33.28 kg/m²       Pain Assessment  11/19/2020   Location of Pain Back   Location Modifiers -   Severity of Pain 6   Quality of Pain Aching; Throbbing   Quality of Pain Comment -   Duration of Pain Persistent   Frequency of Pain Constant   Aggravating Factors Stairs; Other (Comment)   Aggravating Factors Comment lifting   Limiting Behavior Yes   Relieving Factors Heat;Other (Comment)   Relieving Factors Comment muscle relaxers as needed   Result of Injury No           Constitutional:  Well developed, well nourished, in no acute distress. Psychiatric: Affect and mood are appropriate. Integumentary: No rashes or abrasions noted on exposed areas. SPINE/MUSCULOSKELETAL EXAM       Cervical spine:  Neck is midline. Normal muscle tone. No focal atrophy is noted. ROM pain free. Shoulder ROM intact. Mild tenderness to palpation, R>L. Negative Spurling's sign. Negative Tinel's sign. Negative Dillon's sign.       Sensation in the bilateral arms grossly intact to light touch.       Lumbar spine:  No rash, ecchymosis, or gross obliquity. No fasciculations. No focal atrophy is noted. No pain with hip ROM. Full range of motion. Diffuse tenderness to palpation, L>R. No tenderness to palpation at the sciatic notch.    SI joints non-tender. Trochanters non tender.      Sensation in the bilateral legs grossly intact to light touch.     Updates from 05/10/17:  Bilateral SI joints tender. Negative right PORTILLO test.  Negative right p4 test.  Negative bilateral straight leg raise. Tenderness to palpation in the lumbar region. Updates 11/19/2020:  Tenderness to palpation of lumbar paraspinals, buttocks  Pain with lumbar flexion = extension    MOTOR:      Biceps  Triceps Deltoids Wrist Ext Wrist Flex Hand Intrin   Right 5/5 5/5 5/5 5/5 5/5 5/5   Left 5/5 5/5 5/5 5/5 5/5 5/5             Hip Flex  Quads Hamstrings Ankle DF EHL Ankle PF   Right 5/5 5/5 5/5 5/5 5/5 5/5   Left 5/5 5/5 5/5 5/5 5/5 5/5     DTRs are 2+ biceps, triceps, brachioradialis, patella, and Achilles.      Negative Straight Leg raise. Squat not tested. No difficulty with tandem gait.       Ambulation without assistive device. FWB.       RADIOGRAPHS  2V Cervical XR images taken on 12/17/18 personally reviewed with patient:  Straightening of the cervical lordosis  Normal alignment. Mild facet sclerosis. No obvious compression fractures or instabilities.     2V Lumbar XR images taken on 12/17/18 personally reviewed with patient:  Facet sclerosis  Disc space narrowing at L4-5 and L5-S1  Normal alignment  No obvious compression fractures or instabilities. Lumbar MRI images taken on 6/27/16 personally reviewed with patient:  Five lumbar vertebrae are present.  Somewhat hypolordotic sagittal  alignment. Disc desiccation from L3-4 through L5-S1 levels.  Mild L3-4 and L5-S1 disc  space narrowing.  Moderate L4-5 disc space narrowing. Edema associate with the anterior superior endplate of L5 may suggest  relative acuity.  No marrow replacing process. Mild multilevel facet osteoarthritis. The conus medullaris is at the L1-2 level.  There is normal signal  throughout the spinal cord. L1-2:No posterior disc bulge.  No foraminal or central canal stenosis.   L2-3:No posterior disc bulge.  Mild hypertrophy ligamentum flavum.  Mild  facet osteoarthritis.  Mild foraminal stenosis.  No central canal stenosis. L3-4:Minimal broad based posterior disc bulge.  Mild hypertrophy  ligamentum flavum.  Mild facet osteoarthritis.  Mild foraminal stenosis. No central canal stenosis. Page 1 of 2  Performing Location:[facility]  [id number]             6/27/2016 8:53:30 AM  L4-5:Small broad based posterior disc bulge.  Moderate hypertrophy  ligamentum flavum and moderate facet osteoarthritis.  Mild foraminal  stenosis.  No central canal stenosis. L5-S1:Minimal broad based posterior disc bulge.  Mild hypertrophy  ligamentum flavum and mild facet osteoarthritis.  Moderate left and mild  right foraminal stenosis.  No central canal stenosis. IMPRESSION  Overall mild degenerative changes.  Edema associated with superior endplate  of L5 may suggest relative acuity.  Mild to moderate foraminal stenosis in  the lower lumbar spine. 10 minutes of face-to-face contact were spent with the patient during today's visit extensively discussing symptoms and treatment plan. All questions were answered. More than half of this visit today was spent on counseling.      Written by Meg Oakes as dictated by Jenna Kebede MD

## 2020-11-23 ENCOUNTER — HOSPITAL ENCOUNTER (OUTPATIENT)
Dept: PHYSICAL THERAPY | Age: 65
Discharge: HOME | End: 2020-11-23
Payer: MEDICARE

## 2020-11-23 PROCEDURE — 97112 NEUROMUSCULAR REEDUCATION: CPT

## 2020-11-23 PROCEDURE — 97162 PT EVAL MOD COMPLEX 30 MIN: CPT

## 2020-11-23 PROCEDURE — 97530 THERAPEUTIC ACTIVITIES: CPT

## 2020-11-23 NOTE — PROGRESS NOTES
PT DAILY TREATMENT NOTE/LUMBAR EVAL     Patient Name: Silas Morales  Date:2020  : 1955  [x]  Patient  Verified  Payor: [facility] MEDICARE / Plan: [facility] COMMUNITY PLAN MCR / Product Type: Managed Care Medicare /    In time:1105  Out time:1205  Total Treatment Time (min): 60  Visit #: 1 of 12    Medicare/BCBS Only   Total Timed Codes (min): 40  1:1 Treatment Time:  60     Treatment Area: Low back pain [M54.5]  Myalgia, other site [M79.18]  SUBJECTIVE  Pain Level (0-10 scale): 4-5  []constant []intermittent []improving []worsening []no change since onset    Any medication changes, allergies to medications, adverse drug reactions, diagnosis change, or new procedure performed?: [x] No    [] Yes (see summary sheet for update)  Subjective functional status/changes:Reports chronic LBP however exacerbation of LBP after being chased by a dog about 3 months ago. Reports previous PT at [facility] with good success using DN. Currently patient is reporting LBP/bilateral buttock pain ranging 3-10/10 with most pain bending over affecting her ability to move and walk. Since steroid injection in  she has been feeling better. Also reporting left knee pain and pending MRI in 2020. Patient reports buttock pain however denies any LE pain/paresthesia. 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 Also recently diagnosed with umbilical hernia.     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, material handling  Mechanism of Injury: LB strain  Current symptoms/Complaints: LBP/buttock pain  Previous Treatment/Compliance: PT/DN  PMHx/Surgical Hx: none  Work Hx: not working due to disability since   Living Situation: 2 level home, difficulty with stairs due to LBP/knee pain  Pt Goals: get core strength, relieve back pain, get some DN done and return to walking at [facility]  Barriers: []pain []financial []time []transportation []other  Motivation: good  Substance use: []Alcohol []Tobacco []other:   FABQ Score: []low []elevate  Cognition: A & O x 3    Other:    OBJECTIVE/EXAMINATION  Domestic Life: WNL  Activity/Recreational Limitations: limited tolerance to recreational walking  Mobility: WFL  Self Care: independent            20 min [x]Eval                  []Re-Eval         15 min Therapeutic Activity:  [x]  See flow sheet :instruction in HEP/POC   Rationale: patient has good understanding of HEP/POC  to improve the patients ability to improve the patient compliance     25 min Neuromuscular Re-education:  [x]  See flow sheet :   Rationale: improve coordination, improve balance and increase proprioception  to improve postural alignment                With   [] TE   [] TA   [] neuro   [] other: Patient Education: [x] Review HEP    [] Progressed/Changed HEP based on:   [] positioning   [] body mechanics   [] transfers   [] heat/ice application    [] other:      Other Objective/Functional Measures: as per eval    Physical Therapy Evaluation - Lumbar Spine ([facility])    SUBJECTIVE  Chief Complaint:LBP    Mechanism of injury:chronic pain, recent strain after being chased by a dog    Symptoms:  Aggravated by:   [x] Bending [x] Sitting/>25 min [x] Standing/>20-30 min [x] Walking limited due to left knee pain   [] Moving [] Cough [] Sneeze [] Valsalva   [] AM  [x] PM  Lying:  [] sup   [] pro   [] sidelying   [] Other:     Eased by:    [] Bending [] Sitting [] Standing [] Walking   [] Moving [] AM  [] PM  Lying: [x] sup  [] pro  [] sidelying   [x] Other:MH, pain patches, stretching and meds as needed     General Health:  Red Flags Indicated? [] Yes    [x] No  [] Yes [] No Recent weight change (If yes, due to dieting?  [] Yes  [] No)   [] Yes [] No Weakness in legs during walking  [] Yes [] No Unremitting pain at night  [] Yes [] No Abdominal pain or problems  [] Yes [] No Rectal bleeding  [] Yes [] No Feet more cold or painful in cold weather  [] Yes [] No Menstrual irregularities  [] Yes [] No Blood or pain with urination  [] Yes [] No Dysfunction of bowel or bladder  [] Yes [] No Recent illness within past 3 weeks (i.e, cold, flu)  [] Yes [] No Numbness/tingling in buttock/genitalia region    Past History/Treatments:     Diagnostic Tests: [] Lab work [x] X-rays    [] CT [] MRI     [] Other:  Results:9/2020 arthritis    Functional Status  Prior level of function:regular walking for ex  Present functional limitations:reduction in activity level  What position do you sleep in?:supine    OBJECTIVE  Posture:right shoulder elevated  Lateral Shift: [] R    [] L     [] +  [] -  Kyphosis: [] Increased [] Decreased   [x]  WNL  Lordosis:  [] Increased [] Decreased   [x] WNL however unable to completely reverse LS lordosis  Pelvic symmetry: [x] WNL    [] Other:    Gait:  [] Normal     [] Abnormal:    Active Movements: [] N/A   [] Too acute   [] Other:  ROM % AROM % PROM Comments:pain, area   Forward flexion 40-60 4 in  Mild LB strain   Extension 20-30 50%     SB right 20-30 20 in  LB tightness   SB left 20-30 22 in  LB tightness   Rotation right 5-10 15 in     Rotation left 5-10 14 in     Left hip ROM: pain with end range hip IR and ABD  Repeated Movements   Effects on present pain: produces (MT), abolishes (A), increases (incr), decreases (decr), centralizes (C), peripheral (PH), no effect (NE)   Pre-Test Sx Flexion Repeated Flexion Extension Repeated Extension Repeated SBL Repeated SBR   Sitting          Standing          Lying      N/A N/A       Neuro Screen [x] WNL      Dural Mobility:  SLR Sitting: [] R    [] L    [] +    [x] -  @ (degrees):           Supine: [] R    [] L    [] +    [x] -  @ (degrees):       Palpation  [] Min  [x] Mod  [] Severe    Location:L5/S1, PSIS, buttock  [] Min  [x] Mod  [] Severe    Location:left greater trochanter, lateral hip      Strength   L(0-5) R (0-5) N/T   Hip Flexion (L1,2) 4 4 []   Knee Extension (L3,4) 4 4 []   Ankle Dorsiflexion (L4)   []   Great Toe Extension (L5)   []   Ankle Plantarflexion (S1)   []   Knee Flexion (S1,2) 4 4 []   Upper Abdominals 3 3 []   Lower Abdominals 3 3 []   Paraspinals   []   Back Rotators   []   Gluteus Bakari 4- 4- []   Other   []     Special Tests  Lumbar:  Lumb.  Compression: [] Pos  [] Neg               Lumbar Distraction:   [] Pos  [] Neg    Quadrant:  [] Pos  [] Neg   [] Flex  [] Ext    Sacroilliac:  Gaenslen's: [] R    [] L    [] +    [] -     Compression: [] +    [] -     Gapping:  [] +    [] -     Thigh Thrust: [] R    [] L    [] +    [] -     Leg Length: [] +    [] -   Position:    Crests:    ASIS:    PSIS:    Sacral Sulcus:    Mobility: Standing flex:     Sitting flex:     Supine to sit:     Prone knee bend:         Hip: Sveta Gulling:  [] R    [] L    [] +    [] -     Scour:  [] R    [] L    [] +    [] -     Piriformis: [] R    [] L    [] +    [] -          Deficits: Daniele's: [x] R    [x] L    [x] +    [] -     Henrique: [x] R    [x] L    [] +    [x] -     Hamstrings 90/90:90/90         Global Muscular Weakness:  Abdominals:weak  Quadratus Lumborum:tight  Paraspinals:LS tight  Other:    Other tests/comments:frequent hamstring cramping, LB spasms  JESS Special Tests (pre/post)  HGIR:                                                 Right: 90/             Left: 90/  Shoulder Flexion   Right: 160/             Left: 160/  Hip Add Drop Test:                           Right: +/            Left:+/, both slightly positive with ADD past midling  Trunk Rot                                           Right: 15/nt    Left 14 /nt  Shoulder Horizontal Abduction          Right: nt /nt             Left: nt /nt       Extension Drop                                   Right : neg          Left: neg         Pain Level (0-10 scale) post treatment: 3    ASSESSMENT/Changes in Function: good tolerance to all activities, challenged with prolonged exhalation during balloon breathing    Patient will continue to benefit from skilled PT services to address ROM deficits, address strength deficits and analyze and cue movement patterns to attain remaining goals.      [x]  See Plan of Care  []  See progress note/recertification  []  See Discharge Summary         Progress towards goals / Updated goals:  Good understanding of initial HEP and POC    PLAN  []  Upgrade activities as tolerated     [x]  Continue plan of care  []  Update interventions per flow sheet       []  Discharge due to:_  []  Other:_      Erika Romeo, PT 11/23/2020  11:06 AM    9

## 2020-11-23 NOTE — PROGRESS NOTES
[facility] at the [address], [place] An[place] simms, [address] [facility]  Phone: [phone]      Fax:  [phone]       Plan of Care/ Statement of Necessity for Physical Therapy Services      Patient name: Kera Henry Start of Care: 2020   Referral source: Frannie Liu MD : 1955    Medical Diagnosis: Low back pain [M54.5]  Myalgia, other site [M79.18]   Onset Date:3 months ago exacerbation of chronic LBP    Treatment Diagnosis: LBP   Prior Hospitalization: see medical history Provider#: [id number]   Medications: Verified on Patient summary List    Comorbidities: fibromyalgia, chronic LBP, neuropathy, depression, disability, PTSD   Prior Level of Function: recreational walking and ADL with mild chronic LBP      The Plan of Care and following information is based on the information from the initial evaluation. Assessment/ key information: 60 YOF presenting to PT with increased LBP since being chased by a dog about 3 months ago with pain ranging from 3-10/10. High pain levels are occasionally triggered by bending over. Objective findings include core/LE strength deficit, mild rectus diastasis, limitation in spinal ROM lorraine Flexion, limited overall function as per FOTO score of 53/100 and inability to perform recreational walking. SLR testing negative. Patient denies LE pain or paresthesia except bilateral foot symptoms due to neuropathy. Negative for red flags. Patient is a good candidate for skilled PT to address deficits in function. Evaluation Complexity History MEDIUM  Complexity : 1-2 comorbidities / personal factors will impact the outcome/ POC ; Examination MEDIUM Complexity : 3 Standardized tests and measures addressing body structure, function, activity limitation and / or participation in recreation  ;Presentation MEDIUM Complexity : Evolving with changing characteristics  ; Clinical Decision Making MEDIUM Complexity : FOTO score of 26-74  Overall Complexity Rating: MEDIUM  Problem List: decrease ROM, decrease strength, decrease ADL/ functional abilitiies and decrease activity tolerance   Treatment Plan may include any combination of the following: Therapeutic exercise, Therapeutic activities, Neuromuscular re-education, Physical agent/modality, Manual therapy and Patient education  Patient / Family readiness to learn indicated by: trying to perform skills and interest  Persons(s) to be included in education: patient (P)  Barriers to Learning/Limitations: None  Patient Goal (s): get core strength, relieve back pain, get some DN done and return to walking at [facility]  Patient Self Reported Health Status: good  Rehabilitation Potential: good    Short Term Goals: To be accomplished in 3 weeks:   1. Patient is compliant with daily HEP to assure progress in function and reduction in pain  Status at Eval: HEP initiated    2. Hip ADT will improve bilaterally to Negative to indicate improved femoral-acetabular mobility needed for ADL and gait  Status at Eval: ADT positive bilaterally    Long Term Goals: To be accomplished in 6 weeks:   1. Improved FOTO score to >or= 63/100 as evidence of improved functional tolerances including lifting groceries, returning to recreational walking and PLOF  Status at Eval:FOTO 53    2. Improved core and hip strength to >or= 5-/5 to allow return to PLOF including prolonged standing/walking   Status at Eval:    Strength    L(0-5) R (0-5) N/T   Hip Flexion (L1,2) 4 4 []?    Knee Extension (L3,4) 4 4 []?    Ankle Dorsiflexion (L4)     []?    Great Toe Extension (L5)     []?    Ankle Plantarflexion (S1)     []?    Knee Flexion (S1,2) 4 4 []?    Upper Abdominals 3 3 []?    Lower Abdominals 3 3 []?    Paraspinals     []?    Back Rotators     []?    Gluteus Bakari 4- 4- []?    Other     []?         3. Reduction in pain to <or= 3/10 with ADL to allow return to PLOF and increased quality of life  Status at Eval: LBP 3-10/10    4.  Ability to perform light material handling >or= 20# with good body mechanics and no increase in pain for increased ease handling groceries  Status at Eval: difficulty bending over and with material handling in general    5. Ability to reach floor without reports of LBP to allow return to PLOF and ease putting on socks  Status at Eval:FFD 4 in, mild LB strain      Frequency / Duration: Patient to be seen 2 times per week for 6 weeks. Patient/ Caregiver education and instruction: Diagnosis, prognosis, exercises   [x]  Plan of care has been reviewed with PTA    Certification Period: 11/23/20-1/22/21  Rohit Weiss, PT 11/23/2020 12:39 PM  _____________________________________________________________________  I certify that the above Therapy Services are being furnished while the patient is under my care. I agree with the treatment plan and certify that this therapy is necessary.     Physician's Signature:____________Date:_________TIME:________    [employer], Date and Time must be completed for valid certification **    Please sign and return to [facility] at the [address], [facility], [address] [facility]       Phone: [phone]      Fax:  [phone]

## 2020-11-25 ENCOUNTER — APPOINTMENT (OUTPATIENT)
Dept: PHYSICAL THERAPY | Age: 65
End: 2020-11-25
Payer: MEDICARE

## 2020-12-01 ENCOUNTER — HOSPITAL ENCOUNTER (OUTPATIENT)
Dept: ULTRASOUND IMAGING | Age: 65
Discharge: HOME | End: 2020-12-01
Payer: MEDICARE

## 2020-12-01 DIAGNOSIS — K74.60 CIRRHOSIS OF LIVER WITHOUT ASCITES, UNSPECIFIED HEPATIC CIRRHOSIS TYPE (HCC): ICD-10-CM

## 2020-12-01 PROCEDURE — 76705 ECHO EXAM OF ABDOMEN: CPT

## 2020-12-02 ENCOUNTER — APPOINTMENT (OUTPATIENT)
Dept: PHYSICAL THERAPY | Age: 65
End: 2020-12-02

## 2020-12-04 ENCOUNTER — APPOINTMENT (OUTPATIENT)
Dept: PHYSICAL THERAPY | Age: 65
End: 2020-12-04

## 2020-12-08 ENCOUNTER — APPOINTMENT (OUTPATIENT)
Dept: PHYSICAL THERAPY | Age: 65
End: 2020-12-08

## 2020-12-10 ENCOUNTER — APPOINTMENT (OUTPATIENT)
Dept: PHYSICAL THERAPY | Age: 65
End: 2020-12-10

## 2020-12-10 ENCOUNTER — TELEPHONE (OUTPATIENT)
Dept: HEMATOLOGY | Age: 65
End: 2020-12-10

## 2020-12-15 ENCOUNTER — APPOINTMENT (OUTPATIENT)
Dept: PHYSICAL THERAPY | Age: 65
End: 2020-12-15

## 2020-12-17 ENCOUNTER — OFFICE VISIT (OUTPATIENT)
Dept: ORTHOPEDIC SURGERY | Age: 65
End: 2020-12-17
Payer: MEDICARE

## 2020-12-17 ENCOUNTER — APPOINTMENT (OUTPATIENT)
Dept: PHYSICAL THERAPY | Age: 65
End: 2020-12-17

## 2020-12-17 VITALS
TEMPERATURE: 97.3 F | HEART RATE: 81 BPM | DIASTOLIC BLOOD PRESSURE: 82 MMHG | HEIGHT: 65 IN | BODY MASS INDEX: 33.49 KG/M2 | WEIGHT: 201 LBS | RESPIRATION RATE: 16 BRPM | OXYGEN SATURATION: 93 % | SYSTOLIC BLOOD PRESSURE: 134 MMHG

## 2020-12-17 DIAGNOSIS — G89.29 CHRONIC PAIN OF LEFT KNEE: Primary | ICD-10-CM

## 2020-12-17 DIAGNOSIS — M25.562 CHRONIC PAIN OF LEFT KNEE: Primary | ICD-10-CM

## 2020-12-17 DIAGNOSIS — G89.29 CHRONIC PAIN OF LEFT KNEE: ICD-10-CM

## 2020-12-17 DIAGNOSIS — M25.562 CHRONIC PAIN OF LEFT KNEE: ICD-10-CM

## 2020-12-17 DIAGNOSIS — M17.12 PRIMARY OSTEOARTHRITIS OF LEFT KNEE: ICD-10-CM

## 2020-12-17 PROCEDURE — G8510 SCR DEP NEG, NO PLAN REQD: HCPCS | Performed by: ORTHOPAEDIC SURGERY

## 2020-12-17 PROCEDURE — G9899 SCRN MAM PERF RSLTS DOC: HCPCS | Performed by: ORTHOPAEDIC SURGERY

## 2020-12-17 PROCEDURE — 3017F COLORECTAL CA SCREEN DOC REV: CPT | Performed by: ORTHOPAEDIC SURGERY

## 2020-12-17 PROCEDURE — 99203 OFFICE O/P NEW LOW 30 MIN: CPT | Performed by: ORTHOPAEDIC SURGERY

## 2020-12-17 PROCEDURE — G8427 DOCREV CUR MEDS BY ELIG CLIN: HCPCS | Performed by: ORTHOPAEDIC SURGERY

## 2020-12-17 PROCEDURE — 73560 X-RAY EXAM OF KNEE 1 OR 2: CPT | Performed by: ORTHOPAEDIC SURGERY

## 2020-12-17 PROCEDURE — G8417 CALC BMI ABV UP PARAM F/U: HCPCS | Performed by: ORTHOPAEDIC SURGERY
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
## 2020-12-17 NOTE — LETTER
12/17/2020    Patient: Penny Cox   YOB: 1955   Date of Visit: 12/17/2020     Cori Benitez, [address] [place] [address]  Via Fax: [phone]    Dear MARIS Nieto,      Thank you for referring Ms. Freddie Zapien to  ORTHOPAEDICS for evaluation. My notes for this consultation are attached. If you have questions, please do not hesitate to call me. I look forward to following your patient along with you.       Sincerely,    Kari Mayers MD

## 2020-12-21 ENCOUNTER — APPOINTMENT (OUTPATIENT)
Dept: PHYSICAL THERAPY | Age: 65
End: 2020-12-21

## 2020-12-24 ENCOUNTER — APPOINTMENT (OUTPATIENT)
Dept: PHYSICAL THERAPY | Age: 65
End: 2020-12-24

## 2020-12-29 ENCOUNTER — APPOINTMENT (OUTPATIENT)
Dept: PHYSICAL THERAPY | Age: 65
End: 2020-12-29

## 2020-12-29 ENCOUNTER — HOSPITAL ENCOUNTER (OUTPATIENT)
Dept: MRI IMAGING | Age: 65
Discharge: HOME | End: 2020-12-29
Attending: ORTHOPAEDIC SURGERY
Payer: COMMERCIAL

## 2020-12-29 DIAGNOSIS — M25.562 CHRONIC PAIN OF LEFT KNEE: ICD-10-CM

## 2020-12-29 DIAGNOSIS — G89.29 CHRONIC PAIN OF LEFT KNEE: ICD-10-CM

## 2020-12-29 PROCEDURE — 73721 MRI JNT OF LWR EXTRE W/O DYE: CPT

## 2020-12-31 ENCOUNTER — APPOINTMENT (OUTPATIENT)
Dept: PHYSICAL THERAPY | Age: 65
End: 2020-12-31

## 2021-01-12 ENCOUNTER — OFFICE VISIT (OUTPATIENT)
Dept: ORTHOPEDIC SURGERY | Age: 66
End: 2021-01-12
Payer: COMMERCIAL

## 2021-01-12 VITALS
HEIGHT: 65 IN | WEIGHT: 202.8 LBS | OXYGEN SATURATION: 96 % | RESPIRATION RATE: 16 BRPM | HEART RATE: 89 BPM | TEMPERATURE: 97.5 F | SYSTOLIC BLOOD PRESSURE: 116 MMHG | BODY MASS INDEX: 33.79 KG/M2 | DIASTOLIC BLOOD PRESSURE: 83 MMHG

## 2021-01-12 DIAGNOSIS — S83.282A TEAR OF LATERAL MENISCUS OF LEFT KNEE, CURRENT, UNSPECIFIED TEAR TYPE, INITIAL ENCOUNTER: Primary | ICD-10-CM

## 2021-01-12 DIAGNOSIS — S83.242A TEAR OF MEDIAL MENISCUS OF LEFT KNEE, CURRENT, UNSPECIFIED TEAR TYPE, INITIAL ENCOUNTER: ICD-10-CM

## 2021-01-12 PROCEDURE — 3017F COLORECTAL CA SCREEN DOC REV: CPT | Performed by: ORTHOPAEDIC SURGERY

## 2021-01-12 PROCEDURE — 99214 OFFICE O/P EST MOD 30 MIN: CPT | Performed by: ORTHOPAEDIC SURGERY

## 2021-01-12 PROCEDURE — G8417 CALC BMI ABV UP PARAM F/U: HCPCS | Performed by: ORTHOPAEDIC SURGERY

## 2021-01-12 PROCEDURE — G8427 DOCREV CUR MEDS BY ELIG CLIN: HCPCS | Performed by: ORTHOPAEDIC SURGERY

## 2021-01-12 PROCEDURE — G8432 DEP SCR NOT DOC, RNG: HCPCS | Performed by: ORTHOPAEDIC SURGERY

## 2021-01-12 NOTE — PROGRESS NOTES
Tamara Solorzano  1955   Chief Complaint   Patient presents with    Knee Pain     left knee pain        HISTORY OF PRESENT ILLNESS  Tamara Solorzano is a 60 y.o. female who presents today for reevaluation of left knee and MRI review. Patient rates pain as 3/10 today. Pain has been present for awhile. Communicates that it is different than her usual rheumatoid pain. Pain is worse with stairs. Having some pain at night. Has been taking Ibuprofen and using topical gel. Patient denies any fever, chills, chest pain, shortness of breath or calf pain. The remainder of the review of systems is negative. There are no new illness or injuries to report since last seen in the office. There are no changes to medications, allergies, family or social history. Pain Assessment  1/12/2021   Location of Pain Knee   Location Modifiers Left   Severity of Pain 3   Quality of Pain Sharp   Quality of Pain Comment back of the knee   Duration of Pain Persistent   Frequency of Pain Constant   Date Pain First Started -   Aggravating Factors Stairs   Aggravating Factors Comment -   Limiting Behavior -   Relieving Factors Ice;NSAID   Relieving Factors Comment -   Result of Injury No   Work-Related Injury -   Type of Injury -       PHYSICAL EXAM:   Visit Vitals  /83 (BP 1 Location: Right arm, BP Patient Position: Sitting)   Pulse 89   Temp 97.5 °F (36.4 °C) (Temporal)   Resp 16   Ht 5' 5\" (1.651 m)   Wt 202 lb 12.8 oz (92 kg)   SpO2 96%   BMI 33.75 kg/m²     The patient is a well-developed, well-nourished female   in no acute distress. The patient is alert and oriented times three. The patient is alert and oriented times three. Mood and affect are normal.  LYMPHATIC: lymph nodes are not enlarged and are within normal limits  SKIN: normal in color and non tender to palpation. There are no bruises or abrasions noted. NEUROLOGICAL: Motor sensory exam is within normal limits. Reflexes are equal bilaterally.  There is normal sensation to pinprick and light touch  MUSCULOSKELETAL:  Examination Left knee   Skin Intact   Range of motion 0-130   Effusion +   Medial joint line tenderness +   Lateral joint line tenderness +   Tenderness Pes Bursa -   Tenderness insertion MCL -   Tenderness insertion LCL -   Tsering’s +   Patella crepitus +   Patella grind +   Lachman -   Pivot shift -   Anterior drawer -   Posterior drawer -   Varus stress -   Valgus stress -   Neurovascular Intact   Calf Swelling and Tenderness to Palpation -   Oxana's Test -   Hamstring Cord Tightness -      Slight effusion with medial joint tenderness left knee with range of motion 10 to 120 degrees with pain no instability motor sensory exam is normal      IMAGING: MRI of left knee dated 12/29/2020 was reviewed and read by Dr. Sheth:   IMPRESSION:   1.  Anterior root detachment the lateral meniscus with peripheral displacement  along the joint line and fraying along the free edge extending to the body.  2.  Nondisplaced longitudinal vertical tear of the posterior horn of the needle  meniscus.  3.  Small effusion and synovitis along with a small Baker's cyst.  4.  Mild to moderate chondromalacia patella.        XR of left knee obtained in the office dated 12/17/2020 was reviewed and read by Dr. Sheth: Slight decreased joint space on the medial compartment and moderate degenerative changes patellofemoral joint      IMPRESSION:      ICD-10-CM ICD-9-CM    1. Tear of lateral meniscus of left knee, current, unspecified tear type, initial encounter  S83.282A 836.1    2. Tear of medial meniscus of left knee, current, unspecified tear type, initial encounter  S83.242A 836.0         PLAN:   1. Pt presents today with left knee pain due to MRI-documented lateral and medial meniscus tears. Surgery was discussed with the patient today but she states the pain is tolerable at this time. Can return as needed.  Risk factors include: BMI>30   2. No ultrasound exam indicated today  3.  No cortisone injection indicated today   4. No Physical/Occupational Therapy indicated today  5. No diagnostic test indicated today:   6. No durable medical equipment indicated today  7. No referral indicated today   8. No medications indicated today:   9. No Narcotic indicated today       RTC prn      Scribed by Jenifer Sanabria) as dictated by MD ANURADHA Geiger, Dr. Linh Morse, confirm that all documentation is accurate.     Linh Morse M.D.   [facility]

## 2021-01-19 ENCOUNTER — TELEPHONE (OUTPATIENT)
Dept: ORTHOPEDIC SURGERY | Age: 66
End: 2021-01-19

## 2021-01-19 NOTE — TELEPHONE ENCOUNTER
Patient states she would like to move forward with surgery as discussed.   Please advise [phone]

## 2021-01-20 DIAGNOSIS — Z01.818 PREOP EXAMINATION: Primary | ICD-10-CM

## 2021-01-20 DIAGNOSIS — Z01.818 PREOP EXAMINATION: ICD-10-CM

## 2021-01-21 ENCOUNTER — HOSPITAL ENCOUNTER (OUTPATIENT)
Dept: NON INVASIVE DIAGNOSTICS | Age: 66
Discharge: HOME | End: 2021-01-21
Payer: MEDICARE

## 2021-01-21 ENCOUNTER — HOSPITAL ENCOUNTER (OUTPATIENT)
Dept: LAB | Age: 66
Discharge: HOME | End: 2021-01-21
Payer: COMMERCIAL

## 2021-01-21 ENCOUNTER — TRANSCRIBE ORDER (OUTPATIENT)
Dept: REGISTRATION | Age: 66
End: 2021-01-21

## 2021-01-21 DIAGNOSIS — Z01.818 PREOP EXAMINATION: ICD-10-CM

## 2021-01-21 LAB
ANION GAP SERPL CALC-SCNC: 3 MMOL/L (ref 3–18)
BASOPHILS # BLD: 0 K/UL (ref 0–0.1)
BASOPHILS NFR BLD: 0 % (ref 0–2)
BUN SERPL-MCNC: 8 MG/DL (ref 7–18)
BUN/CREAT SERPL: 10 (ref 12–20)
CALCIUM SERPL-MCNC: 9.4 MG/DL (ref 8.5–10.1)
CHLORIDE SERPL-SCNC: 104 MMOL/L (ref 100–111)
CO2 SERPL-SCNC: 32 MMOL/L (ref 21–32)
CREAT SERPL-MCNC: 0.81 MG/DL (ref 0.6–1.3)
DIFFERENTIAL METHOD BLD: ABNORMAL
EOSINOPHIL # BLD: 0 K/UL (ref 0–0.4)
EOSINOPHIL NFR BLD: 1 % (ref 0–5)
ERYTHROCYTE [DISTWIDTH] IN BLOOD BY AUTOMATED COUNT: 13 % (ref 11.6–14.5)
GLUCOSE SERPL-MCNC: 98 MG/DL (ref 74–99)
HCT VFR BLD AUTO: 40.9 % (ref 35–45)
HGB BLD-MCNC: 14.2 G/DL (ref 12–16)
LYMPHOCYTES # BLD: 0.7 K/UL (ref 0.9–3.6)
LYMPHOCYTES NFR BLD: 16 % (ref 21–52)
MCH RBC QN AUTO: 29.6 PG (ref 24–34)
MCHC RBC AUTO-ENTMCNC: 34.7 G/DL (ref 31–37)
MCV RBC AUTO: 85.2 FL (ref 74–97)
MONOCYTES # BLD: 0.4 K/UL (ref 0.05–1.2)
MONOCYTES NFR BLD: 9 % (ref 3–10)
NEUTS SEG # BLD: 3.3 K/UL (ref 1.8–8)
NEUTS SEG NFR BLD: 74 % (ref 40–73)
PLATELET # BLD AUTO: 121 K/UL (ref 135–420)
PMV BLD AUTO: 9.4 FL (ref 9.2–11.8)
POTASSIUM SERPL-SCNC: 3.9 MMOL/L (ref 3.5–5.5)
RBC # BLD AUTO: 4.8 M/UL (ref 4.2–5.3)
SODIUM SERPL-SCNC: 139 MMOL/L (ref 136–145)
WBC # BLD AUTO: 4.5 K/UL (ref 4.6–13.2)

## 2021-01-21 PROCEDURE — 36415 COLL VENOUS BLD VENIPUNCTURE: CPT

## 2021-01-21 PROCEDURE — 80048 BASIC METABOLIC PNL TOTAL CA: CPT

## 2021-01-21 PROCEDURE — 85025 COMPLETE CBC W/AUTO DIFF WBC: CPT

## 2021-01-21 PROCEDURE — 93005 ELECTROCARDIOGRAM TRACING: CPT

## 2021-01-22 LAB
ATRIAL RATE: 75 BPM
CALCULATED P AXIS, ECG09: 36 DEGREES
CALCULATED R AXIS, ECG10: 48 DEGREES
CALCULATED T AXIS, ECG11: 49 DEGREES
DIAGNOSIS, 93000: NORMAL
P-R INTERVAL, ECG05: 170 MS
Q-T INTERVAL, ECG07: 388 MS
QRS DURATION, ECG06: 88 MS
QTC CALCULATION (BEZET), ECG08: 433 MS
VENTRICULAR RATE, ECG03: 75 BPM

## 2021-01-25 ENCOUNTER — OFFICE VISIT (OUTPATIENT)
Dept: ORTHOPEDIC SURGERY | Age: 66
End: 2021-01-25

## 2021-01-25 VITALS
SYSTOLIC BLOOD PRESSURE: 110 MMHG | WEIGHT: 202 LBS | HEIGHT: 65 IN | BODY MASS INDEX: 33.66 KG/M2 | HEART RATE: 64 BPM | DIASTOLIC BLOOD PRESSURE: 75 MMHG | TEMPERATURE: 97.3 F | OXYGEN SATURATION: 96 %

## 2021-01-25 DIAGNOSIS — S83.242A TEAR OF MEDIAL MENISCUS OF LEFT KNEE, CURRENT, UNSPECIFIED TEAR TYPE, INITIAL ENCOUNTER: ICD-10-CM

## 2021-01-25 DIAGNOSIS — M17.12 PRIMARY OSTEOARTHRITIS OF LEFT KNEE: ICD-10-CM

## 2021-01-25 DIAGNOSIS — S83.282A TEAR OF LATERAL MENISCUS OF LEFT KNEE, CURRENT, UNSPECIFIED TEAR TYPE, INITIAL ENCOUNTER: Primary | ICD-10-CM

## 2021-01-25 RX ORDER — OXYCODONE HYDROCHLORIDE 5 MG/1
5 TABLET ORAL
Qty: 40 TAB | Refills: 0 | Status: SHIPPED | OUTPATIENT
Start: 2021-01-25 | End: 2021-02-01

## 2021-01-25 RX ORDER — HYDROCODONE BITARTRATE AND ACETAMINOPHEN 7.5; 325 MG/1; MG/1
1 TABLET ORAL
Qty: 40 TAB | Refills: 0 | Status: CANCELLED | OUTPATIENT
Start: 2021-01-25 | End: 2021-02-01

## 2021-01-25 NOTE — PATIENT INSTRUCTIONS
Dr. Odell Elizabeth Knee Arthroscopy Surgery    What is the surgery? - This is an outpatient procedure at either [place] [address] or [address] [place], [place] [address] will be completely asleep for procedure. Dr. Odell Elizabeth will make 2 small incisions in your knee. He will take a tour of your knee with the camera and then address the meniscal tear(s). We will be able to evaluate if any arthritis in your knee but this surgery is not to treat your arthritis. - Total surgery takes about 25-30 mins     What can you expect after surgery? - You will have a bulky dressing on your knee that you can remove 2 days after surgery. You will be able to shower 2 days after surgery but no soaking in a bath, hot tub, ocean or pool x 2 weeks to allow for full wound healing. No special brace is needed. - You will be on crutches or a walker when you leave the hospital. You can place weight on your leg as tolerated starting immediately. You are usually on crutches or your walker for about 4-5 days.   - Even though you can place weight on your leg, we recommend no walking or standing longer than 10mins for the first week. We will gradually increase your activities after that point.    - Dr. Odell Elizabeth will start physical therapy for you when you return for your 1 week post op apt  - It will take your 6-8 weeks to fully recover from your surgery. When can I return to work? - Most patients return to desk work only after 1-2 weeks. We recommend no prolonged walking or standing, climbing, kneeling, or crawling x 6-8 weeks. Not all knee arthroscopies are the same. The specifics of your individual case will be discussed at length with you by Dr. Odell Elizabeth and his Physician Assistant. Pablo Pavon  Surgical Coordinator  [address] [facility] [address]. Jeff.  [address], [facility] Mary Carmenjorge [email or web address]  P: [phone]  F: [phone]

## 2021-01-25 NOTE — PROGRESS NOTES
HISTORY AND PHYSICAL          Patient: Rhonda Jaffe                MRN: [id number]       SSN: [id number]  YOB: 1955          AGE: 60 y.o. SEX: female      Patient scheduled for:  Left knee arthroscopic partial medial and lateral menisectomy    Surgeon: Mario Levy MD    ANESTHESIA TYPE:  General    HISTORY:     The patient was seen in the office today for a preoperative history and physical for an upcoming above listed surgery. The patient is a pleasant 60 y.o. female who has a history of left knee pain. Patient rates pain as 3/10 today. Pain has been present for awhile. Communicates that it is different than her usual rheumatoid pain. Pain is worse with stairs. Having some pain at night. Has been taking Ibuprofen and using topical gel. Due to the current findings, affected activity of daily living and continued pain and discomfort, surgical intervention is indicated. The alternatives, risks, and complications, including but not limited to infection, blood loss, need for blood transfusion, neurovascular damage, sukhjinder-incisional numbness, subcutaneous hematoma, bone fracture, anesthetic complications, DVT, PE, death, RSD, postoperative stiffness and pain, possible surgical scar, delayed healing and nonhealing, reflexive sympathetic dystrophy, damage to blood vessels and nerves, need for more surgery, MI, and stroke,  failure of hardware, gait disturbances,have been discussed. The patient understands and wishes to proceed with surgery.      PAST MEDICAL HISTORY:     Past Medical History:   Diagnosis Date    Abnormal Papanicolaou smear of cervix     1988 HPV    Asthma     [facility] disorder ([facility]     Bipolar 1 disorder, depressed (HCC)     Bursitis     Carpal tunnel syndrome     Cirrhosis, hepatitis C     Connective tissue disease ([facility]     DDD (degenerative disc disease), lumbosacral     Gastric ulcer     Hashimoto's disease     Hashimoto's disease     Hepatitis C     Herniated intervertebral disc of lumbar spine     Hypermobility syndrome     Liver disease     Menopause     Osteoarthritis     Plantar fasciitis, bilateral     RA (rheumatoid arthritis) (HCC)        CURRENT MEDICATIONS:     Current Outpatient Medications   Medication Sig Dispense Refill    oxyCODONE IR (ROXICODONE) 5 mg immediate release tablet Take 1 Tab by mouth every four (4) hours as needed for Pain for up to 7 days. Max Daily Amount: 30 mg. DO NOT TAKE UNTIL AFTER SURGERY (for acute post operative pain) 40 Tab 0    flaxseed oil 1,000 mg cap Take 1,000 mg by mouth daily.  magnesium 250 mg tab Take 250 mg by mouth daily.  CYANOCOBALAMIN, VITAMIN B-12, Take 500 mcg by mouth daily.  tiZANidine (ZANAFLEX) 2 mg tablet Take 1 Tab by mouth three (3) times daily as needed for Muscle Spasm(s). 90 Tab 5    CALCIUM PO Take 1 Tab by mouth daily.  vitamin E acetate (VITAMIN E PO) Take 1 Cap by mouth.  hydroCHLOROthiazide (HYDRODIURIL) 12.5 mg tablet Take 1 Tab by mouth daily. 90 Tab 1    levothyroxine (SYNTHROID) 125 mcg tablet take 1 tablet by mouth once daily BEFORE BREAKFAST 90 Tab 1    PROAIR HFA 90 mcg/actuation inhaler Take 2 Puffs by inhalation every six (6) hours as needed for Wheezing. 1 Inhaler 3    omeprazole (PRILOSEC) 20 mg capsule take 1 capsule by mouth once daily 90 Cap 1    guaiFENesin ER (MUCINEX) 600 mg ER tablet Take 2 Tabs by mouth two (2) times daily as needed for Congestion. 60 Tab 2    multivitamin (ONE A DAY) tablet Take 1 Tab by mouth daily.  diclofenac (VOLTAREN) 1 % gel apply 2 grams to affected area four times a day  0    zolpidem (AMBIEN) 10 mg tablet Take 10 mg by mouth nightly as needed. 0    benztropine (COGENTIN) 1 mg tablet Take 1 mg by mouth three (3) times daily.  ARIPiprazole (ABILIFY) 5 mg tablet Take 15 mg by mouth daily.          ALLERGIES:     Allergies   Allergen Reactions    Latex Rash    Adhesive Tape-Silicones Rash SURGICAL HISTORY:     Past Surgical History:   Procedure Laterality Date    COLONOSCOPY N/A 2/9/2017     Colonoscopy w/polyp bxs x3 performed by [facility] at [facility] ENDOSCOPY    HX APPENDECTOMY      HX DILATION AND CURETTAGE  1988    cryso    HX GYN      BTL    HX HEENT      malofacial surgery       SOCIAL HISTORY:     Social History     Socioeconomic History    Marital status:      Spouse name: Not on file    Number of children: Not on file    Years of education: Not on file    Highest education level: Not on file   Tobacco Use    Smoking status: Former Smoker     Quit date: 7/1/2005     Years since quitting: 15.5    Smokeless tobacco: Never Used   Substance and Sexual Activity    Alcohol use: No     Alcohol/week: 0.0 standard drinks    Drug use: Not Currently     Types: Marijuana    Sexual activity: Never       FAMILY HISTORY:     Family History   Problem Relation Age of Onset    No Known Problems Mother     No Known Problems Father        REVIEW OF SYSTEMS:     Negative for fevers, chills, chest pain, shortness of breath, weight loss, recent illness     General: Negative for fever and chills. No unexpected change in weight. Denies fatigue. No change in appetite. Skin: Negative for rash or itching. HEENT: Negative for congestion, sore throat, neck pain and neck stiffness. No change in vision or hearing. Hasn't noted any enlarged lymph nodes in the neck. Cardiovascular:  Negative for chest pain and palpitations. Has not noted pedal edema. Respiratory: Negative for cough, colds, sinus, hemoptysis, shortness of breath and wheezing. Gastrointestinal: Negative for nausea and vomiting, rectal bleeding, coffee ground emesis, abdominal pain, diarrhea and constipation. Genitourinary: Negative for dysuria, frequency urgency, or burning on micturition. No flank pain, no foul smelling urine, no difficulty with initiating urination.    Hematological: Negative for bleeding or easy bruising. Musculoskeletal: Negative  for arthralgias, back pain or neck pain. Neurological: Negative for dizziness, seizures or syncopal episodes. Denies headaches. Endocrine: Denies excessive thirst.  No heat/cold intolerance. Psychiatric: Negative for depression or insomnia. PHYSICAL EXAMINATION:     VITALS:   Visit Vitals  /75 (BP 1 Location: Right arm, BP Patient Position: Sitting)   Pulse 64   Temp 97.3 °F (36.3 °C) (Skin)   Ht 5' 5\" (1.651 m)   Wt 202 lb (91.6 kg)   SpO2 96%   BMI 33.61 kg/m²     GEN:  Well developed, well nourished 60 y.o. female in no acute distress. HEENT: Normocephalic and atraumatic. Eyes: Conjunctivae and EOM are normal.Pupils are equal, round, and reactive to light. External ear normal appearance, external nose normal appearing. Mouth/Throat: Oropharynx is clear and moist, able to handle oral secretions w/out difficulty, airway patent  NECK: Supple. Normal ROM, No lymphadenopathy. Trachea is midline. No bruising, swelling or deformity  RESP: Clear to auscultation bilaterally. No wheezes, rales, rhonchi. Normal effort and breath sounds. No respiratory distress  CARDIO:  Normal rate, regular rhythm and normal heart sounds. No MGR. ABDOMEN: Soft, non-tender, non-distended, normoactive bowel sounds in all four quadrants. There is no tenderness. There is no rebound and no guarding.    BACK: No CVA or spinal tenderness  BREAST:  Deferred  PELVIC:    Deferred   RECTAL:  Deferred   :           Deferred  EXTREMITIES: EXAMINATION OF:   Examination Left knee   Skin Intact   Range of motion 0-130   Effusion +   Medial joint line tenderness +   Lateral joint line tenderness +   Tenderness Pes Bursa -   Tenderness insertion MCL -   Tenderness insertion LCL -   Tserings +   Patella crepitus +   Patella grind +   Lachman -   Pivot shift -   Anterior drawer -   Posterior drawer -   Varus stress -   Valgus stress -   Neurovascular Intact   Calf Swelling and Tenderness to Palpation - Oxana's Test -   Hamstring Cord Tightness -       Slight effusion with medial joint tenderness left knee with range of motion 10 to 120 degrees with pain no instability motor sensory exam is normal         NEUROVASCULAR: Sensation intact to light touch and strength grossly intact and symmetrical. No nystagmus. Positive distal pulses and capillary refill. DVT ASSESSMENT:  There is not  calf tenderness. No evidence of DVT seen on physical exam.  MOTOR: In tact  PSYCH: Alert an oriented to person, place and time. Mood, memory, affect, behavior and judgment normal       RADIOGRAPHS & DIAGNOSTIC STUDIES:     MRI/xray reveals : IMAGING: MRI of left knee dated 12/29/2020 was reviewed and read by Dr. Kajal Garcia:   IMPRESSION:   1.  Anterior root detachment the lateral meniscus with peripheral displacement  along the joint line and fraying along the free edge extending to the body. 2.  Nondisplaced longitudinal vertical tear of the posterior horn of the needle  meniscus.   3.  Small effusion and synovitis along with a small Baker's cyst.  4.  Mild to moderate chondromalacia patella.           XR of left knee obtained in the office dated 12/17/2020 was reviewed and read by Dr. Kajal Garcia: Slight decreased joint space on the medial compartment and moderate degenerative changes patellofemoral joint        LABS:       @  CBC:   Lab Results   Component Value Date/Time    WBC 4.5 (L) 01/21/2021 12:29 PM    RBC 4.80 01/21/2021 12:29 PM    HGB 14.2 01/21/2021 12:29 PM    HCT 40.9 01/21/2021 12:29 PM    PLATELET 921 (L) 92/31/3621 12:29 PM    and BMP:   Lab Results   Component Value Date/Time    Glucose 98 01/21/2021 12:29 PM    Sodium 139 01/21/2021 12:29 PM    Potassium 3.9 01/21/2021 12:29 PM    Chloride 104 01/21/2021 12:29 PM    CO2 32 01/21/2021 12:29 PM    BUN 8 01/21/2021 12:29 PM    Creatinine 0.81 01/21/2021 12:29 PM    Calcium 9.4 01/21/2021 12:29 PM   @    Preoperative labs were reviewed and are substantially within normal limits   EKG: normal EKG, normal sinus rhythm, unchanged from previous tracings. ASSESSMENT:       Encounter Diagnoses   Name Primary?  Tear of lateral meniscus of left knee, current, unspecified tear type, initial encounter Yes    Tear of medial meniscus of left knee, current, unspecified tear type, initial encounter     Primary osteoarthritis of left knee        PLAN:     Again, the alternatives, risks, and complications, as well as expected outcome were discussed. The patient understands and agrees to proceed with left knee arthroscopic partial medial and lateral menisectomy. The patient was counseled at length about the risks of dori Covid-19 during their perioperative period and any recovery window from their procedure. The patient was made aware that dori Covid-19  may worsen their prognosis for recovering from their procedure and lend to a higher morbidity and/or mortality risk. All material risks, benefits, and reasonable alternatives including postponing the procedure were discussed. The patient does  wish to proceed with the procedure at this time.    Patient given orders listed below:    Orders Placed This Encounter    oxyCODONE IR (ROXICODONE) 5 mg immediate release tablet         Jo Ann Tyson PA-C  1/25/2021  12:30 PM

## 2021-02-08 ENCOUNTER — OFFICE VISIT (OUTPATIENT)
Dept: ORTHOPEDIC SURGERY | Age: 66
End: 2021-02-08
Payer: COMMERCIAL

## 2021-02-08 VITALS
OXYGEN SATURATION: 96 % | SYSTOLIC BLOOD PRESSURE: 116 MMHG | WEIGHT: 205 LBS | HEIGHT: 65 IN | BODY MASS INDEX: 34.16 KG/M2 | RESPIRATION RATE: 16 BRPM | TEMPERATURE: 97 F | HEART RATE: 83 BPM | DIASTOLIC BLOOD PRESSURE: 89 MMHG

## 2021-02-08 DIAGNOSIS — S83.242A TEAR OF MEDIAL MENISCUS OF LEFT KNEE, CURRENT, UNSPECIFIED TEAR TYPE, INITIAL ENCOUNTER: ICD-10-CM

## 2021-02-08 DIAGNOSIS — M17.12 PRIMARY OSTEOARTHRITIS OF LEFT KNEE: ICD-10-CM

## 2021-02-08 DIAGNOSIS — S83.282A TEAR OF LATERAL MENISCUS OF LEFT KNEE, CURRENT, UNSPECIFIED TEAR TYPE, INITIAL ENCOUNTER: Primary | ICD-10-CM

## 2021-02-08 PROCEDURE — 99024 POSTOP FOLLOW-UP VISIT: CPT | Performed by: PHYSICIAN ASSISTANT

## 2021-02-08 NOTE — PROGRESS NOTES
Patient: Juan Jose Miller  YOB: 1955       HISTORY:  The patient presents for reevaluation of her left knee status post arthroscopic partial medial and lateral menisectomy, pseudogout on 2/1/21. Patient is improved, states pain is a 4 out of 10.  she has not gone to physical therapy. Patient denies any fever, chills, chest pain, shortness of breath or calf pain. The remainder of the review of systems is negative. There are no new illness or injuries to report since last seen in the office. No changes in medications, allergies, social or family history. PHYSICAL EXAMINATION:    Visit Vitals  /89 (BP 1 Location: Right upper arm, BP Patient Position: Sitting)   Pulse 83   Temp 97 °F (36.1 °C) (Temporal)   Resp 16   Ht 5' 5\" (1.651 m)   Wt 205 lb (93 kg)   SpO2 96%   BMI 34.11 kg/m²     The patient is a well-developed, well-nourished female in no acute distress. The patient is alert and oriented times three. The patient appears to be well groomed. Mood and affect are normal.   ORTHOPEDIC EXAM of Left knee: Inspection: Effusion present,  incisions clean, dry intact, sutures in place  TTP: medial joint line  Range of motion: 0-100 flexion  Stability: Stable  Strength: 5/5  2+ distal pulses    IMPRESSION:  Status post Left knee arthroscopic partial medial and lateral menisectomy, pseudogout, mild degenerative changes. PLAN: Incisions cleaned. Surgery was discussed at length today. Stressed to patient that nothing causes an increase in pain or swelling. Patient is weight bearing as tolerated. OK to d/c use of crutches/walker. Will set up with physical therapy. Patient does not request refill of pain medication. Patient will follow up in 3 weeks.     JESICA Richards [facility]

## 2021-02-10 ENCOUNTER — HOSPITAL ENCOUNTER (OUTPATIENT)
Dept: PHYSICAL THERAPY | Age: 66
Discharge: HOME | End: 2021-02-10
Payer: MEDICARE

## 2021-02-10 PROCEDURE — 97016 VASOPNEUMATIC DEVICE THERAPY: CPT

## 2021-02-10 PROCEDURE — 97161 PT EVAL LOW COMPLEX 20 MIN: CPT

## 2021-02-10 PROCEDURE — 97110 THERAPEUTIC EXERCISES: CPT

## 2021-02-10 NOTE — PROGRESS NOTES
PT DAILY TREATMENT NOTE/KNEE EVAL 10-18    Patient Name: Jimmy Guzmán  Date:2/10/2021  : 1955  [x]  Patient  Verified  Payor: Andrea Greene / Plan: [facility] [employer] HMO/CHOICE PLUS/POS / Product Type: HMO /    In ZANR:1524  Out time:1246  Total Treatment Time (min): 35  Visit #: 1 of 16    Medicare/BCBS Only   Total Timed Codes (min):  25 1:1 Treatment Time:  25     Treatment Area: Left knee pain [M25.562]    SUBJECTIVE  Pain Level (0-10 scale): 4  []constant []intermittent [x]improving []worsening []no change since onset    Any medication changes, allergies to medications, adverse drug reactions, diagnosis change, or new procedure performed?: [x] No    [] Yes (see summary sheet for update)  Subjective functional status/changes:     Patient has c/c of left knee pain since left knee arthroscopic partial medial and lateral menisectomies performed 2021. MATHEW: Patient reports twisting knee at work about one year ago with progressive disability afterwards. Patient describes pain as aching and pressure anterior and posterior left knee. Pain is worse in PM. Denies numbness/tingling. Denies popping/clicking. Aggravating factors: weight bearing activities, especially stairs. Alleviating factors: NSAIDs, rest, ice. Denies red flags: SOB, chest pain, dizziness/lightheadedness, blurred/double vision, HA, chills/fevers, night sweats, change in bowel/bladder control, abdominal pain, difficulty swallowing, slurred speech, unexplained weight gain/loss, nausea, vomiting. PMHx: RA, HTN, hypothyroid, osteoporosis, depression, asthma, hepatitis C. Surgical Hx: tubal ligation. Social Hx: apartment with 14 stairs to second level, no alcohol, no tobacco, on disability. PLOF: walking 40-60 minutes 2x/wk. CLOF: limited to ambulation household distances.       OBJECTIVE/EXAMINATION    Modality rationale: decrease edema and decrease pain to improve the patients ability to complete ADLs   Type Additional Details   [x]  Vasopneumatic Device Pressure:       [] lo [x] med [] hi   Temperature: [x] lo [] med [] hi   [x] Skin assessment post-treatment:  [x]intact []redness- no adverse reaction    []redness - adverse reaction:     23 min [x]Eval                  []Re-Eval       25 min Therapeutic Exercise:  [] See flow sheet :   Rationale: increase ROM, increase strength and decrease pain to improve the patient’s ability to complete ADLs        With   [] TE   [] TA   [] neuro   [] other: Patient Education: [x] Review HEP    [] Progressed/Changed HEP based on:   [] positioning   [] body mechanics   [] transfers   [] heat/ice application    [] other:        Physical Therapy Evaluation - Knee    Posture: [] Varus    [x] Valgus    [] Recurvatum        [] Tibial Torsion    [] Foot Supination    [] Foot Pronation    Describe: WNL    Gait:  [] Normal    [] Abnormal    [x] Antalgic    [] NWB    Device: none    Describe: decreased weight shift to left in stance phase, decreased left knee flexion in swing phase.    ROM / Strength  [] Unable to assess                  AROM              Strength (1-5)    Left Right Left Right   Knee Flexion +84 +142 4- 5    Extension +5 -3 3+ 5         Palpation:   Neg/Pos  Neg/Pos  Neg/Pos   Joint Line   Pos Quad tendon  Patellar ligament    Patella  Posterior capsule Pos Pes Anserinus    Tibial tubercle  Hamstring tendons  Infrapatellar fat pad      Optional Tests:  Patellar Mobility   []L []R Hypermobile [x]L []R Hypomobile         Girth Measurements:     Cm        Mid-patella   Left  41.5   Right   40.5     Lachmans  [x] Neg    [] Pos Posterior Drawer [] Neg    [] Pos  Pivot Shift  [] Neg    [] Pos Posterior Sag  [] Neg    [] Pos  ANN   [] Neg    [] Pos   ALRI   [] Neg    [] Pos Squat   [] Neg    [x] Pos  Valgus@ 0 Degrees [x] Neg    [] Pos Naya [] Neg    [] Pos  Valgus@ 30 Degrees [x] Neg    [] Pos Patellar Apprehension [] Neg    [] Pos  Varus@ 0 Degrees [x] Neg    [] Pos Noble's  Compression [] Neg    [] Pos  Varus@ 30 Degrees [x] Neg    [] Pos Ely's Test  [] Neg    [] Pos  Apley's Compression [] Neg    [] Pos Daniele's Test  [] Neg    [] Pos  Apley's Distraction [] Neg    [] Pos Stroke Test  [] Neg    [] Pos   Anterior Drawer [] Neg    [] Pos Fluctuation Test [] Neg    [] Pos  Thessaly's Test:                  [] Neg    [] Pos     Lever Sign:              Other tests/comments:       Pain Level (0-10 scale) post treatment: 1-2    ASSESSMENT/Changes in Function: Patient presents s/p left knee arthroscopic partial medial and lateral menisectomies performed 2/1/2021 with deficits in left knee AROM and strength, altered gait, moderate edema, moderate pain and limited activity tolerance. Patient will continue to benefit from skilled PT services to modify and progress therapeutic interventions, address functional mobility deficits, address ROM deficits, address strength deficits, analyze and address soft tissue restrictions, analyze and cue movement patterns, analyze and modify body mechanics/ergonomics and assess and modify postural abnormalities to attain remaining goals.      []  See Plan of Care  []  See progress note/recertification  []  See Discharge Summary         Progress towards goals / Updated goals:  See POC    PLAN  []  Upgrade activities as tolerated     [x]  Continue plan of care  []  Update interventions per flow sheet       []  Discharge due to:_  []  Other:_      Soo Agustin, PT 2/10/2021  11:56 AM

## 2021-02-10 NOTE — PROGRESS NOTES
[facility] at the [address], [place], [address] [facility]  Phone: [phone]      Fax:  [phone]       Plan of Care/ Statement of Necessity for Physical Therapy Services      Patient name: Ayad Arzola Start of Care: 2/10/2021   Referral source: Lin ClatsopDenae bingham : 1955    Medical Diagnosis: Left knee pain [M25.562]   Onset Date: 2021    Treatment Diagnosis: left knee pain   Prior Hospitalization: see medical history Provider#: [id number]   Medications: Verified on Patient summary List    Comorbidities: RA, HTN, hypothyroid, osteoporosis, depression, asthma, hepatitis C   Prior Level of Function: walking 40-60 minutes 2x/wk      The Plan of Care and following information is based on the information from the initial evaluation. Assessment/ key information: Patient presents s/p left knee arthroscopic partial medial and lateral menisectomies performed 2021 with deficits in left knee AROM and strength, altered gait, moderate edema, moderate pain and limited activity tolerance. Patient will continue to benefit from skilled PT services to modify and progress therapeutic interventions, address functional mobility deficits, address ROM deficits, address strength deficits, analyze and address soft tissue restrictions, analyze and cue movement patterns, analyze and modify body mechanics/ergonomics and assess and modify postural abnormalities to attain remaining goals.     Evaluation Complexity History MEDIUM  Complexity : 1-2 comorbidities / personal factors will impact the outcome/ POC ; Examination LOW Complexity : 1-2 Standardized tests and measures addressing body structure, function, activity limitation and / or participation in recreation  ;Presentation LOW Complexity : Stable, uncomplicated  ;Clinical Decision Making MEDIUM Complexity : FOTO score of 26-74  Overall Complexity Rating: LOW   Problem List: pain affecting function, decrease ROM, decrease strength, edema affecting function, impaired gait/ balance, decrease ADL/ functional abilitiies, decrease activity tolerance and decrease flexibility/ joint mobility   Treatment Plan may include any combination of the following: Therapeutic exercise, Therapeutic activities, Neuromuscular re-education, Physical agent/modality, Gait/balance training, Manual therapy, Patient education, Self Care training, Functional mobility training, Home safety training and Stair training  Patient / Family readiness to learn indicated by: asking questions, trying to perform skills and interest  Persons(s) to be included in education: patient (P)  Barriers to Learning/Limitations: None  Patient Goal (s): \"I want to get back to my normal walking. \"  Patient Self Reported Health Status: good  Rehabilitation Potential: good    Short Term Goals: To be accomplished in 4 weeks:   Patient will report compliance with HEP at least 1x/day to aid in rehabilitation program.   Status at IE: Patient instructed in and provided written copy of initial Home Exercise Program.   Current: Same as IE     Patient will demonstrate AROM of 0 to 140 degrees to aid in completion of ADLs. Status at IE:5 to 105 degrees. Current: Same as IE       Long Term Goals: To be accomplished in 8 weeks:   Patient will increase strength to 5/5 throughout B LEs to aid in completion of ADLs. Status at IE: left knee ext 3+, flex 4-   Current: Same as IE     Patient will report pain less than 1-2/10 average to aid in completion of ADLs. Status at IE: 4-10/10   Current: Same as IE     Patient will perform 5 or more pain free squats with good form/technique to aid in completion of ADLs. Status at IE: unable to perform one partial squat without increased pain. Current: Same as IE     Patient will improve FOTO (an established functional score where 100 = no disability) to 57 points overall to demonstrate improvement in functional ability.    Status at IE:43   Current: Same as IE     Frequency / Duration: Patient to be seen 2 times per week for 8 weeks. Patient/ Caregiver education and instruction: Diagnosis, prognosis, self care, activity modification and exercises   [x]  Plan of care has been reviewed with PTA    Certification Period: 2/10/2021 to 5/6/2021  Claudette Crazier, PT 2/10/2021 12:48 PM  _____________________________________________________________________  I certify that the above Therapy Services are being furnished while the patient is under my care. I agree with the treatment plan and certify that this therapy is necessary.     [de-identified] Signature:____________Date:_________TIME:________                                      MARIS Maza    ** Signature, Date and Time must be completed for valid certification **    Please sign and return to [facility] at the [address], [address] [facility] [address]       Phone: [phone]      Fax:  [phone]

## 2021-02-11 ENCOUNTER — HOSPITAL ENCOUNTER (OUTPATIENT)
Dept: PHYSICAL THERAPY | Age: 66
Discharge: HOME | End: 2021-02-11
Payer: MEDICARE

## 2021-02-11 PROCEDURE — 97110 THERAPEUTIC EXERCISES: CPT

## 2021-02-11 PROCEDURE — 97530 THERAPEUTIC ACTIVITIES: CPT

## 2021-02-11 PROCEDURE — 97016 VASOPNEUMATIC DEVICE THERAPY: CPT

## 2021-02-11 NOTE — PROGRESS NOTES
PT DAILY TREATMENT NOTE    Patient Name: Rima Mackey  Date:2021  : 1955  [x]  Patient  Verified  Payor: Lorrie Lois / Plan: [employer] HMO/CHOICE PLUS/POS / Product Type: HMO /    In time: 752  Out time: 332  Total Treatment Time (min): 65  Total Timed Codes (min): 55  1:1 Treatment Time ( [address] only): 50   Visit #: 2 of 16    Treatment Area: Left knee pain [M25.562]    SUBJECTIVE  Pain Level (0-10 scale): 4  Any medication changes, allergies to medications, adverse drug reactions, diagnosis change, or new procedure performed?: [x] No    [] Yes (see summary sheet for update)  Subjective functional status/changes:   [] No changes reported  \"It is feeling kind of stiff and sore today. \"    OBJECTIVE    Modalities Rationale:     decrease edema and decrease pain to improve patient's ability to Complete ADLS  10 min [x]  Vasopneumatic Device, press/temp: Medium 40    min []  Other:    [x] Skin assessment post-treatment (if applicable):    [x]  intact    []  redness- no adverse reaction     []redness - adverse reaction:        40 min Therapeutic Exercise:  [x] See flow sheet :   Rationale: increase ROM, increase strength and decrease pain to improve the patients ability to complete ADLs    10 min Therapeutic Activity:  [x]  See flow sheet :   Rationale: increase ROM, increase strength and improve coordination  to improve the patients ability to Complete ADLS      With   [] TE   [] TA   [] neuro   [] other: Patient Education: [x] Review HEP    [] Progressed/Changed HEP based on:   [] positioning   [] body mechanics   [] transfers   [] heat/ice application    [] other:      Other Objective/Functional Measures: NA     Pain Level (0-10 scale) post treatment: 2    ASSESSMENT/Changes in Function: Patient instructed further in proper exercise technique for HEP.  Provided further training and practice in stairs to assist patient in confidence with stair ambulation as she is currently staying with son who has home with stairs because she is afraid of managing the 14 stairs inside her own home. Patient responds well to treatment session. No adverse effects were noted from today's treatment session. Patient will continue to benefit from skilled PT services to modify and progress therapeutic interventions, address functional mobility deficits, address ROM deficits, address strength deficits, analyze and address soft tissue restrictions, analyze and cue movement patterns, analyze and modify body mechanics/ergonomics, assess and modify postural abnormalities,  and instruct in home and community integration to attain remaining goals. []  See Plan of Care  []  See progress note/recertification  []  See Discharge Summary         Progress towards goals / Updated goals:  Short Term Goals: To be accomplished in 4 weeks:                   Patient will report compliance with HEP at least 1x/day to aid in rehabilitation program.                   Status at IE: Patient instructed in and provided written copy of initial Home Exercise Program.                   Current: Same as IE                      Patient will demonstrate AROM of 0 to 140 degrees to aid in completion of ADLs. Status at IE:5 to 105 degrees. Current: PROM 3 to 117 degrees. 2/11/2021        Long Term Goals: To be accomplished in 8 weeks:                   Patient will increase strength to 5/5 throughout B LEs to aid in completion of ADLs. Status at IE: left knee ext 3+, flex 4-                   Current: Same as IE                      Patient will report pain less than 1-2/10 average to aid in completion of ADLs. Status at IE: 4-10/10                   Current: Same as IE                      Patient will perform 5 or more pain free squats with good form/technique to aid in completion of ADLs.                    Status at IE: unable to perform one partial squat without increased pain. Current: Same as IE                      Patient will improve FOTO (an established functional score where 100 = no disability) to 57 points overall to demonstrate improvement in functional ability.                    Status at IE:43                   Current: Same as IE    PLAN  []  Upgrade activities as tolerated     [x]  Continue plan of care  []  Update interventions per flow sheet       []  Discharge due to:_  []  Other:_      Steph Chavira, PT, DPT 2/11/2021  2:38 PM    Future Appointments   Date Time Provider Aniya Plummeristi   3/1/2021 10:35 AM MELANIE Katz BS AMB   3/3/2021  2:00 PM MARIS Modi [facility] BS AMB   4/8/2021 11:30 AM Mita Davis MD [facility] BS AMB

## 2021-02-17 ENCOUNTER — HOSPITAL ENCOUNTER (OUTPATIENT)
Dept: PHYSICAL THERAPY | Age: 66
Discharge: HOME | End: 2021-02-17
Payer: MEDICARE

## 2021-02-17 PROCEDURE — 97110 THERAPEUTIC EXERCISES: CPT

## 2021-02-17 PROCEDURE — 97530 THERAPEUTIC ACTIVITIES: CPT

## 2021-02-17 PROCEDURE — 97112 NEUROMUSCULAR REEDUCATION: CPT

## 2021-02-17 PROCEDURE — 97016 VASOPNEUMATIC DEVICE THERAPY: CPT

## 2021-02-17 NOTE — PROGRESS NOTES
PT DAILY TREATMENT NOTE    Patient Name: Breanna Schulte  Date:2021  : 1955  [x]  Patient  Verified  Payor: Irma Coad / Plan: [address] HMO/CHOICE PLUS/POS / Product Type: HMO /    In time:400  Out time:455  Total Treatment Time (min): 55  Total Timed Codes (min): 45  1:1 Treatment Time ([facility] only): 39    Visit #: 3 of 16    Treatment Area: Left knee pain [M25.562]    SUBJECTIVE  Pain Level (0-10 scale): 0  Any medication changes, allergies to medications, adverse drug reactions, diagnosis change, or new procedure performed?: [x] No    [] Yes (see summary sheet for update)  Subjective functional status/changes:   [] No changes reported  \"I am limiting the stairs to about 5x each day. \"    OBJECTIVE  Modalities Rationale:     decrease pain and increase tissue extensibility to improve patient's ability to return to PLOF   min [] Estim, type/location:                                      []  att     []  unatt     []  w/US     []  w/ice    []  w/heat    min []  Mechanical Traction: type/lbs                   []  pro   []  sup   []  int   []  cont    []  before manual    []  after manual    min []  Ultrasound, settings/location:      min []  Iontophoresis w/ dexamethasone, location:                                               []  take home patch       []  in clinic    min []  Ice     []  Heat    location/position:    10 min [x]  Vasopneumatic Device, press/temp: Left knee in supine    min []  Other:            20 min Therapeutic Exercise:  [x] See flow sheet :   Rationale: increase ROM and increase strength to improve the patients ability to return to PLOF    15 min Therapeutic Activity:  [x]  See flow sheet :   Rationale: increase proprioception and patient education, updated HEP  to improve the patients ability to perform ADL      10 min Neuromuscular Reeducation:  [x]  See flow sheet :   Rationale: increase proprioception and patient education, updated HEP  to improve the patients ability to perform ADL              With   [x] TE   [] TA   [] neuro   [] other: Patient Education: [x] Review HEP    [] Progressed/Changed HEP based on:   [] positioning   [] body mechanics   [] transfers   [] heat/ice application    [] other:      Other Objective/Functional Measures: left knee Flex 120  3 way hip performed without TB due to pain during previous session     Pain Level (0-10 scale) post treatment: 0    ASSESSMENT/Changes in Function: Good tolerance to all activities with increased mobility    Patient will continue to benefit from skilled PT services to address ROM deficits, address strength deficits and analyze and address soft tissue restrictions to attain remaining goals. [x]  See Plan of Care      Progress towards goals / Updated goals:  Short Term Goals: To be accomplished in 4 weeks:                   EDRFZEQ will report compliance with HEP at least 1x/day to aid in rehabilitation program.                   Status at IE: Patient instructed in and provided written copy of initial Home Exercise Program.                   Current: 2/17/21, compliant with HEP, progressing                      Patient will demonstrate AROM of 0 to 140 degrees to aid in completion of ADLs.                  Status at IE:5 to 105 degrees.                  Current: PROM 3 to 117 degrees. 2/11/2021  Status on 2/17/21: Flex 120, no pain, progressing        Long Term Goals: To be accomplished in 8 weeks:                   Patient will increase strength to 5/5 throughout B LEs to aid in completion of ADLs.                  Status at IE: left knee ext 3+, flex 4-                   Current: Same as IE                      Patient will report pain less than 1-2/10 average to aid in completion of ADLs.                    Status at IE: 4-10/10                   Current: 2/17/21: pain ranging 0-2/10, progressing                      Patient will perform 5 or more pain free squats with good form/technique to aid in completion of ADLs.                  Status at IE: unable to perform one partial squat without increased pain.                   Current: Same as IE                      Patient will improve FOTO (an established functional score where 100 = no disability) to 57 points overall to demonstrate improvement in functional ability.                    Status at IE:43                   Current: Same as IE       PLAN  []  Upgrade activities as tolerated     []  Continue plan of care  []  Update interventions per flow sheet       []  Discharge due to:_  []  Other:_      Terrance Evangelista, PT 2/17/2021  4:22 PM    Future Appointments   Date Time Provider Aniya Brady   2/22/2021  8:45 AM Kimmy Miller, PT [facility]   2/24/2021 11:00 [facility]   3/1/2021 10:35 AM Addie Osler, NP [facility] BS AMB   3/2/2021  1:45 PM Kimmy Miller, PT [facility]   3/3/2021  2:00 PM MARIS Padilla [facility] BS AMB   3/4/2021  2:30 PM Kimmy Miller, [facility]   4/8/2021 11:30 AM Carmen Garcia MD [facility] BS AMB

## 2021-02-19 ENCOUNTER — TELEPHONE (OUTPATIENT)
Dept: ORTHOPEDIC SURGERY | Age: 66
End: 2021-02-19

## 2021-02-19 NOTE — TELEPHONE ENCOUNTER
Called patient to reschedule 4/8/21 appt. Patient stated that she had to put PT for her back on hold due to having surgery on her knee. Patient will call back to have PT reordered once she has completed PT for her knee.

## 2021-02-22 ENCOUNTER — APPOINTMENT (OUTPATIENT)
Dept: PHYSICAL THERAPY | Age: 66
End: 2021-02-22
Payer: MEDICARE

## 2021-02-24 ENCOUNTER — APPOINTMENT (OUTPATIENT)
Dept: PHYSICAL THERAPY | Age: 66
End: 2021-02-24
Payer: MEDICARE

## 2021-03-01 ENCOUNTER — OFFICE VISIT (OUTPATIENT)
Dept: HEMATOLOGY | Age: 66
End: 2021-03-01
Payer: MEDICARE

## 2021-03-01 ENCOUNTER — HOSPITAL ENCOUNTER (OUTPATIENT)
Dept: LAB | Age: 66
Discharge: HOME | End: 2021-03-01
Payer: COMMERCIAL

## 2021-03-01 VITALS
HEART RATE: 97 BPM | TEMPERATURE: 97 F | SYSTOLIC BLOOD PRESSURE: 118 MMHG | BODY MASS INDEX: 33.32 KG/M2 | DIASTOLIC BLOOD PRESSURE: 84 MMHG | RESPIRATION RATE: 16 BRPM | WEIGHT: 200 LBS | OXYGEN SATURATION: 98 % | HEIGHT: 65 IN

## 2021-03-01 DIAGNOSIS — K74.60 CIRRHOSIS OF LIVER WITHOUT ASCITES, UNSPECIFIED HEPATIC CIRRHOSIS TYPE (HCC): ICD-10-CM

## 2021-03-01 DIAGNOSIS — K74.60 CIRRHOSIS OF LIVER WITHOUT ASCITES, UNSPECIFIED HEPATIC CIRRHOSIS TYPE (HCC): Primary | ICD-10-CM

## 2021-03-01 PROBLEM — G62.9 NEUROPATHY: Status: ACTIVE | Noted: 2021-03-01

## 2021-03-01 LAB
ALBUMIN SERPL-MCNC: 4 G/DL (ref 3.4–5)
ALBUMIN/GLOB SERPL: 1.3 {RATIO} (ref 0.8–1.7)
ALP SERPL-CCNC: 65 U/L (ref 45–117)
ALT SERPL-CCNC: 27 U/L (ref 13–56)
ANION GAP SERPL CALC-SCNC: 7 MMOL/L (ref 3–18)
AST SERPL-CCNC: 18 U/L (ref 10–38)
BASOPHILS # BLD: 0 K/UL (ref 0–0.1)
BASOPHILS NFR BLD: 1 % (ref 0–2)
BILIRUB DIRECT SERPL-MCNC: 0.2 MG/DL (ref 0–0.2)
BILIRUB SERPL-MCNC: 0.6 MG/DL (ref 0.2–1)
BUN SERPL-MCNC: 11 MG/DL (ref 7–18)
BUN/CREAT SERPL: 14 (ref 12–20)
CALCIUM SERPL-MCNC: 9.4 MG/DL (ref 8.5–10.1)
CHLORIDE SERPL-SCNC: 101 MMOL/L (ref 100–111)
CO2 SERPL-SCNC: 29 MMOL/L (ref 21–32)
CREAT SERPL-MCNC: 0.81 MG/DL (ref 0.6–1.3)
DIFFERENTIAL METHOD BLD: ABNORMAL
EOSINOPHIL # BLD: 0.1 K/UL (ref 0–0.4)
EOSINOPHIL NFR BLD: 1 % (ref 0–5)
ERYTHROCYTE [DISTWIDTH] IN BLOOD BY AUTOMATED COUNT: 12.4 % (ref 11.6–14.5)
GLOBULIN SER CALC-MCNC: 3 G/DL (ref 2–4)
GLUCOSE SERPL-MCNC: 95 MG/DL (ref 74–99)
HCT VFR BLD AUTO: 42.3 % (ref 35–45)
HGB BLD-MCNC: 14.5 G/DL (ref 12–16)
INR PPP: 1.1 (ref 0.8–1.2)
LYMPHOCYTES # BLD: 1 K/UL (ref 0.9–3.6)
LYMPHOCYTES NFR BLD: 23 % (ref 21–52)
MCH RBC QN AUTO: 29.8 PG (ref 24–34)
MCHC RBC AUTO-ENTMCNC: 34.3 G/DL (ref 31–37)
MCV RBC AUTO: 86.9 FL (ref 74–97)
MONOCYTES # BLD: 0.3 K/UL (ref 0.05–1.2)
MONOCYTES NFR BLD: 7 % (ref 3–10)
NEUTS SEG # BLD: 2.8 K/UL (ref 1.8–8)
NEUTS SEG NFR BLD: 68 % (ref 40–73)
PLATELET # BLD AUTO: 130 K/UL (ref 135–420)
PMV BLD AUTO: 9.7 FL (ref 9.2–11.8)
POTASSIUM SERPL-SCNC: 3.9 MMOL/L (ref 3.5–5.5)
PROT SERPL-MCNC: 7 G/DL (ref 6.4–8.2)
PROTHROMBIN TIME: 14.4 SEC (ref 11.5–15.2)
RBC # BLD AUTO: 4.87 M/UL (ref 4.2–5.3)
SODIUM SERPL-SCNC: 137 MMOL/L (ref 136–145)
WBC # BLD AUTO: 4.2 K/UL (ref 4.6–13.2)

## 2021-03-01 PROCEDURE — 82107 ALPHA-FETOPROTEIN L3: CPT

## 2021-03-01 PROCEDURE — 85610 PROTHROMBIN TIME: CPT

## 2021-03-01 PROCEDURE — 85025 COMPLETE CBC W/AUTO DIFF WBC: CPT

## 2021-03-01 PROCEDURE — 36415 COLL VENOUS BLD VENIPUNCTURE: CPT

## 2021-03-01 PROCEDURE — 80076 HEPATIC FUNCTION PANEL: CPT

## 2021-03-01 PROCEDURE — 99214 OFFICE O/P EST MOD 30 MIN: CPT | Performed by: NURSE PRACTITIONER

## 2021-03-01 PROCEDURE — 80048 BASIC METABOLIC PNL TOTAL CA: CPT

## 2021-03-01 NOTE — PROGRESS NOTES
Britney Belcher MD, Luana, Cite Effie Paz, Leon Shone, MD Omar Hockey, PA-LEANA Wong, ACNP-BC     Rosa Ernandez, AGPCNP-BC   Shahnaz Gibbs, FNP-C    Mick Gonzalez, [facility]-BC       Darien Lea Count includes the [facility] 136    at [facility]    [address] [facility], [address] [place] [address], [facility] 22.    [phone]    FAX: [address] [place] [address], [address]    [phone]    FAX: [phone]         Patient Care Team:  MARIS Mckeon as PCP - General (Nurse Practitioner)  Marvin Kong MD (Rheumatology)  Hi Smith MD (Neurology)  Doak Rinne, MD (Psychiatry)      Problem List  Date Reviewed: 2/8/2021          Codes Class Noted    Intrinsic (urethral) sphincter deficiency (ISD) ICD-10-CM: B20.81  ICD-9-CM: 599.82  5/3/2017        Microscopic hematuria ICD-10-CM: R31.29  ICD-9-CM: 599.72  3/26/2017        Nocturia ICD-10-CM: R35.1  ICD-9-CM: 788.43  4/4/2017        RAMONA (stress urinary incontinence, female) ICD-10-CM: N39.3  ICD-9-CM: 625.6  4/4/2017        Hepatitis C virus infection cured after antiviral drug therapy ICD-10-CM: Z86.19  ICD-9-CM: V12.09  10/1/2020        HCV antibody positive ICD-10-CM: R76.8  ICD-9-CM: 795.79  4/11/2019    Overview Signed 4/11/2019  4:45 PM by Mirna Alvarez NP     HCV treated and cured with SOF + ROBBY in 2014             Severe obesity ([address] ICD-10-CM: E66.01  ICD-9-CM: 278.01  11/23/2018        Negative depression screening ICD-10-CM: Z13.31  ICD-9-CM: V79.0  7/3/2018        Advanced care planning/counseling discussion ICD-10-CM: Z71.89  ICD-9-CM: V65.49  7/3/2018        Chronic back pain ICD-10-CM: M54.9, G89.29  ICD-9-CM: 724.5, 338.29  9/23/2015    Overview Signed 9/23/2015 12:00 PM by Lazarus Chough, NP     Nerve ablation 09/16/2015. Hypothyroidism ICD-10-CM: E03.9  ICD-9-CM: 244.9  2/6/2013        Rheumatoid arthritis(714.0) ICD-10-CM: M06.9  ICD-9-CM: 714.0  2/6/2013        Carpal tunnel syndrome ICD-10-CM: G56.00  ICD-9-CM: 354.0  2/6/2013              Chai Cabrera returns to the [address] for education and management of cirrhosis. The active problem list, all pertinent past medical history, medications, liver histology, endoscopic studies, radiologic findings and laboratory findings related to the liver disorder were reviewed with the patient. The patient is a 60 y.o.  female who was noted to have abnormalities in liver chemistries and subsequently tested positive for chronic HCV in 1990s. Risk factors for acquiring HCV are IV drug use in 1980s. There was no history of acute incteric hepatitis at the time of these risk factors. She was treated for HCV and remained SVR five years post treatment. She began treatment on 05/15/2014 with dual therapy of SOF/SIM and completed her 12 weeks of therapy on 08/05/2014. Ultrasound with shear wave elastography was performed in 10/2018. The ultrasound demonstrates a coarse heterogeneous echotexture. No focal mass. The shear wave elastography suggests that the cirrhosis has resolved. Fibroscan analysis performed in 10/220 indicates fatty liver and bridging fibrosis/cirrhosis, Metavir fibrosis score of F3/F4. A liver biopsy has not been performed. The patient was treated with standard interferon in 1997 and with standard interferon and ribavirin in 1999. Both treatments lasted for 24 weeks. The patient tolerated treatment reasonably well. HCV RNA levels obtained during treatment are not available at this time. The patient is unaware of the virologic response pattern.       The most recent laboratory studies indicate that the liver transaminases are normal, alkaline phosphatase is normal, tests of hepatic synthetic and metabolic function are normal, and the platelet count is depressed. The patient has no complaints which can be attributed to liver disease. The patient completes all daily activities without any functional limitations. The patient has not experienced fatigue, fevers, chills, shortness of breath, chest pain, pain in the right side over the liver, diffuse abdominal pain, nausea, vomiting, constipation, diarrhrea, dry eyes, dry mouth, arthralgias, myalgias, yellowing of the eyes or skin, itching, dark urine, problems concentrating, swelling of the abdomen, swelling of the lower extremities, hematemesis, or hematochezia. Since the last office appointment:  Had surgery on left knee torn meniscus 4 02/01/2021. Has lost 5 pounds. 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 The HCV has been treated and cured. Elastography, imaging, and labs suggest that the cirrhosis is resolving post eradication of the HCV. The most recent laboratory studies indicate that the liver transaminases are normal, alkaline phosphatase is normal,  tests of hepatic synthetic and metabolic function are depressed, and the platelet count is depressed. Complications of cirrhosis were discussed in detail. We discussed thrombocytopenia, portal hypertension, varices, GI bleeding, peripheral edema, ascites, hepatic encephalopathy, and hepatocellular carcinoma. We discussed the need for every 6 month liver imaging studies. Hepatic encephalopathy has not developed. Edema. Resolved. Diuretics have been discontinued. [address] screening. Ultrasound was recently performed and does not demonstrate any sign of developing HCC. Repeat imaging ordered today to be performed in 06/2021. The need to perform an assessment of liver fibrosis was discussed with the patient.   Fibroscan can assess liver fibrosis and determine if a patient has advanced fibrosis or cirrhosis without the need for liver biopsy. This was performed in the office in 10/2020 and suggests a Metavir fibrosis score of F3/F4 with fatty liver. Fibroscan will be repeated annually in the office or as often as clinically indicated. HCV. The patient was previously treated for HCV with standard interferon and ribavirin in the 1990s. There was a non-response that was not clearly defined. She was retreated with SOF/SIM in [address] and cured. The patient was directed to continue all current medications at the current dosages except for the NSAIDs, which she should stop. There are no contraindications for the patient to take any medications that are necessary for treatment of other medical issues. The patient was counseled regarding alcohol consumption. Vaccination for viral hepatitis A is not needed. The patient has serologic evidence of prior exposure or vaccination with immunity. ALLERGIES  Allergies   Allergen Reactions    Latex Rash    Adhesive Tape-Silicones Rash       MEDICATIONS:  Current Outpatient Medications   Medication Sig Dispense Refill    flaxseed oil 1,000 mg cap Take 1,000 mg by mouth daily.  magnesium 250 mg tab Take 250 mg by mouth daily.  CYANOCOBALAMIN, VITAMIN B-12, Take 500 mcg by mouth daily.  tiZANidine (ZANAFLEX) 2 mg tablet Take 1 Tab by mouth three (3) times daily as needed for Muscle Spasm(s). 90 Tab 5    CALCIUM PO Take 1 Tab by mouth daily.  vitamin E acetate (VITAMIN E PO) Take 1 Cap by mouth.  hydroCHLOROthiazide (HYDRODIURIL) 12.5 mg tablet Take 1 Tab by mouth daily. 90 Tab 1    levothyroxine (SYNTHROID) 125 mcg tablet take 1 tablet by mouth once daily BEFORE BREAKFAST 90 Tab 1    PROAIR HFA 90 mcg/actuation inhaler Take 2 Puffs by inhalation every six (6) hours as needed for Wheezing.  1 Inhaler 3    omeprazole (PRILOSEC) 20 mg capsule take 1 capsule by mouth once daily 90 Cap 1    guaiFENesin ER (MUCINEX) 600 mg ER tablet Take 2 Tabs by mouth two (2) times daily as needed for Congestion. 60 Tab 2    multivitamin (ONE A DAY) tablet Take 1 Tab by mouth daily.  diclofenac (VOLTAREN) 1 % gel apply 2 grams to affected area four times a day  0    zolpidem (AMBIEN) 10 mg tablet Take 10 mg by mouth nightly as needed. 0    benztropine (COGENTIN) 1 mg tablet Take 1 mg by mouth three (3) times daily.  ARIPiprazole (ABILIFY) 5 mg tablet Take 15 mg by mouth daily. REVIEW OF SYSTEMS:  Systems related to all organ systems were reviewed. All were negative except as noted above. FAMILY/SOCIAL HISTORY:  The patient is . The patient has 2 children, and 4 grandchildren. The patient used to smoke a little but stopped in 2005. She used to consumed alcohol in excess. The patient has been abstinent from alcohol since 5/2012. 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 The patient is currently receiving disability. PHYSICAL EXAMINATION:  Visit Vitals  /84 (BP 1 Location: Left upper arm, BP Patient Position: Sitting, BP Cuff Size: Small adult)   Pulse 97   Temp 97 °F (36.1 °C)   Resp 16   Ht 5' 5\" (1.651 m)   Wt 200 lb (90.7 kg)   SpO2 98%   BMI 33.28 kg/m²     General: No acute distress. Eyes: Sclera anicteric. ENT: No oral lesions. Thyroid normal.  Nodes: No adenopathy. Skin: No spider angiomata. No jaundice. No palmar erythema. Respiratory: Lungs clear to auscultation. Cardiovascular: Regular heart rate. No murmurs. No JVD. Abdomen: Soft non-tender. Liver size normal to percussion/palpation. Spleen not palpable. No obvious ascites. Extremities: No lower extremity edema. No muscle wasting. No gross arthritic changes. Neurologic: Alert and oriented. Cranial nerves grossly intact. No asterixsis.     LABORATORY STUDIES:   [facility] of [address] [place] [address] & Units 10/1/2020 6/30/2020   WBC 4.6 - 13.2 K/uL 4.7 4.4 (L)   ANC 1.8 - 8.0 K/UL 3.4 3.2   HGB 12.0 - 16.0 g/dL 14.0 14.3    - 420 K/uL 121 (L) 113 (L)   INR 0.8 - 1.2   1.0 1.1   AST 10 - 38 U/L 23 20   ALT 13 - 56 U/L 33 27   Alk Phos 45 - 117 U/L 65 68   Bili, Total 0.2 - 1.0 MG/DL 1.1 (H) 0.5   Bili, Direct 0.0 - 0.2 MG/DL 0.2 0.2   Albumin 3.4 - 5.0 g/dL 4.0 3.9   BUN 7.0 - 18 MG/DL 7 8   Creat 0.6 - 1.3 MG/DL 0.76 1.00   Na 136 - 145 mmol/L 137 134 (L)   K 3.5 - 5.5 mmol/L 3.6 3.5   Cl 100 - 111 mmol/L 103 102   CO2 21 - 32 mmol/L 29 26   Glucose 74 - 99 mg/dL 99 103 (H)       SEROLOGIES:  Serologies Latest Ref Rng & Units 10/3/2016 5/10/2016   HCV RT-PCR, Quant IU/mL HCV Not Detected HCV Not Detected   HCV RT-PCR, Quant IU/mL       Serologies Latest Ref Rng 2/6/2013   Hep A Ab, Total Negative Positive (A)   Hep B Surface Ag Negative Negative   Hep B Core Ab, Total Negative Negative   Hep B Surface Ab 0.00 - 0.99 Index Value 0.52   Hep C Genotype See Note 1a   IL28B Genotype  CT genotype   Ferritin 13 - 150 ng/mL 464 (H)   Iron % Saturation 15 - 55 % 67 (H)     2/2013. HCV RNA Log 6.36 IU/ml. Eradicated. LIVER HISTOLOGY:  12/2016. Shear wave elastography. E Median is 11.66, suggestive of F3, bridging fibrosis. Wide E Std of 4.41 is suggestive of fatty liver disease. 10/2017. TRANSIENT HEPATIC ELASTOGRAPHY:  E Range: 7-12.0 kPa (previously 9.1-23.0), E Mean: 9.3 kPa (previously 13.05),   E Median: 8.6 kPa (recently 11.7), E Std: 2.0 kPa (previously 4.4)     10/2018. TRANSIENT HEPATIC ELASTOGRAPHY:   E Range: 7.18-10.40 kPa  E Mean: 8.80 kPa  E Median: 8.75 kPa  E Std: 1.16 kPa    10/2020. FibroScan performed at [address]. EkPa was 12.4. IQR/med 22%. . The results suggested a fibrosis level of F3/F4. The CAP score suggests fatty liver. ENDOSCOPIC PROCEDURES:  09/2014. EGD by Dr. Vilma Cr. Normal esophagus. H.Pylori is negative. RADIOLOGY:  04/2014. Ultrasound of liver. Consistent with cirrhosis. No masses. 10/2014. Ultrasound of liver. Consistent with cirrhosis. No masses. 04/2015. Ultrasound of the liver. Consistent with cirrhosis. No mention of masses in the report. 10/2015. Ultrasound of the liver. Consistent with cirrhosis. No mention of masses in the report.  06/2016. Ultrasound of the liver. Heterogeneous appearing hepatic echotexture without mass. 12/2016. Ultrasound of the liver. Coarse heterogeneous echotexture. No focal mass. 10/2018. Ultrasound of the liver, performed with elastography. Persistent sonographic findings of hepatic cirrhosis. No focal hepatic mass. 04/2018. Ultrasound of the liver. The liver has a heterogeneous echotexture with a nodular contour, compatible with known cirrhosis. No focal hepatic lesions are evident. 10/2018. Ultrasound of the liver. Sonographic findings of hepatic cirrhosis and steatosis. No focal hepatic mass. 04/2019. Ultrasound of the liver. Coarsened hepatic echotexture and lobular surface contour noted as previously. No focal mass. 11/2019. Ultrasound f the liver. Heterogeneous liver with lobular contour compatible with the history of cirrhosis. No suspicious liver masses are demonstrated. 06/2020. Ultrasound of the liver. Cirrhosis. No focal hepatic lesion. 12/2020. Ultrasound of the liver. Cirrhotic hepatic morphology without focal hepatic lesion. OTHER TESTING:  Not available or performed    [address] in 6 months.     BERNY eCe  Liver [place] of [address], [address] [place] [address] Francisca Mcmillan, [address] [place] [address]   [phone]

## 2021-03-02 ENCOUNTER — HOSPITAL ENCOUNTER (OUTPATIENT)
Dept: PHYSICAL THERAPY | Age: 66
Discharge: HOME | End: 2021-03-02
Payer: MEDICARE

## 2021-03-02 LAB
AFP L3 MFR SERPL: NORMAL % (ref 0–9.9)
AFP SERPL-MCNC: 3 NG/ML (ref 0–8)

## 2021-03-02 PROCEDURE — 97112 NEUROMUSCULAR REEDUCATION: CPT

## 2021-03-02 PROCEDURE — 97110 THERAPEUTIC EXERCISES: CPT

## 2021-03-02 NOTE — PROGRESS NOTES
[facility] [address], [place], [address] [facility]  Phone: [phone]      Fax:  [phone]    Progress Note  Patient name: Vanessa Adames Start of Care: 2/10/2021   Referral source: Fili Mustafa, Cosme Galloway : 1955               Medical Diagnosis: Left knee pain [M25.562]    Onset Date: 2021               Treatment Diagnosis: left knee pain   Prior Hospitalization: see medical history Provider#: [id number]   Medications: Verified on Patient summary List    Comorbidities: RA, HTN, hypothyroid, osteoporosis, depression, asthma, hepatitis C   Prior Level of Function: walking 40-60 minutes 2x/wk                                Visits from Start of Care: 4    Missed Visits: 0    Progress towards goals / Updated goals:  Short Term Goals: To be accomplished in 4 weeks:                   Patient will report compliance with HEP at least 1x/day to aid in rehabilitation program.                   Status at IE: Patient instructed in and provided written copy of initial Home Exercise Program.                   Current: 21, compliant with HEP, progressing                      Patient will demonstrate AROM of 0 to 140 degrees to aid in completion of ADLs.                  Status at IE:5 to 105 degrees.                  Current: PROM 3 to 117 degrees.   2021  Status on 3/2/21: Flex 130, no pain, progressing        Long Term Goals: To be accomplished in 8 weeks:                   Patient will increase strength to 5/5 throughout B LEs to aid in completion of ADLs.                  Status at IE: left knee ext 3+, flex 4-                   Current: 3/2/21: good functional strength, MMT left LE 4/5, progressing                      Patient will report pain less than 1-2/10 average to aid in completion of ADLs.                    Status at IE: 4-10/10                   Current: 21: pain ranging 0-2/10, progressing  Status on 3/2/21: no pain with ADL, residual mild left lateral knee pain when crossing legs in seated position, MET                      Patient will perform 5 or more pain free squats with good form/technique to aid in completion of ADLs.                  Status at IE: unable to perform one partial squat without increased pain.                   Current: 3/2/21: performing > 10 squats to chair level with minimal left knee discomfort, progressing                      Patient will improve FOTO (an established functional score where 100 = no disability) to 57 points overall to demonstrate improvement in functional ability.                  Status at IE:43                   Current: Same as IE       Key Functional Changes: ADL without pain, normal gait pattern without AD    ASSESSMENT/RECOMMENDATIONS:patient has shown nice improvement in function. Returned to normal gait pattern without AD, and no pain with ADL. Left knee ROM 0-130. I recommend to continue with current treatment to address residual deficits in function.    [x]Continue therapy per initial plan/protocol at a frequency of  2 x per week for 8 weeks/12 remaining visits  []Continue therapy with the following recommended changes:_____________________      _____________________________________________________________________  []Discontinue therapy progressing towards or have reached established goals  []Discontinue therapy due to lack of appreciable progress towards goals  []Discontinue therapy due to lack of attendance or compliance  []Await Physician's recommendations/decisions regarding therapy  []Other:________________________________________________________________    Thank you for this referral.   Jelly Ohara, PT 3/2/2021 2:29 PM

## 2021-03-02 NOTE — PROGRESS NOTES
PT DAILY TREATMENT NOTE    Patient Name: Dunia Manuel  Date:3/2/2021  : 1955  [x]  Patient  Verified  Payor: Kal White / Plan: [address] HMO/CHOICE PLUS/POS / Product Type: HMO /    In time:150  Out time:230  Total Treatment Time (min): 40  Total Timed Codes (min): 40  1:1 Treatment Time (MC/BCBS only): 40    Visit #: 4 of 16    Treatment Area: Left knee pain [M25.562]    SUBJECTIVE  Pain Level (0-10 scale): 0  Any medication changes, allergies to medications, adverse drug reactions, diagnosis change, or new procedure performed?: [x] No    [] Yes (see summary sheet for update)  Subjective functional status/changes:   [] No changes reported  No pain with normal activities for several days. No swelling. Mild discomfort left lateral knee when crossing legs in seated position. MD visit tomorrow    OBJECTIVE        30 min Therapeutic Exercise:  [x] See flow sheet :started SL bridging   Rationale: increase ROM and increase strength to improve the patients ability to return to PLOF       10 min Neuromuscular Re-education:  [x]  See flow sheet :   Rationale: improve coordination, improve balance and increase proprioception  to improve the patients ability to return to PLOF                With   [x] TE   [] TA   [] neuro   [] other: Patient Education: [x] Review HEP    [] Progressed/Changed HEP based on:   [] positioning   [] body mechanics   [] transfers   [] heat/ice application    [] other:      Other Objective/Functional Measures: good tolerance to SL bridging and bosu step ups  Left knee Flex 130, no pain     Pain Level (0-10 scale) post treatment: 0    ASSESSMENT/Changes in Function: excellent tolerance to all activities, residual tightness left lateral thigh/hip region    Patient will continue to benefit from skilled PT services to address strength deficits and assess and modify postural abnormalities to attain remaining goals.      [x]  See Plan of Care    Progress towards goals / Updated goals:  Short Term Goals: To be accomplished in 4 weeks:                   DIQPFMF will report compliance with HEP at least 1x/day to aid in rehabilitation program.                   Status at IE: Patient instructed in and provided written copy of initial Home Exercise Program.                   Current: 2/17/21, compliant with HEP, progressing                      Patient will demonstrate AROM of 0 to 140 degrees to aid in completion of ADLs.                  Status at IE:5 to 105 degrees.                  Current: PROM 3 to 117 degrees.   2/11/2021  Status on 3/2/21: Flex 130, no pain, progressing        Long Term Goals: To be accomplished in 8 weeks:                   Patient will increase strength to 5/5 throughout B LEs to aid in completion of ADLs.                  Status at IE: left knee ext 3+, flex 4-                   Current: 3/2/21: good functional strength, MMT left LE 4/5, progressing                      Patient will report pain less than 1-2/10 average to aid in completion of ADLs.                  Status at IE: 4-10/10                   Current: 2/17/21: pain ranging 0-2/10, progressing  Status on 3/2/21: no pain with ADL, residual mild left lateral knee pain when crossing legs in seated position, MET                      Patient will perform 5 or more pain free squats with good form/technique to aid in completion of ADLs.                  Status at IE: unable to perform one partial squat without increased pain.                   Current: 3/2/21: performing > 10 squats to chair level with minimal left knee discomfort, progressing                      Patient will improve FOTO (an established functional score where 100 = no disability) to 57 points overall to demonstrate improvement in functional ability.                    Status at IE:43                   Current: Same as IE       PLAN  [x]  Upgrade activities as tolerated     [x]  Continue plan of care      Jamil Cote, PT 3/2/2021  1:57 PM    Future Appointments   Date Time Provider An[place] Brady   3/3/2021  2:00 PM Denae Enamorado VSHV BS AMB   3/4/2021  2:30 PM [facility]oks[facility] PT [facility]   3/9/2021 10:30 AM THE [facility] US RM 1 MIHRUS THE [facility]   9/1/2021 10:00 AM Sudha Brown, NP IZABELLA BS AMB

## 2021-03-03 ENCOUNTER — OFFICE VISIT (OUTPATIENT)
Dept: ORTHOPEDIC SURGERY | Age: 66
End: 2021-03-03
Payer: COMMERCIAL

## 2021-03-03 VITALS
BODY MASS INDEX: 33.99 KG/M2 | OXYGEN SATURATION: 92 % | SYSTOLIC BLOOD PRESSURE: 123 MMHG | HEIGHT: 65 IN | WEIGHT: 204 LBS | DIASTOLIC BLOOD PRESSURE: 81 MMHG | RESPIRATION RATE: 16 BRPM | HEART RATE: 82 BPM | TEMPERATURE: 96.6 F

## 2021-03-03 DIAGNOSIS — S83.242D TEAR OF MEDIAL MENISCUS OF LEFT KNEE, CURRENT, UNSPECIFIED TEAR TYPE, SUBSEQUENT ENCOUNTER: ICD-10-CM

## 2021-03-03 DIAGNOSIS — M17.12 PRIMARY OSTEOARTHRITIS OF LEFT KNEE: ICD-10-CM

## 2021-03-03 DIAGNOSIS — S83.282D TEAR OF LATERAL MENISCUS OF LEFT KNEE, CURRENT, UNSPECIFIED TEAR TYPE, SUBSEQUENT ENCOUNTER: Primary | ICD-10-CM

## 2021-03-03 PROCEDURE — 99024 POSTOP FOLLOW-UP VISIT: CPT | Performed by: PHYSICIAN ASSISTANT

## 2021-03-03 NOTE — PROGRESS NOTES
Patient: Tyrone Blankenship  YOB: 1955       HISTORY:  The patient presents for reevaluation of her left knee status post arthroscopic partial medial and lateral menisectomy, pseudogout on 2/1/21. Patient states pain is a 0 out of 10 today. she has gone to physical therapy. She states she is doing great today, notes overall improvement. Has been able to go up stairs. Still notes popping sensation but not associated with pain. Patient denies any fever, chills, chest pain, shortness of breath or calf pain. The remainder of the review of systems is negative. There are no new illness or injuries to report since last seen in the office. No changes in medications, allergies, social or family history. PHYSICAL EXAMINATION:    Visit Vitals  /81 (BP 1 Location: Right upper arm, BP Patient Position: Sitting, BP Cuff Size: Large adult)   Pulse 82   Temp (!) 96.6 °F (35.9 °C) (Temporal)   Resp 16   Ht 5' 5\" (1.651 m)   Wt 204 lb (92.5 kg)   SpO2 92%   BMI 33.95 kg/m²     The patient is a well-developed, well-nourished female in no acute distress. The patient is alert and oriented times three. The patient appears to be well groomed. Mood and affect are normal.   ORTHOPEDIC EXAM of Left knee: Inspection: Effusion not present,  incisions well healed  TTP: none  Range of motion: 0-120 flexion  Stability: Stable  Strength: 5/5  2+ distal pulses    IMPRESSION:  Status post Left knee arthroscopic partial medial and lateral menisectomy, pseudogout, mild degenerative changes. Encounter Diagnoses   Name Primary?  Tear of lateral meniscus of left knee, current, unspecified tear type, subsequent encounter Yes    Tear of medial meniscus of left knee, current, unspecified tear type, subsequent encounter     Primary osteoarthritis of left knee          PLAN: Pt presents s/p left knee arthroscopic partial medial and lateral menisectomy, pseudogout on 2/1/21. Pt is doing well post-operatively.  Stressed to patient that nothing causes an increase in pain or swelling. Will continue with physical therapy, can transition to physician-directed exercise home program when ready. Gradually increase with activities.  Patient does not request refill of pain medication. Can follow up as needed.      RTC prn    Scribed by Jenifer Griffin (Sandraibdillan) as dictated by JESICA Keita PA-C  [facility]

## 2021-03-04 ENCOUNTER — HOSPITAL ENCOUNTER (OUTPATIENT)
Dept: PHYSICAL THERAPY | Age: 66
Discharge: HOME | End: 2021-03-04
Payer: MEDICARE

## 2021-03-04 PROCEDURE — 97530 THERAPEUTIC ACTIVITIES: CPT

## 2021-03-04 PROCEDURE — 97110 THERAPEUTIC EXERCISES: CPT

## 2021-03-04 NOTE — PROGRESS NOTES
PT DAILY TREATMENT NOTE    Patient Name: Juan J García  Date:3/4/2021  : 1955  [x]  Patient  Verified  Payor: Devin Mahan / Plan: [facility] HMO/CHOICE PLUS/POS / Product Type: HMO /    In time:230  Out time:310  Total Treatment Time (min): 40  Total Timed Codes (min): 40  1:1 Treatment Time (MC/BCBS only): 40    Visit #: 5 of 16    Treatment Area: Left knee pain [M25.562]    SUBJECTIVE  Pain Level (0-10 scale): 0  Any medication changes, allergies to medications, adverse drug reactions, diagnosis change, or new procedure performed?: [x] No    [] Yes (see summary sheet for update)  Subjective functional status/changes:   [] No changes reported  Patient reports that her MD was extremely content with her progress and is requesting d/c from PT    OBJECTIVE      30 min Therapeutic Exercise:  [x] See flow sheet :   Rationale: increase ROM and increase strength to improve the patients ability to return to PLOF    10 min Therapeutic Activity:  [x]  See flow sheet :review of final HEP   Rationale: assure independent HEP  to improve the patients ability to continue with HEP             With   [x] TE   [] TA   [] neuro   [] other: Patient Education: [x] Review HEP    [] Progressed/Changed HEP based on:   [] positioning   [] body mechanics   [] transfers   [] heat/ice application    [] other:      Other Objective/Functional Measures: FOTO 62  Left knee Flex 133     Pain Level (0-10 scale) post treatment: 0    ASSESSMENT/Changes in Function: minimal pain left knee with squatting, otherwise good performance with all activities, good understanding of updated HEP      [x]  See Discharge Summary         Progress towards goals / Updated goals:  Short Term Goals: To be accomplished in 4 weeks:                   Patient will report compliance with HEP at least 1x/day to aid in rehabilitation program.                   Status at IE: Patient instructed in and provided written copy of initial Home Exercise Program.                   Current: 2/17/21, compliant with HEP,MET                      Patient will demonstrate AROM of 0 to 140 degrees to aid in completion of ADLs.                  Status at IE:5 to 105 degrees.                  Current: PROM 3 to 117 degrees.   2/11/2021  Status on 3/4/21: Flex 133, no pain, progressing        Long Term Goals: To be accomplished in 8 weeks:                   Patient will increase strength to 5/5 throughout B LEs to aid in completion of ADLs.                  Status at IE: left knee ext 3+, flex 4-                   Current: 3/2/21: good functional strength, MMT left LE 4/5, progressing                      Patient will report pain less than 1-2/10 average to aid in completion of ADLs.                  Status at IE: 4-10/10                   Current: 2/17/21: pain ranging 0-2/10, progressing  Status on 3/4/21: no pain with ADL, residual mild left lat knee pain when crossing legs in seated position, MET                      Patient will perform 5 or more pain free squats with good form/technique to aid in completion of ADLs.                  Status at IE: unable to perform one partial squat without increased pain.                   Current: 3/2/21: performing > 10 squats to chair level with minimal left knee discomfort, progressing                      Patient will improve FOTO (an established functional score where 100 = no disability) to 57 points overall to demonstrate improvement in functional ability.                    Status at IE:43                   Current:3/4/21:62/100, MET       PLAN       [x]  Discharge due to:MD bertrand_      Darline Zelaya, PT 3/4/2021  2:52 PM    Future Appointments   Date Time Provider Aniya Brady   3/9/2021 10:30 AM 11 [facility] 1 [place] THE [facility]   3/11/2021 11:00 AM Angelina Herndon, PT MIHPTBMAGGIE THE [facility]   3/15/2021 11:00 AM Emmett Campbell, DANIEL MIHPNIKKY THE [facility]   3/18/2021  9:30 AM Angelina Quant, PT MIHPTBW THE [facility]   9/1/2021 10:00 AM Klaudia Jackman Bonita Pichardo, MELANIE DIETRICH AMB

## 2021-03-04 NOTE — PROGRESS NOTES
[facility] [address], [place] [address]  Phone: [phone]      Fax:  [phone]    Discharge Summary  Patient name: Collette Cherry Start of Care: 2/10/2021   Referral source: Sis Proctor PA : [id number]               Medical Diagnosis: Left knee pain [M25.562]    Onset Date: 2021               Treatment Diagnosis: left knee pain   Prior Hospitalization: see medical history Provider#: [id number]   Medications: Verified on Patient summary List    Comorbidities: RA, HTN, hypothyroid, osteoporosis, depression, asthma, hepatitis C   Prior Level of Function: walking 40-60 minutes 2x/wk                                Visits from Start of Care: 5    Missed Visits: 0  Reporting Period : 2/10/21 to 3/4/21        Progress towards goals / Updated goals:  Short Term Goals: To be accomplished in 4 weeks:                   Patient will report compliance with HEP at least 1x/day to aid in rehabilitation program.                   Status at IE: Patient instructed in and provided written copy of initial Home Exercise Program.                   Current: 21, compliant with HEP,MET                      Patient will demonstrate AROM of 0 to 140 degrees to aid in completion of ADLs.                   Status at IE:5 to 105 degrees.                   Current: PROM 3 to 117 degrees.   2021  Status on 3/4/21: Flex 133, no pain, progressing        Long Term Goals: To be accomplished in 8 weeks:                   Patient will increase strength to 5/5 throughout B LEs to aid in completion of ADLs.                   Status at IE: left knee ext 3+, flex 4-                   Current: 3/2/21: good functional strength, MMT left LE 4/5, progressing                      Patient will report pain less than 1-2/10 average to aid in completion of ADLs.                   Status at IE: 4-1010                   Current: 21: pain ranging 0-2/10,  progressing  Status on 3/4/21: no pain with ADL, residual mild left lat knee pain when crossing legs in seated position, MET                      Patient will perform 5 or more pain free squats with good form/technique to aid in completion of ADLs.                  Status at IE: unable to perform one partial squat without increased pain.                   Current: 3/2/21: performing > 10 squats to chair level with minimal left knee discomfort, progressing                      Patient will improve FOTO (an established functional score where 100 = no disability) to 57 points overall to demonstrate improvement in functional ability.                  Status at IE:43                   Current:3/4/21:62/100, MET    Assessment/ Summary of Care: Mrs. Jonathan Steward has been showing excellent progress in overall function (FOTO 68/100) , left knee mobility (Flex 133) and strength. She continues with mild core and LE strength deficit which she will be able to address through her advanced HEP. She has met 3 of 6 goals however is being discharged at this time as per MD recommendation.      RECOMMENDATIONS:  [x]Discontinue therapy: [x]Patient has reached or is progressing toward set goals          Mikey Oneal, PT 3/4/2021 3:14 PM

## 2021-03-09 ENCOUNTER — HOSPITAL ENCOUNTER (OUTPATIENT)
Dept: ULTRASOUND IMAGING | Age: 66
Discharge: HOME | End: 2021-03-09
Payer: MEDICARE

## 2021-03-09 DIAGNOSIS — K74.60 CIRRHOSIS OF LIVER WITHOUT ASCITES, UNSPECIFIED HEPATIC CIRRHOSIS TYPE (HCC): ICD-10-CM

## 2021-03-09 PROCEDURE — 76705 ECHO EXAM OF ABDOMEN: CPT

## 2021-03-11 ENCOUNTER — APPOINTMENT (OUTPATIENT)
Dept: PHYSICAL THERAPY | Age: 66
End: 2021-03-11
Payer: MEDICARE

## 2021-03-15 ENCOUNTER — APPOINTMENT (OUTPATIENT)
Dept: PHYSICAL THERAPY | Age: 66
End: 2021-03-15
Payer: MEDICARE

## 2021-03-17 NOTE — PROGRESS NOTES
[facility]  [address] Karl[address] Chav[address], [address] [facility] [place]  Ph [phone]  Fx [phone]    Physical Therapy Discharge Summary    Patient Name: Macho Spencer : 1955   Treatment/Medical Diagnosis: Low back pain [M54.5]  Myalgia, other site [M79.18]   Referral Source: Natty Mason MD     Date of Initial Visit: 20 Attended Visits: 1 Missed Visits: n/a       SUMMARY OF TREATMENT  Pt attended only initial evaluation and     0     follow-ups as she initiated PT for post op left knee pain. Therefore a formal reassessment of goals was not performed. RECOMMENDATIONS  Discontinue physical therapy due to patient not returning. If you have any questions/comments please contact us directly at [phone]. Thank you for allowing us to assist in the care of your patient.     Therapist Signature: Christiano Alcala PT Date: 3/17/21     Time: 1:18 PM

## 2021-03-18 ENCOUNTER — APPOINTMENT (OUTPATIENT)
Dept: PHYSICAL THERAPY | Age: 66
End: 2021-03-18
Payer: MEDICARE

## 2021-09-01 ENCOUNTER — OFFICE VISIT (OUTPATIENT)
Dept: HEMATOLOGY | Age: 66
End: 2021-09-01
Payer: MEDICARE

## 2021-09-01 ENCOUNTER — HOSPITAL ENCOUNTER (OUTPATIENT)
Dept: LAB | Age: 66
Discharge: HOME | End: 2021-09-01
Payer: MEDICARE

## 2021-09-01 VITALS
HEIGHT: 65 IN | WEIGHT: 199 LBS | RESPIRATION RATE: 20 BRPM | TEMPERATURE: 97.1 F | DIASTOLIC BLOOD PRESSURE: 98 MMHG | HEART RATE: 81 BPM | SYSTOLIC BLOOD PRESSURE: 174 MMHG | BODY MASS INDEX: 33.15 KG/M2 | OXYGEN SATURATION: 97 %

## 2021-09-01 DIAGNOSIS — K74.60 CIRRHOSIS OF LIVER WITHOUT ASCITES, UNSPECIFIED HEPATIC CIRRHOSIS TYPE (HCC): Primary | ICD-10-CM

## 2021-09-01 DIAGNOSIS — K74.60 CIRRHOSIS OF LIVER WITHOUT ASCITES, UNSPECIFIED HEPATIC CIRRHOSIS TYPE (HCC): ICD-10-CM

## 2021-09-01 LAB
ALBUMIN SERPL-MCNC: 4 G/DL (ref 3.4–5)
ALBUMIN/GLOB SERPL: 1.2 {RATIO} (ref 0.8–1.7)
ALP SERPL-CCNC: 69 U/L (ref 45–117)
ALT SERPL-CCNC: 32 U/L (ref 13–56)
ANION GAP SERPL CALC-SCNC: 8 MMOL/L (ref 3–18)
AST SERPL-CCNC: 21 U/L (ref 10–38)
BASOPHILS # BLD: 0.1 K/UL (ref 0–0.1)
BASOPHILS NFR BLD: 1 % (ref 0–2)
BILIRUB DIRECT SERPL-MCNC: 0.2 MG/DL (ref 0–0.2)
BILIRUB SERPL-MCNC: 0.6 MG/DL (ref 0.2–1)
BUN SERPL-MCNC: 8 MG/DL (ref 7–18)
BUN/CREAT SERPL: 10 (ref 12–20)
CALCIUM SERPL-MCNC: 9.6 MG/DL (ref 8.5–10.1)
CHLORIDE SERPL-SCNC: 103 MMOL/L (ref 100–111)
CO2 SERPL-SCNC: 27 MMOL/L (ref 21–32)
CREAT SERPL-MCNC: 0.82 MG/DL (ref 0.6–1.3)
DIFFERENTIAL METHOD BLD: ABNORMAL
EOSINOPHIL # BLD: 0.1 K/UL (ref 0–0.4)
EOSINOPHIL NFR BLD: 1 % (ref 0–5)
ERYTHROCYTE [DISTWIDTH] IN BLOOD BY AUTOMATED COUNT: 12.8 % (ref 11.6–14.5)
GLOBULIN SER CALC-MCNC: 3.4 G/DL (ref 2–4)
GLUCOSE SERPL-MCNC: 105 MG/DL (ref 74–99)
HCT VFR BLD AUTO: 41 % (ref 35–45)
HGB BLD-MCNC: 14.1 G/DL (ref 12–16)
INR PPP: 1.1 (ref 0.8–1.2)
LYMPHOCYTES # BLD: 1 K/UL (ref 0.9–3.6)
LYMPHOCYTES NFR BLD: 21 % (ref 21–52)
MCH RBC QN AUTO: 29 PG (ref 24–34)
MCHC RBC AUTO-ENTMCNC: 34.4 G/DL (ref 31–37)
MCV RBC AUTO: 84.4 FL (ref 78–100)
MONOCYTES # BLD: 0.3 K/UL (ref 0.05–1.2)
MONOCYTES NFR BLD: 7 % (ref 3–10)
NEUTS SEG # BLD: 3.3 K/UL (ref 1.8–8)
NEUTS SEG NFR BLD: 69 % (ref 40–73)
PLATELET # BLD AUTO: 127 K/UL (ref 135–420)
PMV BLD AUTO: 9.8 FL (ref 9.2–11.8)
POTASSIUM SERPL-SCNC: 4.1 MMOL/L (ref 3.5–5.5)
PROT SERPL-MCNC: 7.4 G/DL (ref 6.4–8.2)
PROTHROMBIN TIME: 13.3 SEC (ref 11.5–15.2)
RBC # BLD AUTO: 4.86 M/UL (ref 4.2–5.3)
SODIUM SERPL-SCNC: 138 MMOL/L (ref 136–145)
WBC # BLD AUTO: 4.8 K/UL (ref 4.6–13.2)

## 2021-09-01 PROCEDURE — G8427 DOCREV CUR MEDS BY ELIG CLIN: HCPCS | Performed by: NURSE PRACTITIONER

## 2021-09-01 PROCEDURE — 36415 COLL VENOUS BLD VENIPUNCTURE: CPT

## 2021-09-01 PROCEDURE — 80048 BASIC METABOLIC PNL TOTAL CA: CPT

## 2021-09-01 PROCEDURE — G8417 CALC BMI ABV UP PARAM F/U: HCPCS | Performed by: NURSE PRACTITIONER

## 2021-09-01 PROCEDURE — 80076 HEPATIC FUNCTION PANEL: CPT

## 2021-09-01 PROCEDURE — 85025 COMPLETE CBC W/AUTO DIFF WBC: CPT

## 2021-09-01 PROCEDURE — 3017F COLORECTAL CA SCREEN DOC REV: CPT | Performed by: NURSE PRACTITIONER

## 2021-09-01 PROCEDURE — 85610 PROTHROMBIN TIME: CPT

## 2021-09-01 PROCEDURE — G8432 DEP SCR NOT DOC, RNG: HCPCS | Performed by: NURSE PRACTITIONER

## 2021-09-01 PROCEDURE — 82107 ALPHA-FETOPROTEIN L3: CPT

## 2021-09-01 PROCEDURE — 99214 OFFICE O/P EST MOD 30 MIN: CPT | Performed by: NURSE PRACTITIONER

## 2021-09-01 NOTE — PROGRESS NOTES
[address] [facility], Melvin HITCHCOCK MD Tito Rout, PA-C Floria Iha, [facility]     Rosa Ernandez, [facility]   FRAN Starr-LEANA Monteiro [facility]       Darien Lea [facility] 136    at [facility]    [address] [facility], [address] [place], [place] [address]Sunny  22.    [phone]    FAX: [address], [address]    [phone]    FAX: [phone]       Patient Care Team:  Mitch Candelaria NP as PCP - General (Family Medicine)  Viviana Ramirez MD (Rheumatology)  Oneil Soulier, MD (Psychiatry)      Problem List  Date Reviewed: 9/1/2021        Codes Class Noted    Intrinsic (urethral) sphincter deficiency (ISD) ICD-10-CM: N36.42  ICD-9-CM: 599.82  5/3/2017        Microscopic hematuria ICD-10-CM: R31.29  ICD-9-CM: 599.72  3/26/2017        Nocturia ICD-10-CM: R35.1  ICD-9-CM: 788.43  4/4/2017        RAMONA (stress urinary incontinence, female) ICD-10-CM: N39.3  ICD-9-CM: 625.6  4/4/2017        Neuropathy ICD-10-CM: G62.9  ICD-9-CM: 355.9  3/1/2021        Hepatitis C virus infection cured after antiviral drug therapy ICD-10-CM: Z86.19  ICD-9-CM: V12.09  10/1/2020        HCV antibody positive ICD-10-CM: R76.8  ICD-9-CM: 795.79  4/11/2019    Overview Signed 4/11/2019  4:45 PM by Ro Alfaro NP     HCV treated and cured with SOF + ROBBY in 2014             Severe obesity ([address] ICD-10-CM: E66.01  ICD-9-CM: 278.01  11/23/2018        Negative depression screening ICD-10-CM: Z13.31  ICD-9-CM: V79.0  7/3/2018        Advanced care planning/counseling discussion ICD-10-CM: Z71.89  ICD-9-CM: V65.49  7/3/2018        Chronic back pain ICD-10-CM: M54.9, G89.29  ICD-9-CM: 724.5, 338.29  9/23/2015    Overview Signed 9/23/2015 12:00 PM by Josue Aguirre NP     Nerve ablation 09/16/2015. Hypothyroidism ICD-10-CM: E03.9  ICD-9-CM: 244.9  2/6/2013        Rheumatoid arthritis(714.0) ICD-10-CM: M06.9  ICD-9-CM: 714.0  2/6/2013        Carpal tunnel syndrome ICD-10-CM: G56.00  ICD-9-CM: 354.0  2/6/2013              Parish Malave returns to the [facility] today for education and management of cirrhosis. The active problem list, all pertinent past medical history, medications, liver histology, endoscopic studies, radiologic findings and laboratory findings related to the liver disorder were reviewed with the patient. The patient is a 60 y.o.  female who was noted to have abnormalities in liver chemistries and subsequently tested positive for chronic HCV in 1990s. Risk factors for acquiring HCV are IV drug use in 1980s. There was no history of acute incteric hepatitis at the time of these risk factors. She was treated for HCV and remained SVR five years post treatment. She began treatment on 05/15/2014 with dual therapy of SOF/SIM and completed her 12 weeks of therapy on 08/05/2014. She was previously treated with standard interferon in 1997 and with standard interferon and ribavirin in 1999. Both treatments lasted for 24 weeks. The patient tolerated treatment reasonably well. HCV RNA levels obtained during treatment are not available at this time. The patient is unaware of the virologic response pattern. Ultrasound with shear wave elastography was performed in 10/2018. The ultrasound demonstrates a coarse heterogeneous echotexture. No focal mass. The shear wave elastography suggests that the cirrhosis has resolved. Fibroscan analysis performed in 10/2020 indicates fatty liver and bridging fibrosis/cirrhosis, Metavir fibrosis score of F3/F4. A liver biopsy has not been performed.      The most recent laboratory studies indicate that the liver transaminases are normal, alkaline phosphatase is normal, tests of hepatic synthetic and metabolic function are normal, and the platelet count is depressed. The patient has no complaints which can be attributed to liver disease. The patient completes all daily activities without any functional limitations. The patient has not experienced fatigue, fevers, chills, shortness of breath, chest pain, pain in the right side over the liver, diffuse abdominal pain, nausea, vomiting, constipation, diarrhrea, dry eyes, dry mouth, arthralgias, myalgias, yellowing of the eyes or skin, itching, dark urine, problems concentrating, swelling of the abdomen, swelling of the lower extremities, hematemesis, or hematochezia. Since the last office appointment:  Had surgery on left knee torn meniscus 4 02/01/2021. Has lost 5 pounds. 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 The HCV has been treated and cured. Elastography, imaging, and labs suggest that the cirrhosis is resolving post eradication of the HCV. The most recent laboratory studies indicate that the liver transaminases are normal, alkaline phosphatase is normal,  tests of hepatic synthetic and metabolic function are depressed, and the platelet count is depressed. Complications of cirrhosis were discussed in detail. We discussed thrombocytopenia, portal hypertension, varices, GI bleeding, peripheral edema, ascites, hepatic encephalopathy, and hepatocellular carcinoma. We discussed the need for every 6 month liver imaging studies. Hepatic encephalopathy has not developed. Edema. Resolved. Diuretics have been discontinued. [facility] [address] screening. Ultrasound was recently performed and does not demonstrate any sign of developing HCC. Repeat imaging ordered today to be performed in ASAP. The need to perform an assessment of liver fibrosis was discussed with the patient.   Fibroscan can assess liver fibrosis and determine if a patient has advanced fibrosis or cirrhosis without the need for liver biopsy. This was performed in the office in 10/2020 and suggests a Metavir fibrosis score of F3/F4 with fatty liver. Fibroscan will be repeated annually in the office or as often as clinically indicated. This will be performed at her next office visit  In 6 months. HCV. The patient was previously treated for HCV with standard interferon and ribavirin in the 1990s. There was a non-response that was not clearly defined. She was retreated with SOF/SIM in [address] and cured. The patient was directed to continue all current medications at the current dosages except for the NSAIDs, which she should stop. There are no contraindications for the patient to take any medications that are necessary for treatment of other medical issues. The patient was counseled regarding alcohol consumption. Vaccination for viral hepatitis A is not needed. The patient has serologic evidence of prior exposure or vaccination with immunity. All of the above issues were discussed with the patient. All questions were answered. The patient expressed a clear understanding of the above. ALLERGIES  Allergies   Allergen Reactions    Latex Rash    Adhesive Tape-Silicones Rash       MEDICATIONS:  Current Outpatient Medications   Medication Sig Dispense Refill    flaxseed oil 1,000 mg cap Take 1,000 mg by mouth daily.  magnesium 250 mg tab Take 250 mg by mouth daily.  CYANOCOBALAMIN, VITAMIN B-12, Take 500 mcg by mouth daily.  tiZANidine (ZANAFLEX) 2 mg tablet Take 1 Tab by mouth three (3) times daily as needed for Muscle Spasm(s). 90 Tab 5    CALCIUM PO Take 1 Tab by mouth daily.  vitamin E acetate (VITAMIN E PO) Take 1 Cap by mouth.  hydroCHLOROthiazide (HYDRODIURIL) 12.5 mg tablet Take 1 Tab by mouth daily.  90 Tab 1    levothyroxine (SYNTHROID) 125 mcg tablet take 1 tablet by mouth once daily BEFORE BREAKFAST 90 Tab 1    PROAIR HFA 90 mcg/actuation inhaler Take 2 Puffs by inhalation every six (6) hours as needed for Wheezing. 1 Inhaler 3    omeprazole (PRILOSEC) 20 mg capsule take 1 capsule by mouth once daily 90 Cap 1    guaiFENesin ER (MUCINEX) 600 mg ER tablet Take 2 Tabs by mouth two (2) times daily as needed for Congestion. 60 Tab 2    multivitamin (ONE A DAY) tablet Take 1 Tab by mouth daily.  diclofenac (VOLTAREN) 1 % gel apply 2 grams to affected area four times a day  0    zolpidem (AMBIEN) 10 mg tablet Take 10 mg by mouth nightly as needed. 0    benztropine (COGENTIN) 1 mg tablet Take 1 mg by mouth three (3) times daily.  ARIPiprazole (ABILIFY) 5 mg tablet Take 15 mg by mouth daily. REVIEW OF SYSTEMS:  Systems related to all organ systems were reviewed. All were negative except as noted above. FAMILY/SOCIAL HISTORY:  The patient is . The patient has 2 children, and 4 grandchildren. The patient used to smoke a little but stopped in 2005. She used to consumed alcohol in excess. The patient has been abstinent from alcohol since 5/2012. 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 The patient is currently receiving disability. PHYSICAL EXAMINATION:  Visit Vitals  /84 (BP 1 Location: Left upper arm, BP Patient Position: Sitting, BP Cuff Size: Small adult)   Pulse 97   Temp 97 °F (36.1 °C)   Resp 16   Ht 5' 5\" (1.651 m)   Wt 200 lb (90.7 kg)   SpO2 98%   BMI 33.28 kg/m²     General: No acute distress. Eyes: Sclera anicteric. ENT: No oral lesions. Thyroid normal.  Nodes: No adenopathy. Skin: No spider angiomata. No jaundice. No palmar erythema. Respiratory: Lungs clear to auscultation. Cardiovascular: Regular heart rate. No murmurs. No JVD. Abdomen: Soft non-tender. Liver size normal to percussion/palpation. Spleen not palpable. No obvious ascites. Extremities: No lower extremity edema. No muscle wasting. No gross arthritic changes.   Neurologic: Alert and oriented. Cranial nerves grossly intact. No asterixsis. LABORATORY STUDIES:   Liver [place] of [address] [place] [address] & Units 9/1/2021 3/1/2021   WBC 4.6 - 13.2 K/uL 4.8 4.2 (L)   ANC 1.8 - 8.0 K/UL 3.3 2.8   HGB 12.0 - 16.0 g/dL 14.1 14.5    - 420 K/uL 127 (L) 130 (L)   INR 0.8 - 1.2   1.1 1.1   AST 10 - 38 U/L 21 18   ALT 13 - 56 U/L 32 27   Alk Phos 45 - 117 U/L 69 65   Bili, Total 0.2 - 1.0 MG/DL 0.6 0.6   Bili, Direct 0.0 - 0.2 MG/DL 0.2 0.2   Albumin 3.4 - 5.0 g/dL 4.0 4.0   BUN 7.0 - 18 MG/DL 8 11   Creat 0.6 - 1.3 MG/DL 0.82 0.81   Na 136 - 145 mmol/L 138 137   K 3.5 - 5.5 mmol/L 4.1 3.9   Cl 100 - 111 mmol/L 103 101   CO2 21 - 32 mmol/L 27 29   Glucose 74 - 99 mg/dL 105 (H) 95     Cancer Screening Latest Ref Rng & Units 9/1/2021 3/1/2021   AFP, Serum 0.0 - 8.0 ng/mL 3.2 3.0   AFP-L3% 0.0 - 9.9 % Comment Comment     SEROLOGIES:  Serologies Latest Ref Rng & Units 10/3/2016 5/10/2016   HCV RT-PCR, Quant IU/mL HCV Not Detected HCV Not Detected   HCV RT-PCR, Quant IU/mL       Serologies Latest Ref Rng 2/6/2013   Hep A Ab, Total Negative Positive (A)   Hep B Surface Ag Negative Negative   Hep B Core Ab, Total Negative Negative   Hep B Surface Ab 0.00 - 0.99 Index Value 0.52   Hep C Genotype See Note 1a   IL28B Genotype  CT genotype   Ferritin 13 - 150 ng/mL 464 (H)   Iron % Saturation 15 - 55 % 67 (H)     2/2013. HCV RNA Log 6.36 IU/ml. Eradicated. LIVER HISTOLOGY:  12/2016. Shear wave elastography. E Median is 11.66, suggestive of F3, bridging fibrosis. Wide E Std of 4.41 is suggestive of fatty liver disease. 10/2017. TRANSIENT HEPATIC ELASTOGRAPHY:  E Range: 7-12.0 kPa (previously 9.1-23.0), E Mean: 9.3 kPa (previously 13.05),   E Median: 8.6 kPa (recently 11.7), E Std: 2.0 kPa (previously 4.4)     10/2018. TRANSIENT HEPATIC ELASTOGRAPHY:   E Range: 7.18-10.40 kPa  E Mean: 8.80 kPa  E Median: 8.75 kPa  E Std: 1.16 kPa    10/2020. FibroScan performed at [address]. EkPa was 12.4. IQR/med 22%. . The results suggested a fibrosis level of F3/F4. The CAP score suggests fatty liver. ENDOSCOPIC PROCEDURES:  09/2014. EGD by Dr. Baldemar Rock. Normal esophagus. H.Pylori is negative. RADIOLOGY:  04/2014. Ultrasound of liver. Consistent with cirrhosis. No masses. 10/2014. Ultrasound of liver. Consistent with cirrhosis. No masses. 04/2015. Ultrasound of the liver. Consistent with cirrhosis. No mention of masses in the report. 10/2015. Ultrasound of the liver. Consistent with cirrhosis. No mention of masses in the report.  06/2016. Ultrasound of the liver. Heterogeneous appearing hepatic echotexture without mass. 12/2016. Ultrasound of the liver. Coarse heterogeneous echotexture. No focal mass. 10/2018. Ultrasound of the liver, performed with elastography. Persistent sonographic findings of hepatic cirrhosis. No focal hepatic mass. 04/2018. Ultrasound of the liver. The liver has a heterogeneous echotexture with a nodular contour, compatible with known cirrhosis. No focal hepatic lesions are evident. 10/2018. Ultrasound of the liver. Sonographic findings of hepatic cirrhosis and steatosis. No focal hepatic mass. 04/2019. Ultrasound of the liver. Coarsened hepatic echotexture and lobular surface contour noted as previously. No focal mass. 11/2019. Ultrasound f the liver. Heterogeneous liver with lobular contour compatible with the history of cirrhosis. No suspicious liver masses are demonstrated. 06/2020. Ultrasound of the liver. Cirrhosis. No focal hepatic lesion. 12/2020. Ultrasound of the liver. Cirrhotic hepatic morphology without focal hepatic lesion. 03/2021. Ultrasound of the liver. Coarsened hepatic echotexture with nodular contour of cirrhosis. No focal  mass. OTHER TESTING:  Not available or performed    [address] in 6 months for fibroscan and continued monitoring.       Lizbeth Fischer Roger Davis FNP-C  Liver [place] of [facility]  [address], [address]   [address], [address] [facility]   [phone]

## 2021-09-02 LAB
AFP L3 MFR SERPL: NORMAL % (ref 0–9.9)
AFP SERPL-MCNC: 3.2 NG/ML (ref 0–8)

## 2021-09-27 ENCOUNTER — HOSPITAL ENCOUNTER (OUTPATIENT)
Dept: ULTRASOUND IMAGING | Age: 66
Discharge: HOME | End: 2021-09-27
Payer: MEDICARE

## 2021-09-27 DIAGNOSIS — K74.60 CIRRHOSIS OF LIVER WITHOUT ASCITES, UNSPECIFIED HEPATIC CIRRHOSIS TYPE (HCC): ICD-10-CM

## 2021-09-27 PROCEDURE — 76705 ECHO EXAM OF ABDOMEN: CPT

## 2021-11-09 ENCOUNTER — OFFICE VISIT (OUTPATIENT)
Dept: ORTHOPEDIC SURGERY | Age: 66
End: 2021-11-09
Payer: MEDICARE

## 2021-11-09 VITALS
DIASTOLIC BLOOD PRESSURE: 82 MMHG | HEART RATE: 85 BPM | TEMPERATURE: 98.1 F | RESPIRATION RATE: 16 BRPM | WEIGHT: 206 LBS | SYSTOLIC BLOOD PRESSURE: 130 MMHG | OXYGEN SATURATION: 94 % | HEIGHT: 65 IN | BODY MASS INDEX: 34.32 KG/M2

## 2021-11-09 DIAGNOSIS — M54.59 MECHANICAL LOW BACK PAIN: ICD-10-CM

## 2021-11-09 DIAGNOSIS — M79.18 MYOFASCIAL PAIN: ICD-10-CM

## 2021-11-09 DIAGNOSIS — G89.29 CHRONIC PRIMARY MUSCULOSKELETAL PAIN: Primary | ICD-10-CM

## 2021-11-09 DIAGNOSIS — M79.18 CHRONIC PRIMARY MUSCULOSKELETAL PAIN: Primary | ICD-10-CM

## 2021-11-09 PROCEDURE — G8400 PT W/DXA NO RESULTS DOC: HCPCS | Performed by: PHYSICAL MEDICINE & REHABILITATION

## 2021-11-09 PROCEDURE — G8536 NO DOC ELDER MAL SCRN: HCPCS | Performed by: PHYSICAL MEDICINE & REHABILITATION

## 2021-11-09 PROCEDURE — G8432 DEP SCR NOT DOC, RNG: HCPCS | Performed by: PHYSICAL MEDICINE & REHABILITATION

## 2021-11-09 PROCEDURE — G8427 DOCREV CUR MEDS BY ELIG CLIN: HCPCS | Performed by: PHYSICAL MEDICINE & REHABILITATION

## 2021-11-09 PROCEDURE — 1090F PRES/ABSN URINE INCON ASSESS: CPT | Performed by: PHYSICAL MEDICINE & REHABILITATION

## 2021-11-09 PROCEDURE — G8417 CALC BMI ABV UP PARAM F/U: HCPCS | Performed by: PHYSICAL MEDICINE & REHABILITATION

## 2021-11-09 PROCEDURE — 3017F COLORECTAL CA SCREEN DOC REV: CPT | Performed by: PHYSICAL MEDICINE & REHABILITATION

## 2021-11-09 PROCEDURE — 1101F PT FALLS ASSESS-DOCD LE1/YR: CPT | Performed by: PHYSICAL MEDICINE & REHABILITATION

## 2021-11-09 PROCEDURE — 99213 OFFICE O/P EST LOW 20 MIN: CPT | Performed by: PHYSICAL MEDICINE & REHABILITATION

## 2021-11-09 RX ORDER — MULTIVIT WITH MINERALS/HERBS
1 TABLET ORAL DAILY
COMMUNITY

## 2021-11-09 RX ORDER — TIZANIDINE 2 MG/1
2 TABLET ORAL
Qty: 90 TABLET | Refills: 5 | Status: SHIPPED | OUTPATIENT
Start: 2021-11-09 | End: 2022-01-06 | Stop reason: SDUPTHER

## 2021-11-09 NOTE — PROGRESS NOTES
Chief Complaint   Patient presents with    Back Pain       Pt preferred language for health care discussion is english. Is someone accompanying this pt? no    Is the patient using any DME equipment during [address]? no    Depression Screening:  3 most recent [facility] Screens 9/1/2021 3/3/2021 3/1/2021 1/25/2021 12/17/2020 10/1/2020 10/31/2019   PHQ Not Done - - - Patient Decline - - -   Little interest or pleasure in doing things Not at all Not at all Not at all - Not at all Not at all Not at all   Feeling down, depressed, irritable, or hopeless Not at all Not at all Not at all - Not at all Not at all Not at all   Total Score PHQ 2 0 0 0 - 0 0 0       Learning Assessment:  Learning Assessment 4/2/2018 6/8/2017 7/18/2016 3/19/2015 11/18/2014   PRIMARY LEARNER Patient Patient Patient Patient Patient   HIGHEST LEVEL OF EDUCATION - PRIMARY LEARNER  GRADUATED HIGH SCHOOL OR GED GRADUATED HIGH SCHOOL OR GED GRADUATED HIGH SCHOOL OR GED - GRADUATED HIGH SCHOOL OR GED   BARRIERS PRIMARY LEARNER VISUAL NONE NONE - [address] CAREGIVER - No No - No   PRIMARY LANGUAGE ENGLISH ENGLISH ENGLISH ENGLISH ENGLISH   LEARNER PREFERENCE PRIMARY DEMONSTRATION DEMONSTRATION DEMONSTRATION LISTENING DEMONSTRATION   ANSWERED BY patient patient patient patient Patient   RELATIONSHIP SELF SELF SELF SELF SELF       Abuse Screening:  Abuse Screening Questionnaire 7/3/2018 6/8/2017   Do you ever feel afraid of your partner? N N   Are you in a relationship with someone who physically or mentally threatens you? N N   Is it safe for you to go home? Y Y       Fall Risk  Fall Risk Assessment, last 12 mths 11/9/2021   Able to walk? Yes   Fall in past 12 months? 0   Do you feel unsteady? 0   Are you worried about falling 0           Advance Directive:  1. Do you have an advance directive in place? Patient Reply:no    2. If not, would you like material regarding how to put one in place? Patient Reply: no    2.   Per patient no changes to their ACP contact no.      Coordination of Care:  1. Have you been to the ER, urgent care clinic since your last visit? Hospitalized since your last visit? no    2. Have you seen or consulted any other health care providers outside of the [address] [place] [address] since your last visit? Include any pap smears or colon screening.  no

## 2021-11-09 NOTE — PROGRESS NOTES
[address]  [address], [address]  [place], [address] [place] [address] Street  Phone: [phone]  Fax: [phone]        Minnie Bailey  : 1955  PCP: Marilee Anguiano NP  2021    PROGRESS NOTE      HISTORY OF PRESENT ILLNESS  Nelida Oviedo is a 73 y.o. female who was seen in 2016 with c/o chronic neck and low back pain. There was no radiologic evidence to explain her pain. She found significant relief with PT. She took Mobic PRN. She had a mechanical fall in which she injured her right shoulder. She was treated with a cortisone injection and PT. She returned 5/10/17 with c/o lumbar strain x 2 weeks after moving heavy tables. She found significant relief with PT and TPI. She had returned in 2018 after another exacerbation of cervical and lumbar pain. She remained active with walking and caring for her grandson. She found significant benefit with PT. She returned 10/2020. Pt noted that she has a hernia that the surgeon does not want to repair because it is caused by adipose tissue, so she has been focusing on weight loss through diet changes. She has been maintaining an HEP of walking. She occasionally had muscle spasms in her back, but she was maintaining well. She had a few flare ups of her low back and buttock pain to where the pain was a 10/10 and she almost went to the ER. She previously did well with PT with DN and was able to maintain with relatively no pain for 2 years, so she was interested in proceeding with PT again. She attended PT (2020; Ally Blas). Nelida Oviedo comes in to the office today for f/u. She has had an exacerbation of her low back pain x  2 months where it has been locking up. She has tried Zanaflex and Ibuprofen with some benefit. Pain Score: 10    PmHx: hypothyroidism, neuropathy, CTS, RA    ASSESSMENT  Nelida Oviedo is a 73 y.o. female with c/o exacerbation of chronic low back pain.  Her symptoms likely remain myofascial in nature. PLAN  1. Referral to PT [facility]   2. Refill Tizanidine 2 mg TID PRN. Pt will f/u in 6-8 weeks or sooner as needed. Diagnoses and all orders for this visit:    1. Chronic primary musculoskeletal pain    2. Mechanical low back pain    3. Myofascial pain         PAST MEDICAL HISTORY   Past Medical History:   Diagnosis Date    Abnormal Papanicolaou smear of cervix     1988 HPV    Asthma     Bipolar 1 disorder (HCC)     Bipolar 1 disorder, depressed (HCC)     Bursitis     Carpal tunnel syndrome     Cirrhosis, hepatitis C     Connective tissue disease ([address]     DDD (degenerative disc disease), lumbosacral     Gastric ulcer     Hashimoto's disease     Hashimoto's disease     Hepatitis C     Herniated intervertebral disc of lumbar spine     Hypermobility syndrome     Liver disease     Menopause     Osteoarthritis     Plantar fasciitis, bilateral     RA (rheumatoid arthritis) ([address]        Past Surgical History:   Procedure Laterality Date    COLONOSCOPY N/A 2/9/2017     Colonoscopy w/polyp bxs x3 performed by Philippe Adkins MD at [facility] ENDOSCOPY    HX ACL RECONSTRUCTION Left     HX APPENDECTOMY      HX DILATION AND CURETTAGE  1988    cryso    HX GYN      BTL    HX HEENT      malofacial surgery   . MEDICATIONS    Current Outpatient Medications   Medication Sig Dispense Refill    tiZANidine (ZANAFLEX) 2 mg tablet Take 1 Tab by mouth three (3) times daily as needed for Muscle Spasm(s). 90 Tab 5    CALCIUM PO Take 1 Tab by mouth daily.  hydroCHLOROthiazide (HYDRODIURIL) 12.5 mg tablet Take 1 Tab by mouth daily. 90 Tab 1    levothyroxine (SYNTHROID) 125 mcg tablet take 1 tablet by mouth once daily BEFORE BREAKFAST 90 Tab 1    PROAIR HFA 90 mcg/actuation inhaler Take 2 Puffs by inhalation every six (6) hours as needed for Wheezing.  1 Inhaler 3    omeprazole (PRILOSEC) 20 mg capsule take 1 capsule by mouth once daily 90 Cap 1    guaiFENesin ER (MUCINEX) 600 mg ER tablet Take 2 Tabs by mouth two (2) times daily as needed for Congestion. 60 Tab 2    multivitamin (ONE A DAY) tablet Take 1 Tab by mouth daily.  diclofenac (VOLTAREN) 1 % gel apply 2 grams to affected area four times a day  0    zolpidem (AMBIEN) 10 mg tablet Take 10 mg by mouth nightly as needed. 0    benztropine (COGENTIN) 1 mg tablet Take 1 mg by mouth three (3) times daily.  ARIPiprazole (ABILIFY) 5 mg tablet Take 15 mg by mouth daily. ALLERGIES  Allergies   Allergen Reactions    Latex Rash    Adhesive Tape-Silicones Rash          SOCIAL HISTORY    Social History     Socioeconomic History    Marital status:    Tobacco Use    Smoking status: Former Smoker     Quit date: 2005     Years since quittin.3    Smokeless tobacco: Never Used   Substance and Sexual Activity    Alcohol use: No     Alcohol/week: 0.0 standard drinks    Drug use: Not Currently     Types: Marijuana    Sexual activity: Never       FAMILY HISTORY  Family History   Problem Relation Age of Onset    No Known Problems Mother     No Known Problems Father          REVIEW OF SYSTEMS  Review of Systems   Constitutional: Negative for chills, fever and weight loss. Respiratory: Negative for shortness of breath. Cardiovascular: Negative for chest pain. Gastrointestinal: Negative for constipation. Negative for fecal incontinence    Genitourinary: Negative for dysuria. Negative for urinary incontinence   Musculoskeletal: Positive for back pain. Skin: Negative for rash. Neurological: Negative for dizziness, tingling, tremors, focal weakness and headaches. Endo/Heme/Allergies: Does not bruise/bleed easily. Psychiatric/Behavioral: The patient does not have insomnia.            PHYSICAL EXAMINATION  Visit Vitals  /82 (BP 1 Location: Right arm, BP Patient Position: Sitting, BP Cuff Size: Adult)   Pulse 85   Temp 98.1 °F (36.7 °C) (Tympanic)   Resp 16   Ht 5' 5\" (1.651 m)   Wt 206 lb (93.4 kg)   SpO2 94%   BMI 34.28 kg/m²       Pain Assessment  11/9/2021   Location of Pain Back   Location Modifiers -   Severity of Pain 4   Quality of Pain Sharp; Aching;Burning   Quality of Pain Comment -   Duration of Pain A few days   Frequency of Pain Once a week   Date Pain First Started (No Data)   Date Pain First Started Comment years   Aggravating Factors Standing   Aggravating Factors Comment sitting   Limiting Behavior Yes   Relieving Factors Rest;NSAID;Heat   Relieving Factors Comment -   Result of Injury No   Work-Related Injury -   Type of Injury -           Constitutional:  Well developed, well nourished, in no acute distress. Psychiatric: Affect and mood are appropriate. Integumentary: No rashes or abrasions noted on exposed areas. SPINE/MUSCULOSKELETAL EXAM    Cervical spine:  Neck is midline. Normal muscle tone. No focal atrophy is noted. ROM pain free. Shoulder ROM intact. Mild tenderness to palpation, R>L. Negative Spurling's sign. Negative Tinel's sign. Negative Dillon's sign.       Sensation in the bilateral arms grossly intact to light touch.       Lumbar spine:  No rash, ecchymosis, or gross obliquity. No fasciculations. No focal atrophy is noted. No pain with hip ROM. Full range of motion. Diffuse tenderness to palpation, L>R. No tenderness to palpation at the sciatic notch. SI joints non-tender. Trochanters non tender.      Sensation in the bilateral legs grossly intact to light touch.     Updates from 05/10/17:  Bilateral SI joints tender. Negative right PORTILLO test.  Negative right p4 test.  Negative bilateral straight leg raise.    Tenderness to palpation in the lumbar region.     Updates 11/19/2020:  Tenderness to palpation of lumbar paraspinals, buttocks  Pain with lumbar flexion = extension    Updates 11/9/21:  Tenderness to palpation lumbar paraspinals  Decreased sensation in stocking distribution to the ankles        MOTOR:      Biceps Triceps Deltoids Wrist Ext Wrist Flex Hand Intrin   Right 5/5 5/5 5/5 5/5 5/5 5/5   Left 5/5 5/5 5/5 5/5 5/5 5/5             Hip Flex  Quads Hamstrings Ankle DF EHL Ankle PF   Right 5/5 5/5 5/5 5/5 5/5 5/5   Left 5/5 5/5 5/5 5/5 5/5 5/5     DTRs are 2+ biceps, triceps, brachioradialis, patella, and Achilles.      Negative Straight Leg raise. Squat not tested. No difficulty with tandem gait.       Ambulation without assistive device. FWB.       RADIOGRAPHS  2V Cervical XR images taken on 12/17/18 personally reviewed with patient:  Straightening of the cervical lordosis  Normal alignment. Mild facet sclerosis. No obvious compression fractures or instabilities.     2V Lumbar XR images taken on 12/17/18 personally reviewed with patient:  Facet sclerosis  Disc space narrowing at L4-5 and L5-S1  Normal alignment  No obvious compression fractures or instabilities. Lumbar MRI images taken on 6/27/16 personally reviewed with patient:  Five lumbar vertebrae are present.  Somewhat hypolordotic sagittal  alignment. Disc desiccation from L3-4 through L5-S1 levels.  Mild L3-4 and L5-S1 disc  space narrowing.  Moderate L4-5 disc space narrowing. Edema associate with the anterior superior endplate of L5 may suggest  relative acuity.  No marrow replacing process. Mild multilevel facet osteoarthritis. The conus medullaris is at the L1-2 level.  There is normal signal  throughout the spinal cord. L1-2:No posterior disc bulge.  No foraminal or central canal stenosis. L2-3:No posterior disc bulge.  Mild hypertrophy ligamentum flavum.  Mild  facet osteoarthritis.  Mild foraminal stenosis.  No central canal stenosis. L3-4:Minimal broad based posterior disc bulge.  Mild hypertrophy  ligamentum flavum.  Mild facet osteoarthritis.  Mild foraminal stenosis. No central canal stenosis.   Page 1 of 2  Performing Location:[facility]  [id number]             6/27/2016 8:53:30 AM  L4-5:Small broad based posterior disc bulge.  Moderate hypertrophy  ligamentum flavum and moderate facet osteoarthritis.  Mild foraminal  stenosis.  No central canal stenosis. L5-S1:Minimal broad based posterior disc bulge.  Mild hypertrophy  ligamentum flavum and mild facet osteoarthritis.  Moderate left and mild  right foraminal stenosis.  No central canal stenosis. IMPRESSION  Overall mild degenerative changes.  Edema associated with superior endplate  of L5 may suggest relative acuity.  Mild to moderate foraminal stenosis in  the lower lumbar spine.       10 minutes of face-to-face contact were spent with the patient during today's visit extensively discussing symptoms and treatment plan. All questions were answered. More than half of this visit today was spent on counseling.      Written by Meng Walters as dictated by Warren Staton MD

## 2021-12-06 ENCOUNTER — APPOINTMENT (OUTPATIENT)
Dept: PHYSICAL THERAPY | Age: 66
End: 2021-12-06
Attending: PHYSICAL MEDICINE & REHABILITATION

## 2021-12-14 ENCOUNTER — TELEPHONE (OUTPATIENT)
Dept: ORTHOPEDIC SURGERY | Age: 66
End: 2021-12-14

## 2021-12-14 DIAGNOSIS — G89.29 CHRONIC PRIMARY MUSCULOSKELETAL PAIN: Primary | ICD-10-CM

## 2021-12-14 DIAGNOSIS — M79.18 CHRONIC PRIMARY MUSCULOSKELETAL PAIN: Primary | ICD-10-CM

## 2021-12-14 NOTE — TELEPHONE ENCOUNTER
Patient called stating she was unable to go to physical therapy due to going on vacation, then fell in her bathtub after returning. She stated she went to the emergency room, and is doing better with taking pain medication. She is wanting to know if her physical therapy order can be opened so she can start. Patient can be reached at [phone].

## 2022-01-06 ENCOUNTER — VIRTUAL VISIT (OUTPATIENT)
Dept: ORTHOPEDIC SURGERY | Age: 67
End: 2022-01-06
Payer: MEDICARE

## 2022-01-06 DIAGNOSIS — M54.59 MECHANICAL LOW BACK PAIN: ICD-10-CM

## 2022-01-06 DIAGNOSIS — M79.18 CHRONIC PRIMARY MUSCULOSKELETAL PAIN: Primary | ICD-10-CM

## 2022-01-06 DIAGNOSIS — G89.29 CHRONIC PRIMARY MUSCULOSKELETAL PAIN: Primary | ICD-10-CM

## 2022-01-06 DIAGNOSIS — M79.18 MYOFASCIAL PAIN: ICD-10-CM

## 2022-01-06 PROCEDURE — G8427 DOCREV CUR MEDS BY ELIG CLIN: HCPCS | Performed by: PHYSICAL MEDICINE & REHABILITATION

## 2022-01-06 PROCEDURE — G8432 DEP SCR NOT DOC, RNG: HCPCS | Performed by: PHYSICAL MEDICINE & REHABILITATION

## 2022-01-06 PROCEDURE — 3017F COLORECTAL CA SCREEN DOC REV: CPT | Performed by: PHYSICAL MEDICINE & REHABILITATION

## 2022-01-06 PROCEDURE — G8417 CALC BMI ABV UP PARAM F/U: HCPCS | Performed by: PHYSICAL MEDICINE & REHABILITATION

## 2022-01-06 PROCEDURE — 1101F PT FALLS ASSESS-DOCD LE1/YR: CPT | Performed by: PHYSICAL MEDICINE & REHABILITATION

## 2022-01-06 PROCEDURE — G8536 NO DOC ELDER MAL SCRN: HCPCS | Performed by: PHYSICAL MEDICINE & REHABILITATION

## 2022-01-06 PROCEDURE — 99213 OFFICE O/P EST LOW 20 MIN: CPT | Performed by: PHYSICAL MEDICINE & REHABILITATION

## 2022-01-06 PROCEDURE — 1090F PRES/ABSN URINE INCON ASSESS: CPT | Performed by: PHYSICAL MEDICINE & REHABILITATION

## 2022-01-06 PROCEDURE — G8400 PT W/DXA NO RESULTS DOC: HCPCS | Performed by: PHYSICAL MEDICINE & REHABILITATION

## 2022-01-06 RX ORDER — TIZANIDINE 2 MG/1
2 TABLET ORAL
Qty: 90 TABLET | Refills: 5 | Status: SHIPPED | OUTPATIENT
Start: 2022-01-06

## 2022-01-06 NOTE — PROGRESS NOTES
[address]  [address], [address]  [place], [address] [place] [address]  Phone: [phone]  Fax: [phone]        Jamal Edmond  : 1955  PCP: Yeison Lopez NP  2022    PROGRESS NOTE    Consent: Abraham Brandt is a 73 y.o. female who was seen by synchronous (real-time) audio-video technology, and/or her healthcare decision maker, is aware that this patient-initiated, Telehealth encounter on 2022 is a billable service, with coverage as determined by his/her insurance carrier. He/She is aware that he/she may receive a bill and has provided verbal consent to proceed: Yes. The visit was conducted in the office with the patient being in home through a Telephone platform. Visit time start: 1:15 PM end: 1:28 PM      HISTORY OF PRESENT ILLNESS  Abraham Brandt is a 73 y.o. female who was seen in  with c/o chronic neck and low back pain. There was no radiologic evidence to explain her pain. She found significant relief with PT. She took Mobic PRN. She had a mechanical fall in which she injured her right shoulder. She was treated with a cortisone injection and PT. She returned 5/10/17 with c/o lumbar strain x 2 weeks after moving heavy tables. She found significant relief with PT and TPI. She had returned in 2018 after another exacerbation of cervical and lumbar pain. She remained active with walking and caring for her grandson. She found significant benefit with PT. She returned 10/2020. Pt noted that she has a hernia that the surgeon does not want to repair because it is caused by adipose tissue, so she has been focusing on weight loss through diet changes. She has been maintaining an HEP of walking. She occasionally had muscle spasms in her back, but she was maintaining well. She had a few flare ups of her low back and buttock pain to where the pain was a 10/10 and she almost went to the ER.  She previously did well with PT with DN and was able to maintain with relatively no pain for 2 years, so she was interested in proceeding with PT again. She attended PT (11/23/2020; Paulett Habermann). She has had an exacerbation of her low back pain x  2 months where it has been locking up. She has tried Zanaflex and Ibuprofen with some benefit. Nasra Fraser is seen virtually for f/u. She exacerbated her pain on 12/2/21 when she fell in the bathtub the day she returned from her vacation. She is feeling much better now. She was seen at [facility] ED 12/9/21 for c/o back pain from a fall a week prior. She slipped in the bathroom and landed on her bottom. She believed her neck was extended back when she fell. She had right sided neck, shoulder, upper back, and low back since the fall. Pain was not better with Lidocaine patches, Ibuprofen, heat, and Percocet. Then on 12/20/21 she tested positive for COVID, but she notes that it was not too bad. She tested negative again on 12/29/21. She continues to take Tizanidine as needed with benefit. She plans to schedule her PT today. Pain Scale: 2/10      PmHx: hypothyroidism, neuropathy, CTS, RA    ASSESSMENT  Nasra Fraser is a 73 y.o. female with c/o exacerbation of chronic low back pain. Her symptoms likely remain myofascial in nature. She had a fall on 12/2/21, but she is doing much better now. PLAN  1. Proceed with PT as previously ordered. 2. Refill Tizanidine 2 mg TID PRN. Pt will f/u in 6 months or sooner as needed. Diagnoses and all orders for this visit:    1. Chronic primary musculoskeletal pain    2. Mechanical low back pain    3.  Myofascial pain         PAST MEDICAL HISTORY   Past Medical History:   Diagnosis Date    Abnormal Papanicolaou smear of cervix     1988 HPV    Asthma     [facility]     Bipolar 1 disorder, depressed (HCC)     Bursitis     Carpal tunnel syndrome     Cirrhosis, hepatitis C     Connective tissue disease ([facility]     DDD (degenerative disc disease), lumbosacral     Gastric ulcer  Hashimoto's disease     Hashimoto's disease     Hepatitis C     Herniated intervertebral disc of lumbar spine     Hypermobility syndrome     Liver disease     Menopause     Osteoarthritis     Plantar fasciitis, bilateral     RA (rheumatoid arthritis) ([address]        Past Surgical History:   Procedure Laterality Date    COLONOSCOPY N/A 2/9/2017     Colonoscopy w/polyp bxs x3 performed by Antwon Oliveira MD at [address] HX ACL RECONSTRUCTION Left     HX APPENDECTOMY      HX DILATION AND CURETTAGE  1988    cryso    HX GYN      BTL    HX HEENT      malofacial surgery   . MEDICATIONS    Current Outpatient Medications   Medication Sig Dispense Refill    cholecalciferol, vitamin D3, (VITAMIN D3 PO) Take  by mouth.  b complex vitamins tablet Take 1 Tablet by mouth daily.  tiZANidine (ZANAFLEX) 2 mg tablet Take 1 Tablet by mouth three (3) times daily as needed for Muscle Spasm(s). 90 Tablet 5    CALCIUM PO Take 1 Tab by mouth daily.  hydroCHLOROthiazide (HYDRODIURIL) 12.5 mg tablet Take 1 Tab by mouth daily. 90 Tab 1    levothyroxine (SYNTHROID) 125 mcg tablet take 1 tablet by mouth once daily BEFORE BREAKFAST 90 Tab 1    PROAIR HFA 90 mcg/actuation inhaler Take 2 Puffs by inhalation every six (6) hours as needed for Wheezing. 1 Inhaler 3    omeprazole (PRILOSEC) 20 mg capsule take 1 capsule by mouth once daily 90 Cap 1    guaiFENesin ER (MUCINEX) 600 mg ER tablet Take 2 Tabs by mouth two (2) times daily as needed for Congestion. 60 Tab 2    multivitamin (ONE A DAY) tablet Take 1 Tab by mouth daily.  diclofenac (VOLTAREN) 1 % gel apply 2 grams to affected area four times a day  0    zolpidem (AMBIEN) 10 mg tablet Take 10 mg by mouth nightly as needed. 0    benztropine (COGENTIN) 1 mg tablet Take 1 mg by mouth three (3) times daily.  ARIPiprazole (ABILIFY) 5 mg tablet Take 15 mg by mouth daily.           ALLERGIES  Allergies   Allergen Reactions    Latex Rash    Adhesive Tape-Silicones Rash     States not allergic          SOCIAL HISTORY    Social History     Socioeconomic History    Marital status:    Tobacco Use    Smoking status: Former Smoker     Quit date: 2005     Years since quittin.5    Smokeless tobacco: Never Used   Substance and Sexual Activity    Alcohol use: No     Alcohol/week: 0.0 standard drinks    Drug use: Not Currently     Types: Marijuana    Sexual activity: Never       FAMILY HISTORY  Family History   Problem Relation Age of Onset    No Known Problems Mother     No Known Problems Father          REVIEW OF SYSTEMS  Review of Systems   Constitutional: Negative for chills, fever and weight loss. Respiratory: Negative for shortness of breath. Cardiovascular: Negative for chest pain. Gastrointestinal: Negative for constipation. Negative for fecal incontinence    Genitourinary: Negative for dysuria. Negative for urinary incontinence   Musculoskeletal: Positive for back pain. Skin: Negative for rash. Neurological: Negative for dizziness, tingling, tremors, focal weakness and headaches. Endo/Heme/Allergies: Does not bruise/bleed easily. Psychiatric/Behavioral: The patient does not have insomnia. PHYSICAL EXAMINATION  There were no vitals taken for this visit.       Pain Assessment  2022   Location of Pain Back   Location Modifiers -   Severity of Pain 2   Quality of Pain Aching   Quality of Pain Comment -   Duration of Pain Persistent   Frequency of Pain Intermittent   Date Pain First Started -   Date Pain First Started Comment -   Aggravating Factors (No Data)   Aggravating Factors Comment lifting   Limiting Behavior Some   Relieving Factors Heat   Relieving Factors Comment bio freeze   Result of Injury -   Work-Related Injury -   Type of Injury -         The limitations of a telemedicine visit including the inability to perform a detailed physical examination may miss significant findings was presented to the patient, and the patient still elected to proceed with the telemedicine visit. RADIOGRAPHS  Lumbar Spine XR images taken on 12/9/2021 at [facility]:  AP, lateral, oblique and coned down views of the lumbosacral spine were provided. There are 5 non rib-bearing lumbar vertebral bodies. Alignment: Normal.   Fracture/spondylolisthesis: None. Pars defects: None visualized. Degenerative changes: Moderate degenerative changes of the lower lumbar spine, manifested by disc space narrowing/vacuum disc phenomenon, osteophytosis and facet arthropathy. This is most significant at L4/5 and L5/S1. Sacroiliac joints: Symmetric. Ancillary: Calcifications in the pelvis, likely reflective of degenerative uterine fibroids. IMPRESSION     1. No acute fracture. 2. Moderate multilevel degenerative change of the lower lumbar spine. Thoracic Spine XR images taken on 12/9/2021 at [facility]:   AP, lateral and swimmer's views of the thoracic spine were provided. Bone mineralization: Decreased. Alignment: Mildly accentuated thoracic kyphosis. Fracture/subluxation: None. Degenerative change: Mild multilevel degenerative change of the midthoracic spine. Paraspinal soft tissues: Normal.       IMPRESSION     1. No acute fracture. 2. Mild multilevel degenerative change. Cervical Spine XR images taken on 12/9/2021 at [facility]:  AP, lateral and open mouth odontoid views were performed. Complete imaging of cervical spine: Yes. Alignment: Normal. CORWIN is normal.   Fracture/subluxation: None. Prevertebral soft tissues: Normal.   Spinolaminar line: Normal.   Degenerative changes: Mild disc space narrowing and osteophytosis is seen involving the mid/lower cervical spine. Early facet arthropathy. Ancillary: Postsurgical changes involving the bilateral mandibular angles. IMPRESSION     1. No acute cervical spine fracture.      2. Mild multilevel degenerative change of the mid/lower cervical spine. 2V Cervical XR images taken on 12/17/18 personally reviewed with patient:  Straightening of the cervical lordosis  Normal alignment. Mild facet sclerosis. No obvious compression fractures or instabilities.     2V Lumbar XR images taken on 12/17/18 personally reviewed with patient:  Facet sclerosis  Disc space narrowing at L4-5 and L5-S1  Normal alignment  No obvious compression fractures or instabilities. Lumbar MRI images taken on 6/27/16 personally reviewed with patient:  Five lumbar vertebrae are present.  Somewhat hypolordotic sagittal  alignment. Disc desiccation from L3-4 through L5-S1 levels.  Mild L3-4 and L5-S1 disc  space narrowing.  Moderate L4-5 disc space narrowing. Edema associate with the anterior superior endplate of L5 may suggest  relative acuity.  No marrow replacing process. Mild multilevel facet osteoarthritis. The conus medullaris is at the L1-2 level.  There is normal signal  throughout the spinal cord. L1-2:No posterior disc bulge.  No foraminal or central canal stenosis. L2-3:No posterior disc bulge.  Mild hypertrophy ligamentum flavum.  Mild  facet osteoarthritis.  Mild foraminal stenosis.  No central canal stenosis. L3-4:Minimal broad based posterior disc bulge.  Mild hypertrophy  ligamentum flavum.  Mild facet osteoarthritis.  Mild foraminal stenosis. No central canal stenosis. Page 1 of 2  Performing Location:[facility]  [id number]             6/27/2016 8:53:30 AM  L4-5:Small broad based posterior disc bulge.  Moderate hypertrophy  ligamentum flavum and moderate facet osteoarthritis.  Mild foraminal  stenosis.  No central canal stenosis. L5-S1:Minimal broad based posterior disc bulge.  Mild hypertrophy  ligamentum flavum and mild facet osteoarthritis.  Moderate left and mild  right foraminal stenosis.  No central canal stenosis.     IMPRESSION  Overall mild degenerative changes.  Edema associated with superior endplate  of L5 may suggest relative acuity.  Mild to moderate foraminal stenosis in  the lower lumbar spine.  reviewed    Ms. Houston Rojas has a reminder for a \"due or due soon\" health maintenance. I have asked that she contact her primary care provider for follow-up on this health maintenance. Pursuant to the emergency declaration under the [address] [place] [address] authority and the light and [employer] Act, this Virtual  Visit was conducted, with patient's consent, to reduce the patient's risk of exposure to COVID-19 and provide continuity of care for an established patient. Services were provided through a video synchronous discussion virtually to substitute for in-person clinic visit. CPT Codes 61235-20136 for Established Patients may apply to this Telehealth Visit  Time-based coding, delete if not needed: I spent at least 10 minutes with this established patient, and >50% of the time was spent counseling and/or coordinating care. Written by Renetta Ramírez, as dictated by Dr. Amol Bunch.

## 2022-01-10 ENCOUNTER — HOSPITAL ENCOUNTER (OUTPATIENT)
Dept: PHYSICAL THERAPY | Age: 67
Discharge: HOME | End: 2022-01-10
Payer: MEDICARE

## 2022-01-10 PROCEDURE — 97140 MANUAL THERAPY 1/> REGIONS: CPT

## 2022-01-10 PROCEDURE — 97161 PT EVAL LOW COMPLEX 20 MIN: CPT

## 2022-01-10 PROCEDURE — 97110 THERAPEUTIC EXERCISES: CPT

## 2022-01-10 NOTE — PROGRESS NOTES
[facility] at the [address], [place] An[place] simms, [address] [facility]  Phone: [phone]      Fax:  [phone]             Plan of Care/ Statement of Necessity for Physical Therapy Services      Patient name: Mala Knott Start of Care: 1/10/2022   Referral source: Jessy Schmidt MD : 1955    Medical Diagnosis: Other low back pain [M54.59]  Myalgia, other site [M79.18]   Onset Date:21    Treatment Diagnosis: Other low back pain   Prior Hospitalization: see medical history Provider#: [id number]   Medications: Verified on Patient summary List    Comorbidities: Hx of chronic low back pain; Left knee meniscal repair 2021; Hx of gastric hernia; Hx of neuropathy in bilat feet; Hypothyroidism; Hashimoto's disease; RA; OA; Depression; Asthma   Prior Level of Function: functionally independent, no AD, somewhat active lifestyle, spends time with her 9year old grandson      The Plan of Care and following information is based on the information from the initial evaluation. Assessment/ key information: Patient is a 74 yo female who presents to [facility] with c/o low back pain. Patient reports chronic history of low back pain with recent exacerbation 21 when patient slipped in bath tub and fell on her right side. Since then, patient reports intermittent achy and tight pain from the right side of her neck down to her low back. She state the pain stretches across her entire low back and occasionally into her right buttock. Patient also reports episodes of intense musce spasms and her back \"locking up\" to the point where she is unable to move. Patient demonstrates decreased ROM, decreased core and bilat LE strength, impaired posture, impaired gait mechancis, pain and decreased functional mobility tolerance. Patient is classified as a falls risk, as evidenced by her single limb balance, 5xSTS test, and recent hx of falling.  Patient also presenting with elevated left ileum and anteriorly rotate right ileum today, which was corrected with MET with good tolerance and patient reporting improvement of symptoms. The listed deficits impair her ability to tolerate standing activities for greater than one hour, navigate stairs, bend down, and perform ADLs.  Patient would benefit from skilled physical therapy to address listed deficits, reduce pain, and maximize functional potential.   Evaluation Complexity History HIGH Complexity :3+ comorbidities / personal factors will impact the outcome/ POC ; Examination MEDIUM Complexity : 3 Standardized tests and measures addressing body structure, function, activity limitation and / or participation in recreation  ;Presentation LOW Complexity : Stable, uncomplicated  ;Clinical Decision Making MEDIUM Complexity : FOTO score of 26-74 FOTO score = an established functional score where 100 = no disability  Overall Complexity Rating: LOW   Problem List: pain affecting function, decrease ROM, decrease strength, edema affecting function, impaired gait/ balance, decrease ADL/ functional abilitiies, decrease activity tolerance, decrease flexibility/ joint mobility and decrease transfer abilities   Treatment Plan may include any combination of the following: Therapeutic exercise, Therapeutic activities, Neuromuscular re-education, Physical agent/modality, Gait/balance training, Manual therapy, Patient education, Self Care training, Functional mobility training, Home safety training and Stair training  Vasopnuematic compression justification:  Per bilateral girth measures taken and listed above the edema is considered significant and having an impact on the patient's strength, balance, gait, transfers, self care and ADLs  Patient / Family readiness to learn indicated by: asking questions, trying to perform skills and interest  Persons(s) to be included in education: patient (P)  Barriers to Learning/Limitations: None  Patient Goal (s): To get rid of the pain; strengthen my back and my core  Patient Self Reported Health Status: fair  Rehabilitation Potential: good    Short Term Goals: To be accomplished in 3 weeks:  1. Patient will be independent and compliant with HEP to progress toward goals and restore functional mobility. Eval Status: issued at [facility]     2. Patient will improve pain in low back to 5/10  to improve activity tolerance and restore prior level of function. Eval Status: 10/10 at worst     3. Pt will have painfree lumbar AROM WFL in all planes to aid in functional mechanics for ambulation/ADLs. Eval Status:   ROM % AROM Comments:pain, area   Forward flexion 40-60 11 cm     Extension 20-30 50%     SideBend right 20-30 51 cm     SideBend left 20-30 52 cm     Rotation right 5-10 50%     Rotation left 5-10 50%              4. Patient will improve 5xSTS to 10 seconds or less without use of UE in order to demonstrate reduced risk for falls. Eval Status: 16 seconds with use of UE on thighs for support               Long Term Goals: To be accomplished in 6 weeks:  1. Patient will improve FOTO score by 7 points to improve functional tolerance for ambulation and ADLs. Eval Status: FOTO 54  FOTO score = an established functional score where 100 = no disability     2. Pt will have 5/5 bilat LE and core strength to return to goals of ambulation and ADLs. Eval Status:     Left(0-5) Right (0-5) N/T   Hip Flexion (L1,2) 4 4 []?    Knee Extension (L3,4) 5 5 []?    Ankle Dorsiflexion (L4) 4+ 4+ []?    Great Toe Extension (L5)     [x]?     Ankle Plantarflexion (S1) 4+ 4+ []?    Knee Flexion (S1,2) 4+ 4+ []?    Abdominals 3+   []?    Gluteus Bakari (sidelying) 4 4- []?    Hip Abduction 4 4- []?          3. Patient will improve single limb stance time to at least 20 seconds on each leg in order to demonstrate reduced risk for falls. Eval Status: Without UE support: Left = 7 seconds; Right = 11 seconds            4.  Patient will report pain no greater than 1-2/10 with stair negotiation in order to facilitate her ability to climb stairs to her bedroom. Eval Status: Pain increased with all stair negotiation; worst pain 10/10       Frequency / Duration: Patient to be seen 2 times per week for 6 weeks. Patient/ Caregiver education and instruction: Diagnosis, prognosis, self care, activity modification and exercises   [x]  Plan of care has been reviewed with PTA    Certification Period: 1/10/22-4/10/22  Donato Harrell, PT 1/10/2022 12:32 PM  _____________________________________________________________________  I certify that the above Therapy Services are being furnished while the patient is under my care. I agree with the treatment plan and certify that this therapy is necessary.     [de-identified] Signature:____________Date:_________TIME:________                                      Buck Mireles MD    ** Signature, Date and Time must be completed for valid certification **    Please sign and return to [facility] at the [address], [address] [facility]       Phone: [phone]      Fax:  [phone]

## 2022-01-10 NOTE — PROGRESS NOTES
PT DAILY TREATMENT NOTE/ LUMBAR EVAL 10-18    Patient Name: Salvador Bailey  Date:1/10/2022  : 1955  [x]  Patient  Verified  Payor: [address] [place],9D / Plan: [email or web address] / Product Type: Managed Care Medicare /    In time:10:01  Out time:10:45  Total Treatment Time (min): 44  Visit #: 1 of 12    Medicare/BCBS Only   Total Timed Codes (min):  44 1:1 Treatment Time:  44       Treatment Area:  Other low back pain [M54.59]  Myalgia, other site [M79.18]    SUBJECTIVE  Pain Level (0-10 scale): 4-5/10  []constant [x]intermittent []improving []worsening []no change since onset    Any medication changes, allergies to medications, adverse drug reactions, diagnosis change, or new procedure performed?: [x] No    [] Yes (see summary sheet for update)  Subjective functional status/changes:     PLOF: functionally independent, no AD, somewhat active lifestyle, spends time with her 9year old grandson  Limitations to PLOF: Difficulty with climbing stairs; pain with bending over and increased activity; reports she can tolerate about an hour of standing activity before needing to rest  Mechanism of Injury: Chronic LBP aggravated by fall on 21 in bath tub primarily affecting right side; sought medical treatment one week later; multiple x-rays unremarkable   Current symptoms/Complaints: Pain down right side of body from neck down to low back however does have pain of both sides of low back; describes pain as achy and tight; pain occasionally travels to right buttock; reports she has spasms that cause her to lock up and fall to her knees; 0/10 at best with muscle relaxers, biofreeze, lidocaine patches, and heat; 10/10 at worst with increased activity, bending down, and doing housework; pt reports they are able to sleep through the night but she does often wake up with stiffness in the morning; denies red flags and NT; reports hx of falls with most recent on 21 where she slipped in bath tub and fell on right side  Previous Treatment/Compliance: Previous PT for left knee pain and LBP; patient reports limited compliance with previous HEP  PMHx/Surgical Hx: Hx of chronic low back pain; Left knee meniscal repair 2/2021; Hx of gastric hernia; Hx of neuropathy in bilat feet; Hypothyroidism; Hashimoto's disease; RA; OA; Depression; Asthma  Work Hx: Currently not working; disabled  Living Situation: 2 story home alone; difficulty with stairs due to LBP/knee pain  Pt Goals: \"To get rid of the pain; strengthen my back and my core\"  Barriers: [x]pain []financial []time []transportation []other  Motivation: Good; evidenced by participation in PT eval and desire to return to PLOF  Substance use: []Alcohol []Tobacco []other:   Cognition: A & O x 3        OBJECTIVE    24 min [x]Eval                  []Re-Eval       10 min Therapeutic Exercise:  [x] See flow sheet :   Rationale: increase ROM and increase strength to improve the patients ability to restore PLOF. 10 min Manual Therapy:  MET to correct elevated left ileum and right anterior innominate rotation   Rationale: decrease pain, increase ROM and increase postural awareness to perform ADLs with reduced pain and symptoms.            With   [x] TE   [] TA   [] neuro   [] other: Patient Education: [x] Review HEP    [] Progressed/Changed HEP based on:   [] positioning   [] body mechanics   [] transfers   [] heat/ice application    [] other:        General Evaluation    Physical Therapy Evaluation - Lumbar Spine:    OBJECTIVE  Posture:  Lateral Shift: [] Right    [] Left     [] +  [x] -  Kyphosis: [] Increased [] Decreased   []  WNL  Lordosis:  [x] Increased [] Decreased   [] WNL  Pelvic symmetry: [] WNL    [x] Other: Elevated left ileum; right anterior innominate rotation  Gait:  [] Normal     [x] Abnormal: Elevated left ileum; decreased bilat step length; decreased speed; slightly decreased bilat DF at initial contact    Active Movements: [] N/A   [] Too acute   [] Other:  ROM % AROM Comments:pain, area   Forward flexion 40-60 11 cm    Extension 20-30 50%    SideBend right 20-30 51 cm    SideBend left 20-30 52 cm    Rotation right 5-10 50%    Rotation left 5-10 50%        Neuro Screen [x] WNL    Dural Mobility:  SLR Supine: [] Right    [] Left   [] +    [x] -  @ (degrees):   Slump Test: [] Right    [] Left   [] +    [x] -  @ (degrees):     Palpation  [] Min  [x] Mod  [] Severe    Location: Bilat lumbar paraspinals and QL, worse right>left    Strength   Left(0-5) Right (0-5) N/T   Hip Flexion (L1,2) 4 4 []   Knee Extension (L3,4) 5 5 []   Ankle Dorsiflexion (L4) 4+ 4+ []   Great Toe Extension (L5)   [x]   Ankle Plantarflexion (S1) 4+ 4+ []   Knee Flexion (S1,2) 4+ 4+ []   Abdominals 3+  []   Gluteus Bakari (sidelying) 4 4- []   Hip Abduction 4 4- []         Special Tests  Lumbar:  Lumb. Compression: [x] Pos  [] Neg               Lumbar Distraction:   [x] Pos  [] Neg        [place]:     Compression: [] +    [x] -     Gapping:  [] +    [x] -                Hip: Winnie:  [x] Right    [x] Left    [x] +    [] -     90/90 SLR: [] Right    [] Left    [] +    [x] -     Piriformis: [x] Right    [x] Left    [x] +    [] -       Other Tests / Comments:     5xSTS: 16 seconds with use of UE on thighs for support   SLS: Without UE support: Left = 7 seconds; Right = 11 seconds      Pain Level (0-10 scale) post treatment: 3/10    ASSESSMENT/Changes in Function: Patient is a 74 yo female who presents to [facility] with c/o low back pain. Patient reports chronic history of low back pain with recent exacerbation 12/2/21 when patient slipped in bath tub and fell on her right side. Since then, patient reports intermittent achy and tight pain from the right side of her neck down to her low back. She state the pain stretches across her entire low back and occasionally into her right buttock.  Patient also reports episodes of intense musce spasms and her back \"locking up\" to the point where she is unable to move. Patient demonstrates decreased ROM, decreased core and bilat LE strength, impaired posture, impaired gait mechancis, pain and decreased functional mobility tolerance. Patient is classified as a falls risk, as evidenced by her single limb balance, 5xSTS test, and recent hx of falling. Patient also presenting with elevated left ileum and anteriorly rotate right ileum today, which was corrected with MET with good tolerance and patient reporting improvement of symptoms. The listed deficits impair her ability to tolerate standing activities for greater than one hour, navigate stairs, bend down, and perform ADLs. Patient would benefit from skilled physical therapy to address listed deficits, reduce pain, and maximize functional potential.     Patient will continue to benefit from skilled PT services to modify and progress therapeutic interventions, address functional mobility deficits, address ROM deficits, address strength deficits, analyze and address soft tissue restrictions, analyze and cue movement patterns, analyze and modify body mechanics/ergonomics, assess and modify postural abnormalities and instruct in home and community integration to attain remaining goals. [x]  See Plan of Care  []  See progress note/recertification  []  See Discharge Summary         Progress towards goals / Updated goals:  Short Term Goals: To be accomplished in 3 weeks:  1. Patient will be independent and compliant with HEP to progress toward goals and restore functional mobility. Eval Status: issued at eval    2. Patient will improve pain in low back to 5/10  to improve activity tolerance and restore prior level of function. Eval Status: 10/10 at worst    3. Pt will have painfree lumbar AROM WFL in all planes to aid in functional mechanics for ambulation/ADLs.   Eval Status:   ROM % AROM Comments:pain, area   Forward flexion 40-60 11 cm    Extension 20-30 50%    SideBend right 20-30 51 cm    SideBend left 20-30 52 cm    Rotation right 5-10 50%    Rotation left 5-10 50%            4. Patient will improve 5xSTS to 10 seconds or less without use of UE in order to demonstrate reduced risk for falls. Eval Status: 16 seconds with use of UE on thighs for support     Long Term Goals: To be accomplished in 6 weeks:  1. Patient will improve FOTO score by 7 points to improve functional tolerance for ambulation and ADLs. Eval Status: FOTO 54  FOTO score = an established functional score where 100 = no disability    2. Pt will have 5/5 bilat LE and core strength to return to goals of ambulation and ADLs. Eval Status:    Left(0-5) Right (0-5) N/T   Hip Flexion (L1,2) 4 4 []   Knee Extension (L3,4) 5 5 []   Ankle Dorsiflexion (L4) 4+ 4+ []   Great Toe Extension (L5)   [x]   Ankle Plantarflexion (S1) 4+ 4+ []   Knee Flexion (S1,2) 4+ 4+ []   Abdominals 3+  []   Gluteus Bakari (sidelying) 4 4- []   Hip Abduction 4 4- []       3. Patient will improve single limb stance time to at least 20 seconds on each leg in order to demonstrate reduced risk for falls. Eval Status: Without UE support: Left = 7 seconds; Right = 11 seconds           4. Patient will report pain no greater than 1-2/10 with stair negotiation in order to facilitate her ability to climb stairs to her bedroom.     Eval Status: Pain increased with all stair negotiation; worst pain 10/10    PLAN  [x]  Upgrade activities as tolerated     [x]  Continue plan of care  [x]  Update interventions per flow sheet       []  Discharge due to:_  []  Other:_      Quynh Nails, PT 1/10/2022  9:05 AM

## 2022-01-14 ENCOUNTER — HOSPITAL ENCOUNTER (OUTPATIENT)
Dept: PHYSICAL THERAPY | Age: 67
Discharge: HOME | End: 2022-01-14
Payer: MEDICARE

## 2022-01-14 PROCEDURE — 97110 THERAPEUTIC EXERCISES: CPT

## 2022-01-14 PROCEDURE — 97112 NEUROMUSCULAR REEDUCATION: CPT

## 2022-01-14 NOTE — PROGRESS NOTES
PT DAILY TREATMENT NOTE    Patient Name: Hanna Arroyo  Date:2022  : 1955  [x]  Patient  Verified  Payor: Oscar[address] [place] / Plan: [address] / Product Type: Managed Care Medicare /    In time:10:00 am  Out time:10:47 am   Total Treatment Time (min): 47  Total Timed Codes (min): 47  1:1 Treatment Time (MC/BCBS only): 47   Visit #: 2 of 12    Treatment Dx: Other low back pain [M54.59]  Myalgia, other site [M79.18]    SUBJECTIVE  Pain Level (0-10 scale): 0  Any medication changes, allergies to medications, adverse drug reactions, diagnosis change, or new procedure performed?: [x] No    [] Yes (see summary sheet for update)  Subjective functional status/changes:   [] No changes reported  \"Today is a good day. I feel pretty good. \"    OBJECTIVE    30 min Therapeutic Exercise:  [x] See flow sheet :   Rationale: increase ROM, increase strength, improve coordination and increase proprioception to improve the patients ability to perform daily activities with decreased pain and symptom levels       17 min Neuromuscular Re-education:  [x]  See flow sheet :   Rationale: increase ROM, increase strength, improve coordination, improve balance and increase proprioception  to improve the patients ability to perform daily activities with decreased pain and symptom levels         With   [] TE   [] TA   [] neuro   [] other: Patient Education: [x] Review HEP    [] Progressed/Changed HEP based on:   [] positioning   [] body mechanics   [] transfers   [] heat/ice application    [] other:      Other Objective/Functional Measures:    Required max cues for 90-90 to inhibit quads and facilitate hamstrings    Pain Level (0-10 scale) post treatment: 0    ASSESSMENT/Changes in Function:   Initiated POC this session with no adverse effects. Pt reports has only been using medication when needs to but today is a good. Day. Right glut fatigued quickly this session with glut max.  Tolerated session well. Could benefit from continued skilled PT to address strength, stability and mobility. Patient will continue to benefit from skilled PT services to modify and progress therapeutic interventions, address functional mobility deficits, address ROM deficits, address strength deficits, analyze and address soft tissue restrictions, analyze and cue movement patterns, analyze and modify body mechanics/ergonomics, assess and modify postural abnormalities and instruct in home and community integration to attain remaining goals. [x]  See Plan of Care  []  See progress note/recertification  []  See Discharge Summary         Progress towards goals / Updated goals:  Short Term Goals: To be accomplished in 3 weeks:  1. Patient will be independent and compliant with HEP to progress toward goals and restore functional mobility. Eval Status: issued at eval  Current: Progressing 1/14/22 reports attempted compliance     2. Patient will improve pain in low back to 5/10  to improve activity tolerance and restore prior level of function. Eval Status: 10/10 at worst  Current progressing 1/14/22 no pain this session      3. Pt will have painfree lumbar AROM WFL in all planes to aid in functional mechanics for ambulation/ADLs. Eval Status:   ROM % AROM Comments:pain, area   Forward flexion 40-60 11 cm     Extension 20-30 50%     SideBend right 20-30 51 cm     SideBend left 20-30 52 cm     Rotation right 5-10 50%     Rotation left 5-10 50%              4. Patient will improve 5xSTS to 10 seconds or less without use of UE in order to demonstrate reduced risk for falls.               Eval Status: 16 seconds with use of UE on thighs for support               Long Term Goals: To be accomplished in 6 weeks:  1. Patient will improve FOTO score by 7 points to improve functional tolerance for ambulation and ADLs.   Eval Status: FOTO 54  FOTO score = an established functional score where 100 = no disability     2.   Pt will have 5/5 bilat LE and core strength to return to goals of ambulation and ADLs. Eval Status:     Left(0-5) Right (0-5) N/T   Hip Flexion (L1,2) 4 4 []? ?    Knee Extension (L3,4) 5 5 []? ?    Ankle Dorsiflexion (L4) 4+ 4+ []? ?    Great Toe Extension (L5)     [x]? ?    Ankle Plantarflexion (S1) 4+ 4+ []? ?    Knee Flexion (S1,2) 4+ 4+ []? ?    Abdominals 3+   []? ?    Gluteus Bakari (sidelying) 4 4- []??    Hip Abduction 4 4- []??          3.   Patient will improve single limb stance time to at least 20 seconds on each leg in order to demonstrate reduced risk for falls.               Eval Status: Without UE support: Left = 7 seconds; Right = 11 seconds            4. Patient will report pain no greater than 1-2/10 with stair negotiation in order to facilitate her ability to climb stairs to her bedroom.               Eval Status: Pain increased with all stair negotiation; worst pain 10/10              PLAN  []  Upgrade activities as tolerated     [x]  Continue plan of care  []  Update interventions per flow sheet       []  Discharge due to:_  []  Other:_      Charito Crowder PT, DPT 1/14/2022  10:00 AM    Future Appointments   Date Time Provider An[place] Brady   3/9/2022  9:00 AM MELANIE Sood [facility]B

## 2022-01-26 ENCOUNTER — HOSPITAL ENCOUNTER (OUTPATIENT)
Dept: PHYSICAL THERAPY | Age: 67
Discharge: HOME | End: 2022-01-26
Payer: MEDICARE

## 2022-01-26 PROCEDURE — 97110 THERAPEUTIC EXERCISES: CPT

## 2022-01-26 PROCEDURE — 97112 NEUROMUSCULAR REEDUCATION: CPT

## 2022-01-26 PROCEDURE — 97530 THERAPEUTIC ACTIVITIES: CPT

## 2022-01-26 NOTE — PROGRESS NOTES
PT DAILY TREATMENT NOTE    Patient Name: Stephanie Earing  Date:2022  : 1955  [x]  Patient  Verified  Payor: Luis Enrique Lesterariane / Plan: [address] / Product Type: Managed Care Medicare /    In time:9:57  Out time:10:45  Total Treatment Time (min): 48  Total Timed Codes (min): 48  1:1 Treatment Time (MC/BCBS only): 48   Visit #: 3 of 12    Treatment Dx: Other low back pain [M54.59]  Myalgia, other site [M79.18]    SUBJECTIVE  Pain Level (0-10 scale): 0  Any medication changes, allergies to medications, adverse drug reactions, diagnosis change, or new procedure performed?: [x] No    [] Yes (see summary sheet for update)  Subjective functional status/changes:   [] No changes reported  Patient reporting no pain today. However, states she had 7/10 pain yesterday. Reports she woke up with pain in her back but it improved as the day went on. Also states she began to feel increased pain in her right neck and shoulder in the afternoon, likely due to looking down at her phone for long periods of time. Reports she felt better after applying biofreeze and taking NSAIDs and muscle relaxer. Reports her legs felt a little sore after last session from [facility]. Also reports she started taking Gabapentin one week ago for her neuropathy. OBJECTIVE    18 min Therapeutic Exercise:  [x] See flow sheet :   Rationale: increase ROM and increase strength to improve the patients ability to restore PLOF. 15 min Therapeutic Activity:  [x]  See flow sheet :   Rationale: increase strength, improve coordination, improve balance and increase proprioception  to improve the patients ability to return to prior level of physical activity. 15 min Neuromuscular Re-education:  [x]  See flow sheet :   Rationale: increase strength, improve coordination and increase proprioception  to improve the patients ability to activate glutes and hamstrings without compensation.      With   [x] TE   [] TA   [] neuro   [] other: Patient Education: [x] Review HEP    [] Progressed/Changed HEP based on:   [] positioning   [] body mechanics   [] transfers   [] heat/ice application    [] other:      Other Objective/Functional Measures:     Significant UT compensation during t-band extension, worse on right     Pain Level (0-10 scale) post treatment: 0    ASSESSMENT/Changes in Function: Patient tolerated treatment session well today without any adverse effects. Patient had no complaints with addition of forward and lateral step ups to exercise program to accomplish improved LE strength and functional mobility. Patient was challenged by theraband extensions today, requiring consistent tactile cues to prevent UT compensation. Patient continues to requiring cuing to inhibit quads during 90-90 exercises; however, did display improved hamstring and glut activation. Patient continues to make steady progress toward goals and would benefit from continued skilled PT intervention to address remaining deficits outlined in goals below. Patient will continue to benefit from skilled PT services to modify and progress therapeutic interventions, address functional mobility deficits, address ROM deficits, address strength deficits, analyze and address soft tissue restrictions, analyze and cue movement patterns, analyze and modify body mechanics/ergonomics, assess and modify postural abnormalities and instruct in home and community integration to attain remaining goals. [x]  See Plan of Care  []  See progress note/recertification  []  See Discharge Summary         Progress towards goals / Updated goals:  Short Term Goals: To be accomplished in 3 weeks:  1. Patient will be independent and compliant with HEP to progress toward goals and restore functional mobility. [facility] Status: issued at [facility]  Current: Progressing 1/14/22 reports attempted compliance     2.  Patient will improve pain in low back to 5/10  to improve activity tolerance and restore prior level of function. Eval Status: 10/10 at worst  Current progressing 1/14/22 no pain this session      3. Pt will have painfree lumbar AROM WFL in all planes to aid in functional mechanics for ambulation/ADLs. Eval Status:   ROM % AROM Comments:pain, area   Forward flexion 40-60 11 cm     Extension 20-30 50%     SideBend right 20-30 51 cm     SideBend left 20-30 52 cm     Rotation right 5-10 50%     Rotation left 5-10 50%              4. Patient will improve 5xSTS to 10 seconds or less without use of UE in order to demonstrate reduced risk for falls.               Eval Status: 16 seconds with use of UE on thighs for support               Long Term Goals: To be accomplished in 6 weeks:  1. Patient will improve FOTO score by 7 points to improve functional tolerance for ambulation and ADLs. Eval Status: FOTO 54  FOTO score = an established functional score where 100 = no disability     2.   Pt will have 5/5 bilat LE and core strength to return to goals of ambulation and ADLs. Eval Status:     Left(0-5) Right (0-5) N/T   Hip Flexion (L1,2) 4 4 []? ??    Knee Extension (L3,4) 5 5 []? ??    Ankle Dorsiflexion (L4) 4+ 4+ []???    Great Toe Extension (L5)     [x]? ??    Ankle Plantarflexion (S1) 4+ 4+ []? ??    Knee Flexion (S1,2) 4+ 4+ []???    Abdominals 3+   []? ??    Gluteus Bakari (sidelying) 4 4- []???    Hip Abduction 4 4- []???          3.   Patient will improve single limb stance time to at least 20 seconds on each leg in order to demonstrate reduced risk for falls.               Eval Status: Without UE support: Left = 7 seconds; Right = 11 seconds            4. Patient will report pain no greater than 1-2/10 with stair negotiation in order to facilitate her ability to climb stairs to her bedroom.               Eval Status: Pain increased with all stair negotiation; worst pain 10/10   Current: 7/10 at worst in past week 1/26/22        PLAN  [x]  Upgrade activities as tolerated [x]  Continue plan of care  []  Update interventions per flow sheet       []  Discharge due to:_  []  Other:_      Brittany Mccray, PT 1/26/2022  9:40 AM    Future Appointments   Date Time Provider Aniya Brady   1/26/2022 10:00 AM Zen Miller, PT [facility]   1/28/2022 10:45 AM Karma Durand, PT [facility]   2/1/2022 10:00 AM Alivia Rush, PT [facility]   2/3/2022 10:00 AM Alivia Rush, PT [facility]   2/8/2022 10:00 AM Alivia Rush, PT [facility]   2/10/2022 10:45 AM Alivia Rush, PT [facility]   2/16/2022 11:30 AM Alivia Rush, PT [facility]   2/18/2022 10:45 AM Carmen Wise PT, DPT [facility]   3/9/2022  9:00 AM MELANIE Foote AMB

## 2022-01-28 ENCOUNTER — HOSPITAL ENCOUNTER (OUTPATIENT)
Dept: PHYSICAL THERAPY | Age: 67
Discharge: HOME | End: 2022-01-28
Payer: MEDICARE

## 2022-01-28 PROCEDURE — 97112 NEUROMUSCULAR REEDUCATION: CPT

## 2022-01-28 PROCEDURE — 97110 THERAPEUTIC EXERCISES: CPT

## 2022-01-28 PROCEDURE — 97530 THERAPEUTIC ACTIVITIES: CPT

## 2022-01-28 NOTE — PROGRESS NOTES
PT DAILY TREATMENT NOTE    Patient Name: Tracy Murillo  Date:2022  : 1955  [x]  Patient  Verified  Payor: [address] [place],9D / Plan: [address] / Product Type: Managed Care Medicare /    In time:10:46  Out time:11:37  Total Treatment Time (min): 51  Total Timed Codes (min): 51  1:1 Treatment Time (MC/BCBS only): 51   Visit #: 4 of 12    Treatment Dx: Other low back pain [M54.59]  Myalgia, other site [M79.18]    SUBJECTIVE  Pain Level (0-10 scale): 0  Any medication changes, allergies to medications, adverse drug reactions, diagnosis change, or new procedure performed?: [x] No    [] Yes (see summary sheet for update)  Subjective functional status/changes:   [] No changes reported  Pt reports that her buttocks were really sore yesterday. Reports that she is compliant with HEP.      OBJECTIVE        21 min Therapeutic Exercise:  [] See flow sheet :   Rationale: increase ROM, increase strength, improve coordination, improve balance and increase proprioception to improve the patients ability to perform daily activities with decreased pain and symptom levels      15 min Therapeutic Activity:  []  See flow sheet :   Rationale: increase ROM, increase strength, improve coordination, improve balance and increase proprioception  to improve the patients ability to perform daily activities with decreased pain and symptom levels       15 min Neuromuscular Re-education:  []  See flow sheet :   Rationale: increase ROM, increase strength, improve coordination, improve balance and increase proprioception  to improve the patients ability to perform daily activities with decreased pain and symptom levels     With   [x] TE   [x] TA   [x] neuro   [] other: Patient Education: [x] Review HEP    [] Progressed/Changed HEP based on:   [x] positioning   [x] body mechanics   [] transfers   [] heat/ice application    [] other:      Other Objective/Functional Measures: Pt enters gym in no apparent distress. Pain Level (0-10 scale) post treatment: 0/10    ASSESSMENT/Changes in Function: Patient tolerated therapy session well as there were no adverse reactions today. Pt initially having some left lower back discomfort with bridges, but when therapist provided cuing for PPT, glute set, and TA set pt with decreased pain. Pt requires tactile cuing to keep from shrugging shoulders with alternating UE exercises. Pt is progressing with therapy as indicated by pt tolerating increase in exercise repetitions and resistance. Although showing progress patient would benefit from continuation of skilled physical therapy to address the remaining limitations. Patient will continue to benefit from skilled PT services to modify and progress therapeutic interventions, address functional mobility deficits, address ROM deficits, address strength deficits, analyze and address soft tissue restrictions, analyze and cue movement patterns, analyze and modify body mechanics/ergonomics, assess and modify postural abnormalities, address imbalance/dizziness and instruct in home and community integration to attain remaining goals. [x]  See Plan of Care  []  See progress note/recertification  []  See Discharge Summary         Progress towards goals / Updated goals:  Short Term Goals: To be accomplished in 3 weeks:  1. Patient will be independent and compliant with HEP to progress toward goals and restore functional mobility. Eval Status: issued at eval  Current: Progressing 1/28/22 reports compliance     2. Patient will improve pain in low back to 5/10  to improve activity tolerance and restore prior level of function. Eval Status: 10/10 at worst  Current progressing 1/28/22 no pain this session      3. Pt will have painfree lumbar AROM WFL in all planes to aid in functional mechanics for ambulation/ADLs.   Eval Status:   ROM % AROM Comments:pain, area   Forward flexion 40-60 11 cm     Extension 20-30 50%   SideBend right 20-30 51 cm     SideBend left 20-30 52 cm     Rotation right 5-10 50%     Rotation left 5-10 50%     Current:           4. Patient will improve 5xSTS to 10 seconds or less without use of UE in order to demonstrate reduced risk for falls.               Eval Status: 16 seconds with use of UE on thighs for support             Current:    Long Term Goals: To be accomplished in 6 weeks:  1. Patient will improve FOTO score by 7 points to improve functional tolerance for ambulation and ADLs. Eval Status: ROYQ 21  Current: 1/28/22 will perform on 5th visit, next visit  FOTO score = an established functional score where 100 = no disability     2.   Pt will have 5/5 bilat LE and core strength to return to goals of ambulation and ADLs. Eval Status:     Left(0-5) Right (0-5) N/T   Hip Flexion (L1,2) 4 4 []????    Knee Extension (L3,4) 5 5 []????    Ankle Dorsiflexion (L4) 4+ 4+ []????    Great Toe Extension (L5)     [x]? ???    Ankle Plantarflexion (S1) 4+ 4+ []????    Knee Flexion (S1,2) 4+ 4+ []????    Abdominals 3+   []????    Gluteus Bakari (sidelying) 4 4- []????    Hip Abduction 4 4- []????    Current:      3.   Patient will improve single limb stance time to at least 20 seconds on each leg in order to demonstrate reduced risk for falls.               Eval Status: Without UE support: Left = 7 seconds; Right = 11 seconds    Current:          2. Patient will report pain no greater than 1-2/10 with stair negotiation in order to facilitate her ability to climb stairs to her bedroom.               Eval Status: Pain increased with all stair negotiation; worst pain 10/10              Current: 1/28/22 rates pain 0/10           PLAN  []  Upgrade activities as tolerated     [x]  Continue plan of care  []  Update interventions per flow sheet       []  Discharge due to:_  []  Other:_      Dom Nolan, PT, DPT, [facility] 1/28/2022  9:03 AM    Future Appointments   Date Time Provider Aniya Brady   1/28/2022 10:45 AM Tracy Layton, PT [facility]   2/1/2022 10:00 AM Leigha Jonathan, PT [facility]   2/3/2022 10:00 AM Leigha Jonathan, PT [facility]   2/8/2022 10:00 AM Leigha Jonathan, PT [facility]   2/10/2022 10:45 AM Leigha Shettym, PT [facility]   2/16/2022 11:30 AM Leigha Shettym, PT [facility]   2/18/2022 10:45 AM Justin Wilson PT, DPT [facility]   3/9/2022  9:00 AM MELANIE Campos AMB

## 2022-02-01 ENCOUNTER — APPOINTMENT (OUTPATIENT)
Dept: PHYSICAL THERAPY | Age: 67
End: 2022-02-01
Payer: MEDICARE

## 2022-02-03 ENCOUNTER — HOSPITAL ENCOUNTER (OUTPATIENT)
Dept: PHYSICAL THERAPY | Age: 67
Discharge: HOME | End: 2022-02-03
Payer: MEDICARE

## 2022-02-03 PROCEDURE — 97112 NEUROMUSCULAR REEDUCATION: CPT

## 2022-02-03 PROCEDURE — 97530 THERAPEUTIC ACTIVITIES: CPT

## 2022-02-03 PROCEDURE — 97110 THERAPEUTIC EXERCISES: CPT

## 2022-02-03 NOTE — PROGRESS NOTES
PT DAILY TREATMENT NOTE    Patient Name: Stephanie Earing  Date:2/3/2022  : 1955  [x]  Patient  Verified  Payor: Gennadeena Batistaariane / Plan: [address] / Product Type: Managed Care Medicare /    In time:10:46 am  Out time:11:39 am  Total Treatment Time (min): 53  Total Timed Codes (min): 53  1:1 Treatment Time (MC/BCBS only): 53   Visit #: 5 of 12    Treatment Dx: Other low back pain [M54.59]  Myalgia, other site [M79.18]    SUBJECTIVE  Pain Level (0-10 scale): 0  Any medication changes, allergies to medications, adverse drug reactions, diagnosis change, or new procedure performed?: [x] No    [] Yes (see summary sheet for update)  Subjective functional status/changes:   [] No changes reported  \"Better today. I was in like 10 out of 10 pain on . I went to the doctor and they did x-rays and blood work and don't know what is going on. It comes and goes. \"    OBJECTIVE    10 min Therapeutic Exercise:  [x] See flow sheet :   Rationale: increase ROM, increase strength, improve coordination and increase proprioception to improve the patients ability to perform daily activities with decreased pain and symptom levels      13 min Therapeutic Activity:  [x]  See flow sheet : reassessed with FOTO - conducted with pt  Discussion of left S/L over a pillow or cushion with acute right sided pain    Rationale: increase ROM, increase strength, improve coordination, improve balance and increase proprioception  to improve the patients ability to perform daily activities with decreased pain and symptom levels       30 min Neuromuscular Re-education:  [x]  See flow sheet :   Rationale: increase ROM, increase strength, improve coordination, improve balance and increase proprioception  to improve the patients ability to perform daily activities with decreased pain and symptom levels           With   [] TE   [] TA   [] neuro   [] other: Patient Education: [x] Review HEP    [] Progressed/Changed HEP based on:   [] positioning   [] body mechanics   [] transfers   [] heat/ice application    [] other:      Other Objective/Functional Measures:   FOTO 60       Pain Level (0-10 scale) post treatment: 0    ASSESSMENT/Changes in Function:   Focused on intercostal inhibition and IO/TA facilitation with interventions. Reported stretch in spots of pain earlier this week with child's pose, \"lady in glasses\" and S/L IO/TA. Required max cues with IO/TA facilitation. Tried to compensate  With left lower back, corrected with PPT. Right glut fatigued quickly. Discussed with pt potential cause of right sided pain is diaphragm, QL and intercostals spasms due to posture and compensations. Patient will continue to benefit from skilled PT services to modify and progress therapeutic interventions, address functional mobility deficits, address ROM deficits, address strength deficits, analyze and address soft tissue restrictions, analyze and cue movement patterns, analyze and modify body mechanics/ergonomics, assess and modify postural abnormalities, address imbalance/dizziness and instruct in home and community integration to attain remaining goals. [x]  See Plan of Care  []  See progress note/recertification  []  See Discharge Summary         Progress towards goals / Updated goals:  Short Term Goals: To be accomplished in 3 weeks:  1. Patient will be independent and compliant with HEP to progress toward goals and restore functional mobility. Eval Status: issued at eval  Current: Progressing 1/28/22 reports compliance     2. Patient will improve pain in low back to 5/10  to improve activity tolerance and restore prior level of function. Eval Status: 10/10 at worst  Current progressing 1/28/22 no pain this session      3. Pt will have painfree lumbar AROM WFL in all planes to aid in functional mechanics for ambulation/ADLs.   Eval Status:   ROM % AROM Comments:pain, area   Forward flexion 40-60 11 cm   Extension 20-30 50%     SideBend right 20-30 51 cm     SideBend left 20-30 52 cm     Rotation right 5-10 50%     Rotation left 5-10 50%     Current:           4. Patient will improve 5xSTS to 10 seconds or less without use of UE in order to demonstrate reduced risk for falls.               Eval Status: 16 seconds with use of UE on thighs for support             Current:     Long Term Goals: To be accomplished in 6 weeks:  1. Patient will improve FOTO score by 7 points to improve functional tolerance for ambulation and ADLs. Eval Status: FOTO 54  Current: Progressing 2/3/22 60  FOTO score = an established functional score where 100 = no disability     2.   Pt will have 5/5 bilat LE and core strength to return to goals of ambulation and ADLs.   Eval Status:     Left(0-5) Right (0-5) N/T   Hip Flexion (L1,2) 4 4 []?????    Knee Extension (L3,4) 5 5 []?????    Ankle Dorsiflexion (L4) 4+ 4+ []?????    Great Toe Extension (L5)     [x]?????    Ankle Plantarflexion (S1) 4+ 4+ []?????    Knee Flexion (S1,2) 4+ 4+ []?????    Abdominals 3+   []?????    Gluteus Bakari (sidelying) 4 4- []?????    Hip Abduction 4 4- []?????    Current: Progressing 2/3/22 challenged per observations, especially with right glut max     3.   Patient will improve single limb stance time to at least 20 seconds on each leg in order to demonstrate reduced risk for falls.               Eval Status: Without UE support: Left = 7 seconds; Right = 11 seconds    Current:           4. Patient will report pain no greater than 1-2/10 with stair negotiation in order to facilitate her ability to climb stairs to her bedroom.               Eval Status: Pain increased with all stair negotiation; worst pain 10/10              Current: 1/28/22 rates pain 0/10        PLAN  [x]  Upgrade activities as tolerated     [x]  Continue plan of care  []  Update interventions per flow sheet       []  Discharge due to:_  []  Other:_      Chevy Hooks, PT, DPT 2/3/2022  10:53 AM    Future Appointments   Date Time Provider Aniya Caitlyn   2/8/2022 10:00 AM Johana Haney PT [facility]   2/10/2022 10:45 AM DANIEL Corbin [facility]   2/16/2022 11:30 AM DANIEL CorbinHPNIKKY [facility]   2/18/2022 10:45 AM Monica Morales, PT, DPT [facility]   3/9/2022  9:00 AM Shmuel Reddy NP [facility] AMB

## 2022-02-08 ENCOUNTER — APPOINTMENT (OUTPATIENT)
Dept: PHYSICAL THERAPY | Age: 67
End: 2022-02-08
Payer: MEDICARE

## 2022-02-09 ENCOUNTER — HOSPITAL ENCOUNTER (OUTPATIENT)
Dept: PHYSICAL THERAPY | Age: 67
Discharge: HOME | End: 2022-02-09
Payer: MEDICARE

## 2022-02-09 PROCEDURE — 97110 THERAPEUTIC EXERCISES: CPT

## 2022-02-09 PROCEDURE — 97112 NEUROMUSCULAR REEDUCATION: CPT

## 2022-02-09 NOTE — PROGRESS NOTES
PT DAILY TREATMENT NOTE    Patient Name: Guera Templeton  Date:2022  : 1955  [x]  Patient  Verified  Payor: [place] [employer] / Plan: [address] / Product Type: Managed Care Medicare /    In time:605  Out time:645  Total Treatment Time (min): 40  Total Timed Codes (min): 40  1:1 Treatment Time (MC/BCBS only): 40   Visit #: 6 of 12    Treatment Dx:  Other low back pain [M54.59]  Myalgia, other site [M79.18]    SUBJECTIVE  Pain Level (0-10 scale): 0  Any medication changes, allergies to medications, adverse drug reactions, diagnosis change, or new procedure performed?: [x] No    [] Yes (see summary sheet for update)  Subjective functional status/changes:   [] No changes reported  I haven't had any pain since the middle of last week    OBJECTIVE        15 min Therapeutic Exercise:  [x] See flow sheet :   Rationale: increase ROM, increase strength and improve coordination to improve the patients ability to perform ADL       25 min Neuromuscular Re-education:  [x]  See flow sheet :worked on breathing pattern , repositioning   Rationale: increase ROM, increase strength, improve coordination and increase proprioception  to improve the patients ability to perform ADL            With   [] TE   [] TA   [] neuro   [] other: Patient Education: [x] Review HEP    [] Progressed/Changed HEP based on:   [] positioning   [] body mechanics   [] transfers   [] heat/ice application    [] other:      Other Objective/Functional Measures:   -patient challenged with child's pose position and breathing  -mild asthma attack however reduced with pursed lip breathing     Pain Level (0-10 scale) post treatment: 0    ASSESSMENT/Changes in Function: overall good tolerance to activities without pain, continues with strength deficit    Patient will continue to benefit from skilled PT services to address ROM deficits, address strength deficits, analyze and address soft tissue restrictions and analyze and cue movement patterns to attain remaining goals. [x]  See Plan of Care  []  See progress note/recertification  []  See Discharge Summary         Progress towards goals / Updated goals:  Short Term Goals: To be accomplished in 3 weeks:  1. Patient will be independent and compliant with HEP to progress toward goals and restore functional mobility. Eval Status: issued at eval  Current: Progressing 1/28/22 reports compliance     2. Patient will improve pain in low back to 5/10  to improve activity tolerance and restore prior level of function. Eval Status: 10/10 at worst  Current 2/9/22: no pain with ADL since last week, MET     3. Pt will have painfree lumbar AROM WFL in all planes to aid in functional mechanics for ambulation/ADLs. Eval Status:   ROM % AROM Comments:pain, area   Forward flexion 40-60 11 cm     Extension 20-30 50%     SideBend right 20-30 51 cm     SideBend left 20-30 52 cm     Rotation right 5-10 50%     Rotation left 5-10 50%     Current:           4. Patient will improve 5xSTS to 10 seconds or less without use of UE in order to demonstrate reduced risk for falls.               Eval Status: 16 seconds with use of UE on thighs for support             Current:     Long Term Goals: To be accomplished in 6 weeks:  1. Patient will improve FOTO score by 7 points to improve functional tolerance for ambulation and ADLs. Eval Status: FOTO 54  Current: Progressing 2/3/22 60  FOTO score = an established functional score where 100 = no disability     2.   Pt will have 5/5 bilat LE and core strength to return to goals of ambulation and ADLs.   Eval Status:     Left(0-5) Right (0-5) N/T   Hip Flexion (L1,2) 4 4 []??????    Knee Extension (L3,4) 5 5 []??????    Ankle Dorsiflexion (L4) 4+ 4+ []??????    Great Toe Extension (L5)     [x]??????    Ankle Plantarflexion (S1) 4+ 4+ []??????    Knee Flexion (S1,2) 4+ 4+ []??????    Abdominals 3+   []??????    Gluteus Bakari (sidelying) 4 4- []??????  Hip Abduction 4 4- []??????    Current: Progressing 2/3/22 challenged per observations, especially with right glut max     3.   Patient will improve single limb stance time to at least 20 seconds on each leg in order to demonstrate reduced risk for falls.               Eval Status: Without UE support: Left = 7 seconds; Right = 11 seconds    Current:           4. Patient will report pain no greater than 1-2/10 with stair negotiation in order to facilitate her ability to climb stairs to her bedroom.               Eval Status: Pain increased with all stair negotiation; worst pain 10/10              Current: 1/28/22 rates pain 0/10  Status on 2/9/22: patient reports no pain since last week with ADL, MET           PLAN  []  Upgrade activities as tolerated     [x]  Continue plan of care  []  Update interventions per flow sheet       []  Discharge due to:_  []  Other:_      Fina Valencia PT 2/9/2022  6:18 PM    Future Appointments   Date Time Provider [facility]   2/10/2022 10:45 AM [facility]   2/16/2022 11:30 AM [facility] PT MARIA T [facility] THE [facility]   2/18/2022 10:45 AM Monster Davidson, PT, DPT MI[facility]PTBW THE [facility]   3/9/2022  9:00 AM MELANIE Massey AMB

## 2022-02-10 ENCOUNTER — HOSPITAL ENCOUNTER (OUTPATIENT)
Dept: PHYSICAL THERAPY | Age: 67
Discharge: HOME | End: 2022-02-10
Payer: MEDICARE

## 2022-02-10 PROCEDURE — 97112 NEUROMUSCULAR REEDUCATION: CPT

## 2022-02-10 PROCEDURE — 97110 THERAPEUTIC EXERCISES: CPT

## 2022-02-10 NOTE — PROGRESS NOTES
PT DAILY TREATMENT NOTE    Patient Name: Misael Buitrago  Date:2/10/2022  : 1955  [x]  Patient  Verified  Payor: [employer] / Plan: [address] / Product Type: Managed Care Medicare /    In time:1046  Out time:1140  Total Treatment Time (min): 56  Total Timed Codes (min): 46  1:1 Treatment Time (MC/BCBS only): 46   Visit #: 7 of 12    Treatment Dx:  Other low back pain [M54.59]  Myalgia, other site [M79.18]    SUBJECTIVE  Pain Level (0-10 scale): 1-2 right LB  Any medication changes, allergies to medications, adverse drug reactions, diagnosis change, or new procedure performed?: [x] No    [] Yes (see summary sheet for update)  Subjective functional status/changes:   [] No changes reported  Mild pain right LB but overall feeling good    OBJECTIVE    Modalities Rationale:     decrease pain and increase tissue extensibility to improve patient's ability to perform ADL   min [] Estim, type/location:                                      []  att     []  unatt     []  w/US     []  w/ice    []  w/heat    min []  Mechanical Traction: type/lbs                   []  pro   []  sup   []  int   []  cont    []  before manual    []  after manual    min []  Ultrasound, settings/location:      min []  Iontophoresis w/ dexamethasone, location:                                               []  take home patch       []  in clinic   10 min []  Ice     [x]  Heat    location/position: S/L left with bolster under waist    min []  Vasopneumatic Device, press/temp:    If using vaso (only need to measure limb vaso being performed on)      pre-treatment girth :       post-treatment girth :       measured at (landmark location) :      min []  Other:    [] Skin assessment post-treatment (if applicable):    []  intact    []  redness- no adverse reaction                  []redness - adverse reaction:        15 min Therapeutic Exercise:  [x] See flow sheet :updated HEP/lady in glasses stretch Rationale: increase ROM, increase strength and improve coordination to improve the patients ability to perform ADL       31 min Neuromuscular Re-education:  [x]  See flow sheet :focus on right QL inhibition, left abs activation, balance activities   Rationale: increase ROM, increase strength, improve coordination, improve balance and increase proprioception  to improve the patients ability to perform ADL with more ease            With   [x] TE   [] TA   [] neuro   [] other: Patient Education: [x] Review HEP    [] Progressed/Changed HEP based on:   [] positioning   [] body mechanics   [] transfers   [] heat/ice application    [] other:      Other Objective/Functional Measures:   -right LBP alleviated post warm up on stepper  -trunk rotation post session left 15 in, right 14 in     Pain Level (0-10 scale) post treatment: 0    ASSESSMENT/Changes in Function: patient was able to perform all activities without reports of pain however has difficulty coordinating breathing pattern with PPT, needs VC for proper PPT    Patient will continue to benefit from skilled PT services to address ROM deficits, address strength deficits, analyze and address soft tissue restrictions and analyze and cue movement patterns to attain remaining goals. [x]  See Plan of Care  []  See progress note/recertification  []  See Discharge Summary         Progress towards goals / Updated goals:  Short Term Goals: To be accomplished in 3 weeks:  1. Patient will be independent and compliant with HEP to progress toward goals and restore functional mobility. Eval Status: issued at eval  Current: Progressing 1/28/22 reports compliance     2. Patient will improve pain in low back to 5/10  to improve activity tolerance and restore prior level of function.   Eval Status: 10/10 at worst  Current 2/10/22: mostly pain free, occasional residual right LBP up to 2/10 max, MET     3. Pt will have painfree lumbar AROM WFL in all planes to aid in functional mechanics for ambulation/ADLs. Eval Status:   ROM % AROM Comments:pain, area   Forward flexion 40-60 11 cm     Extension 20-30 50%     SideBend right 20-30 51 cm     SideBend left 20-30 52 cm     Rotation right 5-10 50%     Rotation left 5-10 50%     Current:2/10/22: improvement in LS mobility as per observation during treatment sessions, progressing           4. Patient will improve 5xSTS to 10 seconds or less without use of UE in order to demonstrate reduced risk for falls.               Eval Status: 16 seconds with use of UE on thighs for support             Current:     Long Term Goals: To be accomplished in 6 weeks:  1. Patient will improve FOTO score by 7 points to improve functional tolerance for ambulation and ADLs. Eval Status: FOTO 54  Current: Progressing 2/3/22 60  FOTO score = an established functional score where 100 = no disability     2.   Pt will have 5/5 bilat LE and core strength to return to goals of ambulation and ADLs.   Eval Status:     Left(0-5) Right (0-5) N/T   Hip Flexion (L1,2) 4 4 []???????    Knee Extension (L3,4) 5 5 []???????    Ankle Dorsiflexion (L4) 4+ 4+ []???????    Great Toe Extension (L5)     [x]???????    Ankle Plantarflexion (S1) 4+ 4+ []???????    Knee Flexion (S1,2) 4+ 4+ []???????    Abdominals 3+   []???????    Gluteus Bakari (sidelying) 4 4- []???????    Hip Abduction 4 4- []???????    Current: Progressing 2/3/22 challenged per observations, especially with right glut max     3.   Patient will improve single limb stance time to at least 20 seconds on each leg in order to demonstrate reduced risk for falls.               Eval Status: Without UE support: Left = 7 seconds; Right = 11 seconds    Current:2/10/22: SLS 15 sec each, progressing           4. Patient will report pain no greater than 1-2/10 with stair negotiation in order to facilitate her ability to climb stairs to her bedroom.               Eval Status: Pain increased with all stair negotiation; worst pain 10/10              Current: 1/28/22 rates pain 0/10  Status on 2/9/22: patient reports no pain since last week with ADL, MET              PLAN  []  Upgrade activities as tolerated     [x]  Continue plan of care  []  Update interventions per flow sheet       []  Discharge due to:_  []  Other:_      Alda Garcia, PT 2/10/2022  11:01 AM    Future Appointments   Date Time Provider [place] Brady   [facility]   2/18/2022 10:45 AM Bernice Osman, PT, DPT MI[facility]PTBW THE [facility]   3/9/2022  9:00 AM MELANIE Lopez AMB

## 2022-02-16 ENCOUNTER — HOSPITAL ENCOUNTER (OUTPATIENT)
Dept: PHYSICAL THERAPY | Age: 67
Discharge: HOME | End: 2022-02-16
Payer: MEDICARE

## 2022-02-16 PROCEDURE — 97110 THERAPEUTIC EXERCISES: CPT

## 2022-02-16 PROCEDURE — 97530 THERAPEUTIC ACTIVITIES: CPT

## 2022-02-16 NOTE — PROGRESS NOTES
[facility] at the [address], [place], [address] [facility]  Phone: [phone]      Fax:  [phone]    Progress Note  Patient name: Nixon Monet Start of Care: 1/10/2022   Referral source: Omi Bishop MD : 1955               Medical Diagnosis: Other low back pain [M54.59]  Myalgia, other site [M79.18]    Onset Date:21               Treatment Diagnosis: Other low back pain   Prior Hospitalization: see medical history Provider#: [id number]   Medications: Verified on Patient summary List    Comorbidities: Hx of chronic low back pain; Left knee meniscal repair 2021; Hx of gastric hernia; Hx of neuropathy in bilat feet; Hypothyroidism; Hashimoto's disease; RA; OA; Depression; Asthma   Prior Level of Function: functionally independent, no AD, somewhat active lifestyle, spends time with her 9year old grandson      Visits from Start of Care: 8    Missed Visits: 0    Key Functional Changes: patient is now with minimal to no pain, improving lumbar AROM, improving LE/core strength, improving overall function including sit <> stands and better SLS/balance    Updated Goals: to be achieved in 4 weeks:  Short Term Goals: To be accomplished in 3 weeks:  1. Patient will be independent and compliant with HEP to progress toward goals and restore functional mobility. Eval Status: issued at eval  Current: Patient reports daily compliance with her HEP.   22, met     2. Patient will improve pain in low back to 5/10  to improve activity tolerance and restore prior level of function. Eval Status: 10/10 at worst  Current: 0/10 pain over the last week     22, MET     3. Pt will have painfree lumbar AROM WFL in all planes to aid in functional mechanics for ambulation/ADLs.   Eval Status:   ROM % AROM Comments:pain, area   Forward flexion 40-60 11 cm     Extension 20-30 50%     SideBend right 20-30 51 cm     SideBend left 20-30 52 cm     Rotation right 5-10 50%     Rotation left 5-10 50%     Current: WNL except flexion to ankles (5cm), though c/o \"popping\" along right lower back region during flexion and extension; 0/10 pain for all planes    2/16/22, in progress           4. Patient will improve 5xSTS to 10 seconds or less without use of UE in order to demonstrate reduced risk for falls.               Eval Status: 16 seconds with use of UE on thighs for support             Current: 11 seconds without use of UE's    2/16/22, in progress     Long Term Goals: To be accomplished in 6 weeks:  1. Patient will improve FOTO score by 7 points to improve functional tolerance for ambulation and ADLs. Eval Status: FOTO 54  Current: 60     2/3/22, in progress  FOTO score = an established functional score where 100 = no disability     2.   Pt will have 5/5 bilat LE and core strength to return to goals of ambulation and ADLs.   Eval Status:     Left(0-5) Right (0-5) N/T   Hip Flexion (L1,2) 4 4 []?????????    Knee Extension (L3,4) 5 5 []?????????    Ankle Dorsiflexion (L4) 4+ 4+ []?????????    Great Toe Extension (L5)     [x]?????????    Ankle Plantarflexion (S1) 4+ 4+ []?????????    Knee Flexion (S1,2) 4+ 4+ []?????????    Abdominals 3+   []?????????    Gluteus Bakari (sidelying) 4 4- []?????????    Hip Abduction 4 4- []?????????    Current: Grossly 5/5 except bilateral hip flexion 4+/5, left hip abduction 4+/5, right hip abduction 4-/5     2/16/22, in progress     3.   Patient will improve single limb stance time to at least 20 seconds on each leg in order to demonstrate reduced risk for falls.               Eval Status: Without UE support: Left = 7 seconds; Right = 11 seconds               Current: SLS 15 sec each     2/10/22, progressing           4. Patient will report pain no greater than 1-2/10 with stair negotiation in order to facilitate her ability to climb stairs to her bedroom.               Eval Status: Pain increased with all stair negotiation; worst pain 10/10 Current: patient reports no pain since last week with ADL, 2/9/22, MET          ASSESSMENT/RECOMMENDATIONS:Patient responded well to today's treatment without any adverse reactions. Patient has participated in 8 visits of skilled PT at this time. She is making steady progress towards goals, including vast improvement in trunk/lumbar AROM, LE strength, and is now with relatively no pain for the most part. She does, however, report \"occasional muscle spasms\" as well as intermittent \"popping\" along her right lower back/posterior hip region, that she does feel is reducing with the assistance of skilled PT exercises. Patient also still has weakened glutes bilateral as well as decreased core stabilization, but this should continue to improve with further skilled guidance in lumbopelvic stabilization exercises. Function continues to gradually improve as does patient's satisfaction, as shown by increasing FOTO score. Patient would continue to benefit from further skilled PT 2x/week x 4 weeks to further improve ROM, LE strength, core stabilization, posture, and overall function, while reducing pain. Patient will continue to benefit from skilled PT services to further modify and progress therapeutic interventions, address functional mobility deficits, address ROM deficits, address strength deficits, analyze and address soft tissue restrictions, analyze and cue movement patterns, analyze and modify body mechanics/ergonomics, assess and modify postural abnormalities, and instruct in home and community integration to attain remaining goals.       [x]Continue therapy per initial plan/protocol at a frequency of  2 x per week for 4 weeks  []Continue therapy with the following recommended changes:_____________________      _____________________________________________________________________  []Discontinue therapy progressing towards or have reached established goals  []Discontinue therapy due to lack of appreciable progress towards goals  []Discontinue therapy due to lack of attendance or compliance  []Await Physician's recommendations/decisions regarding therapy  []Other:________________________________________________________________    Thank you for this referral.   Corine Whitfield, PT 2/16/2022 8:16 AM  NOTE TO PHYSICIAN:  PLEASE COMPLETE THE ORDERS BELOW AND   FAX TO [facility]: [phone]  If you are unable to process this request in 24 hours please contact our office: [phone]        []  I have read the above report and request that my patient continue as recommended. []  I have read the above report and request that my patient continue therapy with the following changes/special instructions:________________________________________  []I have read the above report and request that my patient be discharged from therapy.     [de-identified] Signature:____________Date:_________TIME:________                                      Benedict Martinez MD      ** Signature, Date and Time must be completed for valid certification **

## 2022-02-18 ENCOUNTER — HOSPITAL ENCOUNTER (OUTPATIENT)
Dept: PHYSICAL THERAPY | Age: 67
Discharge: HOME | End: 2022-02-18
Payer: MEDICARE

## 2022-02-18 PROCEDURE — 97110 THERAPEUTIC EXERCISES: CPT

## 2022-02-18 PROCEDURE — 97112 NEUROMUSCULAR REEDUCATION: CPT

## 2022-02-18 PROCEDURE — 97530 THERAPEUTIC ACTIVITIES: CPT

## 2022-02-18 NOTE — PROGRESS NOTES
PT DAILY TREATMENT NOTE    Patient Name: Abraham Brandt  Date: 2022  : 1955  [x]  Patient  Verified  Payor: Adal Galvan / Plan: [address] / Product Type: Managed Care Medicare /    In Time: 10:49  Out Time: 11:35  Total Treatment Time (min): 46  Total Timed Codes (min): 46  1:1 Treatment Time ( [address] only): 43   Visit #:     Treatment Dx: Other low back pain [M54.59]  Myalgia, other site [M79.18]    SUBJECTIVE  Pre-Treatment Pain Level (0-10 scale): 3-4    Any medication changes, allergies to medications, adverse drug reactions, diagnosis change, or new procedure performed?: [x] No    [] Yes (see summary sheet for update)    Subjective Functional Status/Changes:  [] No changes reported  Patient states her mid-back to low-back is a bit more bothersome today.     OBJECTIVE     17 min Therapeutic Exercise:  [x] See flow sheet   Rationale: increase ROM, increase strength, and decrease pain to assist in improving patient's ability to perform functional activities and ADLs    14 min Therapeutic Activity:  [x] See flow sheet   Rationale: increase ROM, increase strength, and improve coordination to assist in improving patient's ability to perform functional activities and ADLs    12 min Neuromuscular Re-Education:  [x] See flow sheet   Rationale: improve coordination, improve balance/proprioception, improve posture, and improve core stabilization to assist in improving patient's ability to perform functional activities and ADLs as well as to improve core stabilization during static/dynamic movements      With   [x] TE   [] TA   [] neuro   [] other: Patient Education: [x] Review HEP    [] Progressed/Changed HEP based on:   [] positioning   [] body mechanics   [] transfers   [] heat/ice application    [] other:      Other Objective/Functional Measures: N/A     Pain Level (0-10 scale) Post Treatment: 0    ASSESSMENT/Changes in Function:  Patient responded well to today's treatment without any adverse reactions. Patient did well with the progression of core/lumbopelvic strengthening via rows, lat pulls (exts), Paloff presses, and Paloff rotations today. She reported good glute firing during these exercises and demonstrated good form. Continue per POC. Patient will continue to benefit from skilled PT services to further modify and progress therapeutic interventions, address functional mobility deficits, address ROM deficits, address strength deficits, analyze and address soft tissue restrictions, analyze and cue movement patterns, analyze and modify body mechanics/ergonomics, assess and modify postural abnormalities, and instruct in home and community integration to attain remaining goals. [x]  See Plan of Care  []  See Progress Note/Recertification  []  See Discharge Summary         Progress Towards Goals/Updated Goals:    Short Term Goals: To be accomplished in 3 weeks:  1. Patient will be independent and compliant with HEP to progress toward goals and restore functional mobility. Eval Status: issued at eval  Current: Patient reports daily compliance with her HEP.   2/16/22, met     2. Patient will improve pain in low back to 5/10  to improve activity tolerance and restore prior level of function. Eval Status: 10/10 at worst  Current: 0/10 pain over the last week     2/16/22, MET     3. Pt will have painfree lumbar AROM WFL in all planes to aid in functional mechanics for ambulation/ADLs.   Eval Status:   ROM % AROM Comments:pain, area   Forward flexion 40-60 11 cm     Extension 20-30 50%     SideBend right 20-30 51 cm     SideBend left 20-30 52 cm     Rotation right 5-10 50%     Rotation left 5-10 50%     Current: WNL except flexion to ankles (5cm), though c/o \"popping\" along right lower back region during flexion and extension; 0/10 pain for all planes    2/16/22, in progress           4. Patient will improve 5xSTS to 10 seconds or less without use of UE in order to demonstrate reduced risk for falls.               Eval Status: 16 seconds with use of UE on thighs for support             Current: 11 seconds without use of UE's    2/16/22, in progress     Long Term Goals: To be accomplished in 6 weeks:  1. Patient will improve FOTO score by 7 points to improve functional tolerance for ambulation and ADLs. Eval Status: FOTO 54  Current: 60     2/3/22, in progress  FOTO score = an established functional score where 100 = no disability     2.   Pt will have 5/5 bilat LE and core strength to return to goals of ambulation and ADLs. Eval Status:     Left(0-5) Right (0-5) N/T   Hip Flexion (L1,2) 4 4 []?????????    Knee Extension (L3,4) 5 5 []?????????    Ankle Dorsiflexion (L4) 4+ 4+ []?????????    Great Toe Extension (L5)     [x]?????????    Ankle Plantarflexion (S1) 4+ 4+ []?????????    Knee Flexion (S1,2) 4+ 4+ []?????????    Abdominals 3+   []?????????    Gluteus Bakari (sidelying) 4 4- []?????????    Hip Abduction 4 4- []?????????    Current: Grossly 5/5 except bilateral hip flexion 4+/5, left hip abduction 4+/5, right hip abduction 4-/5     2/16/22, in progress     3.   Patient will improve single limb stance time to at least 20 seconds on each leg in order to demonstrate reduced risk for falls.               Eval Status: Without UE support: Left = 7 seconds; Right = 11 seconds               Current: SLS 15 sec each     2/10/22, progressing           4. Patient will report pain no greater than 1-2/10 with stair negotiation in order to facilitate her ability to climb stairs to her bedroom.               Eval Status: Pain increased with all stair negotiation; worst pain 10/10              Current: patient reports no pain since last week with ADL, 2/9/22, MET      PLAN  []  Upgrade Activities as Tolerated     [x]  Continue Plan of Care  []  Update Interventions per Flow Sheet       []  Discharge Due To:_  []  Other:_      Hart Leventhal.  Nahid, PT, DPT, ATR  2/18/2022 9:48 AM    Future Appointments   Date Time Provider Aniya Brady   2/18/2022 10:45 AM [facility]   2/22/2022  7:45 AM Genoveva Fischer, PT [facility]   3/1/2022 10:00 AM Genoveva Fischer, PT [facility]   3/4/2022 11:30 AM Miryam Ac, PT, DPT [facility]   3/7/2022  9:15 AM Genoveva Fischer, PT [facility]   3/9/2022  9:00 AM MELANIE Hong BS AMB   3/10/2022 10:45 AM Genoveva Fischer, PT [facility]

## 2022-02-22 ENCOUNTER — HOSPITAL ENCOUNTER (OUTPATIENT)
Dept: PHYSICAL THERAPY | Age: 67
Discharge: HOME | End: 2022-02-22
Payer: MEDICARE

## 2022-02-22 PROCEDURE — 97110 THERAPEUTIC EXERCISES: CPT

## 2022-02-22 PROCEDURE — 97112 NEUROMUSCULAR REEDUCATION: CPT

## 2022-02-22 NOTE — PROGRESS NOTES
PT DAILY TREATMENT NOTE    Patient Name: Beto Chin  Date:2022  : 1955  [x]  Patient  Verified  Payor: Sierra Fernando / Plan: [email or web address] / Product Type: Managed Care Medicare /    In time:745  Out time:842  Total Treatment Time (min): 57  Total Timed Codes (min): 47  1:1 Treatment Time ([facility] only): 47   Visit #: 10 of 16    Treatment Dx:  Other low back pain [M54.59]  Myalgia, other site [M79.18]    SUBJECTIVE  Pain Level (0-10 scale): 0  Any medication changes, allergies to medications, adverse drug reactions, diagnosis change, or new procedure performed?: [x] No    [] Yes (see summary sheet for update)  Subjective functional status/changes:   [] No changes reported  No pain at all and I had a     OBJECTIVE    Modalities Rationale:     decrease pain and increase tissue extensibility to improve patient's ability to perform ADL with ease   min [] Estim, type/location:                                      []  att     []  unatt     []  w/US     []  w/ice    []  w/heat    min []  Mechanical Traction: type/lbs                   []  pro   []  sup   []  int   []  cont    []  before manual    []  after manual    min []  Ultrasound, settings/location:      min []  Iontophoresis w/ dexamethasone, location:                                               []  take home patch       []  in clinic   10 min []  Ice     [x]  Heat    location/position: LB with legs elevated    min []  Vasopneumatic Device, press/temp:    If using vaso (only need to measure limb vaso being performed on)      pre-treatment girth :       post-treatment girth :       measured at (landmark location) :      min []  Other:    [] Skin assessment post-treatment (if applicable):    []  intact    []  redness- no adverse reaction                  []redness - adverse reaction:            12 min Therapeutic Exercise:  [x] See flow sheet :   Rationale: increase ROM, increase strength and improve coordination to improve the patients ability to perform ADL       35 min Neuromuscular Re-education:  [x]  See flow sheet :focus on ES inhibition LS, facilitation of proper diaphragmatic breathing and PPT   Rationale: increase ROM, increase strength, improve coordination, improve balance and increase proprioception  to improve the patients ability to perform ADL            With   [x] TE   [] TA   [] neuro   [] other: Patient Education: [x] Review HEP    [] Progressed/Changed HEP based on:   [] positioning   [] body mechanics   [] transfers   [] heat/ice application    [] other:      Other Objective/Functional Measures: trunk rotation 16 in both,post session left 15 in, right 15.5 in   ADT both +, post session left past midline       Pain Level (0-10 scale) post treatment: 0    ASSESSMENT/Changes in Function: Excellent tolerance to activities with occasional VC for proper form and PPT with activities    Patient will continue to benefit from skilled PT services to address ROM deficits, address strength deficits, analyze and address soft tissue restrictions and analyze and cue movement patterns to attain remaining goals. [x]  See Plan of Care  []  See progress note/recertification  []  See Discharge Summary         Progress towards goals / Updated goals:  Short Term Goals: To be accomplished in 3 weeks:  1. Patient will be independent and compliant with HEP to progress toward goals and restore functional mobility. Eval Status: issued at eval  Current: Patient reports daily compliance with her HEP.   2/16/22, met     2. Patient will improve pain in low back to 5/10  to improve activity tolerance and restore prior level of function. Eval Status: 10/10 at worst  Current: 0/10 pain over the last week     2/16/22, MET     3. Pt will have painfree lumbar AROM WFL in all planes to aid in functional mechanics for ambulation/ADLs.   Eval Status:   ROM % AROM Comments:pain, area   Forward flexion 40-60 11 cm   Extension 20-30 50%     SideBend right 20-30 51 cm     SideBend left 20-30 52 cm     Rotation right 5-10 50%     Rotation left 5-10 50%     Current: WNL except flexion to ankles (5cm), though c/o \"popping\" along right lower back region during flexion and extension; 0/10 pain for all planes    2/16/22, in progress  Status on 2/22/22:Fwd Flex FFD 3 in, SB left 19 in, right 20 in, trunk rotation 16 in both ways, all LS ROM without pain, progressing           4. Patient will improve 5xSTS to 10 seconds or less without use of UE in order to demonstrate reduced risk for falls.               Eval Status: 16 seconds with use of UE on thighs for support             Current: 10 seconds without use of UE's    2/22/22, MET     Long Term Goals: To be accomplished in 6 weeks:  1. Patient will improve FOTO score by 7 points to improve functional tolerance for ambulation and ADLs. Eval Status: FOTO 54  Current: 60     2/3/22, in progress  FOTO score = an established functional score where 100 = no disability     2.   Pt will have 5/5 bilat LE and core strength to return to goals of ambulation and ADLs.   Eval Status:     Left(0-5) Right (0-5) N/T   Hip Flexion (L1,2) 4 4 []??????????    Knee Extension (L3,4) 5 5 []??????????    Ankle Dorsiflexion (L4) 4+ 4+ []??????????    Great Toe Extension (L5)     [x]??????????    Ankle Plantarflexion (S1) 4+ 4+ []??????????    Knee Flexion (S1,2) 4+ 4+ []??????????    Abdominals 3+   []??????????    Gluteus Bakari (sidelying) 4 4- []??????????    Hip Abduction 4 4- []??????????    Current: Grossly 5/5 except bilateral hip flexion 4+/5, left hip abduction 4+/5, right hip abduction 4-/5     2/16/22, in progress     3.   Patient will improve single limb stance time to at least 20 seconds on each leg in order to demonstrate reduced risk for falls.               Eval Status: Without UE support: Left = 7 seconds; Right = 11 seconds               Current: SLS 15 sec each     2/10/22, progressing           4. Patient will report pain no greater than 1-2/10 with stair negotiation in order to facilitate her ability to climb stairs to her bedroom.               Eval Status: Pain increased with all stair negotiation; worst pain 10/10              Current: patient reports no pain since last week with ADL, 2/9/22, MET           PLAN  []  Upgrade activities as tolerated     [x]  Continue plan of care  []  Update interventions per flow sheet       []  Discharge due to:_  []  Other:_      Grover Casillas, PT 2/22/2022  7:52 AM    Future Appointments   Date Time Provider Aniya Brady   3/1/2022 10:00 AM Dariana Genesis, PT [facility]   3/4/2022 11:30 AM Richar Barriga, PT, DPT [facility]   3/7/2022  9:15 AM Dariana Genesis, PT [facility]   3/9/2022  9:00 AM MELANIE Law BS AMB   3/10/2022 10:45 AM Dariana Genesis, PT [facility]

## 2022-03-01 ENCOUNTER — HOSPITAL ENCOUNTER (OUTPATIENT)
Dept: PHYSICAL THERAPY | Age: 67
Discharge: HOME | End: 2022-03-01
Payer: MEDICARE

## 2022-03-01 PROCEDURE — 97112 NEUROMUSCULAR REEDUCATION: CPT

## 2022-03-01 PROCEDURE — 97110 THERAPEUTIC EXERCISES: CPT

## 2022-03-01 NOTE — PROGRESS NOTES
PT DAILY TREATMENT NOTE    Patient Name: Salvador Bailey  Date:3/1/2022  : 1955  [x]  Patient  Verified  Payor: [facility] MEDICARE / Plan: [address] / Product Type: Managed Care Medicare /    In time:1005  Out time:1053  Total Treatment Time (min): 48  Total Timed Codes (min): 48  1:1 Treatment Time ([facility] only): 48   Visit #: 16 of 22    Treatment Dx: Other low back pain [M54.59]  Myalgia, other site [M79.18]    SUBJECTIVE  Pain Level (0-10 scale): 0  Any medication changes, allergies to medications, adverse drug reactions, diagnosis change, or new procedure performed?: [x] No    [] Yes (see summary sheet for update)  Subjective functional status/changes:   [] No changes reported  Reports feeling 'so much better'    OBJECTIVE      18 min Therapeutic Exercise:  [] See flow sheet :   Rationale: increase ROM, increase strength and improve coordination to improve the patients ability to perform ADL         30 min Neuromuscular Re-education:  [x]  See flow sheet :   Rationale: increase ROM, increase strength, improve coordination, increase proprioception and pelvic repositioning  to improve the patients ability to perform ADL      With   [x] TE   [] TA   [] neuro   [] other: Patient Education: [x] Review HEP    [] Progressed/Changed HEP based on:   [] positioning   [] body mechanics   [] transfers   [] heat/ice application    [] other:      Other Objective/Functional Measures:   FOTO 65  Post session: ADT both -       Pain Level (0-10 scale) post treatment: 0    ASSESSMENT/Changes in Function: patient showing nice progress in overall function with residual deficits in strength and endurance.  Patient reports some difficulty with heavy groceries and lifting in general    Patient will continue to benefit from skilled PT services to address ROM deficits, address strength deficits, analyze and address soft tissue restrictions and analyze and cue movement patterns to attain remaining goals. [x]  See Plan of Care  []  See progress note/recertification  []  See Discharge Summary           Progress towards goals / Updated goals:  Short Term Goals: To be accomplished in 3 weeks:  1. Patient will be independent and compliant with HEP to progress toward goals and restore functional mobility. Eval Status: issued at eval  Current: Patient reports daily compliance with her HEP.   2/16/22, met     2. Patient will improve pain in low back to 5/10  to improve activity tolerance and restore prior level of function. Eval Status: 10/10 at worst  Current: 0/10 pain over the last week     2/16/22, MET     3. Pt will have painfree lumbar AROM WFL in all planes to aid in functional mechanics for ambulation/ADLs. Eval Status:   ROM % AROM Comments:pain, area   Forward flexion 40-60 11 cm     Extension 20-30 50%     SideBend right 20-30 51 cm     SideBend left 20-30 52 cm     Rotation right 5-10 50%     Rotation left 5-10 50%     Current: WNL except flexion to ankles (5cm), though c/o \"popping\" along right lower back region during flexion and extension; 0/10 pain for all planes    2/16/22, in progress  Status on 2/22/22:Fwd Flex FFD 3 in, SB left 19 in, right 20 in, trunk rotation 16 in both ways, all LS ROM without pain, progressing           4. Patient will improve 5xSTS to 10 seconds or less without use of UE in order to demonstrate reduced risk for falls.               Eval Status: 16 seconds with use of UE on thighs for support             Current: 10 seconds without use of UE's    2/22/22, MET     Long Term Goals: To be accomplished in 6 weeks:  1. Patient will improve FOTO score by 7 points to improve functional tolerance for ambulation and ADLs. Eval Status: FOTO 54  Current: 60     2/3/22, in progress  FOTO score = an established functional score where 100 = no disability     2.   Pt will have 5/5 bilat LE and core strength to return to goals of ambulation and ADLs.   Eval Status:     Left(0-5) Right (0-5) N/T   Hip Flexion (L1,2) 4 4 []???????????    Knee Extension (L3,4) 5 5 []???????????    Ankle Dorsiflexion (L4) 4+ 4+ []???????????    Great Toe Extension (L5)     [x]???????????    Ankle Plantarflexion (S1) 4+ 4+ []???????????    Knee Flexion (S1,2) 4+ 4+ []???????????    Abdominals 3+   []???????????    Gluteus Bakari (sidelying) 4 4- []???????????    Hip Abduction 4 4- []???????????    Current: Grossly 5/5 except bilateral hip flexion 4+/5, left hip abduction 4+/5, right hip abduction 4-/5     2/16/22, in progress     3.   Patient will improve single limb stance time to at least 20 seconds on each leg in order to demonstrate reduced risk for falls.               Eval Status: Without UE support: Left = 7 seconds; Right = 11 seconds               Current: SLS 15 sec each     2/10/22, progressing           4. Patient will report pain no greater than 1-2/10 with stair negotiation in order to facilitate her ability to climb stairs to her bedroom.               Eval Status: Pain increased with all stair negotiation; worst pain 10/10              Current: patient reports no pain since last week with ADL, 2/9/22, MET          PLAN  []  Upgrade activities as tolerated     [x]  Continue plan of care  []  Update interventions per flow sheet       []  Discharge due to:_  []  Other:_      Rossi Holguin PT 3/1/2022  10:22 AM    Future Appointments   Date Time Provider Aniya Brady   3/4/2022 11:30 AM Aissatou Betancourt, PT, DPT [facility]   3/7/2022  9:15 AM DANIEL HutsonHPNIKKY [facility]   3/9/2022  9:00 AM MELANIE Newberry AMB   3/10/2022 10:45 AM Jane Diane PT [facility]

## 2022-03-04 ENCOUNTER — HOSPITAL ENCOUNTER (OUTPATIENT)
Dept: PHYSICAL THERAPY | Age: 67
Discharge: HOME | End: 2022-03-04
Payer: MEDICARE

## 2022-03-04 PROCEDURE — 97530 THERAPEUTIC ACTIVITIES: CPT

## 2022-03-04 PROCEDURE — 97112 NEUROMUSCULAR REEDUCATION: CPT

## 2022-03-04 PROCEDURE — 97110 THERAPEUTIC EXERCISES: CPT

## 2022-03-04 NOTE — PROGRESS NOTES
PT DAILY TREATMENT NOTE    Patient Name: Claudia Gregg  Date:3/4/2022  : 1955  [x]  Patient  Verified  Payor: [place] [employer] / Plan: [employer] / Product Type: Managed Care Medicare /    In time:11:33 am  Out time:12:30 pm   Total Treatment Time (min): 57  Total Timed Codes (min): 47  1:1 Treatment Time (MC/BCBS only): 47   Visit #: 12 of 22    Treatment Dx: Other low back pain [M54.59]  Myalgia, other site [M79.18]    SUBJECTIVE  Pain Level (0-10 scale): 2  Any medication changes, allergies to medications, adverse drug reactions, diagnosis change, or new procedure performed?: [x] No    [] Yes (see summary sheet for update)  Subjective functional status/changes:   [] No changes reported  \"My neck was sore earlier but I am still sore from Erika, left buttock. \"    OBJECTIVE    Modality rationale: decrease pain and increase tissue extensibility to improve the patients ability to perform daily activities with decreased pain and symptom levels     Min Type Additional Details    [] Estim:  []Unatt       []IFC  []Premod                        []Other:  []w/ice   []w/heat  Position:  Location:    [] Estim: []Att    []TENS instruct  []NMES                    []Other:  []w/US   []w/ice   []w/heat  Position:  Location:    []  Traction: [] Cervical       []Lumbar                       [] Prone          []Supine                       []Intermittent   []Continuous Lbs:  [] before manual  [] after manual    []  Ultrasound: []Continuous   [] Pulsed                           []1MHz   []3MHz Location:  W/cm2:    []  Iontophoresis with dexamethasone         Location: [] Take home patch   [] In clinic   10 []  Ice     [x]  heat  []  Ice massage  []  Laser   []  Anodyne To L-spine  in supine with LE elevated      []  Laser with stim  []  Other: Position:  Location:    []  Vasopneumatic Device  Pre-treatment girth:  Post-treatment girth:  Measured at (location):  Pressure: [] lo [] med [] hi   Temperature: [] lo [] med [] hi   [] Skin assessment post-treatment:  []intact []redness- no adverse reaction    []redness - adverse reaction:       10 min Therapeutic Exercise:  [x] See flow sheet :   Rationale: increase ROM, increase strength, improve coordination and increase proprioception to improve the patients ability to perform daily activities with decreased pain and symptom levels      8 min Therapeutic Activity:  [x]  See flow sheet : assessment of measures    Rationale: increase ROM, increase strength, improve coordination and increase proprioception  to improve the patients ability to perform daily activities with decreased pain and symptom levels       19 min Neuromuscular Re-education:  [x]  See flow sheet :   Rationale: increase ROM, increase strength, improve coordination and increase proprioception  to improve the patients ability to perform daily activities with decreased pain and symptom levels            With   [] TE   [] TA   [] neuro   [] other: Patient Education: [x] Review HEP    [] Progressed/Changed HEP based on:   [] positioning   [] body mechanics   [] transfers   [] heat/ice application    [] other:      Other Objective/Functional Measures:   JESS Special Tests (pre/post)  HGIR:                                                 Right: 90             Left: 90  Hip Add Drop Test:                           Right: +/-            Left:+/-  Trunk Rot                                            Right: 17 in/15 in Left 17 in /15 in        Pain Level (0-10 scale) post treatment: 0    ASSESSMENT/Changes in Function:   Challenged with left posterior hip capsule inhibition. Right glut fatigued quickly. Pt reports minimal pain in recent weeks. Noted with gait decreased left stance time and decreased step length with right LE.  Overall pt appears to be progressing nicely with interventions     Patient will continue to benefit from skilled PT services to modify and progress therapeutic interventions, address functional mobility deficits, address ROM deficits, address strength deficits, analyze and address soft tissue restrictions, analyze and cue movement patterns, analyze and modify body mechanics/ergonomics, assess and modify postural abnormalities and instruct in home and community integration to attain remaining goals. [x]  See Plan of Care  []  See progress note/recertification  []  See Discharge Summary         Progress towards goals / Updated goals:  Short Term Goals: To be accomplished in 3 weeks:  1. Patient will be independent and compliant with HEP to progress toward goals and restore functional mobility. Eval Status: issued at eval  Current: Patient reports daily compliance with her HEP.   2/16/22, met     2. Patient will improve pain in low back to 5/10  to improve activity tolerance and restore prior level of function. Eval Status: 10/10 at worst  Current: 0/10 pain over the last week     2/16/22, MET     3. Pt will have painfree lumbar AROM WFL in all planes to aid in functional mechanics for ambulation/ADLs. Eval Status:   ROM % AROM Comments:pain, area   Forward flexion 40-60 11 cm     Extension 20-30 50%     SideBend right 20-30 51 cm     SideBend left 20-30 52 cm     Rotation right 5-10 50%     Rotation left 5-10 50%     Current: WNL except flexion to ankles (5cm), though c/o \"popping\" along right lower back region during flexion and extension; 0/10 pain for all planes    2/16/22, in progress  Status on 2/22/22:Fwd Flex FFD 3 in, SB left 19 in, right 20 in, trunk rotation 16 in both ways, all LS ROM without pain, progressing           4. Patient will improve 5xSTS to 10 seconds or less without use of UE in order to demonstrate reduced risk for falls.               Eval Status: 16 seconds with use of UE on thighs for support             Current: 10 seconds without use of UE's    2/22/22, MET     Long Term Goals: To be accomplished in 6 weeks:  1.  Patient will improve FOTO score by 7 points to improve functional tolerance for ambulation and ADLs. Eval Status: FOTO 54  Current: 60     2/3/22, in progress  FOTO score = an established functional score where 100 = no disability     2.   Pt will have 5/5 bilat LE and core strength to return to goals of ambulation and ADLs.   Eval Status:     Left(0-5) Right (0-5) N/T   Hip Flexion (L1,2) 4 4 []????????????    Knee Extension (L3,4) 5 5 []????????????    Ankle Dorsiflexion (L4) 4+ 4+ []????????????    Great Toe Extension (L5)     [x]????????????    Ankle Plantarflexion (S1) 4+ 4+ []????????????    Knee Flexion (S1,2) 4+ 4+ []????????????    Abdominals 3+   []????????????    Gluteus Bakari (sidelying) 4 4- []????????????    Hip Abduction 4 4- []????????????    Current: Grossly 5/5 except bilateral hip flexion 4+/5, left hip abduction 4+/5, right hip abduction 4-/5     2/16/22, in progress  3/4/22 progressing as per tolerance to exercises, fatigued with glut max.      3.   Patient will improve single limb stance time to at least 20 seconds on each leg in order to demonstrate reduced risk for falls.               Eval Status: Without UE support: Left = 7 seconds; Right = 11 seconds               Current: SLS 15 sec each     2/10/22, progressing    3/4/22 Reassess next session           7. Patient will report pain no greater than 1-2/10 with stair negotiation in order to facilitate her ability to climb stairs to her bedroom.               Eval Status: Pain increased with all stair negotiation; worst pain 10/10              Current: patient reports no pain since last week with ADL, 2/9/22, MET           PLAN  [x]  Upgrade activities as tolerated     [x]  Continue plan of care  []  Update interventions per flow sheet       []  Discharge due to:_  []  Other:_      Kassidy Morales, PT, DPT 3/4/2022  11:18 AM    Future Appointments   Date Time Provider Aniya Brady   3/4/2022 11:30 AM Andreea Fitzpatrick, PT, DPT [facility]   3/7/2022 9:15 AM Jillian King, PT [facility]   3/9/2022  9:00 AM MELANIE Massey BS AMB   3/10/2022 10:45 AM Jillian King, PT [facility]   3/16/2022  3:00 PM Jillian King, PT [facility]   3/22/2022  9:15 AM Jillian King, PT [facility]   7/5/2022 10:15 AM Merline Salm Barbara Abed, MD [facility] BS AMB

## 2022-03-07 ENCOUNTER — APPOINTMENT (OUTPATIENT)
Dept: PHYSICAL THERAPY | Age: 67
End: 2022-03-07
Payer: MEDICARE

## 2022-03-09 ENCOUNTER — HOSPITAL ENCOUNTER (OUTPATIENT)
Dept: LAB | Age: 67
Discharge: HOME | End: 2022-03-09
Payer: MEDICARE

## 2022-03-09 ENCOUNTER — OFFICE VISIT (OUTPATIENT)
Dept: HEMATOLOGY | Age: 67
End: 2022-03-09
Payer: MEDICARE

## 2022-03-09 VITALS
WEIGHT: 213.13 LBS | SYSTOLIC BLOOD PRESSURE: 127 MMHG | OXYGEN SATURATION: 98 % | BODY MASS INDEX: 35.47 KG/M2 | DIASTOLIC BLOOD PRESSURE: 97 MMHG | HEART RATE: 84 BPM | TEMPERATURE: 97.3 F

## 2022-03-09 DIAGNOSIS — K74.60 CIRRHOSIS OF LIVER WITHOUT ASCITES, UNSPECIFIED HEPATIC CIRRHOSIS TYPE (HCC): Primary | ICD-10-CM

## 2022-03-09 DIAGNOSIS — K74.60 CIRRHOSIS OF LIVER WITHOUT ASCITES, UNSPECIFIED HEPATIC CIRRHOSIS TYPE (HCC): ICD-10-CM

## 2022-03-09 LAB
ALBUMIN SERPL-MCNC: 3.9 G/DL (ref 3.4–5)
ALBUMIN/GLOB SERPL: 1.2 {RATIO} (ref 0.8–1.7)
ALP SERPL-CCNC: 66 U/L (ref 45–117)
ALT SERPL-CCNC: 32 U/L (ref 13–56)
ANION GAP SERPL CALC-SCNC: 8 MMOL/L (ref 3–18)
AST SERPL-CCNC: 23 U/L (ref 10–38)
BASOPHILS # BLD: 0 K/UL (ref 0–0.1)
BASOPHILS NFR BLD: 1 % (ref 0–2)
BILIRUB DIRECT SERPL-MCNC: 0.2 MG/DL (ref 0–0.2)
BILIRUB SERPL-MCNC: 0.6 MG/DL (ref 0.2–1)
BUN SERPL-MCNC: 9 MG/DL (ref 7–18)
BUN/CREAT SERPL: 10 (ref 12–20)
CALCIUM SERPL-MCNC: 9.3 MG/DL (ref 8.5–10.1)
CHLORIDE SERPL-SCNC: 103 MMOL/L (ref 100–111)
CO2 SERPL-SCNC: 27 MMOL/L (ref 21–32)
CREAT SERPL-MCNC: 0.86 MG/DL (ref 0.6–1.3)
DIFFERENTIAL METHOD BLD: ABNORMAL
EOSINOPHIL # BLD: 0.1 K/UL (ref 0–0.4)
EOSINOPHIL NFR BLD: 2 % (ref 0–5)
ERYTHROCYTE [DISTWIDTH] IN BLOOD BY AUTOMATED COUNT: 12.8 % (ref 11.6–14.5)
GLOBULIN SER CALC-MCNC: 3.2 G/DL (ref 2–4)
GLUCOSE SERPL-MCNC: 106 MG/DL (ref 74–99)
HCT VFR BLD AUTO: 40.8 % (ref 35–45)
HGB BLD-MCNC: 13.5 G/DL (ref 12–16)
IMM GRANULOCYTES # BLD AUTO: 0 K/UL (ref 0–0.04)
IMM GRANULOCYTES NFR BLD AUTO: 0 % (ref 0–0.5)
INR PPP: 1.1 (ref 0.8–1.2)
LYMPHOCYTES # BLD: 0.9 K/UL (ref 0.9–3.6)
LYMPHOCYTES NFR BLD: 20 % (ref 21–52)
MCH RBC QN AUTO: 27.9 PG (ref 24–34)
MCHC RBC AUTO-ENTMCNC: 33.1 G/DL (ref 31–37)
MCV RBC AUTO: 84.3 FL (ref 78–100)
MONOCYTES # BLD: 0.4 K/UL (ref 0.05–1.2)
MONOCYTES NFR BLD: 8 % (ref 3–10)
NEUTS SEG # BLD: 3.1 K/UL (ref 1.8–8)
NEUTS SEG NFR BLD: 69 % (ref 40–73)
NRBC # BLD: 0 K/UL (ref 0–0.01)
NRBC BLD-RTO: 0 PER 100 WBC
PLATELET # BLD AUTO: 132 K/UL (ref 135–420)
PMV BLD AUTO: 9.6 FL (ref 9.2–11.8)
POTASSIUM SERPL-SCNC: 3.7 MMOL/L (ref 3.5–5.5)
PROT SERPL-MCNC: 7.1 G/DL (ref 6.4–8.2)
PROTHROMBIN TIME: 13.2 SEC (ref 11.5–15.2)
RBC # BLD AUTO: 4.84 M/UL (ref 4.2–5.3)
SODIUM SERPL-SCNC: 138 MMOL/L (ref 136–145)
WBC # BLD AUTO: 4.5 K/UL (ref 4.6–13.2)

## 2022-03-09 PROCEDURE — 85025 COMPLETE CBC W/AUTO DIFF WBC: CPT

## 2022-03-09 PROCEDURE — 91200 LIVER ELASTOGRAPHY: CPT | Performed by: NURSE PRACTITIONER

## 2022-03-09 PROCEDURE — 85610 PROTHROMBIN TIME: CPT

## 2022-03-09 PROCEDURE — 80076 HEPATIC FUNCTION PANEL: CPT

## 2022-03-09 PROCEDURE — 80048 BASIC METABOLIC PNL TOTAL CA: CPT

## 2022-03-09 PROCEDURE — 82107 ALPHA-FETOPROTEIN L3: CPT

## 2022-03-09 PROCEDURE — 99214 OFFICE O/P EST MOD 30 MIN: CPT | Performed by: NURSE PRACTITIONER

## 2022-03-09 PROCEDURE — 36415 COLL VENOUS BLD VENIPUNCTURE: CPT

## 2022-03-09 RX ORDER — GABAPENTIN 300 MG/1
300 CAPSULE ORAL 3 TIMES DAILY
COMMUNITY
Start: 2022-01-18

## 2022-03-09 NOTE — PROGRESS NOTES
[address] [facility], [place], [address], [place], [place]      Marcela Willis, PA-LEANA Paez, ACN-BC     Rosa Ernandez, [facility]   TONE YaP-LEANA Olivares, [facility]       Darien Lea Evelio De Santamaria 136    at [address], [address] [place] [address], [facility] 22.    [phone]    FAX: [address] [place] [address]    at [address], [address]    [phone]    FAX: [phone]         Patient Care Team:  Noemi Jean NP as PCP - General (Family Medicine)  Lacy Flores MD (Psychiatry)      Problem List  Date Reviewed: 1/7/2022          Codes Class Noted    Intrinsic (urethral) sphincter deficiency (ISD) ICD-10-CM: N36.42  ICD-9-CM: 599.82  5/3/2017        Microscopic hematuria ICD-10-CM: R31.29  ICD-9-CM: 599.72  3/26/2017        Nocturia ICD-10-CM: R35.1  ICD-9-CM: 788.43  4/4/2017        RAMONA (stress urinary incontinence, female) ICD-10-CM: N39.3  ICD-9-CM: 625.6  4/4/2017        Neuropathy ICD-10-CM: G62.9  ICD-9-CM: 355.9  3/1/2021        Hepatitis C virus infection cured after antiviral drug therapy ICD-10-CM: Z86.19  ICD-9-CM: V12.09  10/1/2020        HCV antibody positive ICD-10-CM: R76.8  ICD-9-CM: 795.79  4/11/2019    Overview Signed 4/11/2019  4:45 PM by Dionicio Sheehan NP     HCV treated and cured with SOF + ROBBY in 2014             Severe obesity ([address] ICD-10-CM: E66.01  ICD-9-CM: 278.01  11/23/2018        Negative depression screening ICD-10-CM: Z13.31  ICD-9-CM: V79.0  7/3/2018        Advanced care planning/counseling discussion ICD-10-CM: Z71.89  ICD-9-CM: V65.49  7/3/2018        Chronic back pain ICD-10-CM: M54.9, G89.29  ICD-9-CM: 724.5, 338.29  9/23/2015    Overview Signed 9/23/2015 12:00 PM by Dionicio Sheehan NP     Nerve ablation 09/16/2015. Hypothyroidism ICD-10-CM: E03.9  ICD-9-CM: 244.9  2/6/2013        Rheumatoid arthritis(714.0) ICD-10-CM: M06.9  ICD-9-CM: 714.0  2/6/2013        Carpal tunnel syndrome ICD-10-CM: G56.00  ICD-9-CM: 354.0  2/6/2013              Stephanie Spaulding returns to the [facility] today for fibrosccan assessment of hepatic fibrosis and for education and management of cirrhosis. The active problem list, all pertinent past medical history, medications, liver histology, endoscopic studies, radiologic findings and laboratory findings related to the liver disorder were reviewed with the patient. The patient is a 73 y.o.  female who was noted to have abnormalities in liver chemistries and subsequently tested positive for chronic HCV in 1990s. Risk factors for acquiring HCV are IV drug use in 1980s. There was no history of acute incteric hepatitis at the time of these risk factors. She was treated for HCV and remained SVR five years post treatment. She began treatment on 05/15/2014 with dual therapy of SOF/SIM and completed her 12 weeks of therapy on 08/05/2014. She was previously treated with standard interferon in 1997 and with standard interferon and ribavirin in 1999. Both treatments lasted for 24 weeks. The patient tolerated treatment reasonably well. HCV RNA levels obtained during treatment are not available at this time. The patient is unaware of the virologic response pattern. Ultrasound with shear wave elastography was performed in 10/2018. The ultrasound demonstrates a coarse heterogeneous echotexture. No focal mass. The shear wave elastography suggests that the cirrhosis has resolved. Fibroscan analysis performed in 10/2020 indicates fatty liver and bridging fibrosis/cirrhosis, Metavir fibrosis score of F3/F4. The scan was repeated today. Results indicate that the liver continues to improve with resolving fibrosis.  Today's assessment indicates that the Metavir fibrosis score has improved to F3. A liver biopsy has not been performed. The most recent laboratory studies indicate that the liver transaminases are normal, alkaline phosphatase is normal, tests of hepatic synthetic and metabolic function are normal, and the platelet count is depressed. The patient has no complaints which can be attributed to liver disease. The patient completes all daily activities without any functional limitations. The patient has not experienced fatigue, fevers, chills, shortness of breath, chest pain, pain in the right side over the liver, diffuse abdominal pain, nausea, vomiting, constipation, diarrhrea, dry eyes, dry mouth, arthralgias, myalgias, yellowing of the eyes or skin, itching, dark urine, problems concentrating, swelling of the abdomen, swelling of the lower extremities, hematemesis, or hematochezia. Since the last office appointment:  Had surgery on left knee torn meniscus 4 02/01/2021. Has lost 5 pounds. 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 The HCV has been treated and cured. Elastography, imaging, and labs suggest that the cirrhosis is resolving post eradication of the HCV. The most recent laboratory studies indicate that the liver transaminases are normal, alkaline phosphatase is normal,  tests of hepatic synthetic and metabolic function are depressed, and the platelet count is depressed. Complications of cirrhosis were discussed in detail. We discussed thrombocytopenia, portal hypertension, varices, GI bleeding, peripheral edema, ascites, hepatic encephalopathy, and hepatocellular carcinoma. We discussed the need for every 6 month liver imaging studies. Hepatic encephalopathy has not developed. Edema. Resolved. Diuretics have been discontinued. [facility] [address] screening. Ultrasound was recently performed and does not demonstrate any sign of developing HCC. Repeat imaging ordered today.        The need to perform an assessment of liver fibrosis was discussed with the patient. Fibroscan can assess liver fibrosis and determine if a patient has advanced fibrosis or cirrhosis without the need for liver biopsy. This was performed in the office during this appointment. Results of the scan indicate that the Metavir fibrosis score has improved to F3. There is no evidence of fatty liver per the scan. Fibroscan will be repeated annually in the office or as often as clinically indicated. HCV. The patient was previously treated for HCV with standard interferon and ribavirin in the 1990s. There was a non-response that was not clearly defined. She was retreated with SOF/SIM in [address] and cured. The patient was directed to continue all current medications at the current dosages except for the NSAIDs, which she should stop. There are no contraindications for the patient to take any medications that are necessary for treatment of other medical issues. The patient was counseled regarding alcohol consumption. Vaccination for viral hepatitis A is not needed. The patient has serologic evidence of prior exposure or vaccination with immunity. All of the above issues were discussed with the patient. All questions were answered. The patient expressed a clear understanding of the above. ALLERGIES  Allergies   Allergen Reactions    Latex Rash    Adhesive Tape-Silicones Rash       MEDICATIONS:  Current Outpatient Medications   Medication Sig Dispense Refill    flaxseed oil 1,000 mg cap Take 1,000 mg by mouth daily.  magnesium 250 mg tab Take 250 mg by mouth daily.  CYANOCOBALAMIN, VITAMIN B-12, Take 500 mcg by mouth daily.  tiZANidine (ZANAFLEX) 2 mg tablet Take 1 Tab by mouth three (3) times daily as needed for Muscle Spasm(s). 90 Tab 5    CALCIUM PO Take 1 Tab by mouth daily.  vitamin E acetate (VITAMIN E PO) Take 1 Cap by mouth.       hydroCHLOROthiazide (HYDRODIURIL) 12.5 mg tablet Take 1 Tab by mouth daily. 90 Tab 1    levothyroxine (SYNTHROID) 125 mcg tablet take 1 tablet by mouth once daily BEFORE BREAKFAST 90 Tab 1    PROAIR HFA 90 mcg/actuation inhaler Take 2 Puffs by inhalation every six (6) hours as needed for Wheezing. 1 Inhaler 3    omeprazole (PRILOSEC) 20 mg capsule take 1 capsule by mouth once daily 90 Cap 1    guaiFENesin ER (MUCINEX) 600 mg ER tablet Take 2 Tabs by mouth two (2) times daily as needed for Congestion. 60 Tab 2    multivitamin (ONE A DAY) tablet Take 1 Tab by mouth daily.  diclofenac (VOLTAREN) 1 % gel apply 2 grams to affected area four times a day  0    zolpidem (AMBIEN) 10 mg tablet Take 10 mg by mouth nightly as needed. 0    benztropine (COGENTIN) 1 mg tablet Take 1 mg by mouth three (3) times daily.  ARIPiprazole (ABILIFY) 5 mg tablet Take 15 mg by mouth daily. REVIEW OF SYSTEMS:  Systems related to all organ systems were reviewed. All were negative except as noted above. FAMILY/SOCIAL HISTORY:  The patient is . The patient has 2 children, and 4 grandchildren. The patient used to smoke a little but stopped in 2005. She used to consumed alcohol in excess. The patient has been abstinent from alcohol since 5/2012. 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 The patient is currently receiving disability. PHYSICAL EXAMINATION:  Visit Vitals  /84 (BP 1 Location: Left upper arm, BP Patient Position: Sitting, BP Cuff Size: Small adult)   Pulse 97   Temp 97 °F (36.1 °C)   Resp 16   Ht 5' 5\" (1.651 m)   Wt 200 lb (90.7 kg)   SpO2 98%   BMI 33.28 kg/m²     General: No acute distress. Eyes: Sclera anicteric. ENT: No oral lesions. Thyroid normal.  Nodes: No adenopathy. Skin: No spider angiomata. No jaundice. No palmar erythema. Respiratory: Lungs clear to auscultation. Cardiovascular: Regular heart rate. No murmurs. No JVD. Abdomen: Soft non-tender.   Liver size normal to percussion/palpation. Spleen not palpable. No obvious ascites. Extremities: No lower extremity edema. No muscle wasting. No gross arthritic changes. Neurologic: Alert and oriented. Cranial nerves grossly intact. No asterixsis. LABORATORY STUDIES:   [facility] [place] of [id number] & Units 3/9/2022 9/1/2021 3/1/2021   WBC 4.6 - 13.2 K/uL 4.5 (L) 4.8 4.2 (L)   ANC 1.8 - 8.0 K/UL 3.1 3.3 2.8   HGB 12.0 - 16.0 g/dL 13.5 14.1 14.5    - 420 K/uL 132 (L) 127 (L) 130 (L)   INR 0.8 - 1.2   1.1 1.1 1.1   AST 10 - 38 U/L 23 21 18   ALT 13 - 56 U/L 32 32 27   Alk Phos 45 - 117 U/L 66 69 65   Bili, Total 0.2 - 1.0 MG/DL 0.6 0.6 0.6   Bili, Direct 0.0 - 0.2 MG/DL 0.2 0.2 0.2   Albumin 3.4 - 5.0 g/dL 3.9 4.0 4.0   BUN 7.0 - 18 MG/DL 9 8 11   Creat 0.6 - 1.3 MG/DL 0.86 0.82 0.81   Na 136 - 145 mmol/L 138 138 137   K 3.5 - 5.5 mmol/L 3.7 4.1 3.9   Cl 100 - 111 mmol/L 103 103 101   CO2 21 - 32 mmol/L 27 27 29   Glucose 74 - 99 mg/dL 106 (H) 105 (H) 95     Cancer Screening Latest Ref Rng & Units 9/1/2021 3/1/2021   AFP, Serum 0.0 - 8.0 ng/mL 3.2 3.0   AFP-L3% 0.0 - 9.9 % Comment Comment     SEROLOGIES:  Serologies Latest Ref Rng & Units 10/3/2016 5/10/2016   HCV RT-PCR, Quant IU/mL HCV Not Detected HCV Not Detected   HCV RT-PCR, Quant IU/mL       Serologies Latest Ref Rng 2/6/2013   Hep A Ab, Total Negative Positive (A)   Hep B Surface Ag Negative Negative   Hep B Core Ab, Total Negative Negative   Hep B Surface Ab 0.00 - 0.99 Index Value 0.52   Hep C Genotype See Note 1a   IL28B Genotype  CT genotype   Ferritin 13 - 150 ng/mL 464 (H)   Iron % Saturation 15 - 55 % 67 (H)     2/2013. HCV RNA Log 6.36 IU/ml. Eradicated. LIVER HISTOLOGY:  12/2016. Shear wave elastography. E Median is 11.66, suggestive of F3, bridging fibrosis. Wide E Std of 4.41 is suggestive of fatty liver disease. 10/2017.  TRANSIENT HEPATIC ELASTOGRAPHY:  E Range: 7-12.0 kPa (previously 9.1-23.0), E Mean: 9.3 kPa (previously 13.05),   E Median: 8.6 kPa (recently 11.7), E Std: 2.0 kPa (previously 4.4)     10/2018. TRANSIENT HEPATIC ELASTOGRAPHY:   E Range: 7.18-10.40 kPa  E Mean: 8.80 kPa  E Median: 8.75 kPa  E Std: 1.16 kPa    10/2020. FibroScan performed at [address]. EkPa was 12.4. IQR/med 22%. . The results suggested a fibrosis level of F3/F4. The CAP score suggests fatty liver. 03/2022. FibroScan performed at [address]. EkPa was 9.8. IQR/med 21%. . The results suggested a fibrosis level of F3. The CAP score suggests there is no significant hepatic steatosis. ENDOSCOPIC PROCEDURES:  09/2014. EGD by Dr. Ole Bonilla. Normal esophagus. H.Pylori is negative. RADIOLOGY:  04/2014. Ultrasound of liver. Consistent with cirrhosis. No masses. 10/2014. Ultrasound of liver. Consistent with cirrhosis. No masses. 04/2015. Ultrasound of the liver. Consistent with cirrhosis. No mention of masses in the report. 10/2015. Ultrasound of the liver. Consistent with cirrhosis. No mention of masses in the report.  06/2016. Ultrasound of the liver. Heterogeneous appearing hepatic echotexture without mass. 12/2016. Ultrasound of the liver. Coarse heterogeneous echotexture. No focal mass. 10/2018. Ultrasound of the liver, performed with elastography. Persistent sonographic findings of hepatic cirrhosis. No focal hepatic mass. 04/2018. Ultrasound of the liver. The liver has a heterogeneous echotexture with a nodular contour, compatible with known cirrhosis. No focal hepatic lesions are evident. 10/2018. Ultrasound of the liver. Sonographic findings of hepatic cirrhosis and steatosis. No focal hepatic mass. 04/2019. Ultrasound of the liver. Coarsened hepatic echotexture and lobular surface contour noted as previously. No focal mass. 11/2019. Ultrasound f the liver. Heterogeneous liver with lobular contour compatible with the history of cirrhosis. No suspicious liver masses are demonstrated. 06/2020. Ultrasound of the liver. Cirrhosis. No focal hepatic lesion. 12/2020. Ultrasound of the liver. Cirrhotic hepatic morphology without focal hepatic lesion. 03/2021. Ultrasound of the liver. Coarsened hepatic echotexture with nodular contour of cirrhosis. No focal mass. 09/2021. Ultrasound of the liver. Cirrhosis. No focal hepatic lesion    OTHER TESTING:  Not available or performed    [address] in 6 months for continued monitoring.       FRAN Bui-LEANA  [facility] [place] of [address], [address] [place] [address]   [phone]

## 2022-03-10 ENCOUNTER — HOSPITAL ENCOUNTER (OUTPATIENT)
Dept: PHYSICAL THERAPY | Age: 67
Discharge: HOME | End: 2022-03-10
Payer: MEDICARE

## 2022-03-10 PROCEDURE — 97112 NEUROMUSCULAR REEDUCATION: CPT

## 2022-03-10 PROCEDURE — 97110 THERAPEUTIC EXERCISES: CPT

## 2022-03-10 NOTE — PROGRESS NOTES
PT DAILY TREATMENT NOTE    Patient Name: Ever Duckworth  Date:3/10/2022  : 1955  [x]  Patient  Verified  Payor: Abundio Stahl / Plan: [employer] [address] / Product Type: Managed Care Medicare /    In time:1050  Out time:1140  Total Treatment Time (min): 50  Total Timed Codes (min): 40  1:1 Treatment Time ([facility] only): 40   Visit #: 13 of 16    Treatment Dx: Other low back pain [M54.59]  Myalgia, other site [M79.18]    SUBJECTIVE  Pain Level (0-10 scale): 2-3 LB  Any medication changes, allergies to medications, adverse drug reactions, diagnosis change, or new procedure performed?: [x] No    [] Yes (see summary sheet for update)  Subjective functional status/changes:   [] No changes reported    Reports 'not having fatty liver any more' due to life style changes. Also just got an indoor recumbent bike that she started riding and is doing another diet. No recent neck pain as per patient.     OBJECTIVE    Modalities Rationale:     decrease pain and increase tissue extensibility to improve patient's ability to perform ADL   min [] Estim, type/location:                                      []  att     []  unatt     []  w/US     []  w/ice    []  w/heat    min []  Mechanical Traction: type/lbs                   []  pro   []  sup   []  int   []  cont    []  before manual    []  after manual    min []  Ultrasound, settings/location:      min []  Iontophoresis w/ dexamethasone, location:                                               []  take home patch       []  in clinic   10 min []  Ice     [x]  Heat    location/position: LB/90/90 position    min []  Vasopneumatic Device, press/temp:    If using vaso (only need to measure limb vaso being performed on)      pre-treatment girth :       post-treatment girth :       measured at (landmark location) :      min []  Other:    [] Skin assessment post-treatment (if applicable):    []  intact    []  redness- no adverse reaction []redness - adverse reaction:              10 min Therapeutic Exercise:  [x] See flow sheet :   Rationale: increase ROM, increase strength and improve coordination to improve the patients ability to perform ADL       30 min Neuromuscular Re-education:  [x]  See flow sheet : LS ES inhibition   Rationale: increase ROM, increase strength, improve coordination and increase proprioception  to improve the patients ability to perform ADL with improved alignment/symmetry            With   [x] TE   [] TA   [] neuro   [] other: Patient Education: [x] Review HEP    [] Progressed/Changed HEP based on:   [] positioning   [] body mechanics   [] transfers   [] heat/ice application    [] other:      Other Objective/Functional Measures:   Testing post session:  Trunk rotation right 14.5, left 14 in  HGIR neg  ADT negative bilaterally     Pain Level (0-10 scale) post treatment: 0    ASSESSMENT/Changes in Function: good tolerance to activities, no increase in pain, needs frequent VC to reduce LS overcompensation and proper breathing pattern    Patient will continue to benefit from skilled PT services to address ROM deficits, address strength deficits, analyze and address soft tissue restrictions, analyze and cue movement patterns and assess and modify postural abnormalities to attain remaining goals. [x]  See Plan of Care  []  See progress note/recertification  []  See Discharge Summary           Progress towards goals / Updated goals:  Short Term Goals: To be accomplished in 3 weeks:  1. Patient will be independent and compliant with HEP to progress toward goals and restore functional mobility. Eval Status: issued at eval  Current: Patient reports daily compliance with her HEP.   2/16/22, met     2. Patient will improve pain in low back to 5/10  to improve activity tolerance and restore prior level of function.   Eval Status: 10/10 at worst  Current: 0/10 pain over the last week     2/16/22, MET     3. Pt will have painfree lumbar AROM WFL in all planes to aid in functional mechanics for ambulation/ADLs. Eval Status:   ROM % AROM Comments:pain, area   Forward flexion 40-60 11 cm     Extension 20-30 50%     SideBend right 20-30 51 cm     SideBend left 20-30 52 cm     Rotation right 5-10 50%     Rotation left 5-10 50%     Current: WNL except flexion to ankles (5cm), though c/o \"popping\" along right lower back region during flexion and extension; 0/10 pain for all planes    2/16/22, in progress  Status on 2/22/22:Fwd Flex FFD 3 in, SB left 19 in, right 20 in, trunk rotation 16 in both ways, all LS ROM without pain, progressing           4. Patient will improve 5xSTS to 10 seconds or less without use of UE in order to demonstrate reduced risk for falls.               Eval Status: 16 seconds with use of UE on thighs for support             Current: 10 seconds without use of UE's    2/22/22, MET     Long Term Goals: To be accomplished in 6 weeks:  1. Patient will improve FOTO score by 7 points to improve functional tolerance for ambulation and ADLs. Eval Status: FOTO 54  Current: 60     2/3/22, in progress  FOTO score = an established functional score where 100 = no disability     2.   Pt will have 5/5 bilat LE and core strength to return to goals of ambulation and ADLs. Eval Status:     Left(0-5) Right (0-5) N/T   Hip Flexion (L1,2) 4 4 []?????????????    Knee Extension (L3,4) 5 5 []?????????????    Ankle Dorsiflexion (L4) 4+ 4+ []?????????????    Great Toe Extension (L5)     [x]?????????????    Ankle Plantarflexion (S1) 4+ 4+ []?????????????    Knee Flexion (S1,2) 4+ 4+ []?????????????    Abdominals 3+   []?????????????    Gluteus Bakari (sidelying) 4 4- []?????????????    Hip Abduction 4 4- []?????????????    Current: Grossly 5/5 except bilateral hip flexion 4+/5, left hip abduction 4+/5, right hip abduction 4-/5     2/16/22, in progress  3/4/22 progressing as per tolerance to exercises, fatigued with glut max.      3.   Patient will improve single limb stance time to at least 20 seconds on each leg in order to demonstrate reduced risk for falls.               Eval Status: Without UE support: Left = 7 seconds; Right = 11 seconds               Current: SLS 15 sec each     2/10/22, progressing                        4. Patient will report pain no greater than 1-2/10 with stair negotiation in order to facilitate her ability to climb stairs to her bedroom.               Eval Status: Pain increased with all stair negotiation; worst pain 10/10              Current: patient reports no pain since last week with ADL, 2/9/22, MET           PLAN  []  Upgrade activities as tolerated     [x]  Continue plan of care  []  Update interventions per flow sheet       []  Discharge due to:_  []  Other:_      Alirio Swanson PT 3/10/2022  11:00 AM    Future Appointments   Date Time Provider Aniya Brady   3/16/2022  3:00 PM DANIEL Odonnell [facility]   3/22/2022  9:15 AM DANIEL Odonnell [facility]   7/5/2022 10:15 AM MD RICHARD Murrieta BS AMB   9/8/2022 10:00 AM MELANIE Gunderson BS AMB

## 2022-03-11 LAB
AFP L3 MFR SERPL: NORMAL % (ref 0–9.9)
AFP SERPL-MCNC: 2.7 NG/ML (ref 0–9.2)

## 2022-03-16 ENCOUNTER — APPOINTMENT (OUTPATIENT)
Dept: PHYSICAL THERAPY | Age: 67
End: 2022-03-16
Payer: MEDICARE

## 2022-03-18 PROBLEM — N39.3 SUI (STRESS URINARY INCONTINENCE, FEMALE): Status: ACTIVE | Noted: 2017-04-04

## 2022-03-18 PROBLEM — G62.9 NEUROPATHY: Status: ACTIVE | Noted: 2021-03-01

## 2022-03-18 PROBLEM — Z13.31 NEGATIVE DEPRESSION SCREENING: Status: ACTIVE | Noted: 2018-07-03

## 2022-03-19 PROBLEM — R76.8 HCV ANTIBODY POSITIVE: Status: ACTIVE | Noted: 2019-04-11

## 2022-03-19 PROBLEM — R31.29 MICROSCOPIC HEMATURIA: Status: ACTIVE | Noted: 2017-03-26

## 2022-03-19 PROBLEM — Z86.19 HEPATITIS C VIRUS INFECTION CURED AFTER ANTIVIRAL DRUG THERAPY: Status: ACTIVE | Noted: 2020-10-01

## 2022-03-19 PROBLEM — E66.01 SEVERE OBESITY (HCC): Status: ACTIVE | Noted: 2018-11-23

## 2022-03-19 PROBLEM — R35.1 NOCTURIA: Status: ACTIVE | Noted: 2017-04-04

## 2022-03-20 PROBLEM — Z71.89 ADVANCED CARE PLANNING/COUNSELING DISCUSSION: Status: ACTIVE | Noted: 2018-07-03

## 2022-03-22 ENCOUNTER — APPOINTMENT (OUTPATIENT)
Dept: PHYSICAL THERAPY | Age: 67
End: 2022-03-22
Payer: MEDICARE

## 2022-04-23 ENCOUNTER — HOSPITAL ENCOUNTER (OUTPATIENT)
Dept: ULTRASOUND IMAGING | Age: 67
Discharge: HOME | End: 2022-04-23
Payer: MEDICARE

## 2022-04-23 DIAGNOSIS — K74.60 CIRRHOSIS OF LIVER WITHOUT ASCITES, UNSPECIFIED HEPATIC CIRRHOSIS TYPE (HCC): ICD-10-CM

## 2022-04-23 PROCEDURE — 76705 ECHO EXAM OF ABDOMEN: CPT

## 2022-07-18 ENCOUNTER — VIRTUAL VISIT (OUTPATIENT)
Dept: ORTHOPEDIC SURGERY | Age: 67
End: 2022-07-18
Payer: MEDICARE

## 2022-07-18 DIAGNOSIS — G89.29 CHRONIC PRIMARY MUSCULOSKELETAL PAIN: Primary | ICD-10-CM

## 2022-07-18 DIAGNOSIS — M70.62 TROCHANTERIC BURSITIS, LEFT HIP: ICD-10-CM

## 2022-07-18 DIAGNOSIS — M79.18 CHRONIC PRIMARY MUSCULOSKELETAL PAIN: Primary | ICD-10-CM

## 2022-07-18 DIAGNOSIS — M54.59 MECHANICAL LOW BACK PAIN: ICD-10-CM

## 2022-07-18 DIAGNOSIS — M79.18 MYOFASCIAL PAIN: ICD-10-CM

## 2022-07-18 PROCEDURE — 1101F PT FALLS ASSESS-DOCD LE1/YR: CPT | Performed by: PHYSICAL MEDICINE & REHABILITATION

## 2022-07-18 PROCEDURE — G8432 DEP SCR NOT DOC, RNG: HCPCS | Performed by: PHYSICAL MEDICINE & REHABILITATION

## 2022-07-18 PROCEDURE — 3017F COLORECTAL CA SCREEN DOC REV: CPT | Performed by: PHYSICAL MEDICINE & REHABILITATION

## 2022-07-18 PROCEDURE — 1123F ACP DISCUSS/DSCN MKR DOCD: CPT | Performed by: PHYSICAL MEDICINE & REHABILITATION

## 2022-07-18 PROCEDURE — 99213 OFFICE O/P EST LOW 20 MIN: CPT | Performed by: PHYSICAL MEDICINE & REHABILITATION

## 2022-07-18 PROCEDURE — G8536 NO DOC ELDER MAL SCRN: HCPCS | Performed by: PHYSICAL MEDICINE & REHABILITATION

## 2022-07-18 PROCEDURE — G8427 DOCREV CUR MEDS BY ELIG CLIN: HCPCS | Performed by: PHYSICAL MEDICINE & REHABILITATION

## 2022-07-18 PROCEDURE — G8400 PT W/DXA NO RESULTS DOC: HCPCS | Performed by: PHYSICAL MEDICINE & REHABILITATION

## 2022-07-18 PROCEDURE — 1090F PRES/ABSN URINE INCON ASSESS: CPT | Performed by: PHYSICAL MEDICINE & REHABILITATION

## 2022-07-18 PROCEDURE — G8417 CALC BMI ABV UP PARAM F/U: HCPCS | Performed by: PHYSICAL MEDICINE & REHABILITATION

## 2022-07-18 NOTE — PATIENT INSTRUCTIONS
Trochanteric Bursitis: Exercises  Introduction  Here are some examples of exercises for you to try. The exercises may be suggested for a condition or for rehabilitation. Start each exercise slowly. Ease off the exercises if you start to have pain. You will be told when to start these exercises and which ones will work best for you. How to do the exercises  Hamstring wall stretch    1. Lie on your back in a doorway, with your good leg through the open door. 2. Slide your affected leg up the wall to straighten your knee. You should feel a gentle stretch down the back of your leg. 3. Hold the stretch for at least 1 minute to begin. Then try to lengthen the time you hold the stretch to as long as 6 minutes. 4. Repeat 2 to 4 times. 5. If you do not have a place to do this exercise in a doorway, there is another way to do it:  6. Lie on your back, and bend the knee of your affected leg. 7. Loop a towel under the ball and toes of that foot, and hold the ends of the towel in your hands. 8. Straighten your knee, and slowly pull back on the towel. You should feel a gentle stretch down the back of your leg. 9. Hold the stretch for 15 to 30 seconds. Or even better, hold the stretch for 1 minute if you can. 10. Repeat 2 to 4 times. 1. Do not arch your back. 2. Do not bend either knee. 3. Keep one heel touching the floor and the other heel touching the wall. Do not point your toes. Straight-leg raises to the outside    1. Lie on your side, with your affected leg on top. 2. Tighten the front thigh muscles of your top leg to keep your knee straight. 3. Keep your hip and your leg straight in line with the rest of your body, and keep your knee pointing forward. Do not drop your hip back. 4. Lift your top leg straight up toward the ceiling, about 12 inches off the floor. Hold for about 6 seconds, then slowly lower your leg. 5. Repeat 8 to 12 times. Clamshell    1.  Lie on your side, with your affected leg on top and your head propped on a pillow. Keep your feet and knees together and your knees bent. 2. Raise your top knee, but keep your feet together. Do not let your hips roll back. Your legs should open up like a clamshell. 3. Hold for 6 seconds. 4. Slowly lower your knee back down. Rest for 10 seconds. 5. Repeat 8 to 12 times. Standing quadriceps stretch    1. If you are not steady on your feet, hold on to a chair, counter, or wall. You can also lie on your stomach or your side to do this exercise. 2. Bend the knee of the leg you want to stretch, and reach behind you to grab the front of your foot or ankle with the hand on the same side. For example, if you are stretching your right leg, use your right hand. 3. Keeping your knees next to each other, pull your foot toward your buttock until you feel a gentle stretch across the front of your hip and down the front of your thigh. Your knee should be pointed directly to the ground, and not out to the side. 4. Hold the stretch for 15 to 30 seconds. 5. Repeat 2 to 4 times. Piriformis stretch    1. Lie on your back with your legs straight. 2. Lift your affected leg and bend your knee. With your opposite hand, reach across your body, and then gently pull your knee toward your opposite shoulder. 3. Hold the stretch for 15 to 30 seconds. 4. Repeat 2 to 4 times. Double knee-to-chest    1. Lie on your back with your knees bent and your feet flat on the floor. You can put a small pillow under your head and neck if it is more comfortable. 2. Bring both knees to your chest.  3. Keep your lower back pressed to the floor. Hold for 15 to 30 seconds. 4. Relax, and lower your knees to the starting position. 5. Repeat 2 to 4 times. Follow-up care is a key part of your treatment and safety. Be sure to make and go to all appointments, and call your doctor if you are having problems.  It's also a good idea to know your test results and keep a list of the medicines you take.  Where can you learn more? Go to http://dena-denton.info/  Enter N503 in the search box to learn more about \"Trochanteric Bursitis: Exercises. \"  Current as of: July 1, 2021               Content Version: 13.2  © 8286-7105 [employer]. Care instructions adapted under license by [employer] (which disclaims liability or warranty for this information). If you have questions about a medical condition or this instruction, always ask your healthcare professional. [address] any warranty or liability for your use of this information.

## 2022-09-22 ENCOUNTER — HOSPITAL ENCOUNTER (OUTPATIENT)
Dept: LAB | Age: 67
Discharge: HOME | End: 2022-09-22
Payer: MEDICARE

## 2022-09-22 ENCOUNTER — OFFICE VISIT (OUTPATIENT)
Dept: HEMATOLOGY | Age: 67
End: 2022-09-22
Payer: MEDICARE

## 2022-09-22 VITALS
DIASTOLIC BLOOD PRESSURE: 65 MMHG | HEART RATE: 81 BPM | WEIGHT: 219 LBS | OXYGEN SATURATION: 98 % | SYSTOLIC BLOOD PRESSURE: 120 MMHG | BODY MASS INDEX: 37.39 KG/M2 | TEMPERATURE: 96.8 F | HEIGHT: 64 IN

## 2022-09-22 DIAGNOSIS — K74.60 CIRRHOSIS OF LIVER WITHOUT ASCITES, UNSPECIFIED HEPATIC CIRRHOSIS TYPE (HCC): Primary | ICD-10-CM

## 2022-09-22 DIAGNOSIS — K74.60 CIRRHOSIS OF LIVER WITHOUT ASCITES, UNSPECIFIED HEPATIC CIRRHOSIS TYPE (HCC): ICD-10-CM

## 2022-09-22 LAB
ALBUMIN SERPL-MCNC: 3.8 G/DL (ref 3.4–5)
ALBUMIN/GLOB SERPL: 1.2 {RATIO} (ref 0.8–1.7)
ALP SERPL-CCNC: 75 U/L (ref 45–117)
ALT SERPL-CCNC: 31 U/L (ref 13–56)
ANION GAP SERPL CALC-SCNC: 6 MMOL/L (ref 3–18)
AST SERPL-CCNC: 22 U/L (ref 10–38)
BASOPHILS # BLD: 0 K/UL (ref 0–0.1)
BASOPHILS NFR BLD: 0 % (ref 0–2)
BILIRUB DIRECT SERPL-MCNC: 0.1 MG/DL (ref 0–0.2)
BILIRUB SERPL-MCNC: 0.6 MG/DL (ref 0.2–1)
BUN SERPL-MCNC: 8 MG/DL (ref 7–18)
BUN/CREAT SERPL: 10 (ref 12–20)
CALCIUM SERPL-MCNC: 9.3 MG/DL (ref 8.5–10.1)
CHLORIDE SERPL-SCNC: 103 MMOL/L (ref 100–111)
CO2 SERPL-SCNC: 28 MMOL/L (ref 21–32)
CREAT SERPL-MCNC: 0.77 MG/DL (ref 0.6–1.3)
DIFFERENTIAL METHOD BLD: ABNORMAL
EOSINOPHIL # BLD: 0.1 K/UL (ref 0–0.4)
EOSINOPHIL NFR BLD: 1 % (ref 0–5)
ERYTHROCYTE [DISTWIDTH] IN BLOOD BY AUTOMATED COUNT: 13.2 % (ref 11.6–14.5)
GLOBULIN SER CALC-MCNC: 3.2 G/DL (ref 2–4)
GLUCOSE SERPL-MCNC: 115 MG/DL (ref 74–99)
HCT VFR BLD AUTO: 39.1 % (ref 35–45)
HGB BLD-MCNC: 13.3 G/DL (ref 12–16)
IMM GRANULOCYTES # BLD AUTO: 0 K/UL (ref 0–0.04)
IMM GRANULOCYTES NFR BLD AUTO: 1 % (ref 0–0.5)
INR PPP: 1 (ref 0.8–1.2)
LYMPHOCYTES # BLD: 0.9 K/UL (ref 0.9–3.6)
LYMPHOCYTES NFR BLD: 20 % (ref 21–52)
MCH RBC QN AUTO: 28.5 PG (ref 24–34)
MCHC RBC AUTO-ENTMCNC: 34 G/DL (ref 31–37)
MCV RBC AUTO: 83.9 FL (ref 78–100)
MONOCYTES # BLD: 0.4 K/UL (ref 0.05–1.2)
MONOCYTES NFR BLD: 8 % (ref 3–10)
NEUTS SEG # BLD: 3.1 K/UL (ref 1.8–8)
NEUTS SEG NFR BLD: 69 % (ref 40–73)
NRBC # BLD: 0 K/UL (ref 0–0.01)
NRBC BLD-RTO: 0 PER 100 WBC
PLATELET # BLD AUTO: 118 K/UL (ref 135–420)
PMV BLD AUTO: 9.6 FL (ref 9.2–11.8)
POTASSIUM SERPL-SCNC: 3.7 MMOL/L (ref 3.5–5.5)
PROT SERPL-MCNC: 7 G/DL (ref 6.4–8.2)
PROTHROMBIN TIME: 13.3 SEC (ref 11.5–15.2)
RBC # BLD AUTO: 4.66 M/UL (ref 4.2–5.3)
SODIUM SERPL-SCNC: 137 MMOL/L (ref 136–145)
WBC # BLD AUTO: 4.5 K/UL (ref 4.6–13.2)

## 2022-09-22 PROCEDURE — 99214 OFFICE O/P EST MOD 30 MIN: CPT | Performed by: NURSE PRACTITIONER

## 2022-09-22 PROCEDURE — G8400 PT W/DXA NO RESULTS DOC: HCPCS | Performed by: NURSE PRACTITIONER

## 2022-09-22 PROCEDURE — G8536 NO DOC ELDER MAL SCRN: HCPCS | Performed by: NURSE PRACTITIONER

## 2022-09-22 PROCEDURE — 85025 COMPLETE CBC W/AUTO DIFF WBC: CPT

## 2022-09-22 PROCEDURE — 1090F PRES/ABSN URINE INCON ASSESS: CPT | Performed by: NURSE PRACTITIONER

## 2022-09-22 PROCEDURE — 80048 BASIC METABOLIC PNL TOTAL CA: CPT

## 2022-09-22 PROCEDURE — 80076 HEPATIC FUNCTION PANEL: CPT

## 2022-09-22 PROCEDURE — 1123F ACP DISCUSS/DSCN MKR DOCD: CPT | Performed by: NURSE PRACTITIONER

## 2022-09-22 PROCEDURE — 1101F PT FALLS ASSESS-DOCD LE1/YR: CPT | Performed by: NURSE PRACTITIONER

## 2022-09-22 PROCEDURE — G8432 DEP SCR NOT DOC, RNG: HCPCS | Performed by: NURSE PRACTITIONER

## 2022-09-22 PROCEDURE — 3017F COLORECTAL CA SCREEN DOC REV: CPT | Performed by: NURSE PRACTITIONER

## 2022-09-22 PROCEDURE — 82107 ALPHA-FETOPROTEIN L3: CPT

## 2022-09-22 PROCEDURE — G8427 DOCREV CUR MEDS BY ELIG CLIN: HCPCS | Performed by: NURSE PRACTITIONER

## 2022-09-22 PROCEDURE — 36415 COLL VENOUS BLD VENIPUNCTURE: CPT

## 2022-09-22 PROCEDURE — G8417 CALC BMI ABV UP PARAM F/U: HCPCS | Performed by: NURSE PRACTITIONER

## 2022-09-22 PROCEDURE — 85610 PROTHROMBIN TIME: CPT

## 2022-09-22 NOTE — PROGRESS NOTES
[facility], MD, [address], [facility], [place]      JESICA Andrews, [facility]     Rosa Ernandez, [facility]   Ruthanna Opitz, FNP-LEANA Mittal, [facility]       Darien Deputado Evelio De Santamaria 136    at [address], [address] [place] [address], [facility] 22. [phone]    FAX: [address] [place] [address]    at [address], [address] [phone]    FAX: [phone]       Patient Care Team:  Benedict Polk NP as PCP - General (Family Medicine)  Lj Elder MD (Psychiatry)  Lucita Wilson MD (Orthopedic Surgery)  Abelino Lo MD (Neurology)      Problem List  Date Reviewed: 8/2/2022            Codes Class Noted    Intrinsic (urethral) sphincter deficiency (ISD) ICD-10-CM: N36.42  ICD-9-CM: 599.82  5/3/2017        Microscopic hematuria ICD-10-CM: R31.29  ICD-9-CM: 599.72  3/26/2017        Nocturia ICD-10-CM: R35.1  ICD-9-CM: 788.43  4/4/2017        RAMONA (stress urinary incontinence, female) ICD-10-CM: N39.3  ICD-9-CM: 625.6  4/4/2017        Neuropathy ICD-10-CM: G62.9  ICD-9-CM: 355.9  3/1/2021        Hepatitis C virus infection cured after antiviral drug therapy ICD-10-CM: Z86.19  ICD-9-CM: V12.09  10/1/2020        HCV antibody positive ICD-10-CM: R76.8  ICD-9-CM: 795.79  4/11/2019    Overview Signed 4/11/2019  4:45 PM by Jez Pitt NP     HCV treated and cured with SOF + ROBBY in 2014             Severe obesity ([address] ICD-10-CM: E66.01  ICD-9-CM: 278.01  11/23/2018        Negative depression screening ICD-10-CM: Z13.31  ICD-9-CM: V79.0  7/3/2018        Advanced care planning/counseling discussion ICD-10-CM: Z71.89  ICD-9-CM: V65.49  7/3/2018        Chronic back pain ICD-10-CM: M54.9, G89.29  ICD-9-CM: 724.5, 338.29  9/23/2015    Overview Signed 9/23/2015 12:00 PM by Fanta Haines NP     Nerve ablation 09/16/2015. Hypothyroidism ICD-10-CM: E03.9  ICD-9-CM: 244.9  2/6/2013        Rheumatoid arthritis(714.0) ICD-10-CM: M06.9  ICD-9-CM: 714.0  2/6/2013        Carpal tunnel syndrome ICD-10-CM: G56.00  ICD-9-CM: 354.0  2/6/2013           Tracey Sauceda returns to the [facility] today for education and management of cirrhosis. The active problem list, all pertinent past medical history, medications, liver histology, endoscopic studies, radiologic findings and laboratory findings related to the liver disorder were reviewed with the patient. The patient is a 73 y.o.  female who was noted to have abnormalities in liver chemistries and subsequently tested positive for chronic HCV in 1990s. Risk factors for acquiring HCV are IV drug use in 1980s. There was no history of acute incteric hepatitis at the time of these risk factors. She was treated for HCV and remained SVR five years post treatment. She began treatment on 05/15/2014 with dual therapy of SOF/SIM and completed her 12 weeks of therapy on 08/05/2014. She was previously treated with standard interferon in 1997 and with standard interferon and ribavirin in 1999. Both treatments lasted for 24 weeks. The patient tolerated treatment reasonably well. HCV RNA levels obtained during treatment are not available at this time. The patient is unaware of the virologic response pattern. Ultrasound with shear wave elastography was performed in 10/2018. The ultrasound demonstrates a coarse heterogeneous echotexture. No focal mass. The shear wave elastography suggests that the cirrhosis has resolved. Fibroscan analysis performed in 10/2020 indicates fatty liver and bridging fibrosis/cirrhosis, Metavir fibrosis score of F3/F4. The scan was repeated today. Results indicate that the liver continues to improve with resolving fibrosis.  Today's assessment indicates that the Metavir fibrosis score has improved to F3. A liver biopsy has not been performed. The most recent laboratory studies indicate that the liver transaminases are normal, alkaline phosphatase is normal, tests of hepatic synthetic and metabolic function are normal, and the platelet count is depressed. The patient has no complaints which can be attributed to liver disease. The patient completes all daily activities without any functional limitations. The patient has not experienced fatigue, fevers, chills, shortness of breath, chest pain, pain in the right side over the liver, diffuse abdominal pain, nausea, vomiting, constipation, diarrhrea, dry eyes, dry mouth, arthralgias, myalgias, yellowing of the eyes or skin, itching, dark urine, problems concentrating, swelling of the abdomen, swelling of the lower extremities, hematemesis, or hematochezia. Since the last office appointment:  Had hernia repair in 03/2022. She had a colonoscopy which enraged her umbilical hernia to the point where she required surgical repair two days post colonoscopy. Fully recovered now. 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 The HCV has been treated and cured. Elastography, imaging, and labs suggest that the cirrhosis is resolving post eradication of the HCV. The most recent laboratory studies indicate that the liver transaminases are normal, alkaline phosphatase is normal,  tests of hepatic synthetic and metabolic function are depressed, and the platelet count is depressed. Complications of cirrhosis were discussed in detail. We discussed thrombocytopenia, portal hypertension, varices, GI bleeding, peripheral edema, ascites, hepatic encephalopathy, and hepatocellular carcinoma. We discussed the need for every 6 month liver imaging studies. Hepatic encephalopathy has not developed. Edema. Resolved. Diuretics have been discontinued. [facility] screening.   Ultrasound was recently performed and does not demonstrate any sign of developing HCC. Repeat imaging ordered today. The need to perform an assessment of liver fibrosis was discussed with the patient. Fibroscan can assess liver fibrosis and determine if a patient has advanced fibrosis or cirrhosis without the need for liver biopsy. This was performed in the office during the 03/2022 appointment. Results of the scan indicate that the Metavir fibrosis score has improved to F3. There is no evidence of fatty liver per the scan. Fibroscan will be repeated annually in the office or as often as clinically indicated. Fibroscan will be repeated when the patient returns for follow up in 6 months. HCV. The patient was previously treated for HCV with standard interferon and ribavirin in the 1990s. There was a non-response that was not clearly defined. She was retreated with SOF/SIM in [address] and cured. The patient was directed to continue all current medications at the current dosages except for the NSAIDs, which she should stop. There are no contraindications for the patient to take any medications that are necessary for treatment of other medical issues. The patient was counseled regarding alcohol consumption. Vaccination for viral hepatitis A is not needed. The patient has serologic evidence of prior exposure or vaccination with immunity. All of the above issues were discussed with the patient. All questions were answered. The patient expressed a clear understanding of the above. ALLERGIES  Allergies   Allergen Reactions    Latex Rash    Adhesive Tape-Silicones Rash       MEDICATIONS:  Current Outpatient Medications   Medication Sig Dispense Refill    flaxseed oil 1,000 mg cap Take 1,000 mg by mouth daily. magnesium 250 mg tab Take 250 mg by mouth daily. CYANOCOBALAMIN, VITAMIN B-12, Take 500 mcg by mouth daily.       tiZANidine (ZANAFLEX) 2 mg tablet Take 1 Tab by mouth three (3) times daily as needed for Muscle Spasm(s). 90 Tab 5    CALCIUM PO Take 1 Tab by mouth daily. vitamin E acetate (VITAMIN E PO) Take 1 Cap by mouth. hydroCHLOROthiazide (HYDRODIURIL) 12.5 mg tablet Take 1 Tab by mouth daily. 90 Tab 1    levothyroxine (SYNTHROID) 125 mcg tablet take 1 tablet by mouth once daily BEFORE BREAKFAST 90 Tab 1    PROAIR HFA 90 mcg/actuation inhaler Take 2 Puffs by inhalation every six (6) hours as needed for Wheezing. 1 Inhaler 3    omeprazole (PRILOSEC) 20 mg capsule take 1 capsule by mouth once daily 90 Cap 1    guaiFENesin ER (MUCINEX) 600 mg ER tablet Take 2 Tabs by mouth two (2) times daily as needed for Congestion. 60 Tab 2    multivitamin (ONE A DAY) tablet Take 1 Tab by mouth daily. diclofenac (VOLTAREN) 1 % gel apply 2 grams to affected area four times a day  0    zolpidem (AMBIEN) 10 mg tablet Take 10 mg by mouth nightly as needed. 0    benztropine (COGENTIN) 1 mg tablet Take 1 mg by mouth three (3) times daily. ARIPiprazole (ABILIFY) 5 mg tablet Take 15 mg by mouth daily. REVIEW OF SYSTEMS:  Systems related to all organ systems were reviewed. All were negative except as noted above. FAMILY/SOCIAL HISTORY:  The patient is . The patient has 2 children, and 4 grandchildren. The patient used to smoke a little but stopped in 2005. She used to consumed alcohol in excess. The patient has been abstinent from alcohol since 5/2012. 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 The patient is currently receiving disability. PHYSICAL EXAMINATION:  Visit Vitals  /84 (BP 1 Location: Left upper arm, BP Patient Position: Sitting, BP Cuff Size: Small adult)   Pulse 97   Temp 97 °F (36.1 °C)   Resp 16   Ht 5' 5\" (1.651 m)   Wt 200 lb (90.7 kg)   SpO2 98%   BMI 33.28 kg/m²     General: No acute distress. Eyes: Sclera anicteric. ENT: No oral lesions. Thyroid normal.  Nodes: No adenopathy.    Skin: No spider angiomata. No jaundice. No palmar erythema. Respiratory: Lungs clear to auscultation. Cardiovascular: Regular heart rate. No murmurs. No JVD. Abdomen: Soft non-tender. Liver size normal to percussion/palpation. Spleen not palpable. No obvious ascites. Extremities: No lower extremity edema. No muscle wasting. No gross arthritic changes. Neurologic: Alert and oriented. Cranial nerves grossly intact. No asterixsis. LABORATORY STUDIES:   [facility] of [address] [place] [address] & Units 9/22/2022 3/9/2022 9/1/2021   WBC 4.6 - 13.2 K/uL 4.5 (L) 4.5 (L) 4.8   ANC 1.8 - 8.0 K/UL 3.1 3.1 3.3   HGB 12.0 - 16.0 g/dL 13.3 13.5 14.1    - 420 K/uL 118 (L) 132 (L) 127 (L)   INR 0.8 - 1.2   1.0 1.1 1.1   AST 10 - 38 U/L 22 23 21   ALT 13 - 56 U/L 31 32 32   Alk Phos 45 - 117 U/L 75 66 69   Bili, Total 0.2 - 1.0 MG/DL 0.6 0.6 0.6   Bili, Direct 0.0 - 0.2 MG/DL 0.1 0.2 0.2   Albumin 3.4 - 5.0 g/dL 3.8 3.9 4.0   BUN 7.0 - 18 MG/DL 8 9 8   Creat 0.6 - 1.3 MG/DL 0.77 0.86 0.82   Na 136 - 145 mmol/L 137 138 138   K 3.5 - 5.5 mmol/L 3.7 3.7 4.1   Cl 100 - 111 mmol/L 103 103 103   CO2 21 - 32 mmol/L 28 27 27   Glucose 74 - 99 mg/dL 115 (H) 106 (H) 105 (H)     Cancer Screening Latest Ref Rng & Units 3/9/2022 9/1/2021 3/1/2021   AFP, Serum 0.0 - 9.2 ng/mL 2.7 3.2 3.0   AFP-L3% 0.0 - 9.9 % Comment Comment Comment     SEROLOGIES:  Serologies Latest Ref Rng & Units 10/3/2016 5/10/2016   HCV RT-PCR, Quant IU/mL HCV Not Detected HCV Not Detected   HCV RT-PCR, Quant IU/mL       Serologies Latest Ref Rng 2/6/2013   Hep A Ab, Total Negative Positive (A)   Hep B Surface Ag Negative Negative   Hep B Core Ab, Total Negative Negative   Hep B Surface Ab 0.00 - 0.99 Index Value 0.52   Hep C Genotype See Note 1a   IL28B Genotype  CT genotype   Ferritin 13 - 150 ng/mL 464 (H)   Iron % Saturation 15 - 55 % 67 (H)     2/2013. HCV RNA Log 6.36 IU/ml. Eradicated. LIVER HISTOLOGY:  12/2016. Shear wave elastography.   E Median is 11.66, suggestive of F3, bridging fibrosis. Wide E Std of 4.41 is suggestive of fatty liver disease. 10/2017. TRANSIENT HEPATIC ELASTOGRAPHY:  E Range: 7-12.0 kPa (previously 9.1-23.0), E Mean: 9.3 kPa (previously 13.05),   E Median: 8.6 kPa (recently 11.7), E Std: 2.0 kPa (previously 4.4)     10/2018. TRANSIENT HEPATIC ELASTOGRAPHY:   E Range: 7.18-10.40 kPa  E Mean: 8.80 kPa  E Median: 8.75 kPa  E Std: 1.16 kPa    10/2020. FibroScan performed at [address]. EkPa was 12.4. IQR/med 22%. . The results suggested a fibrosis level of F3/F4. The CAP score suggests fatty liver. 03/2022. FibroScan performed at [address]. EkPa was 9.8. IQR/med 21%. . The results suggested a fibrosis level of F3. The CAP score suggests there is no significant hepatic steatosis. ENDOSCOPIC PROCEDURES:  09/2014. EGD by Dr. Gil Buchanan. Normal esophagus. H.Pylori is negative. RADIOLOGY:  04/2014. Ultrasound of liver. Consistent with cirrhosis. No masses. 10/2014. Ultrasound of liver. Consistent with cirrhosis. No masses. 04/2015. Ultrasound of the liver. Consistent with cirrhosis. No mention of masses in the report. 10/2015. Ultrasound of the liver. Consistent with cirrhosis. No mention of masses in the report.  06/2016. Ultrasound of the liver. Heterogeneous appearing hepatic echotexture without mass. 12/2016. Ultrasound of the liver. Coarse heterogeneous echotexture. No focal mass. 10/2018. Ultrasound of the liver, performed with elastography. Persistent sonographic findings of hepatic cirrhosis. No focal hepatic mass. 04/2018. Ultrasound of the liver. The liver has a heterogeneous echotexture with a nodular contour, compatible with known cirrhosis. No focal hepatic lesions are evident. 10/2018. Ultrasound of the liver. Sonographic findings of hepatic cirrhosis and steatosis. No focal hepatic mass. 04/2019.   Ultrasound of the liver. Coarsened hepatic echotexture and lobular surface contour noted as previously. No focal mass. 11/2019. Ultrasound f the liver. Heterogeneous liver with lobular contour compatible with the history of cirrhosis. No suspicious liver masses are demonstrated. 06/2020. Ultrasound of the liver. Cirrhosis. No focal hepatic lesion. 12/2020. Ultrasound of the liver. Cirrhotic hepatic morphology without focal hepatic lesion. 03/2021. Ultrasound of the liver. Coarsened hepatic echotexture with nodular contour of cirrhosis. No focal mass. 09/2021. Ultrasound of the liver. Cirrhosis. No focal hepatic lesion. 04/2022. Ultrasound of the liver. Coarsened hepatic echotexture with nodular contour of cirrhosis. No focal mass. OTHER TESTING:  Not available or performed    [address] in 6 months for continued monitoring.       BERNY Spann  Liver [place] [facility]  [facility], [address]   [address] Francisca Mcmillan, [address] [facility]   [phone]

## 2022-09-23 LAB
AFP L3 MFR SERPL: NORMAL % (ref 0–9.9)
AFP SERPL-MCNC: 2.9 NG/ML (ref 0–9.2)

## 2022-10-22 ENCOUNTER — HOSPITAL ENCOUNTER (OUTPATIENT)
Dept: ULTRASOUND IMAGING | Age: 67
Discharge: HOME | End: 2022-10-22
Payer: MEDICARE

## 2022-10-22 DIAGNOSIS — K74.60 CIRRHOSIS OF LIVER WITHOUT ASCITES, UNSPECIFIED HEPATIC CIRRHOSIS TYPE (HCC): ICD-10-CM

## 2022-10-22 PROCEDURE — 76705 ECHO EXAM OF ABDOMEN: CPT

## 2023-01-22 DIAGNOSIS — M79.18 MYOFASCIAL PAIN: ICD-10-CM

## 2023-01-23 RX ORDER — TIZANIDINE 2 MG/1
TABLET ORAL
Qty: 90 TABLET | Refills: 5 | Status: SHIPPED | OUTPATIENT
Start: 2023-01-23

## 2023-04-25 ENCOUNTER — HOSPITAL ENCOUNTER (OUTPATIENT)
Facility: HOSPITAL | Age: 68
Setting detail: SPECIMEN
Discharge: HOME | End: 2023-04-28
Payer: MEDICAID

## 2023-04-25 ENCOUNTER — OFFICE VISIT (OUTPATIENT)
Age: 68
End: 2023-04-25
Payer: MEDICAID

## 2023-04-25 VITALS
BODY MASS INDEX: 37.56 KG/M2 | DIASTOLIC BLOOD PRESSURE: 63 MMHG | HEIGHT: 64 IN | TEMPERATURE: 97.5 F | SYSTOLIC BLOOD PRESSURE: 121 MMHG | WEIGHT: 220 LBS | OXYGEN SATURATION: 98 % | HEART RATE: 84 BPM

## 2023-04-25 DIAGNOSIS — K74.60 CIRRHOSIS OF LIVER WITHOUT ASCITES, UNSPECIFIED HEPATIC CIRRHOSIS TYPE (HCC): ICD-10-CM

## 2023-04-25 DIAGNOSIS — K74.60 CIRRHOSIS OF LIVER WITHOUT ASCITES, UNSPECIFIED HEPATIC CIRRHOSIS TYPE (HCC): Primary | ICD-10-CM

## 2023-04-25 PROBLEM — H00.025 HORDEOLUM INTERNUM OF LEFT LOWER EYELID: Status: ACTIVE | Noted: 2023-04-03

## 2023-04-25 PROBLEM — R19.5 LOOSE STOOLS: Status: ACTIVE | Noted: 2018-11-12

## 2023-04-25 PROBLEM — R31.9 HEMATURIA: Status: ACTIVE | Noted: 2023-04-25

## 2023-04-25 PROBLEM — D64.9 ANEMIA: Status: ACTIVE | Noted: 2023-04-25

## 2023-04-25 PROBLEM — F31.61 BIPOLAR DISORDER, CURRENT EPISODE MIXED, MILD (HCC): Status: ACTIVE | Noted: 2021-12-12

## 2023-04-25 PROBLEM — B18.2 CHRONIC HEPATITIS C WITHOUT HEPATIC COMA (HCC): Status: ACTIVE | Noted: 2020-01-08

## 2023-04-25 PROBLEM — J42 CHRONIC BRONCHITIS (HCC): Status: ACTIVE | Noted: 2019-07-07

## 2023-04-25 PROBLEM — N36.42 INTRINSIC SPHINCTER DEFICIENCY (ISD): Status: ACTIVE | Noted: 2017-05-03

## 2023-04-25 PROBLEM — G62.9 PERIPHERAL NEUROPATHY: Status: ACTIVE | Noted: 2020-12-09

## 2023-04-25 PROBLEM — I10 ESSENTIAL HYPERTENSION: Status: ACTIVE | Noted: 2021-06-10

## 2023-04-25 PROBLEM — K21.9 GASTROESOPHAGEAL REFLUX DISEASE: Status: ACTIVE | Noted: 2019-07-07

## 2023-04-25 PROBLEM — E03.4 HYPOTHYROIDISM DUE TO ACQUIRED ATROPHY OF THYROID: Status: ACTIVE | Noted: 2019-07-07

## 2023-04-25 PROBLEM — K43.9 EPIGASTRIC HERNIA: Status: ACTIVE | Noted: 2020-07-15

## 2023-04-25 LAB
ALBUMIN SERPL-MCNC: 3.7 G/DL (ref 3.4–5)
ALBUMIN/GLOB SERPL: 1.2 (ref 0.8–1.7)
ALP SERPL-CCNC: 70 U/L (ref 45–117)
ALT SERPL-CCNC: 30 U/L (ref 13–56)
ANION GAP SERPL CALC-SCNC: 5 MMOL/L (ref 3–18)
AST SERPL-CCNC: 22 U/L (ref 10–38)
BASOPHILS # BLD: 0 K/UL (ref 0–0.1)
BASOPHILS NFR BLD: 1 % (ref 0–2)
BILIRUB DIRECT SERPL-MCNC: 0.2 MG/DL (ref 0–0.2)
BILIRUB SERPL-MCNC: 0.5 MG/DL (ref 0.2–1)
BUN SERPL-MCNC: 8 MG/DL (ref 7–18)
BUN/CREAT SERPL: 10 (ref 12–20)
CALCIUM SERPL-MCNC: 8.6 MG/DL (ref 8.5–10.1)
CHLORIDE SERPL-SCNC: 103 MMOL/L (ref 100–111)
CO2 SERPL-SCNC: 28 MMOL/L (ref 21–32)
CREAT SERPL-MCNC: 0.79 MG/DL (ref 0.6–1.3)
DIFFERENTIAL METHOD BLD: ABNORMAL
EOSINOPHIL # BLD: 0.1 K/UL (ref 0–0.4)
EOSINOPHIL NFR BLD: 2 % (ref 0–5)
ERYTHROCYTE [DISTWIDTH] IN BLOOD BY AUTOMATED COUNT: 12.8 % (ref 11.6–14.5)
GLOBULIN SER CALC-MCNC: 3.1 G/DL (ref 2–4)
GLUCOSE SERPL-MCNC: 116 MG/DL (ref 74–99)
HCT VFR BLD AUTO: 37.1 % (ref 35–45)
HGB BLD-MCNC: 12.8 G/DL (ref 12–16)
IMM GRANULOCYTES # BLD AUTO: 0 K/UL (ref 0–0.04)
IMM GRANULOCYTES NFR BLD AUTO: 1 % (ref 0–0.5)
INR PPP: 1 (ref 0.8–1.2)
LYMPHOCYTES # BLD: 0.8 K/UL (ref 0.9–3.6)
LYMPHOCYTES NFR BLD: 21 % (ref 21–52)
MCH RBC QN AUTO: 28.5 PG (ref 24–34)
MCHC RBC AUTO-ENTMCNC: 34.5 G/DL (ref 31–37)
MCV RBC AUTO: 82.6 FL (ref 78–100)
MONOCYTES # BLD: 0.3 K/UL (ref 0.05–1.2)
MONOCYTES NFR BLD: 7 % (ref 3–10)
NEUTS SEG # BLD: 2.6 K/UL (ref 1.8–8)
NEUTS SEG NFR BLD: 68 % (ref 40–73)
NRBC # BLD: 0 K/UL (ref 0–0.01)
NRBC BLD-RTO: 0 PER 100 WBC
PLATELET # BLD AUTO: 117 K/UL (ref 135–420)
PMV BLD AUTO: 9.5 FL (ref 9.2–11.8)
POTASSIUM SERPL-SCNC: 3.4 MMOL/L (ref 3.5–5.5)
PROT SERPL-MCNC: 6.8 G/DL (ref 6.4–8.2)
PROTHROMBIN TIME: 13.7 SEC (ref 11.5–15.2)
RBC # BLD AUTO: 4.49 M/UL (ref 4.2–5.3)
SODIUM SERPL-SCNC: 136 MMOL/L (ref 136–145)
WBC # BLD AUTO: 3.9 K/UL (ref 4.6–13.2)

## 2023-04-25 PROCEDURE — 36415 COLL VENOUS BLD VENIPUNCTURE: CPT

## 2023-04-25 PROCEDURE — 3074F SYST BP LT 130 MM HG: CPT | Performed by: NURSE PRACTITIONER

## 2023-04-25 PROCEDURE — 91200 LIVER ELASTOGRAPHY: CPT | Performed by: NURSE PRACTITIONER

## 2023-04-25 PROCEDURE — 3078F DIAST BP <80 MM HG: CPT | Performed by: NURSE PRACTITIONER

## 2023-04-25 PROCEDURE — 80048 BASIC METABOLIC PNL TOTAL CA: CPT

## 2023-04-25 PROCEDURE — 99214 OFFICE O/P EST MOD 30 MIN: CPT | Performed by: NURSE PRACTITIONER

## 2023-04-25 PROCEDURE — 85610 PROTHROMBIN TIME: CPT

## 2023-04-25 PROCEDURE — 85025 COMPLETE CBC W/AUTO DIFF WBC: CPT

## 2023-04-25 PROCEDURE — 82107 ALPHA-FETOPROTEIN L3: CPT

## 2023-04-25 PROCEDURE — 80076 HEPATIC FUNCTION PANEL: CPT

## 2023-04-25 PROCEDURE — 1123F ACP DISCUSS/DSCN MKR DOCD: CPT | Performed by: NURSE PRACTITIONER

## 2023-04-25 RX ORDER — LORATADINE 10 MG/1
10 TABLET ORAL DAILY
COMMUNITY

## 2023-04-25 RX ORDER — GUAIFENESIN 600 MG/1
1200 TABLET, EXTENDED RELEASE ORAL PRN
COMMUNITY
Start: 2018-07-13

## 2023-04-25 RX ORDER — FLUTICASONE PROPIONATE 50 MCG
1 SPRAY, SUSPENSION (ML) NASAL DAILY
COMMUNITY
Start: 2022-12-30

## 2023-04-25 ASSESSMENT — PATIENT HEALTH QUESTIONNAIRE - PHQ9
SUM OF ALL RESPONSES TO PHQ9 QUESTIONS 1 & 2: 0
SUM OF ALL RESPONSES TO PHQ QUESTIONS 1-9: 0
2. FEELING DOWN, DEPRESSED OR HOPELESS: 0
SUM OF ALL RESPONSES TO PHQ QUESTIONS 1-9: 0
1. LITTLE INTEREST OR PLEASURE IN DOING THINGS: 0

## 2023-04-25 NOTE — PROGRESS NOTES
[facility], MD, [address], [place], [place]      AYDEN Malcolm, [facility]-BC     Elsa VILLEGAS Flaquita, [facility]   Marco Mireles FNP-VERA Jimenez, [facility]       [facility] 136    at [place] [address], [address] Richard Barnett  22.    [phone]    FAX: [address] [place] [address], [address]    [phone]    FAX: [phone]       Patient Care Team:  Bryan Wilkerson NP as PCP - General (Family Medicine)  David Lopez MD (Psychiatry)  Patrizia De La Cruz MD (Orthopedic Surgery)  Brianne Dodson MD (Neurology)      Patient Active Problem List   Diagnosis    Hypothyroidism    Peripheral neuropathy    CORBIN (stress urinary incontinence, female)    Negative depression screening    HCV antibody positive    Chronic back pain    Severe obesity ([address]    Microscopic hematuria    Hepatitis C virus infection cured after antiviral drug therapy    Nocturia    Advanced care planning/counseling discussion    Carpal tunnel syndrome    Anemia    Bipolar disorder, current episode mixed, mild (HCC)    Chronic bronchitis ([address]    Chronic hepatitis C without hepatic coma ([address]    Cirrhosis of liver without ascites ([address]    Epigastric hernia    Essential hypertension    Gastroesophageal reflux disease    Hematuria    Hordeolum internum of left lower eyelid    Hypothyroidism due to acquired atrophy of thyroid    Intrinsic sphincter deficiency (ISD)    Loose stools    Rheumatoid arthritis ([address]         Gricelda Ma returns to the [place] [address] today for fibroscan assessment of hepatic fibrosis and for education and management of cirrhosis.       The active problem list, all pertinent past medical history, medications, liver histology,

## 2023-04-27 LAB
AFP L3 MFR SERPL: NORMAL % (ref 0–9.9)
AFP SERPL-MCNC: 2.6 NG/ML (ref 0–9.2)

## 2023-05-12 ENCOUNTER — HOSPITAL ENCOUNTER (OUTPATIENT)
Facility: HOSPITAL | Age: 68
End: 2023-05-12
Payer: MEDICAID

## 2023-05-12 DIAGNOSIS — K74.60 CIRRHOSIS OF LIVER WITHOUT ASCITES, UNSPECIFIED HEPATIC CIRRHOSIS TYPE (HCC): ICD-10-CM

## 2023-05-12 PROCEDURE — 93975 VASCULAR STUDY: CPT

## 2023-11-03 ENCOUNTER — TELEPHONE (OUTPATIENT)
Age: 68
End: 2023-11-03

## 2023-11-03 NOTE — TELEPHONE ENCOUNTER
Patient called and stated that she keeps getting the automated message about a refill for the medication Tizanidine but she doesn't need any at the moment and her back is doing fine. She just wanted to inform Dr. Adrien Noonan.

## 2023-11-03 NOTE — TELEPHONE ENCOUNTER
I spoke to  using two identifiers. I let her know we are glad she is doing well. She thought that we were receiving notifications from the pharmacy , I let her know that we have not received any at this time. She thanked me, no further action needed.

## 2023-11-09 ENCOUNTER — OFFICE VISIT (OUTPATIENT)
Age: 68
End: 2023-11-09
Payer: MEDICAID

## 2023-11-09 ENCOUNTER — HOSPITAL ENCOUNTER (OUTPATIENT)
Facility: HOSPITAL | Age: 68
Setting detail: SPECIMEN
Discharge: HOME | End: 2023-11-09
Payer: MEDICAID

## 2023-11-09 VITALS
HEIGHT: 64 IN | BODY MASS INDEX: 34.49 KG/M2 | TEMPERATURE: 97.3 F | DIASTOLIC BLOOD PRESSURE: 65 MMHG | HEART RATE: 85 BPM | WEIGHT: 202 LBS | SYSTOLIC BLOOD PRESSURE: 113 MMHG | OXYGEN SATURATION: 98 %

## 2023-11-09 DIAGNOSIS — K74.60 CIRRHOSIS OF LIVER WITHOUT ASCITES, UNSPECIFIED HEPATIC CIRRHOSIS TYPE (HCC): Primary | ICD-10-CM

## 2023-11-09 DIAGNOSIS — K74.60 CIRRHOSIS OF LIVER WITHOUT ASCITES, UNSPECIFIED HEPATIC CIRRHOSIS TYPE (HCC): ICD-10-CM

## 2023-11-09 LAB
ALBUMIN SERPL-MCNC: 3.5 G/DL (ref 3.4–5)
ALBUMIN/GLOB SERPL: 1.1 (ref 0.8–1.7)
ALP SERPL-CCNC: 58 U/L (ref 45–117)
ALT SERPL-CCNC: 31 U/L (ref 13–56)
ANION GAP SERPL CALC-SCNC: 7 MMOL/L (ref 3–18)
AST SERPL-CCNC: 21 U/L (ref 10–38)
BASOPHILS # BLD: 0 K/UL (ref 0–0.1)
BASOPHILS NFR BLD: 1 % (ref 0–2)
BILIRUB DIRECT SERPL-MCNC: 0.2 MG/DL (ref 0–0.2)
BILIRUB SERPL-MCNC: 0.6 MG/DL (ref 0.2–1)
BUN SERPL-MCNC: 7 MG/DL (ref 7–18)
BUN/CREAT SERPL: 9 (ref 12–20)
CALCIUM SERPL-MCNC: 8.9 MG/DL (ref 8.5–10.1)
CHLORIDE SERPL-SCNC: 104 MMOL/L (ref 100–111)
CO2 SERPL-SCNC: 27 MMOL/L (ref 21–32)
CREAT SERPL-MCNC: 0.82 MG/DL (ref 0.6–1.3)
DIFFERENTIAL METHOD BLD: ABNORMAL
EOSINOPHIL # BLD: 0.1 K/UL (ref 0–0.4)
EOSINOPHIL NFR BLD: 1 % (ref 0–5)
ERYTHROCYTE [DISTWIDTH] IN BLOOD BY AUTOMATED COUNT: 13.3 % (ref 11.6–14.5)
GLOBULIN SER CALC-MCNC: 3.2 G/DL (ref 2–4)
GLUCOSE SERPL-MCNC: 103 MG/DL (ref 74–99)
HCT VFR BLD AUTO: 40 % (ref 35–45)
HGB BLD-MCNC: 13.2 G/DL (ref 12–16)
IMM GRANULOCYTES # BLD AUTO: 0 K/UL (ref 0–0.04)
IMM GRANULOCYTES NFR BLD AUTO: 0 % (ref 0–0.5)
INR PPP: 1 (ref 0.9–1.1)
LYMPHOCYTES # BLD: 0.9 K/UL (ref 0.9–3.6)
LYMPHOCYTES NFR BLD: 19 % (ref 21–52)
MCH RBC QN AUTO: 28.3 PG (ref 24–34)
MCHC RBC AUTO-ENTMCNC: 33 G/DL (ref 31–37)
MCV RBC AUTO: 85.8 FL (ref 78–100)
MONOCYTES # BLD: 0.4 K/UL (ref 0.05–1.2)
MONOCYTES NFR BLD: 7 % (ref 3–10)
NEUTS SEG # BLD: 3.5 K/UL (ref 1.8–8)
NEUTS SEG NFR BLD: 72 % (ref 40–73)
NRBC # BLD: 0 K/UL (ref 0–0.01)
NRBC BLD-RTO: 0 PER 100 WBC
PLATELET # BLD AUTO: 131 K/UL (ref 135–420)
PMV BLD AUTO: 10 FL (ref 9.2–11.8)
POTASSIUM SERPL-SCNC: 3.6 MMOL/L (ref 3.5–5.5)
PROT SERPL-MCNC: 6.7 G/DL (ref 6.4–8.2)
PROTHROMBIN TIME: 13.4 SEC (ref 11.9–14.7)
RBC # BLD AUTO: 4.66 M/UL (ref 4.2–5.3)
SODIUM SERPL-SCNC: 138 MMOL/L (ref 136–145)
WBC # BLD AUTO: 4.8 K/UL (ref 4.6–13.2)

## 2023-11-09 PROCEDURE — 1123F ACP DISCUSS/DSCN MKR DOCD: CPT | Performed by: NURSE PRACTITIONER

## 2023-11-09 PROCEDURE — 3074F SYST BP LT 130 MM HG: CPT | Performed by: NURSE PRACTITIONER

## 2023-11-09 PROCEDURE — 82107 ALPHA-FETOPROTEIN L3: CPT

## 2023-11-09 PROCEDURE — 99214 OFFICE O/P EST MOD 30 MIN: CPT

## 2023-11-09 PROCEDURE — 99214 OFFICE O/P EST MOD 30 MIN: CPT | Performed by: NURSE PRACTITIONER

## 2023-11-09 PROCEDURE — 80076 HEPATIC FUNCTION PANEL: CPT

## 2023-11-09 PROCEDURE — 36415 COLL VENOUS BLD VENIPUNCTURE: CPT

## 2023-11-09 PROCEDURE — 85610 PROTHROMBIN TIME: CPT

## 2023-11-09 PROCEDURE — 85025 COMPLETE CBC W/AUTO DIFF WBC: CPT

## 2023-11-09 PROCEDURE — 80048 BASIC METABOLIC PNL TOTAL CA: CPT

## 2023-11-09 PROCEDURE — 3078F DIAST BP <80 MM HG: CPT | Performed by: NURSE PRACTITIONER

## 2023-11-09 RX ORDER — BENZONATATE 100 MG/1
CAPSULE ORAL
COMMUNITY
Start: 2023-10-12

## 2023-11-09 NOTE — PROGRESS NOTES
cirrhosis. No masses. 10/2014. Ultrasound of liver. Consistent with cirrhosis. No masses. 04/2015. Ultrasound of the liver. Consistent with cirrhosis. No mention of masses in the report. 10/2015. Ultrasound of the liver. Consistent with cirrhosis. No mention of masses in the report.  06/2016. Ultrasound of the liver. Heterogeneous appearing hepatic echotexture without mass. 12/2016. Ultrasound of the liver. Coarse heterogeneous echotexture. No focal mass. 10/2018. Ultrasound of the liver, performed with elastography. Persistent sonographic findings of hepatic cirrhosis. No focal hepatic mass. 04/2018. Ultrasound of the liver. The liver has a heterogeneous echotexture with a nodular contour, compatible with known cirrhosis. No focal hepatic lesions are evident. 10/2018. Ultrasound of the liver. Sonographic findings of hepatic cirrhosis and steatosis. No focal hepatic mass. 04/2019. Ultrasound of the liver. Coarsened hepatic echotexture and lobular surface contour noted as previously. No focal mass. 11/2019. Ultrasound f the liver. Heterogeneous liver with lobular contour compatible with the history of cirrhosis. No suspicious liver masses are demonstrated. 06/2020. Ultrasound of the liver. Cirrhosis. No focal hepatic lesion. 12/2020. Ultrasound of the liver. Cirrhotic hepatic morphology without focal hepatic lesion. 03/2021. Ultrasound of the liver. Coarsened hepatic echotexture with nodular contour of cirrhosis. No focal mass. 09/2021. Ultrasound of the liver. Cirrhosis. No focal hepatic lesion. 04/2022. Ultrasound of the liver. Coarsened hepatic echotexture with nodular contour of cirrhosis. No focal mass. 10/2022. Ultrasound of the liver. Coarse echotexture to the liver, as can reflect underlying liver disease/cirrhosis. No focal hepatic lesion is identified sonographically. 01/2023. Abdominal CT. Liver again demonstrates cirrhotic morphology.  There is

## 2023-11-10 LAB
AFP L3 MFR SERPL: NORMAL % (ref 0–9.9)
AFP SERPL-MCNC: 2.4 NG/ML (ref 0–9.2)

## 2024-01-08 ENCOUNTER — ANESTHESIA EVENT (OUTPATIENT)
Facility: HOSPITAL | Age: 69
End: 2024-01-08
Payer: MEDICAID

## 2024-01-08 ENCOUNTER — HOSPITAL ENCOUNTER (OUTPATIENT)
Facility: HOSPITAL | Age: 69
Setting detail: OUTPATIENT SURGERY
Discharge: HOME | End: 2024-01-08
Attending: INTERNAL MEDICINE | Admitting: INTERNAL MEDICINE
Payer: MEDICAID

## 2024-01-08 ENCOUNTER — ANESTHESIA (OUTPATIENT)
Facility: HOSPITAL | Age: 69
End: 2024-01-08
Payer: MEDICAID

## 2024-01-08 VITALS
SYSTOLIC BLOOD PRESSURE: 119 MMHG | WEIGHT: 203.3 LBS | BODY MASS INDEX: 34.71 KG/M2 | HEART RATE: 71 BPM | OXYGEN SATURATION: 100 % | TEMPERATURE: 97.6 F | RESPIRATION RATE: 18 BRPM | DIASTOLIC BLOOD PRESSURE: 72 MMHG | HEIGHT: 64 IN

## 2024-01-08 PROCEDURE — 3700000001 HC ADD 15 MINUTES (ANESTHESIA): Performed by: INTERNAL MEDICINE

## 2024-01-08 PROCEDURE — 43239 EGD BIOPSY SINGLE/MULTIPLE: CPT | Performed by: INTERNAL MEDICINE

## 2024-01-08 PROCEDURE — 3600007512: Performed by: INTERNAL MEDICINE

## 2024-01-08 PROCEDURE — 3700000000 HC ANESTHESIA ATTENDED CARE: Performed by: INTERNAL MEDICINE

## 2024-01-08 PROCEDURE — 2709999900 HC NON-CHARGEABLE SUPPLY: Performed by: INTERNAL MEDICINE

## 2024-01-08 PROCEDURE — 88305 TISSUE EXAM BY PATHOLOGIST: CPT

## 2024-01-08 PROCEDURE — 7100000011 HC PHASE II RECOVERY - ADDTL 15 MIN: Performed by: INTERNAL MEDICINE

## 2024-01-08 PROCEDURE — 6360000002 HC RX W HCPCS: Performed by: ANESTHESIOLOGY

## 2024-01-08 PROCEDURE — 3600007502: Performed by: INTERNAL MEDICINE

## 2024-01-08 PROCEDURE — 2500000003 HC RX 250 WO HCPCS: Performed by: ANESTHESIOLOGY

## 2024-01-08 PROCEDURE — 2580000003 HC RX 258: Performed by: ANESTHESIOLOGY

## 2024-01-08 PROCEDURE — 7100000010 HC PHASE II RECOVERY - FIRST 15 MIN: Performed by: INTERNAL MEDICINE

## 2024-01-08 RX ORDER — M-VIT,TX,IRON,MINS/CALC/FOLIC 27MG-0.4MG
1 TABLET ORAL DAILY
COMMUNITY

## 2024-01-08 RX ORDER — PROPOFOL 10 MG/ML
INJECTION, EMULSION INTRAVENOUS PRN
Status: DISCONTINUED | OUTPATIENT
Start: 2024-01-08 | End: 2024-01-08 | Stop reason: SDUPTHER

## 2024-01-08 RX ORDER — SODIUM CHLORIDE 0.9 % (FLUSH) 0.9 %
5-40 SYRINGE (ML) INJECTION PRN
Status: CANCELLED | OUTPATIENT
Start: 2024-01-08

## 2024-01-08 RX ORDER — FENTANYL CITRATE 50 UG/ML
25 INJECTION, SOLUTION INTRAMUSCULAR; INTRAVENOUS AS NEEDED
Status: CANCELLED | OUTPATIENT
Start: 2024-01-08

## 2024-01-08 RX ORDER — ONDANSETRON 2 MG/ML
2 INJECTION INTRAMUSCULAR; INTRAVENOUS AS NEEDED
Status: CANCELLED | OUTPATIENT
Start: 2024-01-08

## 2024-01-08 RX ORDER — SODIUM CHLORIDE 0.9 % (FLUSH) 0.9 %
5-40 SYRINGE (ML) INJECTION EVERY 12 HOURS SCHEDULED
Status: CANCELLED | OUTPATIENT
Start: 2024-01-08

## 2024-01-08 RX ORDER — SODIUM CHLORIDE 9 MG/ML
INJECTION, SOLUTION INTRAVENOUS PRN
Status: CANCELLED | OUTPATIENT
Start: 2024-01-08

## 2024-01-08 RX ORDER — ONDANSETRON 2 MG/ML
INJECTION INTRAMUSCULAR; INTRAVENOUS PRN
Status: DISCONTINUED | OUTPATIENT
Start: 2024-01-08 | End: 2024-01-08 | Stop reason: SDUPTHER

## 2024-01-08 RX ORDER — LIDOCAINE HYDROCHLORIDE 20 MG/ML
INJECTION, SOLUTION EPIDURAL; INFILTRATION; INTRACAUDAL; PERINEURAL PRN
Status: DISCONTINUED | OUTPATIENT
Start: 2024-01-08 | End: 2024-01-08 | Stop reason: SDUPTHER

## 2024-01-08 RX ORDER — SODIUM CHLORIDE 9 MG/ML
INJECTION, SOLUTION INTRAVENOUS PRN
Status: DISCONTINUED | OUTPATIENT
Start: 2024-01-08 | End: 2024-01-08 | Stop reason: HOSPADM

## 2024-01-08 RX ADMIN — LIDOCAINE HYDROCHLORIDE 100 MG: 20 INJECTION, SOLUTION EPIDURAL; INFILTRATION; INTRACAUDAL; PERINEURAL at 09:59

## 2024-01-08 RX ADMIN — SODIUM CHLORIDE: 900 INJECTION, SOLUTION INTRAVENOUS at 09:45

## 2024-01-08 RX ADMIN — ONDANSETRON HYDROCHLORIDE 4 MG: 2 INJECTION INTRAMUSCULAR; INTRAVENOUS at 09:59

## 2024-01-08 RX ADMIN — PROPOFOL 20 MG: 10 INJECTION, EMULSION INTRAVENOUS at 10:02

## 2024-01-08 RX ADMIN — PROPOFOL 20 MG: 10 INJECTION, EMULSION INTRAVENOUS at 10:08

## 2024-01-08 RX ADMIN — PROPOFOL 20 MG: 10 INJECTION, EMULSION INTRAVENOUS at 10:06

## 2024-01-08 RX ADMIN — PROPOFOL 20 MG: 10 INJECTION, EMULSION INTRAVENOUS at 10:04

## 2024-01-08 RX ADMIN — PROPOFOL 50 MG: 10 INJECTION, EMULSION INTRAVENOUS at 10:01

## 2024-01-08 ASSESSMENT — PAIN SCALES - GENERAL: PAINLEVEL_OUTOF10: 0

## 2024-01-08 ASSESSMENT — PAIN - FUNCTIONAL ASSESSMENT: PAIN_FUNCTIONAL_ASSESSMENT: 0-10

## 2024-01-08 NOTE — H&P
asterixis.      MOST RECENT LABORATORY STUDIES:  Lab Results   Component Value Date    WBC 4.8 11/09/2023    HGB 13.2 11/09/2023     (L) 11/09/2023     Lab Results   Component Value Date    AST 21 11/09/2023    ALT 31 11/09/2023    BILITOT 0.6 11/09/2023     Lab Results   Component Value Date    INR 1.0 11/09/2023          PRE-PROCEDURE DOCUMENTATION  The risks of the procedure were discussed with the patient.  These included reaction to anesthesia, pain, perforation and bleeding.    All questions were answered.    The patient wishes to proceed with the procedure.    ASA status  3    Airway assessment:   Mouth opening:  3   Mallampati:  3   Dentition:  Intact     ASSESSMENT AND PLAN:  EGD to assess for esophageal and/or gastric varices.  Sedation per anesthesiology      Rafi Snider MD  [facility]  [address] [facility] [address], [address]  [place]  [address] [phone]  [facility]

## 2024-01-08 NOTE — ANESTHESIA POSTPROCEDURE EVALUATION
Department of Anesthesiology  Postprocedure Note    Patient: Flaca Alonso  MRN: [id number]  YOB: 1955  Date of evaluation: 1/8/2024    Procedure Summary       Date: 01/08/24 Room / Location: [place] [address] / [facility] ENDOSCOPY    Anesthesia Start: 0947 Anesthesia Stop: 1011    Procedure: EGD ESOPHAGOGASTRODUODENOSCOPY/ BIOPSY (Upper GI Region) Diagnosis:       Hepatic cirrhosis, unspecified hepatic cirrhosis type, unspecified whether ascites present (HCC)      (Hepatic cirrhosis, unspecified hepatic cirrhosis type, unspecified whether ascites present (HCC) [K74.60])    Surgeons: Rafi Snider MD Responsible Provider: Marcello Major MD    Anesthesia Type: MAC ASA Status: 3            Anesthesia Type: No value filed.    Lindsay Phase I: Lindsay Score: 10    Lindsay Phase II:      Anesthesia Post Evaluation    Patient location during evaluation: PACU  Patient participation: complete - patient participated  Level of consciousness: awake  Pain score: 0  Airway patency: patent  Nausea & Vomiting: no nausea and no vomiting  Cardiovascular status: blood pressure returned to baseline  Respiratory status: acceptable  Hydration status: euvolemic  Pain management: adequate    No notable events documented.

## 2024-01-08 NOTE — OP NOTE
[facility]      Rafi Snider MD, FACP, FACG, FAASLD      Nora Faulkner, PAFELIPA Sams, [facility]   Cathy Silveirateofiloeli, [facility]   Melissa Devon, [facility]-  iYmi Das, [facility]   Mercedez Qiu, [facility]   Faustina Toledo, [facility]   [address] [facility], [address]   [place]  [address] [phone]   FAX: [phone]  [facility]   [address] [facility], [address]   [place]  [address] [phone]   FAX: [phone]         UPPER ENDOSCOPY PROCEDURE NOTE    NAME: Flaca Alonso  :  1955  MRN:  [id number]    INDICATION: Cirrhosis.  Screening for esophageal varices with variceal ligation if  indicated.    : Rafi Snider MD    SURGICAL ASSISTANT:  None    PROSTHETIC DEVISES, TISSUE GRAFTS, ORGAN TRANSPLANTS:  Not applicable     ANESTHESIA/SEDATION: MAC Sedation per anesthesiology        PROCEDURE DESCRIPTION:  [employer] consent was obtained from the patient for the procedure.  All risks and benefits of the procedure explained.     The procedure was performed in the endoscopy suite.  The patient was laying on a stretcher and moved to the left lateral decubitus position prior to administration of sedation.    Sedation was administered by anesthesiology.  See their note for details.      The endoscope was inserted into the mouth and advanced under direct vision to the second portion of the duodenum.  Careful inspection of upper gastrointestinal tract was made as the endoscope was inserted and withdrawn.  Retroflexion of the endoscope to view of the cardia of the stomach was performed.  The findings and interventions are described below.      FINDINGS:  Esophagus:    Normal.      Stomach:   No portal hypertensive gastropathy   Multiple small Polyps in the mid-body of the stomach  No

## 2024-01-08 NOTE — DISCHARGE INSTRUCTIONS
[facility]      Rafi Snider MD, FACP, FACG, FAASLD      Nora Faulkner, PA-C    Elsa Sams, [facility]   Cathy Silveirateofiloeli, [facility]   Melissa Devon, [facility]  Yimi Das, [facility]   Mercedez Qiu, [facility]   Faustina Cuencaon, [facility]   [address], [address]   [place]  [address] [phone]   FAX: [phone]  [facility]   [address], [address]   [place]  [address] [phone]   FAX: [phone]         ENDOSCOPY DISCHARGE INSTRUCTIONS      Flaca Alonso  1955  Date: 1/8/2024    DISCOMFORT:  Use lozenges or warm salt water gargle for sore thoat  Apply warm compress to IV site if red.  If redness or soreness persists call the office.  You may experience gas and bloating.  Walking and belching will help relieve this.  You may experience chest discomfort or pressure especially if banding of esophageal varices was performed.    DIET:  You may resume your normal diet.    ACTIVITY:  Spend the remainder of the day resting.  Avoid any strenuous activity.  You may not operate a vehicle for 12 hours.  You may not engage in an occupation involving machinery or appliances for rest of today.   Avoid making any critical or financial decisions for at least 24 hour.    Call the [facility] Office if you have any of the following:  Increasing chest or abdominal pain, nausea, vomiting, vomiting blood, abdominal distension or swelling, fever or chills, bloody discharge from nose or mouth or shortness of breath.    Follow-up Instructions:  Call Dr. Snider for any questions or problems at the phone number listed above.  If a biopsy was performed, you will be contacted by the office staff or Dr Snider within 1 week.   If you have not heard from us by then you may call the

## 2024-01-08 NOTE — ANESTHESIA PRE PROCEDURE
HGB 13.2 11/09/2023 10:37 AM    HCT 40.0 11/09/2023 10:37 AM    MCV 85.8 11/09/2023 10:37 AM    RDW 13.3 11/09/2023 10:37 AM     11/09/2023 10:37 AM       CMP:   Lab Results   Component Value Date/Time     11/09/2023 10:37 AM    K 3.6 11/09/2023 10:37 AM     11/09/2023 10:37 AM    CO2 27 11/09/2023 10:37 AM    BUN 7 11/09/2023 10:37 AM    CREATININE 0.82 11/09/2023 10:37 AM    GFRAA >60 09/22/2022 10:36 AM    AGRATIO 1.2 09/22/2022 10:36 AM    LABGLOM >60 11/09/2023 10:37 AM    GLUCOSE 103 11/09/2023 10:37 AM    PROT 6.7 11/09/2023 10:37 AM    CALCIUM 8.9 11/09/2023 10:37 AM    BILITOT 0.6 11/09/2023 10:37 AM    ALKPHOS 58 11/09/2023 10:37 AM    ALKPHOS 75 09/22/2022 10:36 AM    AST 21 11/09/2023 10:37 AM    ALT 31 11/09/2023 10:37 AM       POC Tests: No results for input(s): \"POCGLU\", \"POCNA\", \"POCK\", \"POCCL\", \"POCBUN\", \"POCHEMO\", \"POCHCT\" in the last 72 hours.    Coags:   Lab Results   Component Value Date/Time    PROTIME 13.4 11/09/2023 10:37 AM    INR 1.0 11/09/2023 10:37 AM       HCG (If Applicable): No results found for: \"PREGTESTUR\", \"PREGSERUM\", \"HCG\", \"HCGQUANT\"     ABGs: No results found for: \"PHART\", \"PO2ART\", \"KQP6ATJ\", \"SLW5DHE\", \"BEART\", \"S4HQUVNO\"     Type & Screen (If Applicable):  No results found for: \"LABABO\", \"LABRH\"    Drug/Infectious Status (If Applicable):  No results found for: \"HIV\", \"HEPCAB\"    COVID-19 Screening (If Applicable): No results found for: \"COVID19\"        Anesthesia Evaluation  Patient summary reviewed and Nursing notes reviewed  Airway: Mallampati: III          Dental: normal exam         Pulmonary:normal exam  breath sounds clear to auscultation  (+)           asthma:                            Cardiovascular:  Exercise tolerance: good (>4 METS)  (+) hypertension:        Rhythm: regular  Rate: normal                    Neuro/Psych:   (+) neuromuscular disease:, psychiatric history:            GI/Hepatic/Renal:   (+) GERD:, PUD, hepatitis:, liver disease:

## 2024-01-08 NOTE — PERIOP NOTE
TRANSFER - IN REPORT:    Verbal report received from Yamilka QUACH on Flaca Alonso  being received from procedure room for routine progression of patient care      Report consisted of patient's Situation, Background, Assessment and   Recommendations(SBAR).     Information from the following report(s) Nurse Handoff Report, Intake/Output, MAR, and Recent Results was reviewed with the receiving nurse.    Opportunity for questions and clarification was provided.      Assessment completed upon patient's arrival to unit and care assumed.

## 2024-05-09 ENCOUNTER — HOSPITAL ENCOUNTER (OUTPATIENT)
Facility: HOSPITAL | Age: 69
Setting detail: SPECIMEN
Discharge: HOME | End: 2024-05-09
Payer: MEDICAID

## 2024-05-09 ENCOUNTER — OFFICE VISIT (OUTPATIENT)
Age: 69
End: 2024-05-09
Payer: MEDICAID

## 2024-05-09 VITALS
DIASTOLIC BLOOD PRESSURE: 84 MMHG | SYSTOLIC BLOOD PRESSURE: 124 MMHG | HEART RATE: 77 BPM | BODY MASS INDEX: 34.93 KG/M2 | WEIGHT: 204.6 LBS | HEIGHT: 64 IN | TEMPERATURE: 97.8 F | OXYGEN SATURATION: 99 %

## 2024-05-09 DIAGNOSIS — K74.60 CIRRHOSIS OF LIVER WITHOUT ASCITES, UNSPECIFIED HEPATIC CIRRHOSIS TYPE (HCC): Primary | ICD-10-CM

## 2024-05-09 DIAGNOSIS — K74.60 CIRRHOSIS OF LIVER WITHOUT ASCITES, UNSPECIFIED HEPATIC CIRRHOSIS TYPE (HCC): ICD-10-CM

## 2024-05-09 PROBLEM — B18.2 CHRONIC HEPATITIS C WITHOUT HEPATIC COMA (HCC): Status: RESOLVED | Noted: 2020-01-08 | Resolved: 2024-05-09

## 2024-05-09 LAB
ALBUMIN SERPL-MCNC: 3.8 G/DL (ref 3.4–5)
ALBUMIN/GLOB SERPL: 1.2 (ref 0.8–1.7)
ALP SERPL-CCNC: 63 U/L (ref 45–117)
ALT SERPL-CCNC: 27 U/L (ref 13–56)
ANION GAP SERPL CALC-SCNC: 9 MMOL/L (ref 3–18)
AST SERPL-CCNC: 24 U/L (ref 10–38)
BASOPHILS # BLD: 0 K/UL (ref 0–0.1)
BASOPHILS NFR BLD: 1 % (ref 0–2)
BILIRUB DIRECT SERPL-MCNC: 0.2 MG/DL (ref 0–0.2)
BILIRUB SERPL-MCNC: 0.8 MG/DL (ref 0.2–1)
BUN SERPL-MCNC: 8 MG/DL (ref 7–18)
BUN/CREAT SERPL: 10 (ref 12–20)
CALCIUM SERPL-MCNC: 9 MG/DL (ref 8.5–10.1)
CHLORIDE SERPL-SCNC: 98 MMOL/L (ref 100–111)
CO2 SERPL-SCNC: 25 MMOL/L (ref 21–32)
CREAT SERPL-MCNC: 0.78 MG/DL (ref 0.6–1.3)
DIFFERENTIAL METHOD BLD: ABNORMAL
EOSINOPHIL # BLD: 0.1 K/UL (ref 0–0.4)
EOSINOPHIL NFR BLD: 1 % (ref 0–5)
ERYTHROCYTE [DISTWIDTH] IN BLOOD BY AUTOMATED COUNT: 13 % (ref 11.6–14.5)
GLOBULIN SER CALC-MCNC: 3.1 G/DL (ref 2–4)
GLUCOSE SERPL-MCNC: 92 MG/DL (ref 74–99)
HCT VFR BLD AUTO: 38.3 % (ref 35–45)
HGB BLD-MCNC: 13.2 G/DL (ref 12–16)
IMM GRANULOCYTES # BLD AUTO: 0 K/UL (ref 0–0.04)
IMM GRANULOCYTES NFR BLD AUTO: 1 % (ref 0–0.5)
INR PPP: 1.1 (ref 0.9–1.1)
LYMPHOCYTES # BLD: 1 K/UL (ref 0.9–3.6)
LYMPHOCYTES NFR BLD: 21 % (ref 21–52)
MCH RBC QN AUTO: 29.3 PG (ref 24–34)
MCHC RBC AUTO-ENTMCNC: 34.5 G/DL (ref 31–37)
MCV RBC AUTO: 84.9 FL (ref 78–100)
MONOCYTES # BLD: 0.4 K/UL (ref 0.05–1.2)
MONOCYTES NFR BLD: 8 % (ref 3–10)
NEUTS SEG # BLD: 3.4 K/UL (ref 1.8–8)
NEUTS SEG NFR BLD: 69 % (ref 40–73)
NRBC # BLD: 0 K/UL (ref 0–0.01)
NRBC BLD-RTO: 0 PER 100 WBC
PLATELET # BLD AUTO: 146 K/UL (ref 135–420)
PMV BLD AUTO: 9.5 FL (ref 9.2–11.8)
POTASSIUM SERPL-SCNC: 3.4 MMOL/L (ref 3.5–5.5)
PROT SERPL-MCNC: 6.9 G/DL (ref 6.4–8.2)
PROTHROMBIN TIME: 14.3 SEC (ref 11.9–14.7)
RBC # BLD AUTO: 4.51 M/UL (ref 4.2–5.3)
SODIUM SERPL-SCNC: 132 MMOL/L (ref 136–145)
WBC # BLD AUTO: 4.9 K/UL (ref 4.6–13.2)

## 2024-05-09 PROCEDURE — 85025 COMPLETE CBC W/AUTO DIFF WBC: CPT

## 2024-05-09 PROCEDURE — 80076 HEPATIC FUNCTION PANEL: CPT

## 2024-05-09 PROCEDURE — 80048 BASIC METABOLIC PNL TOTAL CA: CPT

## 2024-05-09 PROCEDURE — 82107 ALPHA-FETOPROTEIN L3: CPT

## 2024-05-09 PROCEDURE — 85610 PROTHROMBIN TIME: CPT

## 2024-05-09 PROCEDURE — 36415 COLL VENOUS BLD VENIPUNCTURE: CPT

## 2024-05-09 PROCEDURE — 91200 LIVER ELASTOGRAPHY: CPT | Performed by: NURSE PRACTITIONER

## 2024-05-09 NOTE — PROGRESS NOTES
mass.  09/2021.  Ultrasound of the liver.  Cirrhosis. No focal hepatic lesion.  04/2022.  Ultrasound of the liver.  Coarsened hepatic echotexture with nodular contour of cirrhosis. No focal mass.  10/2022.  Ultrasound of the liver.  Coarse echotexture to the liver, as can reflect underlying liver disease/cirrhosis. No focal hepatic lesion is identified sonographically.  01/2023.  Abdominal CT.  Liver again demonstrates cirrhotic morphology. There is low attenuation indicating underlying hepatic steatosis.  08/2023. Abdominal CT.  Cirrhotic morphology of the liver is stable.     OTHER TESTING:  Not available or performed    Follow-up [facility] in 6 months for continued monitoring.      Yimi Das, FNP-C  [facility]  [address], [address]   [place] [address] [phone]

## 2024-05-13 LAB
AFP L3 MFR SERPL: NORMAL % (ref 0–9.9)
AFP SERPL-MCNC: 2.4 NG/ML (ref 0–9.2)

## 2024-05-20 ENCOUNTER — HOSPITAL ENCOUNTER (OUTPATIENT)
Facility: HOSPITAL | Age: 69
Discharge: HOME | End: 2024-05-23
Payer: MEDICAID

## 2024-05-20 DIAGNOSIS — K74.60 CIRRHOSIS OF LIVER WITHOUT ASCITES, UNSPECIFIED HEPATIC CIRRHOSIS TYPE (HCC): ICD-10-CM

## 2024-05-20 PROCEDURE — 76705 ECHO EXAM OF ABDOMEN: CPT

## 2025-01-23 ENCOUNTER — HOSPITAL ENCOUNTER (OUTPATIENT)
Facility: HOSPITAL | Age: 70
Setting detail: SPECIMEN
Discharge: HOME | End: 2025-01-26
Payer: MEDICAID

## 2025-01-23 ENCOUNTER — OFFICE VISIT (OUTPATIENT)
Age: 70
End: 2025-01-23
Payer: MEDICAID

## 2025-01-23 VITALS
BODY MASS INDEX: 34.66 KG/M2 | DIASTOLIC BLOOD PRESSURE: 78 MMHG | RESPIRATION RATE: 14 BRPM | WEIGHT: 203 LBS | OXYGEN SATURATION: 96 % | HEART RATE: 88 BPM | SYSTOLIC BLOOD PRESSURE: 132 MMHG | HEIGHT: 64 IN

## 2025-01-23 DIAGNOSIS — K74.60 CIRRHOSIS OF LIVER WITHOUT ASCITES, UNSPECIFIED HEPATIC CIRRHOSIS TYPE (HCC): Primary | ICD-10-CM

## 2025-01-23 DIAGNOSIS — K74.60 CIRRHOSIS OF LIVER WITHOUT ASCITES, UNSPECIFIED HEPATIC CIRRHOSIS TYPE (HCC): ICD-10-CM

## 2025-01-23 LAB
ALBUMIN SERPL-MCNC: 3.9 G/DL (ref 3.4–5)
ALBUMIN/GLOB SERPL: 1.3 (ref 0.8–1.7)
ALP SERPL-CCNC: 64 U/L (ref 45–117)
ALT SERPL-CCNC: 28 U/L (ref 13–56)
ANION GAP SERPL CALC-SCNC: 3 MMOL/L (ref 3–18)
AST SERPL-CCNC: 18 U/L (ref 10–38)
BASOPHILS # BLD: 0.04 K/UL (ref 0–0.1)
BASOPHILS NFR BLD: 0.7 % (ref 0–2)
BILIRUB DIRECT SERPL-MCNC: 0.2 MG/DL (ref 0–0.2)
BILIRUB SERPL-MCNC: 0.6 MG/DL (ref 0.2–1)
BUN SERPL-MCNC: 9 MG/DL (ref 7–18)
BUN/CREAT SERPL: 8 (ref 12–20)
CALCIUM SERPL-MCNC: 9.4 MG/DL (ref 8.5–10.1)
CHLORIDE SERPL-SCNC: 100 MMOL/L (ref 100–111)
CO2 SERPL-SCNC: 31 MMOL/L (ref 21–32)
CREAT SERPL-MCNC: 1.06 MG/DL (ref 0.6–1.3)
DIFFERENTIAL METHOD BLD: ABNORMAL
EOSINOPHIL # BLD: 0.07 K/UL (ref 0–0.4)
EOSINOPHIL NFR BLD: 1.2 % (ref 0–5)
ERYTHROCYTE [DISTWIDTH] IN BLOOD BY AUTOMATED COUNT: 12.6 % (ref 11.6–14.5)
GLOBULIN SER CALC-MCNC: 2.9 G/DL (ref 2–4)
GLUCOSE SERPL-MCNC: 100 MG/DL (ref 74–99)
HCT VFR BLD AUTO: 42.3 % (ref 35–45)
HGB BLD-MCNC: 14.8 G/DL (ref 12–16)
IMM GRANULOCYTES # BLD AUTO: 0.07 K/UL (ref 0–0.04)
IMM GRANULOCYTES NFR BLD AUTO: 1.2 % (ref 0–0.5)
INR PPP: 1 (ref 0.9–1.1)
LYMPHOCYTES # BLD: 1.44 K/UL (ref 0.9–3.3)
LYMPHOCYTES NFR BLD: 24.1 % (ref 21–52)
MCH RBC QN AUTO: 30.1 PG (ref 24–34)
MCHC RBC AUTO-ENTMCNC: 35 G/DL (ref 31–37)
MCV RBC AUTO: 86.2 FL (ref 78–100)
MONOCYTES # BLD: 0.48 K/UL (ref 0.05–1.2)
MONOCYTES NFR BLD: 8 % (ref 3–10)
NEUTS SEG # BLD: 3.88 K/UL (ref 1.8–8)
NEUTS SEG NFR BLD: 64.8 % (ref 40–73)
NRBC # BLD: 0 K/UL (ref 0–0.01)
NRBC BLD-RTO: 0 PER 100 WBC
PLATELET # BLD AUTO: 144 K/UL (ref 135–420)
PMV BLD AUTO: 9.1 FL (ref 9.2–11.8)
POTASSIUM SERPL-SCNC: 3.5 MMOL/L (ref 3.5–5.5)
PROT SERPL-MCNC: 6.8 G/DL (ref 6.4–8.2)
PROTHROMBIN TIME: 13.8 SEC (ref 11.9–14.9)
RBC # BLD AUTO: 4.91 M/UL (ref 4.2–5.3)
SODIUM SERPL-SCNC: 134 MMOL/L (ref 136–145)
WBC # BLD AUTO: 6 K/UL (ref 4.6–13.2)

## 2025-01-23 PROCEDURE — 36415 COLL VENOUS BLD VENIPUNCTURE: CPT

## 2025-01-23 PROCEDURE — 80076 HEPATIC FUNCTION PANEL: CPT

## 2025-01-23 PROCEDURE — 85610 PROTHROMBIN TIME: CPT

## 2025-01-23 PROCEDURE — 1123F ACP DISCUSS/DSCN MKR DOCD: CPT | Performed by: NURSE PRACTITIONER

## 2025-01-23 PROCEDURE — 3078F DIAST BP <80 MM HG: CPT | Performed by: NURSE PRACTITIONER

## 2025-01-23 PROCEDURE — 82107 ALPHA-FETOPROTEIN L3: CPT

## 2025-01-23 PROCEDURE — 3075F SYST BP GE 130 - 139MM HG: CPT | Performed by: NURSE PRACTITIONER

## 2025-01-23 PROCEDURE — 1159F MED LIST DOCD IN RCRD: CPT | Performed by: NURSE PRACTITIONER

## 2025-01-23 PROCEDURE — 99214 OFFICE O/P EST MOD 30 MIN: CPT | Performed by: NURSE PRACTITIONER

## 2025-01-23 PROCEDURE — 80048 BASIC METABOLIC PNL TOTAL CA: CPT

## 2025-01-23 PROCEDURE — 85025 COMPLETE CBC W/AUTO DIFF WBC: CPT

## 2025-01-23 PROCEDURE — 1125F AMNT PAIN NOTED PAIN PRSNT: CPT | Performed by: NURSE PRACTITIONER

## 2025-01-23 RX ORDER — TRIAMCINOLONE ACETONIDE 1 MG/G
OINTMENT TOPICAL
COMMUNITY
Start: 2025-01-16

## 2025-01-23 RX ORDER — PREDNISONE 20 MG/1
TABLET ORAL
COMMUNITY
Start: 2025-01-13 | End: 2025-01-23

## 2025-01-23 RX ORDER — AZITHROMYCIN 250 MG/1
TABLET, FILM COATED ORAL
COMMUNITY
Start: 2024-11-23 | End: 2025-01-23

## 2025-01-23 RX ORDER — BUDESONIDE AND FORMOTEROL FUMARATE DIHYDRATE 80; 4.5 UG/1; UG/1
2 AEROSOL RESPIRATORY (INHALATION) 2 TIMES DAILY
COMMUNITY
Start: 2025-01-21 | End: 2026-01-21

## 2025-01-23 RX ORDER — SULFAMETHOXAZOLE AND TRIMETHOPRIM 800; 160 MG/1; MG/1
1 TABLET ORAL 2 TIMES DAILY
COMMUNITY
Start: 2025-01-13 | End: 2025-01-23

## 2025-01-23 RX ORDER — OXYCODONE HYDROCHLORIDE 5 MG/1
5 TABLET ORAL EVERY 6 HOURS PRN
COMMUNITY
Start: 2024-10-08

## 2025-01-23 RX ORDER — CLOTRIMAZOLE 1 %
CREAM (GRAM) TOPICAL
COMMUNITY
Start: 2025-01-16

## 2025-01-23 RX ORDER — BENZONATATE 200 MG/1
CAPSULE ORAL
COMMUNITY
Start: 2024-11-23

## 2025-01-23 NOTE — PROGRESS NOTES
focal mass.  10/2022.  Ultrasound of the liver.  Coarse echotexture to the liver, as can reflect underlying liver disease/cirrhosis. No focal hepatic lesion is identified sonographically.  01/2023.  Abdominal CT.  Liver again demonstrates cirrhotic morphology. There is low attenuation indicating underlying hepatic steatosis.  08/2023. Abdominal CT.  Cirrhotic morphology of the liver is stable.   05/2024.  Ultrasound of the liver.  Echogenic and heterogeneous. No focal liver lesion.     OTHER TESTING:  Not available or performed    Follow-up [facility] in 6 months for continued monitoring.  Fibroscan assessment can be performed during that appointment.      Yimi Das, JUAN JP-C  [facility]  [address], [address]   [place] [address] [phone]

## 2025-02-06 LAB
AFP L3 MFR SERPL: NORMAL % (ref 0–9.9)
AFP SERPL-MCNC: 2.2 NG/ML (ref 0–9.2)

## 2025-03-10 ENCOUNTER — HOSPITAL ENCOUNTER (OUTPATIENT)
Facility: HOSPITAL | Age: 70
Discharge: HOME | End: 2025-03-13
Payer: MEDICAID

## 2025-03-10 DIAGNOSIS — K74.60 CIRRHOSIS OF LIVER WITHOUT ASCITES, UNSPECIFIED HEPATIC CIRRHOSIS TYPE (HCC): ICD-10-CM

## 2025-03-10 PROCEDURE — 76705 ECHO EXAM OF ABDOMEN: CPT

## 2025-03-12 ENCOUNTER — RESULTS FOLLOW-UP (OUTPATIENT)
Facility: HOSPITAL | Age: 70
End: 2025-03-12

## 2025-03-13 ENCOUNTER — TELEPHONE (OUTPATIENT)
Age: 70
End: 2025-03-13

## 2025-03-13 NOTE — TELEPHONE ENCOUNTER
----- Message from GUIDO Delatorre CNP sent at 3/12/2025  9:16 AM EDT -----  Please call with ultrasound results.  Consistent with cirrhosis.  Ho hepatic masses.  Thank you.

## 2025-05-28 NOTE — PROGRESS NOTES
HISTORY OF PRESENT ILLNESS  Jon Kanner is a 59 y.o. female. Ms. Rosenberg Ill was due to come in for repeat thyroid levels anyway, but she has been very tired recently so she really wants them checked. We adjusted her dose to 125 mcg 4 months ago. She also needs a refill of prilosec. She has a history of peptic ulcer disease and GERD and needs meloxicam because of painful knees and back. She denies melena or BRB per rectum. We talked about possible risks of long-term PPI use. She needs a refill of her rescue inhaler. She uses it once or twice a month on average. Her back is doing better since she has been having dry needling. She saw her hepatologist recently and had a good scan, and she saw GI because of IBS and diarrhea and those symptoms are improving        Review of Systems   Constitutional: Positive for malaise/fatigue. Negative for chills and fever. Eyes: Negative for blurred vision and double vision. Respiratory: Negative for cough and shortness of breath. Cardiovascular: Negative for chest pain and palpitations. Gastrointestinal: Positive for heartburn. Negative for abdominal pain, constipation, diarrhea, nausea and vomiting. Genitourinary: Negative for dysuria and urgency. Musculoskeletal: Positive for back pain and joint pain (knees). Neurological: Negative for dizziness and headaches. Psychiatric/Behavioral: Negative for depression and suicidal ideas. The patient has insomnia. The patient is not nervous/anxious. Physical Exam   Constitutional: Vital signs are normal. She appears well-developed and well-nourished. HENT:   Right Ear: Tympanic membrane and ear canal normal.   Left Ear: Tympanic membrane and ear canal normal.   Nose: Nose normal.   Mouth/Throat: Uvula is midline, oropharynx is clear and moist and mucous membranes are normal.   Eyes: Pupils are equal, round, and reactive to light. Neck: No thyromegaly present.    Cardiovascular: Normal rate, regular rhythm and normal heart sounds. Pulmonary/Chest: Effort normal and breath sounds normal. No respiratory distress. She has no wheezes. Abdominal: She exhibits no distension. There is no tenderness. There is no rebound. Lymphadenopathy:     She has no cervical adenopathy. Skin: No rash noted. Psychiatric: She has a normal mood and affect. Her behavior is normal.   Nursing note and vitals reviewed. ASSESSMENT and PLAN    ICD-10-CM ICD-9-CM    1. Hypothyroidism due to acquired atrophy of thyroid E03.4 244.8 TSH 3RD GENERATION     246.8 T4, FREE      METABOLIC PANEL, COMPREHENSIVE   2. Gastroesophageal reflux disease without esophagitis K21.9 530.81 omeprazole (PRILOSEC) 20 mg capsule      METABOLIC PANEL, COMPREHENSIVE      CBC WITH AUTOMATED DIFF   3. Mild intermittent asthma without complication P25.47 777.09 PROAIR HFA 90 mcg/actuation inhaler   4. Fatigue, unspecified type X22.31 059.61 METABOLIC PANEL, COMPREHENSIVE      CBC WITH AUTOMATED DIFF   5.  Elevated BP without diagnosis of hypertension R03.0 796.2 125

## (undated) DEVICE — KIT COLON W/ 1.1OZ LUB AND 2 END

## (undated) DEVICE — TUBING SUCT L12FT DIA0.25IN CLR W/ MAXI-GRIP AND M/M CONN

## (undated) DEVICE — FLEX ADVANTAGE 1500CC: Brand: FLEX ADVANTAGE

## (undated) DEVICE — CONTAINER PREFIL FRMLN 15ML --

## (undated) DEVICE — ENDO CARRY-ON PROCEDURE KIT INCLUDES ENZYMATIC SPONGE, GAUZE, BIOHAZARD LABEL, TRAY, LUBRICANT, DIRTY SCOPE LABEL, WATER LABEL, TRAY, DRAWSTRING PAD, AND DEFENDO 4-PIECE KIT.: Brand: ENDO CARRY-ON PROCEDURE KIT

## (undated) DEVICE — KENDALL RADIOLUCENT FOAM MONITORING ELECTRODE RECTANGULAR SHAPE: Brand: KENDALL

## (undated) DEVICE — KENDALL 500 SERIES DIAPHORETIC FOAM MONITORING ELECTRODE - TEAR DROP SHAPE ( 30/PK): Brand: KENDALL

## (undated) DEVICE — SYR 50ML SLIP TIP NSAF LF STRL --

## (undated) DEVICE — BASIN EMESIS 500CC ROSE 250/CS 60/PLT: Brand: MEDEGEN MEDICAL PRODUCTS, LLC

## (undated) DEVICE — FORCEPS BX CAP 240CM L RAD JAW 4

## (undated) DEVICE — MOUTHPIECE ENDOSCP L CTRL OPN AND SIDE PORTS DISP

## (undated) DEVICE — MEDI-VAC NON-CONDUCTIVE SUCTION TUBING: Brand: CARDINAL HEALTH

## (undated) DEVICE — FORCEPS BX L240CM JAW DIA2.8MM L CAP W/ NDL MIC MESH TOOTH

## (undated) DEVICE — DEVICE INFL 60ML 12ATM CONVENIENT LOK REL HNDL HI PRSS FLX